# Patient Record
Sex: FEMALE | Race: WHITE | Employment: OTHER | URBAN - METROPOLITAN AREA
[De-identification: names, ages, dates, MRNs, and addresses within clinical notes are randomized per-mention and may not be internally consistent; named-entity substitution may affect disease eponyms.]

---

## 2017-01-24 PROBLEM — Z94.4 LIVER TRANSPLANT STATUS (HCC): Chronic | Status: ACTIVE | Noted: 2017-01-24

## 2017-01-24 PROBLEM — J44.9 COPD (CHRONIC OBSTRUCTIVE PULMONARY DISEASE) (HCC): Chronic | Status: ACTIVE | Noted: 2017-01-24

## 2017-01-24 PROBLEM — I10 HTN (HYPERTENSION), BENIGN: Chronic | Status: ACTIVE | Noted: 2017-01-24

## 2017-01-24 PROBLEM — J18.1 LOBAR PNEUMONIA (HCC): Status: ACTIVE | Noted: 2017-01-24

## 2017-01-30 ENCOUNTER — TELEPHONE (OUTPATIENT)
Dept: PHARMACY | Facility: CLINIC | Age: 57
End: 2017-01-30

## 2017-03-10 PROBLEM — J18.1 LOBAR PNEUMONIA (HCC): Status: RESOLVED | Noted: 2017-01-24 | Resolved: 2017-03-10

## 2018-03-15 RX ORDER — PEN NEEDLE, DIABETIC 32GX 5/32"
NEEDLE, DISPOSABLE MISCELLANEOUS
Qty: 200 EACH | Refills: 1 | Status: SHIPPED | OUTPATIENT
Start: 2018-03-15 | End: 2019-01-14 | Stop reason: SDUPTHER

## 2018-03-27 ENCOUNTER — OFFICE VISIT (OUTPATIENT)
Dept: FAMILY MEDICINE CLINIC | Age: 58
End: 2018-03-27
Payer: MEDICARE

## 2018-03-27 VITALS
BODY MASS INDEX: 36.48 KG/M2 | TEMPERATURE: 98 F | OXYGEN SATURATION: 97 % | WEIGHT: 227 LBS | SYSTOLIC BLOOD PRESSURE: 122 MMHG | HEIGHT: 66 IN | RESPIRATION RATE: 18 BRPM | HEART RATE: 74 BPM | DIASTOLIC BLOOD PRESSURE: 78 MMHG

## 2018-03-27 DIAGNOSIS — G89.29 CHRONIC MIDLINE LOW BACK PAIN WITHOUT SCIATICA: Primary | ICD-10-CM

## 2018-03-27 DIAGNOSIS — M79.10 MYALGIA: ICD-10-CM

## 2018-03-27 DIAGNOSIS — M54.50 CHRONIC MIDLINE LOW BACK PAIN WITHOUT SCIATICA: Primary | ICD-10-CM

## 2018-03-27 PROCEDURE — 3014F SCREEN MAMMO DOC REV: CPT | Performed by: NURSE PRACTITIONER

## 2018-03-27 PROCEDURE — G8427 DOCREV CUR MEDS BY ELIG CLIN: HCPCS | Performed by: NURSE PRACTITIONER

## 2018-03-27 PROCEDURE — G8482 FLU IMMUNIZE ORDER/ADMIN: HCPCS | Performed by: NURSE PRACTITIONER

## 2018-03-27 PROCEDURE — 4004F PT TOBACCO SCREEN RCVD TLK: CPT | Performed by: NURSE PRACTITIONER

## 2018-03-27 PROCEDURE — 3017F COLORECTAL CA SCREEN DOC REV: CPT | Performed by: NURSE PRACTITIONER

## 2018-03-27 PROCEDURE — G8417 CALC BMI ABV UP PARAM F/U: HCPCS | Performed by: NURSE PRACTITIONER

## 2018-03-27 PROCEDURE — 99213 OFFICE O/P EST LOW 20 MIN: CPT | Performed by: NURSE PRACTITIONER

## 2018-03-27 RX ORDER — HYDROCODONE BITARTRATE AND ACETAMINOPHEN 7.5; 325 MG/1; MG/1
1 TABLET ORAL 4 TIMES DAILY PRN
Qty: 120 TABLET | Refills: 0 | Status: SHIPPED | OUTPATIENT
Start: 2018-03-27 | End: 2018-04-25 | Stop reason: SDUPTHER

## 2018-03-27 ASSESSMENT — ENCOUNTER SYMPTOMS
VOMITING: 1
CONSTIPATION: 0
COUGH: 0
BACK PAIN: 1
NAUSEA: 1
SHORTNESS OF BREATH: 0
WHEEZING: 0
DIARRHEA: 0

## 2018-03-27 NOTE — PROGRESS NOTES
well-nourished. No distress. HENT:   Head: Normocephalic and atraumatic. Eyes: EOM are normal. Pupils are equal, round, and reactive to light. Neck: No tracheal deviation present. No thyromegaly present. Cardiovascular: Normal rate, regular rhythm and intact distal pulses. No murmur heard. Pulmonary/Chest: Effort normal and breath sounds normal. She has no wheezes. She has no rales. She exhibits no tenderness. Abdominal: Soft. Bowel sounds are normal. There is no tenderness. Musculoskeletal: She exhibits tenderness. Pain to lumbar spine with left SI tenderness. ROM limited due to pain. Negative SLR on left, positive SLR on right causing contralateral pain. Upper and lower extremities equal and strong. Uses cane for ambulation   Lymphadenopathy:     She has no cervical adenopathy. Neurological: She is alert and oriented to person, place, and time. No cranial nerve deficit. Coordination normal.   Skin: Skin is warm and dry. Psychiatric: She has a normal mood and affect. Her behavior is normal.         Assessment/Plan:  Amaris Pang was seen today for back pain and muscle pain. Diagnoses and all orders for this visit:    Chronic midline low back pain with sciatica  -     HYDROcodone-acetaminophen (NORCO) 7.5-325 MG per tablet; Take 1 tablet by mouth 4 times daily as needed for Pain for up to 30 days. Earliest Fill Date: 3/27/18  -The current medical regimen is effective;  continue present plan and medications. Myalgia    -stop lipitor for one week to see if myalgias resolve  -notify office of results    -Controlled Substances Monitoring: The Prescription Monitoring Report for this patient was reviewed today. Viv Gonzalez CNP)    No signs of potential drug abuse or diversion identified.  Viv Gonzalez CNP)

## 2018-04-09 ENCOUNTER — HOSPITAL ENCOUNTER (OUTPATIENT)
Age: 58
Discharge: HOME | End: 2018-04-09
Payer: MEDICARE

## 2018-04-09 LAB
ALBUMIN SERPL-MCNC: 4.5 G/DL (ref 3.5–5.2)
ALP BLD-CCNC: 160 U/L (ref 35–104)
ALT SERPL-CCNC: 10 U/L (ref 0–32)
ANION GAP SERPL CALCULATED.3IONS-SCNC: 13 MMOL/L (ref 7–16)
AST SERPL-CCNC: 13 U/L (ref 0–31)
BASOPHILS ABSOLUTE: 0.06 E9/L (ref 0–0.2)
BASOPHILS RELATIVE PERCENT: 0.9 % (ref 0–2)
BILIRUB SERPL-MCNC: 0.4 MG/DL (ref 0–1.2)
BUN BLDV-MCNC: 26 MG/DL (ref 6–20)
CALCIUM SERPL-MCNC: 9.1 MG/DL (ref 8.6–10.2)
CHLORIDE BLD-SCNC: 99 MMOL/L (ref 98–107)
CO2: 27 MMOL/L (ref 22–29)
CREAT SERPL-MCNC: 1.5 MG/DL (ref 0.5–1)
EOSINOPHILS ABSOLUTE: 0.17 E9/L (ref 0.05–0.5)
EOSINOPHILS RELATIVE PERCENT: 2.6 % (ref 0–6)
GAMMA GLUTAMYL TRANSFERASE: 14 U/L (ref 6–42)
GFR AFRICAN AMERICAN: 43
GFR NON-AFRICAN AMERICAN: 36 ML/MIN/1.73
GLUCOSE BLD-MCNC: 171 MG/DL (ref 74–109)
HCT VFR BLD CALC: 37.4 % (ref 34–48)
HEMOGLOBIN: 12.4 G/DL (ref 11.5–15.5)
IMMATURE GRANULOCYTES #: 0.02 E9/L
IMMATURE GRANULOCYTES %: 0.3 % (ref 0–5)
LYMPHOCYTES ABSOLUTE: 1.11 E9/L (ref 1.5–4)
LYMPHOCYTES RELATIVE PERCENT: 16.7 % (ref 20–42)
MAGNESIUM: 1.8 MG/DL (ref 1.6–2.6)
MCH RBC QN AUTO: 28.6 PG (ref 26–35)
MCHC RBC AUTO-ENTMCNC: 33.2 % (ref 32–34.5)
MCV RBC AUTO: 86.4 FL (ref 80–99.9)
MONOCYTES ABSOLUTE: 0.66 E9/L (ref 0.1–0.95)
MONOCYTES RELATIVE PERCENT: 9.9 % (ref 2–12)
NEUTROPHILS ABSOLUTE: 4.62 E9/L (ref 1.8–7.3)
NEUTROPHILS RELATIVE PERCENT: 69.6 % (ref 43–80)
PDW BLD-RTO: 13.9 FL (ref 11.5–15)
PHOSPHORUS: 4.6 MG/DL (ref 2.5–4.5)
PLATELET # BLD: 191 E9/L (ref 130–450)
PMV BLD AUTO: 11.1 FL (ref 7–12)
POTASSIUM SERPL-SCNC: 4.6 MMOL/L (ref 3.5–5)
RBC # BLD: 4.33 E12/L (ref 3.5–5.5)
SODIUM BLD-SCNC: 139 MMOL/L (ref 132–146)
TOTAL PROTEIN: 7.1 G/DL (ref 6.4–8.3)
WBC # BLD: 6.6 E9/L (ref 4.5–11.5)

## 2018-04-09 PROCEDURE — 85025 COMPLETE CBC W/AUTO DIFF WBC: CPT

## 2018-04-09 PROCEDURE — 82977 ASSAY OF GGT: CPT

## 2018-04-09 PROCEDURE — 83735 ASSAY OF MAGNESIUM: CPT

## 2018-04-09 PROCEDURE — 36415 COLL VENOUS BLD VENIPUNCTURE: CPT

## 2018-04-09 PROCEDURE — 84100 ASSAY OF PHOSPHORUS: CPT

## 2018-04-09 PROCEDURE — 80053 COMPREHEN METABOLIC PANEL: CPT

## 2018-04-25 ENCOUNTER — OFFICE VISIT (OUTPATIENT)
Dept: FAMILY MEDICINE CLINIC | Age: 58
End: 2018-04-25
Payer: MEDICARE

## 2018-04-25 ENCOUNTER — HOSPITAL ENCOUNTER (OUTPATIENT)
Age: 58
Discharge: HOME | End: 2018-04-27
Payer: MEDICARE

## 2018-04-25 VITALS
HEIGHT: 66 IN | OXYGEN SATURATION: 97 % | DIASTOLIC BLOOD PRESSURE: 78 MMHG | HEART RATE: 98 BPM | TEMPERATURE: 98.2 F | BODY MASS INDEX: 35.52 KG/M2 | WEIGHT: 221 LBS | SYSTOLIC BLOOD PRESSURE: 136 MMHG | RESPIRATION RATE: 18 BRPM

## 2018-04-25 DIAGNOSIS — M54.50 CHRONIC MIDLINE LOW BACK PAIN WITHOUT SCIATICA: Primary | ICD-10-CM

## 2018-04-25 DIAGNOSIS — G89.29 CHRONIC MIDLINE LOW BACK PAIN WITHOUT SCIATICA: Primary | ICD-10-CM

## 2018-04-25 LAB
CREATININE URINE: 46 MG/DL (ref 29–226)
HBA1C MFR BLD: 7.1 %
MICROALBUMIN UR-MCNC: 29 MG/L
MICROALBUMIN/CREAT UR-RTO: 63 (ref 0–30)

## 2018-04-25 PROCEDURE — G8427 DOCREV CUR MEDS BY ELIG CLIN: HCPCS | Performed by: NURSE PRACTITIONER

## 2018-04-25 PROCEDURE — 99213 OFFICE O/P EST LOW 20 MIN: CPT | Performed by: NURSE PRACTITIONER

## 2018-04-25 PROCEDURE — 4004F PT TOBACCO SCREEN RCVD TLK: CPT | Performed by: NURSE PRACTITIONER

## 2018-04-25 PROCEDURE — 3045F PR MOST RECENT HEMOGLOBIN A1C LEVEL 7.0-9.0%: CPT | Performed by: NURSE PRACTITIONER

## 2018-04-25 PROCEDURE — 2022F DILAT RTA XM EVC RTNOPTHY: CPT | Performed by: NURSE PRACTITIONER

## 2018-04-25 PROCEDURE — 83036 HEMOGLOBIN GLYCOSYLATED A1C: CPT | Performed by: NURSE PRACTITIONER

## 2018-04-25 PROCEDURE — 82570 ASSAY OF URINE CREATININE: CPT

## 2018-04-25 PROCEDURE — 3017F COLORECTAL CA SCREEN DOC REV: CPT | Performed by: NURSE PRACTITIONER

## 2018-04-25 PROCEDURE — G8417 CALC BMI ABV UP PARAM F/U: HCPCS | Performed by: NURSE PRACTITIONER

## 2018-04-25 PROCEDURE — 82044 UR ALBUMIN SEMIQUANTITATIVE: CPT

## 2018-04-25 RX ORDER — HYDROCODONE BITARTRATE AND ACETAMINOPHEN 7.5; 325 MG/1; MG/1
1 TABLET ORAL 4 TIMES DAILY PRN
Qty: 120 TABLET | Refills: 0 | Status: SHIPPED | OUTPATIENT
Start: 2018-04-25 | End: 2018-05-22 | Stop reason: SDUPTHER

## 2018-04-25 ASSESSMENT — ENCOUNTER SYMPTOMS
VOMITING: 0
CONSTIPATION: 0
NAUSEA: 0
COUGH: 0
WHEEZING: 0
DIARRHEA: 0
BACK PAIN: 1
SHORTNESS OF BREATH: 1

## 2018-05-01 ENCOUNTER — TELEPHONE (OUTPATIENT)
Dept: FAMILY MEDICINE CLINIC | Age: 58
End: 2018-05-01

## 2018-05-01 RX ORDER — INSULIN GLARGINE 100 [IU]/ML
10 INJECTION, SOLUTION SUBCUTANEOUS NIGHTLY
Qty: 5 PEN | Refills: 3
Start: 2018-05-01 | End: 2018-05-22 | Stop reason: SDUPTHER

## 2018-05-22 ENCOUNTER — OFFICE VISIT (OUTPATIENT)
Dept: FAMILY MEDICINE CLINIC | Age: 58
End: 2018-05-22
Payer: MEDICARE

## 2018-05-22 VITALS
TEMPERATURE: 98.7 F | OXYGEN SATURATION: 98 % | HEART RATE: 70 BPM | HEIGHT: 66 IN | RESPIRATION RATE: 16 BRPM | DIASTOLIC BLOOD PRESSURE: 82 MMHG | BODY MASS INDEX: 34.23 KG/M2 | SYSTOLIC BLOOD PRESSURE: 124 MMHG | WEIGHT: 213 LBS

## 2018-05-22 DIAGNOSIS — Z71.6 TOBACCO ABUSE COUNSELING: ICD-10-CM

## 2018-05-22 DIAGNOSIS — R06.02 SHORTNESS OF BREATH: ICD-10-CM

## 2018-05-22 DIAGNOSIS — Z94.4 STATUS POST LIVER TRANSPLANTATION (HCC): Chronic | ICD-10-CM

## 2018-05-22 DIAGNOSIS — M54.50 CHRONIC MIDLINE LOW BACK PAIN WITHOUT SCIATICA: Primary | ICD-10-CM

## 2018-05-22 DIAGNOSIS — Q21.12 PFO (PATENT FORAMEN OVALE): Chronic | ICD-10-CM

## 2018-05-22 DIAGNOSIS — G89.29 CHRONIC MIDLINE LOW BACK PAIN WITHOUT SCIATICA: Primary | ICD-10-CM

## 2018-05-22 DIAGNOSIS — K21.9 GASTROESOPHAGEAL REFLUX DISEASE, ESOPHAGITIS PRESENCE NOT SPECIFIED: Chronic | ICD-10-CM

## 2018-05-22 PROCEDURE — 99213 OFFICE O/P EST LOW 20 MIN: CPT | Performed by: NURSE PRACTITIONER

## 2018-05-22 PROCEDURE — G8417 CALC BMI ABV UP PARAM F/U: HCPCS | Performed by: NURSE PRACTITIONER

## 2018-05-22 PROCEDURE — 2022F DILAT RTA XM EVC RTNOPTHY: CPT | Performed by: NURSE PRACTITIONER

## 2018-05-22 PROCEDURE — 3045F PR MOST RECENT HEMOGLOBIN A1C LEVEL 7.0-9.0%: CPT | Performed by: NURSE PRACTITIONER

## 2018-05-22 PROCEDURE — G8427 DOCREV CUR MEDS BY ELIG CLIN: HCPCS | Performed by: NURSE PRACTITIONER

## 2018-05-22 PROCEDURE — 4004F PT TOBACCO SCREEN RCVD TLK: CPT | Performed by: NURSE PRACTITIONER

## 2018-05-22 PROCEDURE — 3017F COLORECTAL CA SCREEN DOC REV: CPT | Performed by: NURSE PRACTITIONER

## 2018-05-22 RX ORDER — INSULIN GLARGINE 100 [IU]/ML
10 INJECTION, SOLUTION SUBCUTANEOUS NIGHTLY
Qty: 15 PEN | Refills: 1 | Status: SHIPPED | OUTPATIENT
Start: 2018-05-22 | End: 2018-11-21 | Stop reason: SDUPTHER

## 2018-05-22 RX ORDER — FUROSEMIDE 40 MG/1
TABLET ORAL
Qty: 180 TABLET | Refills: 2 | Status: SHIPPED | OUTPATIENT
Start: 2018-05-22 | End: 2018-10-19 | Stop reason: ALTCHOICE

## 2018-05-22 RX ORDER — ATORVASTATIN CALCIUM 10 MG/1
10 TABLET, FILM COATED ORAL DAILY
Qty: 90 TABLET | Refills: 3 | Status: SHIPPED | OUTPATIENT
Start: 2018-05-22 | End: 2018-11-21 | Stop reason: SDUPTHER

## 2018-05-22 RX ORDER — HYDROCODONE BITARTRATE AND ACETAMINOPHEN 7.5; 325 MG/1; MG/1
1 TABLET ORAL 4 TIMES DAILY PRN
Qty: 120 TABLET | Refills: 0 | Status: SHIPPED | OUTPATIENT
Start: 2018-05-22 | End: 2018-06-20 | Stop reason: SDUPTHER

## 2018-05-22 RX ORDER — BUPROPION HYDROCHLORIDE 150 MG/1
TABLET, EXTENDED RELEASE ORAL
Qty: 180 TABLET | Refills: 1 | Status: SHIPPED | OUTPATIENT
Start: 2018-05-22 | End: 2018-10-12 | Stop reason: SDUPTHER

## 2018-05-22 RX ORDER — ALBUTEROL SULFATE 90 UG/1
AEROSOL, METERED RESPIRATORY (INHALATION)
Qty: 3 INHALER | Refills: 3 | Status: SHIPPED
Start: 2018-05-22 | End: 2020-06-01 | Stop reason: SDUPTHER

## 2018-05-22 RX ORDER — OMEPRAZOLE 40 MG/1
CAPSULE, DELAYED RELEASE ORAL
Qty: 90 CAPSULE | Refills: 2 | Status: SHIPPED | OUTPATIENT
Start: 2018-05-22 | End: 2018-12-27 | Stop reason: SDUPTHER

## 2018-05-22 RX ORDER — SULFAMETHOXAZOLE AND TRIMETHOPRIM 400; 80 MG/1; MG/1
1 TABLET ORAL DAILY
Qty: 90 TABLET | Refills: 3 | Status: SHIPPED | OUTPATIENT
Start: 2018-05-22 | End: 2019-05-29

## 2018-05-22 ASSESSMENT — ENCOUNTER SYMPTOMS
NAUSEA: 0
VOMITING: 0
CONSTIPATION: 0
BACK PAIN: 1
BLURRED VISION: 1
WHEEZING: 0
SHORTNESS OF BREATH: 1
DIARRHEA: 0
COUGH: 0
HEARTBURN: 1

## 2018-06-11 ENCOUNTER — HOSPITAL ENCOUNTER (OUTPATIENT)
Age: 58
Discharge: HOME | End: 2018-06-11
Payer: MEDICARE

## 2018-06-11 LAB
ALBUMIN SERPL-MCNC: 4.3 G/DL (ref 3.5–5.2)
ALP BLD-CCNC: 121 U/L (ref 35–104)
ALT SERPL-CCNC: 12 U/L (ref 0–32)
ANION GAP SERPL CALCULATED.3IONS-SCNC: 15 MMOL/L (ref 7–16)
AST SERPL-CCNC: 14 U/L (ref 0–31)
BASOPHILS ABSOLUTE: 0.04 E9/L (ref 0–0.2)
BASOPHILS RELATIVE PERCENT: 0.7 % (ref 0–2)
BILIRUB SERPL-MCNC: 0.3 MG/DL (ref 0–1.2)
BUN BLDV-MCNC: 25 MG/DL (ref 6–20)
CALCIUM SERPL-MCNC: 8 MG/DL (ref 8.6–10.2)
CHLORIDE BLD-SCNC: 102 MMOL/L (ref 98–107)
CHOLESTEROL, TOTAL: 141 MG/DL (ref 0–199)
CO2: 23 MMOL/L (ref 22–29)
CREAT SERPL-MCNC: 1.9 MG/DL (ref 0.5–1)
EOSINOPHILS ABSOLUTE: 0.22 E9/L (ref 0.05–0.5)
EOSINOPHILS RELATIVE PERCENT: 3.9 % (ref 0–6)
GAMMA GLUTAMYL TRANSFERASE: 13 U/L (ref 6–42)
GFR AFRICAN AMERICAN: 33
GFR NON-AFRICAN AMERICAN: 27 ML/MIN/1.73
GLUCOSE BLD-MCNC: 129 MG/DL (ref 74–109)
HCT VFR BLD CALC: 36 % (ref 34–48)
HDLC SERPL-MCNC: 46 MG/DL
HEMOGLOBIN: 12.1 G/DL (ref 11.5–15.5)
IMMATURE GRANULOCYTES #: 0.02 E9/L
IMMATURE GRANULOCYTES %: 0.4 % (ref 0–5)
LDL CHOLESTEROL CALCULATED: 60 MG/DL (ref 0–99)
LYMPHOCYTES ABSOLUTE: 1.07 E9/L (ref 1.5–4)
LYMPHOCYTES RELATIVE PERCENT: 18.9 % (ref 20–42)
MAGNESIUM: 1.2 MG/DL (ref 1.6–2.6)
MCH RBC QN AUTO: 29.4 PG (ref 26–35)
MCHC RBC AUTO-ENTMCNC: 33.6 % (ref 32–34.5)
MCV RBC AUTO: 87.6 FL (ref 80–99.9)
MONOCYTES ABSOLUTE: 0.47 E9/L (ref 0.1–0.95)
MONOCYTES RELATIVE PERCENT: 8.3 % (ref 2–12)
NEUTROPHILS ABSOLUTE: 3.84 E9/L (ref 1.8–7.3)
NEUTROPHILS RELATIVE PERCENT: 67.8 % (ref 43–80)
PDW BLD-RTO: 14.7 FL (ref 11.5–15)
PHOSPHORUS: 4.3 MG/DL (ref 2.5–4.5)
PLATELET # BLD: 184 E9/L (ref 130–450)
PMV BLD AUTO: 10.2 FL (ref 7–12)
POTASSIUM SERPL-SCNC: 4.2 MMOL/L (ref 3.5–5)
RBC # BLD: 4.11 E12/L (ref 3.5–5.5)
SODIUM BLD-SCNC: 140 MMOL/L (ref 132–146)
TOTAL PROTEIN: 6.7 G/DL (ref 6.4–8.3)
TRIGL SERPL-MCNC: 174 MG/DL (ref 0–149)
VLDLC SERPL CALC-MCNC: 35 MG/DL
WBC # BLD: 5.7 E9/L (ref 4.5–11.5)

## 2018-06-11 PROCEDURE — 87522 HEPATITIS C REVRS TRNSCRPJ: CPT

## 2018-06-11 PROCEDURE — 36415 COLL VENOUS BLD VENIPUNCTURE: CPT

## 2018-06-11 PROCEDURE — 80061 LIPID PANEL: CPT

## 2018-06-11 PROCEDURE — 82977 ASSAY OF GGT: CPT

## 2018-06-11 PROCEDURE — 84100 ASSAY OF PHOSPHORUS: CPT

## 2018-06-11 PROCEDURE — 85025 COMPLETE CBC W/AUTO DIFF WBC: CPT

## 2018-06-11 PROCEDURE — 80053 COMPREHEN METABOLIC PANEL: CPT

## 2018-06-11 PROCEDURE — 83735 ASSAY OF MAGNESIUM: CPT

## 2018-06-14 LAB
EER HCV RNA QNT PCR: NORMAL
HCV RNA QNT REAL-TIME PCR INTERP: NOT DETECTED
HCV RNA, QUANTITATIVE REAL TIME PCR: <1.2 LOG IU
HEPATITIS C RNA PCR QUANT: <15 IU/ML

## 2018-06-20 ENCOUNTER — OFFICE VISIT (OUTPATIENT)
Dept: FAMILY MEDICINE CLINIC | Age: 58
End: 2018-06-20
Payer: MEDICARE

## 2018-06-20 VITALS
SYSTOLIC BLOOD PRESSURE: 124 MMHG | TEMPERATURE: 98.2 F | WEIGHT: 213 LBS | BODY MASS INDEX: 34.23 KG/M2 | HEIGHT: 66 IN | DIASTOLIC BLOOD PRESSURE: 82 MMHG | OXYGEN SATURATION: 98 % | HEART RATE: 76 BPM | RESPIRATION RATE: 16 BRPM

## 2018-06-20 DIAGNOSIS — G89.29 CHRONIC MIDLINE LOW BACK PAIN WITHOUT SCIATICA: Primary | ICD-10-CM

## 2018-06-20 DIAGNOSIS — R42 POSITIONAL LIGHTHEADEDNESS: ICD-10-CM

## 2018-06-20 DIAGNOSIS — M54.50 CHRONIC MIDLINE LOW BACK PAIN WITHOUT SCIATICA: Primary | ICD-10-CM

## 2018-06-20 PROCEDURE — G8427 DOCREV CUR MEDS BY ELIG CLIN: HCPCS | Performed by: NURSE PRACTITIONER

## 2018-06-20 PROCEDURE — 3017F COLORECTAL CA SCREEN DOC REV: CPT | Performed by: NURSE PRACTITIONER

## 2018-06-20 PROCEDURE — 4004F PT TOBACCO SCREEN RCVD TLK: CPT | Performed by: NURSE PRACTITIONER

## 2018-06-20 PROCEDURE — 99213 OFFICE O/P EST LOW 20 MIN: CPT | Performed by: NURSE PRACTITIONER

## 2018-06-20 PROCEDURE — G8417 CALC BMI ABV UP PARAM F/U: HCPCS | Performed by: NURSE PRACTITIONER

## 2018-06-20 RX ORDER — HYDROCODONE BITARTRATE AND ACETAMINOPHEN 7.5; 325 MG/1; MG/1
1 TABLET ORAL 4 TIMES DAILY PRN
Qty: 120 TABLET | Refills: 0 | Status: SHIPPED | OUTPATIENT
Start: 2018-06-20 | End: 2018-07-20 | Stop reason: SDUPTHER

## 2018-06-20 ASSESSMENT — ENCOUNTER SYMPTOMS
BLURRED VISION: 0
COUGH: 0
DOUBLE VISION: 0
DIARRHEA: 0
NAUSEA: 0
WHEEZING: 0
VOMITING: 0
HEARTBURN: 0
SHORTNESS OF BREATH: 0
BACK PAIN: 1
CONSTIPATION: 0

## 2018-06-20 ASSESSMENT — PATIENT HEALTH QUESTIONNAIRE - PHQ9
SUM OF ALL RESPONSES TO PHQ9 QUESTIONS 1 & 2: 0
2. FEELING DOWN, DEPRESSED OR HOPELESS: 0
SUM OF ALL RESPONSES TO PHQ QUESTIONS 1-9: 0
1. LITTLE INTEREST OR PLEASURE IN DOING THINGS: 0

## 2018-06-28 LAB
Lab: NORMAL
REPORT: NORMAL
THIS TEST SENT TO: NORMAL

## 2018-07-06 LAB — DIABETIC RETINOPATHY: NORMAL

## 2018-07-20 ENCOUNTER — OFFICE VISIT (OUTPATIENT)
Dept: FAMILY MEDICINE CLINIC | Age: 58
End: 2018-07-20
Payer: MEDICARE

## 2018-07-20 ENCOUNTER — HOSPITAL ENCOUNTER (OUTPATIENT)
Age: 58
Discharge: HOME | End: 2018-07-22
Payer: MEDICARE

## 2018-07-20 VITALS
HEIGHT: 66 IN | TEMPERATURE: 98 F | SYSTOLIC BLOOD PRESSURE: 170 MMHG | WEIGHT: 210 LBS | DIASTOLIC BLOOD PRESSURE: 102 MMHG | BODY MASS INDEX: 33.75 KG/M2 | RESPIRATION RATE: 16 BRPM | OXYGEN SATURATION: 98 % | HEART RATE: 66 BPM

## 2018-07-20 DIAGNOSIS — I10 HTN (HYPERTENSION), BENIGN: Chronic | ICD-10-CM

## 2018-07-20 DIAGNOSIS — G89.29 CHRONIC MIDLINE LOW BACK PAIN WITHOUT SCIATICA: Primary | ICD-10-CM

## 2018-07-20 DIAGNOSIS — Z51.81 THERAPEUTIC DRUG MONITORING: ICD-10-CM

## 2018-07-20 DIAGNOSIS — R11.2 NAUSEA AND VOMITING, INTRACTABILITY OF VOMITING NOT SPECIFIED, UNSPECIFIED VOMITING TYPE: ICD-10-CM

## 2018-07-20 DIAGNOSIS — M54.50 CHRONIC MIDLINE LOW BACK PAIN WITHOUT SCIATICA: Primary | ICD-10-CM

## 2018-07-20 LAB
AMPHETAMINE SCREEN, URINE: NOT DETECTED
BARBITURATE SCREEN URINE: NOT DETECTED
BENZODIAZEPINE SCREEN, URINE: NOT DETECTED
CANNABINOID SCREEN URINE: POSITIVE
COCAINE METABOLITE SCREEN URINE: NOT DETECTED
HBA1C MFR BLD: 7.9 %
METHADONE SCREEN, URINE: NOT DETECTED
OPIATE SCREEN URINE: NOT DETECTED
PHENCYCLIDINE SCREEN URINE: NOT DETECTED
PROPOXYPHENE SCREEN: NOT DETECTED

## 2018-07-20 PROCEDURE — 80307 DRUG TEST PRSMV CHEM ANLYZR: CPT

## 2018-07-20 PROCEDURE — 4004F PT TOBACCO SCREEN RCVD TLK: CPT | Performed by: NURSE PRACTITIONER

## 2018-07-20 PROCEDURE — 3017F COLORECTAL CA SCREEN DOC REV: CPT | Performed by: NURSE PRACTITIONER

## 2018-07-20 PROCEDURE — G0480 DRUG TEST DEF 1-7 CLASSES: HCPCS

## 2018-07-20 PROCEDURE — 99214 OFFICE O/P EST MOD 30 MIN: CPT | Performed by: NURSE PRACTITIONER

## 2018-07-20 PROCEDURE — G8427 DOCREV CUR MEDS BY ELIG CLIN: HCPCS | Performed by: NURSE PRACTITIONER

## 2018-07-20 PROCEDURE — G8417 CALC BMI ABV UP PARAM F/U: HCPCS | Performed by: NURSE PRACTITIONER

## 2018-07-20 PROCEDURE — 83036 HEMOGLOBIN GLYCOSYLATED A1C: CPT | Performed by: NURSE PRACTITIONER

## 2018-07-20 PROCEDURE — 3045F PR MOST RECENT HEMOGLOBIN A1C LEVEL 7.0-9.0%: CPT | Performed by: NURSE PRACTITIONER

## 2018-07-20 PROCEDURE — 2022F DILAT RTA XM EVC RTNOPTHY: CPT | Performed by: NURSE PRACTITIONER

## 2018-07-20 RX ORDER — LOSARTAN POTASSIUM 50 MG/1
50 TABLET ORAL DAILY
Qty: 90 TABLET | Refills: 3 | Status: SHIPPED | OUTPATIENT
Start: 2018-07-20 | End: 2018-09-21 | Stop reason: SDUPTHER

## 2018-07-20 RX ORDER — ONDANSETRON 4 MG/1
4 TABLET, FILM COATED ORAL EVERY 8 HOURS PRN
Qty: 15 TABLET | Refills: 0 | Status: SHIPPED | OUTPATIENT
Start: 2018-07-20 | End: 2018-07-27 | Stop reason: SDUPTHER

## 2018-07-20 RX ORDER — HYDROCODONE BITARTRATE AND ACETAMINOPHEN 7.5; 325 MG/1; MG/1
1 TABLET ORAL 4 TIMES DAILY PRN
Qty: 120 TABLET | Refills: 0 | Status: SHIPPED | OUTPATIENT
Start: 2018-07-20 | End: 2018-08-21 | Stop reason: SDUPTHER

## 2018-07-20 ASSESSMENT — ENCOUNTER SYMPTOMS
DIARRHEA: 0
COUGH: 0
WHEEZING: 0
BACK PAIN: 1
NAUSEA: 1
SHORTNESS OF BREATH: 1
CONSTIPATION: 0
VOMITING: 1

## 2018-07-20 NOTE — PROGRESS NOTES
HPI:  Patient comes in today for   Chief Complaint   Patient presents with    Back Pain     low back pain radiates down legs. pain is 8/10. pt needs refill of meds    Diabetes     A1C today   . Chronic low back pain that radiates down legs, right worse than left. Aggravated by bending for long periods of time. Uses cane for ambulation. Patient is compliant with medications, diet and exercise. Her exercise ability is limited but she does walk and uses an elliptical. Her FBS has been < 150 mg/dl     Patient was on BP medication for one month but then it was DC'd due to low BP 2 months ago. She was monitoring her BP at home and it was 120s/70s    Complains of nausea and vomiting for past 2 days. States she has had difficulty even keeping liquids down. Prior to Visit Medications    Medication Sig Taking? Authorizing Provider   HYDROcodone-acetaminophen (NORCO) 7.5-325 MG per tablet Take 1 tablet by mouth 4 times daily as needed for Pain for up to 30 days. Margie Harmonthers Date: 7/20/18 Yes Tiffany Briscoe APRN - CNP   losartan (COZAAR) 50 MG tablet Take 1 tablet by mouth daily Yes Tiffany Briscoe APRN - CNP   ondansetron (ZOFRAN) 4 MG tablet Take 1 tablet by mouth every 8 hours as needed for Nausea or Vomiting Yes Tiffany Briscoe APRN - CNP   buPROPion (WELLBUTRIN SR) 150 MG extended release tablet TAKE 1 TABLET BY MOUTH TWICE DAILY Yes MIKEL Regalado - CNP   LANTUS SOLOSTAR 100 UNIT/ML injection pen Inject 10 Units into the skin nightly Yes MIKEL Fontana CNP   omeprazole (PRILOSEC) 40 MG delayed release capsule TAKE 1 CAPSULE BY MOUTH EVERY DAY Yes MIKEL Regalado CNP   Liraglutide (VICTOZA) 18 MG/3ML SOPN SC injection Inject 1.8 mg/day Yes MIKEL Regalado CNP   atorvastatin (LIPITOR) 10 MG tablet Take 1 tablet by mouth daily Yes MIKEL Regalado CNP   furosemide (LASIX) 40 MG tablet TAKE 2 TABLETS BY MOUTH EVERY DAY Yes MIKEL Denson - CNP   albuterol sulfate improving). Negative for cough and wheezing. Cardiovascular: Negative for chest pain and palpitations. Gastrointestinal: Positive for nausea and vomiting. Negative for constipation and diarrhea. Musculoskeletal: Positive for back pain and myalgias. Neurological: Negative for dizziness, tingling, weakness and headaches. Endo/Heme/Allergies: Negative for polydipsia. VS:  BP (!) 170/102   Pulse 66   Temp 98 °F (36.7 °C)   Resp 16   Ht 5' 6\" (1.676 m)   Wt 210 lb (95.3 kg)   SpO2 98%   BMI 33.89 kg/m²     Physical Exam  Physical Exam   Constitutional: She is oriented to person, place, and time. She appears well-developed and well-nourished. No distress. HENT:   Head: Normocephalic and atraumatic. Neck: No tracheal deviation present. No thyromegaly present. Cardiovascular: Normal rate, regular rhythm and intact distal pulses. No murmur heard. Pulmonary/Chest: Effort normal and breath sounds normal. She has no wheezes. She has no rales. She exhibits no tenderness. Abdominal: Soft. Bowel sounds are normal. There is no tenderness. Musculoskeletal: She exhibits edema (trace pitting edema to BLE) and tenderness. Pain to lumbar spine and right SI.  ROM limited due to pain. Negative SLR bilateral. Upper and lower extremities equal and strong. Lymphadenopathy:     She has no cervical adenopathy. Neurological: She is alert and oriented to person, place, and time. Skin: Skin is warm and dry. Psychiatric: She has a normal mood and affect. Her behavior is normal.         Assessment/Plan:  [facility] and diabetes. Diagnoses and all orders for this visit:    Chronic midline low back pain with sciatica  -     HYDROcodone-acetaminophen (NORCO) 7.5-325 MG per tablet; Take 1 tablet by mouth 4 times daily as needed for Pain for up to 30 days. Diogenes Savage Date: 7/20/18  -The current medical regimen is effective;  continue present plan and medications. Uncontrolled type 2 diabetes mellitus without complication, with long-term current use of insulin (HCC)  -     POCT glycosylated hemoglobin (Hb A1C)  The current medical regimen is effective;  continue present plan and medications. Nausea and vomiting, intractability of vomiting not specified, unspecified vomiting type  -     ondansetron (ZOFRAN) 4 MG tablet; Take 1 tablet by mouth every 8 hours as needed for Nausea or Vomiting  -if persists, advised to go to ER    HTN (hypertension), benign  -     losartan (COZAAR) 50 MG tablet; Take 1 tablet by mouth daily  -restart losartan and monitor BP at home with goal of < 140/90    Therapeutic drug monitoring  -     URINE DRUG SCREEN    -  Controlled Substances Monitoring:     RX Monitoring 7/20/2018   Attestation The Prescription Monitoring Report for this patient was reviewed today. Documentation No signs of potential drug abuse or diversion identified.;Obtaining appropriate analgesic effect of treatment.    Medication Contracts -         Greater than 25  Minutes was spent with patient and more than 50% of the time was spent face to face counseling and educating regarding diagnoses

## 2018-07-25 LAB
6AM URINE: <10 NG/ML
CODEINE, URINE: <20 NG/ML
HYDROCODONE, URINE: 188 NG/ML
HYDROMORPHONE, URINE: <20 NG/ML
MORPHINE URINE: <20 NG/ML
NORHYDROCODONE, URINE: 240 NG/ML
NOROXYCODONE, URINE: <20 NG/ML
NOROXYMORPHONE, URINE: <20 NG/ML
OXYCODONE, URINE CONFIRMATION: <20 NG/ML
OXYMORPHONE, URINE: <20 NG/ML

## 2018-07-26 LAB — CANNABINOIDS CONF, URINE: 43 NG/ML

## 2018-07-27 DIAGNOSIS — R11.2 NAUSEA AND VOMITING, INTRACTABILITY OF VOMITING NOT SPECIFIED, UNSPECIFIED VOMITING TYPE: ICD-10-CM

## 2018-07-27 RX ORDER — ONDANSETRON 4 MG/1
4 TABLET, FILM COATED ORAL EVERY 8 HOURS PRN
Qty: 15 TABLET | Refills: 0 | Status: SHIPPED | OUTPATIENT
Start: 2018-07-27 | End: 2018-10-19

## 2018-08-13 ENCOUNTER — HOSPITAL ENCOUNTER (OUTPATIENT)
Age: 58
Discharge: HOME | End: 2018-08-13
Payer: MEDICARE

## 2018-08-13 LAB
ALBUMIN SERPL-MCNC: 4.5 G/DL (ref 3.5–5.2)
ALP BLD-CCNC: 122 U/L (ref 35–104)
ALT SERPL-CCNC: 8 U/L (ref 0–32)
ANION GAP SERPL CALCULATED.3IONS-SCNC: 14 MMOL/L (ref 7–16)
AST SERPL-CCNC: 10 U/L (ref 0–31)
BASOPHILS ABSOLUTE: 0.04 E9/L (ref 0–0.2)
BASOPHILS RELATIVE PERCENT: 0.6 % (ref 0–2)
BILIRUB SERPL-MCNC: 0.5 MG/DL (ref 0–1.2)
BUN BLDV-MCNC: 31 MG/DL (ref 6–20)
CALCIUM SERPL-MCNC: 8.3 MG/DL (ref 8.6–10.2)
CHLORIDE BLD-SCNC: 98 MMOL/L (ref 98–107)
CO2: 27 MMOL/L (ref 22–29)
CREAT SERPL-MCNC: 2.1 MG/DL (ref 0.5–1)
EOSINOPHILS ABSOLUTE: 0.23 E9/L (ref 0.05–0.5)
EOSINOPHILS RELATIVE PERCENT: 3.6 % (ref 0–6)
GAMMA GLUTAMYL TRANSFERASE: 13 U/L (ref 6–42)
GFR AFRICAN AMERICAN: 29
GFR NON-AFRICAN AMERICAN: 24 ML/MIN/1.73
GLUCOSE BLD-MCNC: 189 MG/DL (ref 74–109)
HCT VFR BLD CALC: 38.2 % (ref 34–48)
HEMOGLOBIN: 12.7 G/DL (ref 11.5–15.5)
IMMATURE GRANULOCYTES #: 0.03 E9/L
IMMATURE GRANULOCYTES %: 0.5 % (ref 0–5)
LYMPHOCYTES ABSOLUTE: 1.18 E9/L (ref 1.5–4)
LYMPHOCYTES RELATIVE PERCENT: 18.4 % (ref 20–42)
MAGNESIUM: 1.4 MG/DL (ref 1.6–2.6)
MCH RBC QN AUTO: 29.5 PG (ref 26–35)
MCHC RBC AUTO-ENTMCNC: 33.2 % (ref 32–34.5)
MCV RBC AUTO: 88.8 FL (ref 80–99.9)
MONOCYTES ABSOLUTE: 0.49 E9/L (ref 0.1–0.95)
MONOCYTES RELATIVE PERCENT: 7.6 % (ref 2–12)
NEUTROPHILS ABSOLUTE: 4.44 E9/L (ref 1.8–7.3)
NEUTROPHILS RELATIVE PERCENT: 69.3 % (ref 43–80)
PDW BLD-RTO: 14.2 FL (ref 11.5–15)
PHOSPHORUS: 4.3 MG/DL (ref 2.5–4.5)
PLATELET # BLD: 178 E9/L (ref 130–450)
PMV BLD AUTO: 10.4 FL (ref 7–12)
POTASSIUM SERPL-SCNC: 5.1 MMOL/L (ref 3.5–5)
RBC # BLD: 4.3 E12/L (ref 3.5–5.5)
SODIUM BLD-SCNC: 139 MMOL/L (ref 132–146)
TOTAL PROTEIN: 7 G/DL (ref 6.4–8.3)
WBC # BLD: 6.4 E9/L (ref 4.5–11.5)

## 2018-08-13 PROCEDURE — 80053 COMPREHEN METABOLIC PANEL: CPT

## 2018-08-13 PROCEDURE — 36415 COLL VENOUS BLD VENIPUNCTURE: CPT

## 2018-08-13 PROCEDURE — 84100 ASSAY OF PHOSPHORUS: CPT

## 2018-08-13 PROCEDURE — 83735 ASSAY OF MAGNESIUM: CPT

## 2018-08-13 PROCEDURE — 85025 COMPLETE CBC W/AUTO DIFF WBC: CPT

## 2018-08-13 PROCEDURE — 82977 ASSAY OF GGT: CPT

## 2018-08-14 LAB
Lab: NORMAL
REPORT: NORMAL
THIS TEST SENT TO: NORMAL

## 2018-08-21 ENCOUNTER — OFFICE VISIT (OUTPATIENT)
Dept: FAMILY MEDICINE CLINIC | Age: 58
End: 2018-08-21
Payer: MEDICARE

## 2018-08-21 VITALS
HEIGHT: 66 IN | DIASTOLIC BLOOD PRESSURE: 88 MMHG | WEIGHT: 208 LBS | RESPIRATION RATE: 16 BRPM | TEMPERATURE: 98.2 F | HEART RATE: 75 BPM | SYSTOLIC BLOOD PRESSURE: 138 MMHG | BODY MASS INDEX: 33.43 KG/M2 | OXYGEN SATURATION: 98 %

## 2018-08-21 DIAGNOSIS — G89.29 CHRONIC MIDLINE LOW BACK PAIN WITHOUT SCIATICA: Primary | ICD-10-CM

## 2018-08-21 DIAGNOSIS — I10 HTN (HYPERTENSION), BENIGN: Chronic | ICD-10-CM

## 2018-08-21 DIAGNOSIS — M54.50 CHRONIC MIDLINE LOW BACK PAIN WITHOUT SCIATICA: Primary | ICD-10-CM

## 2018-08-21 PROCEDURE — 4004F PT TOBACCO SCREEN RCVD TLK: CPT | Performed by: NURSE PRACTITIONER

## 2018-08-21 PROCEDURE — 99213 OFFICE O/P EST LOW 20 MIN: CPT | Performed by: NURSE PRACTITIONER

## 2018-08-21 PROCEDURE — G8427 DOCREV CUR MEDS BY ELIG CLIN: HCPCS | Performed by: NURSE PRACTITIONER

## 2018-08-21 PROCEDURE — 3017F COLORECTAL CA SCREEN DOC REV: CPT | Performed by: NURSE PRACTITIONER

## 2018-08-21 PROCEDURE — G8417 CALC BMI ABV UP PARAM F/U: HCPCS | Performed by: NURSE PRACTITIONER

## 2018-08-21 RX ORDER — HYDROCODONE BITARTRATE AND ACETAMINOPHEN 7.5; 325 MG/1; MG/1
1 TABLET ORAL 4 TIMES DAILY PRN
Qty: 120 TABLET | Refills: 0 | Status: SHIPPED | OUTPATIENT
Start: 2018-08-21 | End: 2018-09-21 | Stop reason: SDUPTHER

## 2018-08-21 RX ORDER — GUAIFENESIN 600 MG/1
600 TABLET, EXTENDED RELEASE ORAL DAILY
COMMUNITY
End: 2020-10-30

## 2018-08-21 ASSESSMENT — ENCOUNTER SYMPTOMS
DIARRHEA: 0
WHEEZING: 0
SHORTNESS OF BREATH: 1
COUGH: 0
NAUSEA: 0
VOMITING: 0
CONSTIPATION: 0
BACK PAIN: 1

## 2018-08-21 NOTE — PROGRESS NOTES
HPI:  Patient comes in today for   Chief Complaint   Patient presents with    Back Pain     low back pain. pain is 8/10. pt needs refill of meds.  Hypertension     still running high even with the losartan. .    Chronic low back pain that radiates down legs, right worse than left. Aggravated by bending or staying in any position for long periods of time. Uses cane for ambulation. Patient has been compliant with medication for HTN. States her BP has been 174/70 to 150/70. No side effects noted. Prior to Visit Medications    Medication Sig Taking?  Authorizing Provider   guaiFENesin (MUCINEX) 600 MG extended release tablet Take 1,200 mg by mouth 2 times daily Yes Historical Provider, MD   ondansetron (ZOFRAN) 4 MG tablet Take 1 tablet by mouth every 8 hours as needed for Nausea or Vomiting Yes MIKEL Price CNP   losartan (COZAAR) 50 MG tablet Take 1 tablet by mouth daily Yes MIKEL Price CNP   buPROPion (WELLBUTRIN SR) 150 MG extended release tablet TAKE 1 TABLET BY MOUTH TWICE DAILY Yes MIKEL Regalado CNP   LANTUS SOLOSTAR 100 UNIT/ML injection pen Inject 10 Units into the skin nightly Yes MIKEL Burch CNP   omeprazole (PRILOSEC) 40 MG delayed release capsule TAKE 1 CAPSULE BY MOUTH EVERY DAY Yes MIKEL Regalado CNP   Liraglutide (VICTOZA) 18 MG/3ML SOPN SC injection Inject 1.8 mg/day Yes MIKEL Burch CNP   atorvastatin (LIPITOR) 10 MG tablet Take 1 tablet by mouth daily Yes MIKEL Regalado CNP   furosemide (LASIX) 40 MG tablet TAKE 2 TABLETS BY MOUTH EVERY DAY Yes MIKEL Regalado CNP   albuterol sulfate HFA (PROAIR HFA) 108 (90 Base) MCG/ACT inhaler INHALE 2 PUFFS BY MOUTH EVERY 6 HOURS AS NEEDED FOR WHEEZING Yes MIKEL Regalado CNP   sulfamethoxazole-trimethoprim (BACTRIM;SEPTRA) 400-80 MG per tablet Take 1 tablet by mouth daily Yes MIKEL Regalado CNP   BD PEN NEEDLE YUSUF U/F 32G X 4 MM MISC Use with Insulin dosing schedule Yes Kymberly Ford,    nystatin (MYCOSTATIN) 029754 UNIT/GM powder Apply topically 4 times daily. Yes MIKEL Lane CNP   ibuprofen (ADVIL;MOTRIN) 200 MG tablet Take 400 mg by mouth daily as needed for Pain Yes Historical Provider, MD   cyclobenzaprine (FLEXERIL) 10 MG tablet Take 1 tablet by mouth 2 times daily as needed for Muscle spasms Yes MIKEL Lane CNP   diclofenac sodium (VOLTAREN) 1 % GEL Apply 2 g topically 2 times daily Yes MIKEL Lane CNP   ACCU-CHEK CNOTRERAS PLUS strip 1 each by In Vitro route 4 times daily Yes MIKEL Lane CNP   Garlic 1965 MG CAPS Take 1,000 mg by mouth 2 times daily Yes Historical Provider, MD   tacrolimus (PROGRAF) 1 MG capsule Take 2 mg by mouth 2 times daily  Yes Historical Provider, MD   albuterol (PROVENTIL) (2.5 MG/3ML) 0.083% nebulizer solution Take 2.5 mg by nebulization every 6 hours as needed for Wheezing Yes Historical Provider, MD         Allergies   Allergen Reactions    Chantix [Varenicline] Swelling     Tongue swelling Split in 1/2    Heparin      Platelets drop         Review of Systems  Review of Systems   Constitutional: Positive for weight loss (due to diet). Negative for malaise/fatigue. Respiratory: Positive for shortness of breath (ZURITA). Negative for cough and wheezing. Cardiovascular: Negative for chest pain, palpitations and leg swelling. Gastrointestinal: Negative for constipation, diarrhea, nausea and vomiting. Musculoskeletal: Positive for back pain and myalgias. Neurological: Positive for dizziness (chronic). Negative for tingling, weakness and headaches. VS:  /88   Pulse 75   Temp 98.2 °F (36.8 °C)   Resp 16   Ht 5' 6\" (1.676 m)   Wt 208 lb (94.3 kg)   SpO2 98%   BMI 33.57 kg/m²     Physical Exam  Physical Exam   Constitutional: She is oriented to person, place, and time. She appears well-developed and well-nourished. No distress.    HENT:   Head: Normocephalic

## 2018-09-17 ENCOUNTER — HOSPITAL ENCOUNTER (OUTPATIENT)
Age: 58
Discharge: HOME | End: 2018-09-17
Payer: MEDICARE

## 2018-09-17 LAB
ALBUMIN SERPL-MCNC: 4.2 G/DL (ref 3.5–5.2)
ALP BLD-CCNC: 117 U/L (ref 35–104)
ALT SERPL-CCNC: 7 U/L (ref 0–32)
ANION GAP SERPL CALCULATED.3IONS-SCNC: 15 MMOL/L (ref 7–16)
AST SERPL-CCNC: 12 U/L (ref 0–31)
BASOPHILS ABSOLUTE: 0.06 E9/L (ref 0–0.2)
BASOPHILS RELATIVE PERCENT: 1.1 % (ref 0–2)
BILIRUB SERPL-MCNC: 0.2 MG/DL (ref 0–1.2)
BUN BLDV-MCNC: 42 MG/DL (ref 6–20)
CALCIUM SERPL-MCNC: 9.1 MG/DL (ref 8.6–10.2)
CHLORIDE BLD-SCNC: 93 MMOL/L (ref 98–107)
CHOLESTEROL, TOTAL: 150 MG/DL (ref 0–199)
CO2: 23 MMOL/L (ref 22–29)
CREAT SERPL-MCNC: 3.5 MG/DL (ref 0.5–1)
EOSINOPHILS ABSOLUTE: 0.18 E9/L (ref 0.05–0.5)
EOSINOPHILS RELATIVE PERCENT: 3.2 % (ref 0–6)
GAMMA GLUTAMYL TRANSFERASE: 13 U/L (ref 6–42)
GFR AFRICAN AMERICAN: 16
GFR NON-AFRICAN AMERICAN: 13 ML/MIN/1.73
GLUCOSE BLD-MCNC: 173 MG/DL (ref 74–109)
HCT VFR BLD CALC: 35.7 % (ref 34–48)
HDLC SERPL-MCNC: 42 MG/DL
HEMOGLOBIN: 12.1 G/DL (ref 11.5–15.5)
IMMATURE GRANULOCYTES #: 0.03 E9/L
IMMATURE GRANULOCYTES %: 0.5 % (ref 0–5)
LDL CHOLESTEROL CALCULATED: 77 MG/DL (ref 0–99)
LYMPHOCYTES ABSOLUTE: 1.07 E9/L (ref 1.5–4)
LYMPHOCYTES RELATIVE PERCENT: 18.8 % (ref 20–42)
MAGNESIUM: 1.8 MG/DL (ref 1.6–2.6)
MCH RBC QN AUTO: 30.2 PG (ref 26–35)
MCHC RBC AUTO-ENTMCNC: 33.9 % (ref 32–34.5)
MCV RBC AUTO: 89 FL (ref 80–99.9)
MONOCYTES ABSOLUTE: 0.53 E9/L (ref 0.1–0.95)
MONOCYTES RELATIVE PERCENT: 9.3 % (ref 2–12)
NEUTROPHILS ABSOLUTE: 3.83 E9/L (ref 1.8–7.3)
NEUTROPHILS RELATIVE PERCENT: 67.1 % (ref 43–80)
PDW BLD-RTO: 14.3 FL (ref 11.5–15)
PHOSPHORUS: 4.4 MG/DL (ref 2.5–4.5)
PLATELET # BLD: 190 E9/L (ref 130–450)
PMV BLD AUTO: 9.8 FL (ref 7–12)
POTASSIUM SERPL-SCNC: 4.6 MMOL/L (ref 3.5–5)
RBC # BLD: 4.01 E12/L (ref 3.5–5.5)
SODIUM BLD-SCNC: 131 MMOL/L (ref 132–146)
TOTAL PROTEIN: 6.6 G/DL (ref 6.4–8.3)
TRIGL SERPL-MCNC: 154 MG/DL (ref 0–149)
VLDLC SERPL CALC-MCNC: 31 MG/DL
WBC # BLD: 5.7 E9/L (ref 4.5–11.5)

## 2018-09-17 PROCEDURE — 36415 COLL VENOUS BLD VENIPUNCTURE: CPT

## 2018-09-17 PROCEDURE — 85025 COMPLETE CBC W/AUTO DIFF WBC: CPT

## 2018-09-17 PROCEDURE — 80061 LIPID PANEL: CPT

## 2018-09-17 PROCEDURE — 80053 COMPREHEN METABOLIC PANEL: CPT

## 2018-09-17 PROCEDURE — 84100 ASSAY OF PHOSPHORUS: CPT

## 2018-09-17 PROCEDURE — 83735 ASSAY OF MAGNESIUM: CPT

## 2018-09-17 PROCEDURE — 82977 ASSAY OF GGT: CPT

## 2018-09-18 LAB
Lab: NORMAL
REPORT: NORMAL
THIS TEST SENT TO: NORMAL

## 2018-09-21 ENCOUNTER — OFFICE VISIT (OUTPATIENT)
Dept: FAMILY MEDICINE CLINIC | Age: 58
End: 2018-09-21
Payer: MEDICARE

## 2018-09-21 VITALS
HEART RATE: 66 BPM | DIASTOLIC BLOOD PRESSURE: 88 MMHG | TEMPERATURE: 98.6 F | RESPIRATION RATE: 16 BRPM | SYSTOLIC BLOOD PRESSURE: 148 MMHG | WEIGHT: 203 LBS | BODY MASS INDEX: 32.62 KG/M2 | OXYGEN SATURATION: 98 % | HEIGHT: 66 IN

## 2018-09-21 DIAGNOSIS — G89.29 CHRONIC MIDLINE LOW BACK PAIN WITHOUT SCIATICA: ICD-10-CM

## 2018-09-21 DIAGNOSIS — M54.50 CHRONIC MIDLINE LOW BACK PAIN WITHOUT SCIATICA: ICD-10-CM

## 2018-09-21 DIAGNOSIS — I10 HTN (HYPERTENSION), BENIGN: Chronic | ICD-10-CM

## 2018-09-21 PROCEDURE — 2022F DILAT RTA XM EVC RTNOPTHY: CPT | Performed by: NURSE PRACTITIONER

## 2018-09-21 PROCEDURE — 3017F COLORECTAL CA SCREEN DOC REV: CPT | Performed by: NURSE PRACTITIONER

## 2018-09-21 PROCEDURE — 3045F PR MOST RECENT HEMOGLOBIN A1C LEVEL 7.0-9.0%: CPT | Performed by: NURSE PRACTITIONER

## 2018-09-21 PROCEDURE — 4004F PT TOBACCO SCREEN RCVD TLK: CPT | Performed by: NURSE PRACTITIONER

## 2018-09-21 PROCEDURE — 99213 OFFICE O/P EST LOW 20 MIN: CPT | Performed by: NURSE PRACTITIONER

## 2018-09-21 PROCEDURE — G8417 CALC BMI ABV UP PARAM F/U: HCPCS | Performed by: NURSE PRACTITIONER

## 2018-09-21 PROCEDURE — G8427 DOCREV CUR MEDS BY ELIG CLIN: HCPCS | Performed by: NURSE PRACTITIONER

## 2018-09-21 RX ORDER — LOSARTAN POTASSIUM 50 MG/1
TABLET ORAL
Qty: 108 TABLET | Refills: 1 | Status: SHIPPED | OUTPATIENT
Start: 2018-09-21 | End: 2018-10-19 | Stop reason: CLARIF

## 2018-09-21 RX ORDER — HYDROCODONE BITARTRATE AND ACETAMINOPHEN 7.5; 325 MG/1; MG/1
1 TABLET ORAL 4 TIMES DAILY PRN
Qty: 120 TABLET | Refills: 0 | Status: SHIPPED | OUTPATIENT
Start: 2018-09-21 | End: 2018-10-19 | Stop reason: SDUPTHER

## 2018-09-21 RX ORDER — LOSARTAN POTASSIUM 50 MG/1
50 TABLET ORAL DAILY
Qty: 90 TABLET | Refills: 3 | Status: SHIPPED | OUTPATIENT
Start: 2018-09-21 | End: 2018-09-21 | Stop reason: SDUPTHER

## 2018-09-21 ASSESSMENT — ENCOUNTER SYMPTOMS
NAUSEA: 1
DIARRHEA: 0
CONSTIPATION: 0
BACK PAIN: 1
COUGH: 0
SHORTNESS OF BREATH: 0
WHEEZING: 0
VOMITING: 1

## 2018-09-21 NOTE — PROGRESS NOTES
Yes MIKEL Regalado CNP   sulfamethoxazole-trimethoprim (BACTRIM;SEPTRA) 400-80 MG per tablet Take 1 tablet by mouth daily Yes MIKEL Regalado CNP   BD PEN NEEDLE YUSUF U/F 32G X 4 MM MISC Use with Insulin dosing schedule Yes Kiley Moreno,    nystatin (MYCOSTATIN) [id number] UNIT/GM powder Apply topically 4 times daily. Yes MIKEL Ahuja CNP   ibuprofen (ADVIL;MOTRIN) 200 MG tablet Take 400 mg by mouth daily as needed for Pain Yes Historical Provider, MD   cyclobenzaprine (FLEXERIL) 10 MG tablet Take 1 tablet by mouth 2 times daily as needed for Muscle spasms Yes MIKEL Ahuja CNP   diclofenac sodium (VOLTAREN) 1 % GEL Apply 2 g topically 2 times daily Yes MIKEL Ahuja CNP   ACCU-CHEK CONTRERAS PLUS strip 1 each by In Vitro route 4 times daily Yes MIKEL Ahuja CNP   Garlic 1971 MG CAPS Take 1,000 mg by mouth 2 times daily Yes Historical Provider, MD   tacrolimus (PROGRAF) 1 MG capsule Take 2 mg by mouth 2 times daily  Yes Historical Provider, MD   albuterol (PROVENTIL) (2.5 MG/3ML) 0.083% nebulizer solution Take 2.5 mg by nebulization every 6 hours as needed for Wheezing Yes Historical Provider, MD   losartan (COZAAR) 50 MG tablet TAKE 1 TABLET BY MOUTH EVERY DAY. TAKE ANOTHER TABLET IF BLOOD PRESSURE IS HIGH  MIKEL Ahuja CNP         Allergies   Allergen Reactions    Chantix [Varenicline] Swelling     Tongue swelling Split in 1/2    Heparin      Platelets drop         Review of Systems  Review of Systems   Constitutional: Negative for malaise/fatigue and weight loss. Respiratory: Negative for cough, shortness of breath and wheezing. Cardiovascular: Negative for chest pain and palpitations. Gastrointestinal: Positive for nausea and vomiting. Negative for constipation and diarrhea. Musculoskeletal: Positive for back pain and myalgias. Neurological: Positive for dizziness. Negative for tingling, focal weakness, weakness and headaches. appropriate analgesic effect of treatment.    Medication Contracts -           Greater than 15  Minutes was spent with patient and more than 50% of the time was spent face to face counseling and educating regarding diagnoses

## 2018-10-12 DIAGNOSIS — Z71.6 TOBACCO ABUSE COUNSELING: ICD-10-CM

## 2018-10-12 RX ORDER — BUPROPION HYDROCHLORIDE 150 MG/1
TABLET, EXTENDED RELEASE ORAL
Qty: 180 TABLET | Refills: 1 | Status: SHIPPED | OUTPATIENT
Start: 2018-10-12 | End: 2019-03-11 | Stop reason: SDUPTHER

## 2018-10-19 ENCOUNTER — OFFICE VISIT (OUTPATIENT)
Dept: FAMILY MEDICINE CLINIC | Age: 58
End: 2018-10-19
Payer: MEDICARE

## 2018-10-19 VITALS
HEIGHT: 66 IN | WEIGHT: 199 LBS | HEART RATE: 54 BPM | BODY MASS INDEX: 31.98 KG/M2 | RESPIRATION RATE: 16 BRPM | SYSTOLIC BLOOD PRESSURE: 190 MMHG | OXYGEN SATURATION: 96 % | DIASTOLIC BLOOD PRESSURE: 102 MMHG | TEMPERATURE: 98.3 F

## 2018-10-19 DIAGNOSIS — E11.22 TYPE 2 DIABETES MELLITUS WITH CHRONIC KIDNEY DISEASE, WITH LONG-TERM CURRENT USE OF INSULIN, UNSPECIFIED CKD STAGE (HCC): ICD-10-CM

## 2018-10-19 DIAGNOSIS — I10 HTN (HYPERTENSION), BENIGN: Chronic | ICD-10-CM

## 2018-10-19 DIAGNOSIS — M54.50 CHRONIC MIDLINE LOW BACK PAIN WITHOUT SCIATICA: Primary | ICD-10-CM

## 2018-10-19 DIAGNOSIS — G89.29 CHRONIC MIDLINE LOW BACK PAIN WITHOUT SCIATICA: Primary | ICD-10-CM

## 2018-10-19 DIAGNOSIS — Z79.4 TYPE 2 DIABETES MELLITUS WITH CHRONIC KIDNEY DISEASE, WITH LONG-TERM CURRENT USE OF INSULIN, UNSPECIFIED CKD STAGE (HCC): ICD-10-CM

## 2018-10-19 DIAGNOSIS — Z23 IMMUNIZATION DUE: ICD-10-CM

## 2018-10-19 LAB — HBA1C MFR BLD: 6.7 %

## 2018-10-19 PROCEDURE — 99214 OFFICE O/P EST MOD 30 MIN: CPT | Performed by: NURSE PRACTITIONER

## 2018-10-19 PROCEDURE — 2022F DILAT RTA XM EVC RTNOPTHY: CPT | Performed by: NURSE PRACTITIONER

## 2018-10-19 PROCEDURE — 3044F HG A1C LEVEL LT 7.0%: CPT | Performed by: NURSE PRACTITIONER

## 2018-10-19 PROCEDURE — G8427 DOCREV CUR MEDS BY ELIG CLIN: HCPCS | Performed by: NURSE PRACTITIONER

## 2018-10-19 PROCEDURE — G0008 ADMIN INFLUENZA VIRUS VAC: HCPCS | Performed by: NURSE PRACTITIONER

## 2018-10-19 PROCEDURE — G8417 CALC BMI ABV UP PARAM F/U: HCPCS | Performed by: NURSE PRACTITIONER

## 2018-10-19 PROCEDURE — 3017F COLORECTAL CA SCREEN DOC REV: CPT | Performed by: NURSE PRACTITIONER

## 2018-10-19 PROCEDURE — G8482 FLU IMMUNIZE ORDER/ADMIN: HCPCS | Performed by: NURSE PRACTITIONER

## 2018-10-19 PROCEDURE — 4004F PT TOBACCO SCREEN RCVD TLK: CPT | Performed by: NURSE PRACTITIONER

## 2018-10-19 PROCEDURE — 90686 IIV4 VACC NO PRSV 0.5 ML IM: CPT | Performed by: NURSE PRACTITIONER

## 2018-10-19 PROCEDURE — 83036 HEMOGLOBIN GLYCOSYLATED A1C: CPT | Performed by: NURSE PRACTITIONER

## 2018-10-19 RX ORDER — HYDROCODONE BITARTRATE AND ACETAMINOPHEN 7.5; 325 MG/1; MG/1
1 TABLET ORAL 4 TIMES DAILY PRN
Qty: 120 TABLET | Refills: 0 | Status: SHIPPED | OUTPATIENT
Start: 2018-10-19 | End: 2018-11-18

## 2018-10-19 RX ORDER — TACROLIMUS 1 MG/1
1.5 CAPSULE ORAL 2 TIMES DAILY
COMMUNITY
Start: 2018-10-10

## 2018-10-19 RX ORDER — LOSARTAN POTASSIUM 100 MG/1
100 TABLET ORAL DAILY
COMMUNITY
End: 2018-11-21 | Stop reason: SDUPTHER

## 2018-10-19 ASSESSMENT — ENCOUNTER SYMPTOMS
WHEEZING: 0
SHORTNESS OF BREATH: 1
DIARRHEA: 0
CONSTIPATION: 0
BACK PAIN: 1
COUGH: 0
VOMITING: 0
NAUSEA: 0

## 2018-10-22 ENCOUNTER — HOSPITAL ENCOUNTER (OUTPATIENT)
Age: 58
Discharge: HOME | End: 2018-10-22
Payer: MEDICARE

## 2018-10-22 LAB
ALBUMIN SERPL-MCNC: 4.5 G/DL (ref 3.5–5.2)
ALP BLD-CCNC: 117 U/L (ref 35–104)
ALT SERPL-CCNC: 9 U/L (ref 0–32)
ANION GAP SERPL CALCULATED.3IONS-SCNC: 11 MMOL/L (ref 7–16)
AST SERPL-CCNC: 13 U/L (ref 0–31)
BASOPHILS ABSOLUTE: 0.06 E9/L (ref 0–0.2)
BASOPHILS RELATIVE PERCENT: 1 % (ref 0–2)
BILIRUB SERPL-MCNC: 0.3 MG/DL (ref 0–1.2)
BUN BLDV-MCNC: 20 MG/DL (ref 6–20)
CALCIUM SERPL-MCNC: 9.1 MG/DL (ref 8.6–10.2)
CHLORIDE BLD-SCNC: 102 MMOL/L (ref 98–107)
CO2: 25 MMOL/L (ref 22–29)
CREAT SERPL-MCNC: 1.4 MG/DL (ref 0.5–1)
EOSINOPHILS ABSOLUTE: 0.22 E9/L (ref 0.05–0.5)
EOSINOPHILS RELATIVE PERCENT: 3.7 % (ref 0–6)
GAMMA GLUTAMYL TRANSFERASE: 13 U/L (ref 6–42)
GFR AFRICAN AMERICAN: 47
GFR NON-AFRICAN AMERICAN: 39 ML/MIN/1.73
GLUCOSE BLD-MCNC: 180 MG/DL (ref 74–109)
HCT VFR BLD CALC: 35.8 % (ref 34–48)
HEMOGLOBIN: 11.8 G/DL (ref 11.5–15.5)
IMMATURE GRANULOCYTES #: 0.02 E9/L
IMMATURE GRANULOCYTES %: 0.3 % (ref 0–5)
LYMPHOCYTES ABSOLUTE: 1.1 E9/L (ref 1.5–4)
LYMPHOCYTES RELATIVE PERCENT: 18.7 % (ref 20–42)
MAGNESIUM: 2 MG/DL (ref 1.6–2.6)
MCH RBC QN AUTO: 30.8 PG (ref 26–35)
MCHC RBC AUTO-ENTMCNC: 33 % (ref 32–34.5)
MCV RBC AUTO: 93.5 FL (ref 80–99.9)
MONOCYTES ABSOLUTE: 0.58 E9/L (ref 0.1–0.95)
MONOCYTES RELATIVE PERCENT: 9.9 % (ref 2–12)
NEUTROPHILS ABSOLUTE: 3.9 E9/L (ref 1.8–7.3)
NEUTROPHILS RELATIVE PERCENT: 66.4 % (ref 43–80)
PDW BLD-RTO: 13.4 FL (ref 11.5–15)
PHOSPHORUS: 4 MG/DL (ref 2.5–4.5)
PLATELET # BLD: 169 E9/L (ref 130–450)
PMV BLD AUTO: 10.7 FL (ref 7–12)
POTASSIUM SERPL-SCNC: 5.9 MMOL/L (ref 3.5–5)
RBC # BLD: 3.83 E12/L (ref 3.5–5.5)
SODIUM BLD-SCNC: 138 MMOL/L (ref 132–146)
TOTAL PROTEIN: 6.7 G/DL (ref 6.4–8.3)
WBC # BLD: 5.9 E9/L (ref 4.5–11.5)

## 2018-10-22 PROCEDURE — 83735 ASSAY OF MAGNESIUM: CPT

## 2018-10-22 PROCEDURE — 36415 COLL VENOUS BLD VENIPUNCTURE: CPT

## 2018-10-22 PROCEDURE — 84100 ASSAY OF PHOSPHORUS: CPT

## 2018-10-22 PROCEDURE — 85025 COMPLETE CBC W/AUTO DIFF WBC: CPT

## 2018-10-22 PROCEDURE — 80053 COMPREHEN METABOLIC PANEL: CPT

## 2018-10-22 PROCEDURE — 82977 ASSAY OF GGT: CPT

## 2018-10-29 ENCOUNTER — TELEPHONE (OUTPATIENT)
Dept: FAMILY MEDICINE CLINIC | Age: 58
End: 2018-10-29

## 2018-10-29 DIAGNOSIS — I10 HTN (HYPERTENSION), BENIGN: Primary | Chronic | ICD-10-CM

## 2018-10-29 RX ORDER — AMLODIPINE BESYLATE 2.5 MG/1
2.5 TABLET ORAL DAILY
Qty: 30 TABLET | Refills: 0 | Status: SHIPPED | OUTPATIENT
Start: 2018-10-29 | End: 2018-11-21 | Stop reason: SDUPTHER

## 2018-11-05 ENCOUNTER — HOSPITAL ENCOUNTER (OUTPATIENT)
Age: 58
Discharge: HOME | End: 2018-11-05
Payer: MEDICARE

## 2018-11-05 LAB
ALBUMIN SERPL-MCNC: 4.5 G/DL (ref 3.5–5.2)
ALP BLD-CCNC: 120 U/L (ref 35–104)
ALT SERPL-CCNC: 9 U/L (ref 0–32)
ANION GAP SERPL CALCULATED.3IONS-SCNC: 12 MMOL/L (ref 7–16)
AST SERPL-CCNC: 12 U/L (ref 0–31)
BASOPHILS ABSOLUTE: 0.05 E9/L (ref 0–0.2)
BASOPHILS RELATIVE PERCENT: 0.8 % (ref 0–2)
BILIRUB SERPL-MCNC: 0.2 MG/DL (ref 0–1.2)
BUN BLDV-MCNC: 27 MG/DL (ref 6–20)
CALCIUM SERPL-MCNC: 9.2 MG/DL (ref 8.6–10.2)
CHLORIDE BLD-SCNC: 101 MMOL/L (ref 98–107)
CO2: 24 MMOL/L (ref 22–29)
CREAT SERPL-MCNC: 2.2 MG/DL (ref 0.5–1)
EOSINOPHILS ABSOLUTE: 0.2 E9/L (ref 0.05–0.5)
EOSINOPHILS RELATIVE PERCENT: 3.2 % (ref 0–6)
GAMMA GLUTAMYL TRANSFERASE: 13 U/L (ref 6–42)
GFR AFRICAN AMERICAN: 28
GFR NON-AFRICAN AMERICAN: 23 ML/MIN/1.73
GLUCOSE BLD-MCNC: 182 MG/DL (ref 74–99)
HCT VFR BLD CALC: 36.2 % (ref 34–48)
HEMOGLOBIN: 12 G/DL (ref 11.5–15.5)
IMMATURE GRANULOCYTES #: 0.01 E9/L
IMMATURE GRANULOCYTES %: 0.2 % (ref 0–5)
LYMPHOCYTES ABSOLUTE: 1 E9/L (ref 1.5–4)
LYMPHOCYTES RELATIVE PERCENT: 16.2 % (ref 20–42)
MAGNESIUM: 1.9 MG/DL (ref 1.6–2.6)
MCH RBC QN AUTO: 30.8 PG (ref 26–35)
MCHC RBC AUTO-ENTMCNC: 33.1 % (ref 32–34.5)
MCV RBC AUTO: 93.1 FL (ref 80–99.9)
MONOCYTES ABSOLUTE: 0.56 E9/L (ref 0.1–0.95)
MONOCYTES RELATIVE PERCENT: 9.1 % (ref 2–12)
NEUTROPHILS ABSOLUTE: 4.35 E9/L (ref 1.8–7.3)
NEUTROPHILS RELATIVE PERCENT: 70.5 % (ref 43–80)
PDW BLD-RTO: 13.3 FL (ref 11.5–15)
PHOSPHORUS: 4.3 MG/DL (ref 2.5–4.5)
PLATELET # BLD: 186 E9/L (ref 130–450)
PMV BLD AUTO: 10.4 FL (ref 7–12)
POTASSIUM SERPL-SCNC: 5.9 MMOL/L (ref 3.5–5)
RBC # BLD: 3.89 E12/L (ref 3.5–5.5)
SODIUM BLD-SCNC: 137 MMOL/L (ref 132–146)
TOTAL PROTEIN: 6.8 G/DL (ref 6.4–8.3)
WBC # BLD: 6.2 E9/L (ref 4.5–11.5)

## 2018-11-05 PROCEDURE — 82977 ASSAY OF GGT: CPT

## 2018-11-05 PROCEDURE — 84100 ASSAY OF PHOSPHORUS: CPT

## 2018-11-05 PROCEDURE — 80053 COMPREHEN METABOLIC PANEL: CPT

## 2018-11-05 PROCEDURE — 36415 COLL VENOUS BLD VENIPUNCTURE: CPT

## 2018-11-05 PROCEDURE — 85025 COMPLETE CBC W/AUTO DIFF WBC: CPT

## 2018-11-05 PROCEDURE — 83735 ASSAY OF MAGNESIUM: CPT

## 2018-11-06 LAB
Lab: NORMAL
REPORT: NORMAL
THIS TEST SENT TO: NORMAL

## 2018-11-19 ENCOUNTER — HOSPITAL ENCOUNTER (OUTPATIENT)
Age: 58
Discharge: HOME | End: 2018-11-19
Payer: MEDICARE

## 2018-11-19 LAB
ALBUMIN SERPL-MCNC: 4.6 G/DL (ref 3.5–5.2)
ALP BLD-CCNC: 127 U/L (ref 35–104)
ALT SERPL-CCNC: 11 U/L (ref 0–32)
ANION GAP SERPL CALCULATED.3IONS-SCNC: 14 MMOL/L (ref 7–16)
AST SERPL-CCNC: 13 U/L (ref 0–31)
BASOPHILS ABSOLUTE: 0.08 E9/L (ref 0–0.2)
BASOPHILS RELATIVE PERCENT: 1.1 % (ref 0–2)
BILIRUB SERPL-MCNC: 0.3 MG/DL (ref 0–1.2)
BUN BLDV-MCNC: 35 MG/DL (ref 6–20)
CALCIUM SERPL-MCNC: 9.3 MG/DL (ref 8.6–10.2)
CHLORIDE BLD-SCNC: 101 MMOL/L (ref 98–107)
CHOLESTEROL, TOTAL: 188 MG/DL (ref 0–199)
CO2: 24 MMOL/L (ref 22–29)
CREAT SERPL-MCNC: 2 MG/DL (ref 0.5–1)
EOSINOPHILS ABSOLUTE: 0.28 E9/L (ref 0.05–0.5)
EOSINOPHILS RELATIVE PERCENT: 4 % (ref 0–6)
GAMMA GLUTAMYL TRANSFERASE: 15 U/L (ref 6–42)
GFR AFRICAN AMERICAN: 31
GFR NON-AFRICAN AMERICAN: 26 ML/MIN/1.73
GLUCOSE BLD-MCNC: 175 MG/DL (ref 74–99)
HCT VFR BLD CALC: 38.3 % (ref 34–48)
HDLC SERPL-MCNC: 46 MG/DL
HEMOGLOBIN: 12.7 G/DL (ref 11.5–15.5)
IMMATURE GRANULOCYTES #: 0.03 E9/L
IMMATURE GRANULOCYTES %: 0.4 % (ref 0–5)
LDL CHOLESTEROL CALCULATED: 105 MG/DL (ref 0–99)
LYMPHOCYTES ABSOLUTE: 1.38 E9/L (ref 1.5–4)
LYMPHOCYTES RELATIVE PERCENT: 19.5 % (ref 20–42)
MAGNESIUM: 2 MG/DL (ref 1.6–2.6)
MCH RBC QN AUTO: 30.9 PG (ref 26–35)
MCHC RBC AUTO-ENTMCNC: 33.2 % (ref 32–34.5)
MCV RBC AUTO: 93.2 FL (ref 80–99.9)
MONOCYTES ABSOLUTE: 0.62 E9/L (ref 0.1–0.95)
MONOCYTES RELATIVE PERCENT: 8.8 % (ref 2–12)
NEUTROPHILS ABSOLUTE: 4.69 E9/L (ref 1.8–7.3)
NEUTROPHILS RELATIVE PERCENT: 66.2 % (ref 43–80)
PDW BLD-RTO: 13.1 FL (ref 11.5–15)
PHOSPHORUS: 4.6 MG/DL (ref 2.5–4.5)
PLATELET # BLD: 205 E9/L (ref 130–450)
PMV BLD AUTO: 11 FL (ref 7–12)
POTASSIUM SERPL-SCNC: 5.4 MMOL/L (ref 3.5–5)
RBC # BLD: 4.11 E12/L (ref 3.5–5.5)
SODIUM BLD-SCNC: 139 MMOL/L (ref 132–146)
TOTAL PROTEIN: 7.2 G/DL (ref 6.4–8.3)
TRIGL SERPL-MCNC: 186 MG/DL (ref 0–149)
VLDLC SERPL CALC-MCNC: 37 MG/DL
WBC # BLD: 7.1 E9/L (ref 4.5–11.5)

## 2018-11-19 PROCEDURE — 84100 ASSAY OF PHOSPHORUS: CPT

## 2018-11-19 PROCEDURE — 85025 COMPLETE CBC W/AUTO DIFF WBC: CPT

## 2018-11-19 PROCEDURE — 36415 COLL VENOUS BLD VENIPUNCTURE: CPT

## 2018-11-19 PROCEDURE — 87522 HEPATITIS C REVRS TRNSCRPJ: CPT

## 2018-11-19 PROCEDURE — 80061 LIPID PANEL: CPT

## 2018-11-19 PROCEDURE — 80053 COMPREHEN METABOLIC PANEL: CPT

## 2018-11-19 PROCEDURE — 83735 ASSAY OF MAGNESIUM: CPT

## 2018-11-19 PROCEDURE — 82977 ASSAY OF GGT: CPT

## 2018-11-20 LAB
Lab: NORMAL
REPORT: NORMAL
THIS TEST SENT TO: NORMAL

## 2018-11-21 ENCOUNTER — OFFICE VISIT (OUTPATIENT)
Dept: FAMILY MEDICINE CLINIC | Age: 58
End: 2018-11-21
Payer: MEDICARE

## 2018-11-21 VITALS
TEMPERATURE: 98.2 F | BODY MASS INDEX: 33.11 KG/M2 | HEIGHT: 66 IN | WEIGHT: 206 LBS | OXYGEN SATURATION: 95 % | RESPIRATION RATE: 16 BRPM | HEART RATE: 77 BPM | SYSTOLIC BLOOD PRESSURE: 124 MMHG | DIASTOLIC BLOOD PRESSURE: 70 MMHG

## 2018-11-21 DIAGNOSIS — M54.41 CHRONIC MIDLINE LOW BACK PAIN WITH RIGHT-SIDED SCIATICA: Primary | ICD-10-CM

## 2018-11-21 DIAGNOSIS — G89.29 CHRONIC MIDLINE LOW BACK PAIN WITH RIGHT-SIDED SCIATICA: Primary | ICD-10-CM

## 2018-11-21 DIAGNOSIS — E11.22 TYPE 2 DIABETES MELLITUS WITH CHRONIC KIDNEY DISEASE, WITH LONG-TERM CURRENT USE OF INSULIN, UNSPECIFIED CKD STAGE (HCC): ICD-10-CM

## 2018-11-21 DIAGNOSIS — Z79.4 TYPE 2 DIABETES MELLITUS WITH CHRONIC KIDNEY DISEASE, WITH LONG-TERM CURRENT USE OF INSULIN, UNSPECIFIED CKD STAGE (HCC): ICD-10-CM

## 2018-11-21 DIAGNOSIS — I10 HTN (HYPERTENSION), BENIGN: Chronic | ICD-10-CM

## 2018-11-21 PROCEDURE — G8417 CALC BMI ABV UP PARAM F/U: HCPCS | Performed by: NURSE PRACTITIONER

## 2018-11-21 PROCEDURE — G8482 FLU IMMUNIZE ORDER/ADMIN: HCPCS | Performed by: NURSE PRACTITIONER

## 2018-11-21 PROCEDURE — 3044F HG A1C LEVEL LT 7.0%: CPT | Performed by: NURSE PRACTITIONER

## 2018-11-21 PROCEDURE — 4004F PT TOBACCO SCREEN RCVD TLK: CPT | Performed by: NURSE PRACTITIONER

## 2018-11-21 PROCEDURE — 99213 OFFICE O/P EST LOW 20 MIN: CPT | Performed by: NURSE PRACTITIONER

## 2018-11-21 PROCEDURE — 2022F DILAT RTA XM EVC RTNOPTHY: CPT | Performed by: NURSE PRACTITIONER

## 2018-11-21 PROCEDURE — G8427 DOCREV CUR MEDS BY ELIG CLIN: HCPCS | Performed by: NURSE PRACTITIONER

## 2018-11-21 PROCEDURE — 3017F COLORECTAL CA SCREEN DOC REV: CPT | Performed by: NURSE PRACTITIONER

## 2018-11-21 RX ORDER — AMLODIPINE BESYLATE 2.5 MG/1
2.5 TABLET ORAL DAILY
Qty: 90 TABLET | Refills: 1 | Status: SHIPPED | OUTPATIENT
Start: 2018-11-21 | End: 2019-07-31

## 2018-11-21 RX ORDER — ATORVASTATIN CALCIUM 10 MG/1
10 TABLET, FILM COATED ORAL DAILY
Qty: 90 TABLET | Refills: 3 | Status: ON HOLD | OUTPATIENT
Start: 2018-11-21 | End: 2019-03-15 | Stop reason: HOSPADM

## 2018-11-21 RX ORDER — LOSARTAN POTASSIUM 100 MG/1
100 TABLET ORAL DAILY
Qty: 90 TABLET | Refills: 1 | Status: ON HOLD | OUTPATIENT
Start: 2018-11-21 | End: 2019-03-13 | Stop reason: CLARIF

## 2018-11-21 RX ORDER — INSULIN GLARGINE 100 [IU]/ML
40 INJECTION, SOLUTION SUBCUTANEOUS NIGHTLY
Qty: 15 PEN | Refills: 3 | Status: SHIPPED
Start: 2018-11-21 | End: 2020-02-26 | Stop reason: CLARIF

## 2018-11-21 RX ORDER — HYDROCODONE BITARTRATE AND ACETAMINOPHEN 7.5; 325 MG/1; MG/1
1 TABLET ORAL EVERY 6 HOURS PRN
COMMUNITY
End: 2018-11-21 | Stop reason: SDUPTHER

## 2018-11-21 RX ORDER — HYDROCODONE BITARTRATE AND ACETAMINOPHEN 7.5; 325 MG/1; MG/1
1 TABLET ORAL EVERY 6 HOURS PRN
Qty: 120 TABLET | Refills: 0 | Status: SHIPPED | OUTPATIENT
Start: 2018-11-21 | End: 2018-12-21 | Stop reason: SDUPTHER

## 2018-11-21 ASSESSMENT — ENCOUNTER SYMPTOMS
VOMITING: 0
BACK PAIN: 1
SHORTNESS OF BREATH: 0
CONSTIPATION: 0
NAUSEA: 0
DIARRHEA: 0
COUGH: 0
WHEEZING: 0

## 2018-11-21 NOTE — PROGRESS NOTES
HPI:  Patient comes intoday for   Chief Complaint   Patient presents with    Back Pain     refills rates pain at 8/10    . Chronic low back pain with radiation down right leg. Aggravated by bending. Prior to Visit Medications    Medication Sig Taking? Authorizing Provider   HYDROcodone-acetaminophen (NORCO) 7.5-325 MG per tablet Take 1 tablet by mouth every 6 hours as needed for Pain for up to 30 days. [facility] Date: 11/21/18 Yes MIKEL Dumont CNP   amLODIPine (NORVASC) 2.5 MG tablet Take 1 tablet by mouth daily Yes MIKEL Dumont CNP   atorvastatin (LIPITOR) 10 MG tablet Take 1 tablet by mouth daily Yes MIKEL Dumont CNP   losartan (COZAAR) 100 MG tablet Take 1 tablet by mouth daily Yes MIKEL Dumont CNP   LANTUS SOLOSTAR 100 UNIT/ML injection pen Inject 40 Units into the skin nightly Yes MIKEL Dumont CNP   tacrolimus (PROGRAF) 1 MG capsule Take 1 mg by mouth 2 times daily  Yes Historical Provider, MD   buPROPion (WELLBUTRIN SR) 150 MG extended release tablet TAKE 1 TABLET TWICE DAILY Yes MIKEL Dumont CNP   guaiFENesin (MUCINEX) 600 MG extended release tablet Take 600 mg by mouth daily  Yes Historical Provider, MD   omeprazole (PRILOSEC) 40 MG delayed release capsule TAKE 1 CAPSULE BY MOUTH EVERY DAY Yes MIKEL Regalado CNP   albuterol sulfate HFA (PROAIR HFA) 108 (90 Base) MCG/ACT inhaler INHALE 2 PUFFS BY MOUTH EVERY 6 HOURS AS NEEDED FOR WHEEZING Yes MIKEL Regalado CNP   sulfamethoxazole-trimethoprim (BACTRIM;SEPTRA) 400-80 MG per tablet Take 1 tablet by mouth daily Yes MIKEL Regalado CNP   BD PEN NEEDLE YUSUF U/F 32G X 4 MM MISC Use with Insulin dosing schedule Yes Samara Bui,    nystatin (MYCOSTATIN) [id number] UNIT/GM powder Apply topically 4 times daily.  Yes MIKEL Dumont CNP   ibuprofen (ADVIL;MOTRIN) 200 MG tablet Take 400 mg by mouth daily as needed for Pain Yes Historical Provider, MD cyclobenzaprine (FLEXERIL) 10 MG tablet Take 1 tablet by mouth 2 times daily as needed for Muscle spasms Yes MIKEL Horton CNP   diclofenac sodium (VOLTAREN) 1 % GEL Apply 2 g topically 2 times daily Yes MIKEL Horton CNP   ACCU-CHEK CONTRERAS PLUS strip 1 each by In Vitro route 4 times daily Yes MIKEL Horton CNP   Garlic 2600 MG CAPS Take 1,000 mg by mouth 2 times daily Yes Historical Provider, MD   albuterol (PROVENTIL) (2.5 MG/3ML) 0.083% nebulizer solution Take 2.5 mg by nebulization every 6 hours as needed for Wheezing Yes Historical Provider, MD         Allergies   Allergen Reactions    Chantix [Varenicline] Swelling     Tongue swelling Split in 1/2    Heparin      Platelets drop         Review of Systems  Review of Systems   Constitutional: Negative for activity change, appetite change and unexpected weight change. Respiratory: Negative for cough, shortness of breath and wheezing. Cardiovascular: Negative for chest pain, palpitations and leg swelling. Gastrointestinal: Negative for constipation, diarrhea, nausea and vomiting. Musculoskeletal: Positive for back pain and myalgias. Neurological: Negative for light-headedness and headaches. VS:  /70   Pulse 77   Temp 98.2 °F (36.8 °C) (Oral)   Resp 16   Ht 5' 6\" (1.676 m)   Wt 206 lb (93.4 kg)   SpO2 95%   BMI 33.25 kg/m²     Physical Exam  Physical Exam   Constitutional: She is oriented to person, place, and time. She appears well-developed and well-nourished. No distress. HENT:   Head: Normocephalic and atraumatic. Neck: No tracheal deviation present. No thyromegaly present. Cardiovascular: Normal rate, regular rhythm, normal heart sounds and intact distal pulses. No murmur heard. Pulmonary/Chest: Effort normal and breath sounds normal. No respiratory distress. She has no wheezes. She has no rales. She exhibits no tenderness. Abdominal: Soft. Bowel sounds are normal. There is no tenderness.

## 2018-12-03 LAB
Lab: NORMAL
REPORT: NORMAL
THIS TEST SENT TO: NORMAL

## 2018-12-05 LAB
HCV QNT BY NAAT IU/ML: NOT DETECTED IU/ML
HCV QNT BY NAAT LOG IU/ML: NOT DETECTED LOG IU/ML
INTERPRETATION: NOT DETECTED

## 2018-12-21 ENCOUNTER — OFFICE VISIT (OUTPATIENT)
Dept: FAMILY MEDICINE CLINIC | Age: 58
End: 2018-12-21
Payer: MEDICARE

## 2018-12-21 VITALS
HEART RATE: 70 BPM | HEIGHT: 66 IN | SYSTOLIC BLOOD PRESSURE: 132 MMHG | DIASTOLIC BLOOD PRESSURE: 78 MMHG | WEIGHT: 201 LBS | RESPIRATION RATE: 16 BRPM | BODY MASS INDEX: 32.3 KG/M2 | TEMPERATURE: 97.8 F | OXYGEN SATURATION: 98 %

## 2018-12-21 DIAGNOSIS — B37.2 SKIN YEAST INFECTION: ICD-10-CM

## 2018-12-21 DIAGNOSIS — M54.41 CHRONIC MIDLINE LOW BACK PAIN WITH RIGHT-SIDED SCIATICA: ICD-10-CM

## 2018-12-21 DIAGNOSIS — G89.29 CHRONIC MIDLINE LOW BACK PAIN WITH RIGHT-SIDED SCIATICA: ICD-10-CM

## 2018-12-21 PROCEDURE — 3017F COLORECTAL CA SCREEN DOC REV: CPT | Performed by: NURSE PRACTITIONER

## 2018-12-21 PROCEDURE — 4004F PT TOBACCO SCREEN RCVD TLK: CPT | Performed by: NURSE PRACTITIONER

## 2018-12-21 PROCEDURE — 99213 OFFICE O/P EST LOW 20 MIN: CPT | Performed by: NURSE PRACTITIONER

## 2018-12-21 PROCEDURE — G8427 DOCREV CUR MEDS BY ELIG CLIN: HCPCS | Performed by: NURSE PRACTITIONER

## 2018-12-21 PROCEDURE — G8417 CALC BMI ABV UP PARAM F/U: HCPCS | Performed by: NURSE PRACTITIONER

## 2018-12-21 PROCEDURE — G8482 FLU IMMUNIZE ORDER/ADMIN: HCPCS | Performed by: NURSE PRACTITIONER

## 2018-12-21 RX ORDER — NYSTATIN 100000 [USP'U]/G
POWDER TOPICAL
Qty: 1 BOTTLE | Refills: 1 | Status: SHIPPED | OUTPATIENT
Start: 2018-12-21 | End: 2019-02-18 | Stop reason: SDUPTHER

## 2018-12-21 RX ORDER — CYCLOBENZAPRINE HCL 10 MG
10 TABLET ORAL 2 TIMES DAILY PRN
Qty: 60 TABLET | Refills: 2 | Status: SHIPPED | OUTPATIENT
Start: 2018-12-21 | End: 2019-07-31 | Stop reason: ALTCHOICE

## 2018-12-21 RX ORDER — HYDROCODONE BITARTRATE AND ACETAMINOPHEN 7.5; 325 MG/1; MG/1
1 TABLET ORAL EVERY 6 HOURS PRN
Qty: 120 TABLET | Refills: 0 | Status: SHIPPED | OUTPATIENT
Start: 2018-12-21 | End: 2019-01-14 | Stop reason: SDUPTHER

## 2018-12-21 ASSESSMENT — ENCOUNTER SYMPTOMS
BACK PAIN: 1
NAUSEA: 0
VOMITING: 0
SHORTNESS OF BREATH: 0
CONSTIPATION: 0
COUGH: 0
DIARRHEA: 0
WHEEZING: 0

## 2018-12-21 NOTE — PROGRESS NOTES
Eric Standard, DO   ibuprofen (ADVIL;MOTRIN) 200 MG tablet Take 400 mg by mouth daily as needed for Pain Yes Historical Provider, MD   diclofenac sodium (VOLTAREN) 1 % GEL Apply 2 g topically 2 times daily Yes MIKEL Chicas CNP   ACCU-CHEK CONTRERAS PLUS strip 1 each by In Vitro route 4 times daily Yes MIKEL Chicas CNP   Garlic 9775 MG CAPS Take 1,000 mg by mouth 2 times daily Yes Historical Provider, MD   albuterol (PROVENTIL) (2.5 MG/3ML) 0.083% nebulizer solution Take 2.5 mg by nebulization every 6 hours as needed for Wheezing Yes Historical Provider, MD         Allergies   Allergen Reactions    Chantix [Varenicline] Swelling     Tongue swelling Split in 1/2    Heparin      Platelets drop         Review of Systems  Review of Systems   Constitutional: Negative for activity change, appetite change and unexpected weight change. Respiratory: Negative for cough, shortness of breath and wheezing. Cardiovascular: Negative for chest pain and palpitations. Gastrointestinal: Negative for constipation, diarrhea, nausea and vomiting. Musculoskeletal: Positive for back pain and myalgias. Neurological: Positive for weakness and light-headedness. Negative for headaches. VS:  /78   Pulse 70   Temp 97.8 °F (36.6 °C)   Resp 16   Ht 5' 6\" (1.676 m)   Wt 201 lb (91.2 kg)   SpO2 98%   BMI 32.44 kg/m²     Physical Exam  Physical Exam   Constitutional: She is oriented to person, place, and time. She appears well-developed and well-nourished. No distress. HENT:   Head: Normocephalic and atraumatic. Neck: No tracheal deviation present. No thyromegaly present. Cardiovascular: Normal rate, regular rhythm, normal heart sounds and intact distal pulses. No murmur heard. Pulmonary/Chest: Effort normal and breath sounds normal. No respiratory distress. She has no wheezes. She has no rales. She exhibits no tenderness. Abdominal: Soft.  Bowel sounds are normal. There is no

## 2018-12-24 ENCOUNTER — HOSPITAL ENCOUNTER (OUTPATIENT)
Age: 58
Discharge: HOME | End: 2018-12-24
Payer: MEDICARE

## 2018-12-24 LAB
ALBUMIN SERPL-MCNC: 4.6 G/DL (ref 3.5–5.2)
ALP BLD-CCNC: 121 U/L (ref 35–104)
ALT SERPL-CCNC: 9 U/L (ref 0–32)
ANION GAP SERPL CALCULATED.3IONS-SCNC: 12 MMOL/L (ref 7–16)
AST SERPL-CCNC: 10 U/L (ref 0–31)
BASOPHILS ABSOLUTE: 0.07 E9/L (ref 0–0.2)
BASOPHILS RELATIVE PERCENT: 1.1 % (ref 0–2)
BILIRUB SERPL-MCNC: 0.4 MG/DL (ref 0–1.2)
BUN BLDV-MCNC: 23 MG/DL (ref 6–20)
CALCIUM SERPL-MCNC: 9.7 MG/DL (ref 8.6–10.2)
CHLORIDE BLD-SCNC: 101 MMOL/L (ref 98–107)
CHOLESTEROL, TOTAL: 172 MG/DL (ref 0–199)
CO2: 25 MMOL/L (ref 22–29)
CREAT SERPL-MCNC: 1.6 MG/DL (ref 0.5–1)
EOSINOPHILS ABSOLUTE: 0.26 E9/L (ref 0.05–0.5)
EOSINOPHILS RELATIVE PERCENT: 4 % (ref 0–6)
GAMMA GLUTAMYL TRANSFERASE: 12 U/L (ref 6–42)
GFR AFRICAN AMERICAN: 40
GFR NON-AFRICAN AMERICAN: 33 ML/MIN/1.73
GLUCOSE BLD-MCNC: 257 MG/DL (ref 74–99)
HCT VFR BLD CALC: 38.7 % (ref 34–48)
HDLC SERPL-MCNC: 44 MG/DL
HEMOGLOBIN: 12.9 G/DL (ref 11.5–15.5)
IMMATURE GRANULOCYTES #: 0.02 E9/L
IMMATURE GRANULOCYTES %: 0.3 % (ref 0–5)
LDL CHOLESTEROL CALCULATED: 95 MG/DL (ref 0–99)
LYMPHOCYTES ABSOLUTE: 1.17 E9/L (ref 1.5–4)
LYMPHOCYTES RELATIVE PERCENT: 17.9 % (ref 20–42)
MAGNESIUM: 2 MG/DL (ref 1.6–2.6)
MCH RBC QN AUTO: 30.6 PG (ref 26–35)
MCHC RBC AUTO-ENTMCNC: 33.3 % (ref 32–34.5)
MCV RBC AUTO: 91.7 FL (ref 80–99.9)
MONOCYTES ABSOLUTE: 0.56 E9/L (ref 0.1–0.95)
MONOCYTES RELATIVE PERCENT: 8.5 % (ref 2–12)
NEUTROPHILS ABSOLUTE: 4.47 E9/L (ref 1.8–7.3)
NEUTROPHILS RELATIVE PERCENT: 68.2 % (ref 43–80)
PDW BLD-RTO: 13.1 FL (ref 11.5–15)
PHOSPHORUS: 3.5 MG/DL (ref 2.5–4.5)
PLATELET # BLD: 190 E9/L (ref 130–450)
PMV BLD AUTO: 10.4 FL (ref 7–12)
POTASSIUM SERPL-SCNC: 5.5 MMOL/L (ref 3.5–5)
RBC # BLD: 4.22 E12/L (ref 3.5–5.5)
SODIUM BLD-SCNC: 138 MMOL/L (ref 132–146)
TOTAL PROTEIN: 7.1 G/DL (ref 6.4–8.3)
TRIGL SERPL-MCNC: 166 MG/DL (ref 0–149)
VLDLC SERPL CALC-MCNC: 33 MG/DL
WBC # BLD: 6.6 E9/L (ref 4.5–11.5)

## 2018-12-24 PROCEDURE — 80061 LIPID PANEL: CPT

## 2018-12-24 PROCEDURE — 84100 ASSAY OF PHOSPHORUS: CPT

## 2018-12-24 PROCEDURE — 82977 ASSAY OF GGT: CPT

## 2018-12-24 PROCEDURE — 36415 COLL VENOUS BLD VENIPUNCTURE: CPT

## 2018-12-24 PROCEDURE — 83735 ASSAY OF MAGNESIUM: CPT

## 2018-12-24 PROCEDURE — 85025 COMPLETE CBC W/AUTO DIFF WBC: CPT

## 2018-12-24 PROCEDURE — 80053 COMPREHEN METABOLIC PANEL: CPT

## 2018-12-26 LAB
Lab: NORMAL
REPORT: NORMAL
THIS TEST SENT TO: NORMAL

## 2018-12-27 DIAGNOSIS — K21.9 GASTROESOPHAGEAL REFLUX DISEASE, ESOPHAGITIS PRESENCE NOT SPECIFIED: Chronic | ICD-10-CM

## 2018-12-28 RX ORDER — OMEPRAZOLE 40 MG/1
CAPSULE, DELAYED RELEASE ORAL
Qty: 90 CAPSULE | Refills: 2 | Status: SHIPPED | OUTPATIENT
Start: 2018-12-28 | End: 2020-01-31 | Stop reason: SDUPTHER

## 2019-01-14 ENCOUNTER — OFFICE VISIT (OUTPATIENT)
Dept: FAMILY MEDICINE CLINIC | Age: 59
End: 2019-01-14
Payer: MEDICARE

## 2019-01-14 VITALS
HEART RATE: 74 BPM | HEIGHT: 66 IN | SYSTOLIC BLOOD PRESSURE: 134 MMHG | DIASTOLIC BLOOD PRESSURE: 82 MMHG | BODY MASS INDEX: 32.47 KG/M2 | RESPIRATION RATE: 16 BRPM | TEMPERATURE: 98.5 F | WEIGHT: 202 LBS | OXYGEN SATURATION: 97 %

## 2019-01-14 DIAGNOSIS — J32.9 SINOBRONCHITIS: ICD-10-CM

## 2019-01-14 DIAGNOSIS — G89.29 CHRONIC MIDLINE LOW BACK PAIN WITH RIGHT-SIDED SCIATICA: Primary | ICD-10-CM

## 2019-01-14 DIAGNOSIS — J40 SINOBRONCHITIS: ICD-10-CM

## 2019-01-14 DIAGNOSIS — M54.41 CHRONIC MIDLINE LOW BACK PAIN WITH RIGHT-SIDED SCIATICA: Primary | ICD-10-CM

## 2019-01-14 PROCEDURE — G8417 CALC BMI ABV UP PARAM F/U: HCPCS | Performed by: NURSE PRACTITIONER

## 2019-01-14 PROCEDURE — 3017F COLORECTAL CA SCREEN DOC REV: CPT | Performed by: NURSE PRACTITIONER

## 2019-01-14 PROCEDURE — G8482 FLU IMMUNIZE ORDER/ADMIN: HCPCS | Performed by: NURSE PRACTITIONER

## 2019-01-14 PROCEDURE — G8427 DOCREV CUR MEDS BY ELIG CLIN: HCPCS | Performed by: NURSE PRACTITIONER

## 2019-01-14 PROCEDURE — 99213 OFFICE O/P EST LOW 20 MIN: CPT | Performed by: NURSE PRACTITIONER

## 2019-01-14 PROCEDURE — 4004F PT TOBACCO SCREEN RCVD TLK: CPT | Performed by: NURSE PRACTITIONER

## 2019-01-14 RX ORDER — AZITHROMYCIN 250 MG/1
TABLET, FILM COATED ORAL
Qty: 1 PACKET | Refills: 0 | Status: SHIPPED | OUTPATIENT
Start: 2019-01-14 | End: 2019-02-19

## 2019-01-14 RX ORDER — PEN NEEDLE, DIABETIC 32GX 5/32"
NEEDLE, DISPOSABLE MISCELLANEOUS
Qty: 200 EACH | Refills: 1 | Status: SHIPPED | OUTPATIENT
Start: 2019-01-14 | End: 2019-11-05 | Stop reason: CLARIF

## 2019-01-14 RX ORDER — HYDROCODONE BITARTRATE AND ACETAMINOPHEN 7.5; 325 MG/1; MG/1
1 TABLET ORAL EVERY 6 HOURS PRN
Qty: 120 TABLET | Refills: 0 | Status: SHIPPED | OUTPATIENT
Start: 2019-01-14 | End: 2019-02-19 | Stop reason: SDUPTHER

## 2019-01-14 RX ORDER — PREDNISONE 10 MG/1
10 TABLET ORAL SEE ADMIN INSTRUCTIONS
Qty: 21 TABLET | Refills: 0 | Status: SHIPPED | OUTPATIENT
Start: 2019-01-14 | End: 2019-01-20

## 2019-01-14 ASSESSMENT — ENCOUNTER SYMPTOMS
CONSTIPATION: 0
SORE THROAT: 1
DIARRHEA: 0
COUGH: 1
SINUS PRESSURE: 1
CHEST TIGHTNESS: 1
NAUSEA: 0
WHEEZING: 0
BACK PAIN: 1
SHORTNESS OF BREATH: 0
VOMITING: 0
SINUS PAIN: 1

## 2019-02-18 DIAGNOSIS — B37.2 SKIN YEAST INFECTION: ICD-10-CM

## 2019-02-18 DIAGNOSIS — I10 HTN (HYPERTENSION), BENIGN: Chronic | ICD-10-CM

## 2019-02-18 RX ORDER — AMLODIPINE BESYLATE 2.5 MG/1
2.5 TABLET ORAL DAILY
Qty: 90 TABLET | Refills: 0 | Status: SHIPPED | OUTPATIENT
Start: 2019-02-18 | End: 2019-02-19 | Stop reason: SDUPTHER

## 2019-02-18 RX ORDER — NYSTATIN 100000 [USP'U]/G
POWDER TOPICAL
Qty: 60 G | Refills: 0 | Status: SHIPPED
Start: 2019-02-18 | End: 2020-08-12 | Stop reason: SDUPTHER

## 2019-02-18 RX ORDER — LOSARTAN POTASSIUM 50 MG/1
TABLET ORAL
Qty: 108 TABLET | Refills: 3 | Status: SHIPPED | OUTPATIENT
Start: 2019-02-18 | End: 2019-02-19 | Stop reason: SDUPTHER

## 2019-02-19 ENCOUNTER — OFFICE VISIT (OUTPATIENT)
Dept: FAMILY MEDICINE CLINIC | Age: 59
End: 2019-02-19
Payer: MEDICARE

## 2019-02-19 VITALS
OXYGEN SATURATION: 99 % | TEMPERATURE: 98.9 F | DIASTOLIC BLOOD PRESSURE: 82 MMHG | BODY MASS INDEX: 31.98 KG/M2 | HEART RATE: 61 BPM | WEIGHT: 199 LBS | RESPIRATION RATE: 16 BRPM | SYSTOLIC BLOOD PRESSURE: 130 MMHG | HEIGHT: 66 IN

## 2019-02-19 DIAGNOSIS — G89.29 CHRONIC MIDLINE LOW BACK PAIN WITH RIGHT-SIDED SCIATICA: Primary | ICD-10-CM

## 2019-02-19 DIAGNOSIS — N18.30 TYPE 2 DIABETES MELLITUS WITH STAGE 3 CHRONIC KIDNEY DISEASE, WITH LONG-TERM CURRENT USE OF INSULIN (HCC): ICD-10-CM

## 2019-02-19 DIAGNOSIS — M25.511 ACUTE PAIN OF BOTH SHOULDERS: ICD-10-CM

## 2019-02-19 DIAGNOSIS — Z79.4 TYPE 2 DIABETES MELLITUS WITH STAGE 3 CHRONIC KIDNEY DISEASE, WITH LONG-TERM CURRENT USE OF INSULIN (HCC): ICD-10-CM

## 2019-02-19 DIAGNOSIS — M54.41 CHRONIC MIDLINE LOW BACK PAIN WITH RIGHT-SIDED SCIATICA: Primary | ICD-10-CM

## 2019-02-19 DIAGNOSIS — E11.22 TYPE 2 DIABETES MELLITUS WITH STAGE 3 CHRONIC KIDNEY DISEASE, WITH LONG-TERM CURRENT USE OF INSULIN (HCC): ICD-10-CM

## 2019-02-19 DIAGNOSIS — M25.512 ACUTE PAIN OF BOTH SHOULDERS: ICD-10-CM

## 2019-02-19 LAB — HBA1C MFR BLD: 7.3 %

## 2019-02-19 PROCEDURE — G8417 CALC BMI ABV UP PARAM F/U: HCPCS | Performed by: NURSE PRACTITIONER

## 2019-02-19 PROCEDURE — 2022F DILAT RTA XM EVC RTNOPTHY: CPT | Performed by: NURSE PRACTITIONER

## 2019-02-19 PROCEDURE — G8427 DOCREV CUR MEDS BY ELIG CLIN: HCPCS | Performed by: NURSE PRACTITIONER

## 2019-02-19 PROCEDURE — 4004F PT TOBACCO SCREEN RCVD TLK: CPT | Performed by: NURSE PRACTITIONER

## 2019-02-19 PROCEDURE — 99214 OFFICE O/P EST MOD 30 MIN: CPT | Performed by: NURSE PRACTITIONER

## 2019-02-19 PROCEDURE — 83036 HEMOGLOBIN GLYCOSYLATED A1C: CPT | Performed by: NURSE PRACTITIONER

## 2019-02-19 PROCEDURE — G8482 FLU IMMUNIZE ORDER/ADMIN: HCPCS | Performed by: NURSE PRACTITIONER

## 2019-02-19 PROCEDURE — 3045F PR MOST RECENT HEMOGLOBIN A1C LEVEL 7.0-9.0%: CPT | Performed by: NURSE PRACTITIONER

## 2019-02-19 PROCEDURE — 3017F COLORECTAL CA SCREEN DOC REV: CPT | Performed by: NURSE PRACTITIONER

## 2019-02-19 RX ORDER — HYDROCODONE BITARTRATE AND ACETAMINOPHEN 7.5; 325 MG/1; MG/1
1 TABLET ORAL EVERY 6 HOURS PRN
COMMUNITY
End: 2019-02-19 | Stop reason: SINTOL

## 2019-02-19 RX ORDER — HYDROCODONE BITARTRATE AND ACETAMINOPHEN 7.5; 325 MG/1; MG/1
1 TABLET ORAL EVERY 6 HOURS PRN
Qty: 120 TABLET | Refills: 0 | Status: SHIPPED | OUTPATIENT
Start: 2019-02-19 | End: 2019-03-19 | Stop reason: SDUPTHER

## 2019-02-19 ASSESSMENT — ENCOUNTER SYMPTOMS
WHEEZING: 0
VOMITING: 0
COUGH: 0
BACK PAIN: 1
CONSTIPATION: 0
NAUSEA: 0
SHORTNESS OF BREATH: 0
DIARRHEA: 0

## 2019-02-19 ASSESSMENT — PATIENT HEALTH QUESTIONNAIRE - PHQ9
SUM OF ALL RESPONSES TO PHQ9 QUESTIONS 1 & 2: 0
SUM OF ALL RESPONSES TO PHQ QUESTIONS 1-9: 0
2. FEELING DOWN, DEPRESSED OR HOPELESS: 0
SUM OF ALL RESPONSES TO PHQ QUESTIONS 1-9: 0
1. LITTLE INTEREST OR PLEASURE IN DOING THINGS: 0

## 2019-03-11 DIAGNOSIS — Z71.6 TOBACCO ABUSE COUNSELING: ICD-10-CM

## 2019-03-12 RX ORDER — BUPROPION HYDROCHLORIDE 150 MG/1
TABLET, EXTENDED RELEASE ORAL
Qty: 180 TABLET | Refills: 1 | Status: SHIPPED | OUTPATIENT
Start: 2019-03-12 | End: 2019-12-03

## 2019-03-13 ENCOUNTER — APPOINTMENT (OUTPATIENT)
Dept: CT IMAGING | Age: 59
DRG: 065 | End: 2019-03-13
Payer: MEDICARE

## 2019-03-13 ENCOUNTER — HOSPITAL ENCOUNTER (INPATIENT)
Age: 59
LOS: 2 days | Discharge: HOME | DRG: 065 | End: 2019-03-15
Attending: EMERGENCY MEDICINE | Admitting: INTERNAL MEDICINE
Payer: MEDICARE

## 2019-03-13 DIAGNOSIS — I63.9 CEREBROVASCULAR ACCIDENT (CVA), UNSPECIFIED MECHANISM (HCC): Primary | ICD-10-CM

## 2019-03-13 PROBLEM — R29.90 STROKE-LIKE SYMPTOMS: Status: ACTIVE | Noted: 2019-03-13

## 2019-03-13 LAB
ANION GAP SERPL CALCULATED.3IONS-SCNC: 12 MMOL/L (ref 7–16)
ANION GAP SERPL CALCULATED.3IONS-SCNC: 9 MMOL/L (ref 7–16)
APTT: 30.3 SEC (ref 24.5–35.1)
BASOPHILS ABSOLUTE: 0.05 E9/L (ref 0–0.2)
BASOPHILS RELATIVE PERCENT: 1 % (ref 0–2)
BUN BLDV-MCNC: 17 MG/DL (ref 6–20)
BUN BLDV-MCNC: 19 MG/DL (ref 6–20)
CALCIUM SERPL-MCNC: 9.2 MG/DL (ref 8.6–10.2)
CALCIUM SERPL-MCNC: 9.5 MG/DL (ref 8.6–10.2)
CHLORIDE BLD-SCNC: 104 MMOL/L (ref 98–107)
CHLORIDE BLD-SCNC: 105 MMOL/L (ref 98–107)
CHOLESTEROL, TOTAL: 151 MG/DL (ref 0–199)
CO2: 24 MMOL/L (ref 22–29)
CO2: 26 MMOL/L (ref 22–29)
CREAT SERPL-MCNC: 1.5 MG/DL (ref 0.5–1)
CREAT SERPL-MCNC: 1.7 MG/DL (ref 0.5–1)
EOSINOPHILS ABSOLUTE: 0.1 E9/L (ref 0.05–0.5)
EOSINOPHILS RELATIVE PERCENT: 1.9 % (ref 0–6)
GFR AFRICAN AMERICAN: 37
GFR AFRICAN AMERICAN: 43
GFR NON-AFRICAN AMERICAN: 31 ML/MIN/1.73
GFR NON-AFRICAN AMERICAN: 36 ML/MIN/1.73
GLUCOSE BLD-MCNC: 137 MG/DL (ref 74–99)
GLUCOSE BLD-MCNC: 200 MG/DL (ref 74–99)
HCT VFR BLD CALC: 34.1 % (ref 34–48)
HCT VFR BLD CALC: 36 % (ref 34–48)
HDLC SERPL-MCNC: 45 MG/DL
HEMOGLOBIN: 11.5 G/DL (ref 11.5–15.5)
HEMOGLOBIN: 12.3 G/DL (ref 11.5–15.5)
IMMATURE GRANULOCYTES #: 0.02 E9/L
IMMATURE GRANULOCYTES %: 0.4 % (ref 0–5)
INR BLD: 1.1
LDL CHOLESTEROL CALCULATED: 78 MG/DL (ref 0–99)
LYMPHOCYTES ABSOLUTE: 1.03 E9/L (ref 1.5–4)
LYMPHOCYTES RELATIVE PERCENT: 19.6 % (ref 20–42)
MCH RBC QN AUTO: 30.9 PG (ref 26–35)
MCH RBC QN AUTO: 31 PG (ref 26–35)
MCHC RBC AUTO-ENTMCNC: 33.7 % (ref 32–34.5)
MCHC RBC AUTO-ENTMCNC: 34.2 % (ref 32–34.5)
MCV RBC AUTO: 90.7 FL (ref 80–99.9)
MCV RBC AUTO: 91.7 FL (ref 80–99.9)
METER GLUCOSE: 144 MG/DL (ref 74–99)
MONOCYTES ABSOLUTE: 0.48 E9/L (ref 0.1–0.95)
MONOCYTES RELATIVE PERCENT: 9.1 % (ref 2–12)
NEUTROPHILS ABSOLUTE: 3.57 E9/L (ref 1.8–7.3)
NEUTROPHILS RELATIVE PERCENT: 68 % (ref 43–80)
PDW BLD-RTO: 13.2 FL (ref 11.5–15)
PDW BLD-RTO: 13.2 FL (ref 11.5–15)
PLATELET # BLD: 191 E9/L (ref 130–450)
PLATELET # BLD: 199 E9/L (ref 130–450)
PMV BLD AUTO: 10.6 FL (ref 7–12)
PMV BLD AUTO: 11.1 FL (ref 7–12)
POTASSIUM REFLEX MAGNESIUM: 4.2 MMOL/L (ref 3.5–5)
POTASSIUM SERPL-SCNC: 3.7 MMOL/L (ref 3.5–5)
PROTHROMBIN TIME: 12.3 SEC (ref 9.3–12.4)
RBC # BLD: 3.72 E12/L (ref 3.5–5.5)
RBC # BLD: 3.97 E12/L (ref 3.5–5.5)
SODIUM BLD-SCNC: 140 MMOL/L (ref 132–146)
SODIUM BLD-SCNC: 140 MMOL/L (ref 132–146)
TRIGL SERPL-MCNC: 138 MG/DL (ref 0–149)
VLDLC SERPL CALC-MCNC: 28 MG/DL
WBC # BLD: 5 E9/L (ref 4.5–11.5)
WBC # BLD: 5.3 E9/L (ref 4.5–11.5)

## 2019-03-13 PROCEDURE — 6370000000 HC RX 637 (ALT 250 FOR IP): Performed by: INTERNAL MEDICINE

## 2019-03-13 PROCEDURE — 2580000003 HC RX 258: Performed by: INTERNAL MEDICINE

## 2019-03-13 PROCEDURE — 85025 COMPLETE CBC W/AUTO DIFF WBC: CPT

## 2019-03-13 PROCEDURE — 85610 PROTHROMBIN TIME: CPT

## 2019-03-13 PROCEDURE — 36415 COLL VENOUS BLD VENIPUNCTURE: CPT

## 2019-03-13 PROCEDURE — 85027 COMPLETE CBC AUTOMATED: CPT

## 2019-03-13 PROCEDURE — 99285 EMERGENCY DEPT VISIT HI MDM: CPT

## 2019-03-13 PROCEDURE — 80048 BASIC METABOLIC PNL TOTAL CA: CPT

## 2019-03-13 PROCEDURE — 70450 CT HEAD/BRAIN W/O DYE: CPT

## 2019-03-13 PROCEDURE — 93005 ELECTROCARDIOGRAM TRACING: CPT | Performed by: EMERGENCY MEDICINE

## 2019-03-13 PROCEDURE — 2060000000 HC ICU INTERMEDIATE R&B

## 2019-03-13 PROCEDURE — 82962 GLUCOSE BLOOD TEST: CPT

## 2019-03-13 PROCEDURE — 6360000002 HC RX W HCPCS: Performed by: INTERNAL MEDICINE

## 2019-03-13 PROCEDURE — 85730 THROMBOPLASTIN TIME PARTIAL: CPT

## 2019-03-13 PROCEDURE — 2500000003 HC RX 250 WO HCPCS: Performed by: INTERNAL MEDICINE

## 2019-03-13 PROCEDURE — 80061 LIPID PANEL: CPT

## 2019-03-13 RX ORDER — SULFAMETHOXAZOLE AND TRIMETHOPRIM 400; 80 MG/1; MG/1
1 TABLET ORAL DAILY
Status: DISCONTINUED | OUTPATIENT
Start: 2019-03-13 | End: 2019-03-15 | Stop reason: HOSPADM

## 2019-03-13 RX ORDER — HYDROCODONE BITARTRATE AND ACETAMINOPHEN 7.5; 325 MG/1; MG/1
1 TABLET ORAL EVERY 6 HOURS PRN
Status: DISCONTINUED | OUTPATIENT
Start: 2019-03-13 | End: 2019-03-15 | Stop reason: HOSPADM

## 2019-03-13 RX ORDER — AMLODIPINE BESYLATE 2.5 MG/1
2.5 TABLET ORAL DAILY
Status: DISCONTINUED | OUTPATIENT
Start: 2019-03-13 | End: 2019-03-15 | Stop reason: HOSPADM

## 2019-03-13 RX ORDER — ATORVASTATIN CALCIUM 10 MG/1
10 TABLET, FILM COATED ORAL NIGHTLY
Status: DISCONTINUED | OUTPATIENT
Start: 2019-03-14 | End: 2019-03-15

## 2019-03-13 RX ORDER — ALBUTEROL SULFATE 90 UG/1
2 AEROSOL, METERED RESPIRATORY (INHALATION) EVERY 6 HOURS PRN
Status: DISCONTINUED | OUTPATIENT
Start: 2019-03-13 | End: 2019-03-15 | Stop reason: HOSPADM

## 2019-03-13 RX ORDER — AMLODIPINE BESYLATE 2.5 MG/1
2.5 TABLET ORAL DAILY
Status: DISCONTINUED | OUTPATIENT
Start: 2019-03-14 | End: 2019-03-13

## 2019-03-13 RX ORDER — SODIUM CHLORIDE 0.9 % (FLUSH) 0.9 %
10 SYRINGE (ML) INJECTION PRN
Status: DISCONTINUED | OUTPATIENT
Start: 2019-03-13 | End: 2019-03-15 | Stop reason: HOSPADM

## 2019-03-13 RX ORDER — NICOTINE POLACRILEX 4 MG
15 LOZENGE BUCCAL PRN
Status: DISCONTINUED | OUTPATIENT
Start: 2019-03-13 | End: 2019-03-15 | Stop reason: HOSPADM

## 2019-03-13 RX ORDER — SODIUM CHLORIDE 0.9 % (FLUSH) 0.9 %
10 SYRINGE (ML) INJECTION 2 TIMES DAILY
Status: DISCONTINUED | OUTPATIENT
Start: 2019-03-13 | End: 2019-03-15 | Stop reason: HOSPADM

## 2019-03-13 RX ORDER — CYCLOBENZAPRINE HCL 10 MG
10 TABLET ORAL 2 TIMES DAILY PRN
Status: DISCONTINUED | OUTPATIENT
Start: 2019-03-13 | End: 2019-03-15 | Stop reason: HOSPADM

## 2019-03-13 RX ORDER — INSULIN GLARGINE 100 [IU]/ML
50 INJECTION, SOLUTION SUBCUTANEOUS NIGHTLY
Status: DISCONTINUED | OUTPATIENT
Start: 2019-03-13 | End: 2019-03-15 | Stop reason: HOSPADM

## 2019-03-13 RX ORDER — BUPROPION HYDROCHLORIDE 150 MG/1
150 TABLET, EXTENDED RELEASE ORAL 2 TIMES DAILY
Status: DISCONTINUED | OUTPATIENT
Start: 2019-03-13 | End: 2019-03-15 | Stop reason: HOSPADM

## 2019-03-13 RX ORDER — PANTOPRAZOLE SODIUM 40 MG/1
40 TABLET, DELAYED RELEASE ORAL
Status: DISCONTINUED | OUTPATIENT
Start: 2019-03-14 | End: 2019-03-15 | Stop reason: HOSPADM

## 2019-03-13 RX ORDER — ACETAMINOPHEN 325 MG/1
650 TABLET ORAL EVERY 4 HOURS PRN
Status: DISCONTINUED | OUTPATIENT
Start: 2019-03-13 | End: 2019-03-15 | Stop reason: HOSPADM

## 2019-03-13 RX ORDER — DEXTROSE MONOHYDRATE 50 MG/ML
100 INJECTION, SOLUTION INTRAVENOUS PRN
Status: DISCONTINUED | OUTPATIENT
Start: 2019-03-13 | End: 2019-03-15 | Stop reason: HOSPADM

## 2019-03-13 RX ORDER — GUAIFENESIN 400 MG/1
400 TABLET ORAL 2 TIMES DAILY
Status: DISCONTINUED | OUTPATIENT
Start: 2019-03-13 | End: 2019-03-15 | Stop reason: HOSPADM

## 2019-03-13 RX ORDER — ALBUTEROL SULFATE 2.5 MG/3ML
2.5 SOLUTION RESPIRATORY (INHALATION) EVERY 6 HOURS PRN
Status: DISCONTINUED | OUTPATIENT
Start: 2019-03-13 | End: 2019-03-15 | Stop reason: HOSPADM

## 2019-03-13 RX ORDER — DEXTROSE MONOHYDRATE 25 G/50ML
12.5 INJECTION, SOLUTION INTRAVENOUS PRN
Status: DISCONTINUED | OUTPATIENT
Start: 2019-03-13 | End: 2019-03-15 | Stop reason: HOSPADM

## 2019-03-13 RX ORDER — TACROLIMUS 1 MG/1
1 CAPSULE ORAL 2 TIMES DAILY
Status: DISCONTINUED | OUTPATIENT
Start: 2019-03-13 | End: 2019-03-15 | Stop reason: HOSPADM

## 2019-03-13 RX ADMIN — BUPROPION HYDROCHLORIDE 150 MG: 150 TABLET, EXTENDED RELEASE ORAL at 21:33

## 2019-03-13 RX ADMIN — TACROLIMUS 1 MG: 1 CAPSULE ORAL at 21:34

## 2019-03-13 RX ADMIN — HYDROCODONE BITARTRATE AND ACETAMINOPHEN 1 TABLET: 7.5; 325 TABLET ORAL at 21:33

## 2019-03-13 RX ADMIN — AMLODIPINE BESYLATE 2.5 MG: 2.5 TABLET ORAL at 22:41

## 2019-03-13 RX ADMIN — GUAIFENESIN 400 MG: 400 TABLET ORAL at 21:34

## 2019-03-13 RX ADMIN — Medication 10 ML: at 21:34

## 2019-03-13 RX ADMIN — MICONAZOLE NITRATE: 20.6 POWDER TOPICAL at 21:34

## 2019-03-13 RX ADMIN — INSULIN LISPRO 1 UNITS: 100 INJECTION, SOLUTION INTRAVENOUS; SUBCUTANEOUS at 21:35

## 2019-03-13 ASSESSMENT — ENCOUNTER SYMPTOMS
SHORTNESS OF BREATH: 0
COUGH: 0
EYE PAIN: 0
SINUS PAIN: 0
VISUAL CHANGE: 0
ABDOMINAL PAIN: 0
EYE REDNESS: 0
NAUSEA: 0
CHEST TIGHTNESS: 0
VOICE CHANGE: 0
TROUBLE SWALLOWING: 0
PHOTOPHOBIA: 0
VOMITING: 0
DIARRHEA: 0
BLOOD IN STOOL: 0
COLOR CHANGE: 0
SINUS PRESSURE: 0
CONSTIPATION: 0

## 2019-03-13 ASSESSMENT — PAIN SCALES - GENERAL
PAINLEVEL_OUTOF10: 6
PAINLEVEL_OUTOF10: 8
PAINLEVEL_OUTOF10: 6
PAINLEVEL_OUTOF10: 0
PAINLEVEL_OUTOF10: 0

## 2019-03-13 ASSESSMENT — PAIN DESCRIPTION - LOCATION
LOCATION: BACK
LOCATION: ARM

## 2019-03-13 ASSESSMENT — PAIN DESCRIPTION - DESCRIPTORS: DESCRIPTORS: NUMBNESS

## 2019-03-13 ASSESSMENT — PAIN DESCRIPTION - ORIENTATION
ORIENTATION: MID;LOWER
ORIENTATION: LEFT

## 2019-03-13 ASSESSMENT — PAIN DESCRIPTION - PAIN TYPE
TYPE: ACUTE PAIN
TYPE: ACUTE PAIN

## 2019-03-13 ASSESSMENT — PAIN DESCRIPTION - FREQUENCY: FREQUENCY: CONTINUOUS

## 2019-03-14 ENCOUNTER — APPOINTMENT (OUTPATIENT)
Dept: ULTRASOUND IMAGING | Age: 59
DRG: 065 | End: 2019-03-14
Payer: MEDICARE

## 2019-03-14 ENCOUNTER — APPOINTMENT (OUTPATIENT)
Dept: MRI IMAGING | Age: 59
DRG: 065 | End: 2019-03-14
Payer: MEDICARE

## 2019-03-14 PROBLEM — G45.9 TIA (TRANSIENT ISCHEMIC ATTACK): Status: RESOLVED | Noted: 2019-03-14 | Resolved: 2019-03-14

## 2019-03-14 PROBLEM — N18.30 CKD (CHRONIC KIDNEY DISEASE) STAGE 3, GFR 30-59 ML/MIN (HCC): Chronic | Status: ACTIVE | Noted: 2019-03-14

## 2019-03-14 PROBLEM — I63.9 ACUTE CVA (CEREBROVASCULAR ACCIDENT) (HCC): Status: RESOLVED | Noted: 2019-03-13 | Resolved: 2019-03-14

## 2019-03-14 PROBLEM — I63.9 ACUTE CEREBROVASCULAR ACCIDENT (CVA) (HCC): Status: RESOLVED | Noted: 2019-03-13 | Resolved: 2019-03-14

## 2019-03-14 PROBLEM — I10 ESSENTIAL HYPERTENSION: Chronic | Status: ACTIVE | Noted: 2019-03-14

## 2019-03-14 PROBLEM — R29.90 STROKE-LIKE SYMPTOMS: Status: RESOLVED | Noted: 2019-03-13 | Resolved: 2019-03-14

## 2019-03-14 PROBLEM — G45.9 TIA (TRANSIENT ISCHEMIC ATTACK): Status: ACTIVE | Noted: 2019-03-14

## 2019-03-14 PROBLEM — E66.9 OBESITY (BMI 30-39.9): Chronic | Status: ACTIVE | Noted: 2019-03-14

## 2019-03-14 PROBLEM — I10 HTN (HYPERTENSION), BENIGN: Chronic | Status: RESOLVED | Noted: 2017-01-24 | Resolved: 2019-03-14

## 2019-03-14 PROBLEM — I63.9 ACUTE CVA (CEREBROVASCULAR ACCIDENT) (HCC): Status: ACTIVE | Noted: 2019-03-14

## 2019-03-14 LAB
METER GLUCOSE: 180 MG/DL (ref 74–99)
METER GLUCOSE: 186 MG/DL (ref 74–99)
METER GLUCOSE: 210 MG/DL (ref 74–99)

## 2019-03-14 PROCEDURE — 70544 MR ANGIOGRAPHY HEAD W/O DYE: CPT

## 2019-03-14 PROCEDURE — 99223 1ST HOSP IP/OBS HIGH 75: CPT | Performed by: PSYCHIATRY & NEUROLOGY

## 2019-03-14 PROCEDURE — 6360000002 HC RX W HCPCS: Performed by: INTERNAL MEDICINE

## 2019-03-14 PROCEDURE — 82962 GLUCOSE BLOOD TEST: CPT

## 2019-03-14 PROCEDURE — 70551 MRI BRAIN STEM W/O DYE: CPT

## 2019-03-14 PROCEDURE — 70547 MR ANGIOGRAPHY NECK W/O DYE: CPT

## 2019-03-14 PROCEDURE — 97166 OT EVAL MOD COMPLEX 45 MIN: CPT

## 2019-03-14 PROCEDURE — 2580000003 HC RX 258: Performed by: INTERNAL MEDICINE

## 2019-03-14 PROCEDURE — 2060000000 HC ICU INTERMEDIATE R&B

## 2019-03-14 PROCEDURE — 97162 PT EVAL MOD COMPLEX 30 MIN: CPT

## 2019-03-14 PROCEDURE — 6370000000 HC RX 637 (ALT 250 FOR IP): Performed by: INTERNAL MEDICINE

## 2019-03-14 PROCEDURE — 97530 THERAPEUTIC ACTIVITIES: CPT

## 2019-03-14 PROCEDURE — 93880 EXTRACRANIAL BILAT STUDY: CPT

## 2019-03-14 PROCEDURE — 6370000000 HC RX 637 (ALT 250 FOR IP): Performed by: FAMILY MEDICINE

## 2019-03-14 PROCEDURE — 97535 SELF CARE MNGMENT TRAINING: CPT

## 2019-03-14 RX ORDER — ASPIRIN 81 MG/1
81 TABLET, CHEWABLE ORAL DAILY
Status: DISCONTINUED | OUTPATIENT
Start: 2019-03-14 | End: 2019-03-15 | Stop reason: HOSPADM

## 2019-03-14 RX ORDER — CLOPIDOGREL BISULFATE 75 MG/1
75 TABLET ORAL DAILY
Status: DISCONTINUED | OUTPATIENT
Start: 2019-03-14 | End: 2019-03-15 | Stop reason: HOSPADM

## 2019-03-14 RX ORDER — LORAZEPAM 2 MG/ML
1 INJECTION INTRAMUSCULAR ONCE
Status: COMPLETED | OUTPATIENT
Start: 2019-03-14 | End: 2019-03-14

## 2019-03-14 RX ADMIN — BUPROPION HYDROCHLORIDE 150 MG: 150 TABLET, EXTENDED RELEASE ORAL at 09:26

## 2019-03-14 RX ADMIN — SULFAMETHOXAZOLE AND TRIMETHOPRIM 1 TABLET: 400; 80 TABLET ORAL at 09:26

## 2019-03-14 RX ADMIN — MICONAZOLE NITRATE: 20.6 POWDER TOPICAL at 20:24

## 2019-03-14 RX ADMIN — GUAIFENESIN 400 MG: 400 TABLET ORAL at 09:26

## 2019-03-14 RX ADMIN — INSULIN GLARGINE 50 UNITS: 100 INJECTION, SOLUTION SUBCUTANEOUS at 20:28

## 2019-03-14 RX ADMIN — CLOPIDOGREL 75 MG: 75 TABLET, FILM COATED ORAL at 17:01

## 2019-03-14 RX ADMIN — ASPIRIN 81 MG 81 MG: 81 TABLET ORAL at 17:01

## 2019-03-14 RX ADMIN — DICLOFENAC SODIUM 2 G: 10 GEL TOPICAL at 09:27

## 2019-03-14 RX ADMIN — HYDROCODONE BITARTRATE AND ACETAMINOPHEN 1 TABLET: 7.5; 325 TABLET ORAL at 05:33

## 2019-03-14 RX ADMIN — AMLODIPINE BESYLATE 2.5 MG: 2.5 TABLET ORAL at 09:26

## 2019-03-14 RX ADMIN — PANTOPRAZOLE SODIUM 40 MG: 40 TABLET, DELAYED RELEASE ORAL at 06:21

## 2019-03-14 RX ADMIN — HYDROCODONE BITARTRATE AND ACETAMINOPHEN 1 TABLET: 7.5; 325 TABLET ORAL at 17:48

## 2019-03-14 RX ADMIN — MICONAZOLE NITRATE: 20.6 POWDER TOPICAL at 09:27

## 2019-03-14 RX ADMIN — ATORVASTATIN CALCIUM 10 MG: 10 TABLET, FILM COATED ORAL at 20:24

## 2019-03-14 RX ADMIN — Medication 10 ML: at 20:24

## 2019-03-14 RX ADMIN — GUAIFENESIN 400 MG: 400 TABLET ORAL at 20:24

## 2019-03-14 RX ADMIN — TACROLIMUS 1 MG: 1 CAPSULE ORAL at 20:24

## 2019-03-14 RX ADMIN — DICLOFENAC SODIUM 2 G: 10 GEL TOPICAL at 20:23

## 2019-03-14 RX ADMIN — LORAZEPAM 1 MG: 2 INJECTION INTRAMUSCULAR; INTRAVENOUS at 13:30

## 2019-03-14 RX ADMIN — TACROLIMUS 1 MG: 1 CAPSULE ORAL at 09:26

## 2019-03-14 RX ADMIN — Medication 10 ML: at 09:27

## 2019-03-14 RX ADMIN — BUPROPION HYDROCHLORIDE 150 MG: 150 TABLET, EXTENDED RELEASE ORAL at 20:24

## 2019-03-14 ASSESSMENT — ENCOUNTER SYMPTOMS
FACIAL SWELLING: 0
ABDOMINAL PAIN: 0
PHOTOPHOBIA: 0
SHORTNESS OF BREATH: 0
NAUSEA: 0

## 2019-03-14 ASSESSMENT — PAIN DESCRIPTION - FREQUENCY: FREQUENCY: INTERMITTENT

## 2019-03-14 ASSESSMENT — PAIN DESCRIPTION - LOCATION: LOCATION: BACK

## 2019-03-14 ASSESSMENT — PAIN SCALES - GENERAL
PAINLEVEL_OUTOF10: 0
PAINLEVEL_OUTOF10: 8
PAINLEVEL_OUTOF10: 0
PAINLEVEL_OUTOF10: 6
PAINLEVEL_OUTOF10: 7

## 2019-03-14 ASSESSMENT — PAIN DESCRIPTION - DESCRIPTORS: DESCRIPTORS: ACHING

## 2019-03-14 ASSESSMENT — PAIN DESCRIPTION - PAIN TYPE: TYPE: CHRONIC PAIN

## 2019-03-14 ASSESSMENT — PAIN DESCRIPTION - ORIENTATION: ORIENTATION: LOWER

## 2019-03-15 VITALS
BODY MASS INDEX: 31.66 KG/M2 | SYSTOLIC BLOOD PRESSURE: 172 MMHG | WEIGHT: 197 LBS | HEART RATE: 76 BPM | TEMPERATURE: 98.6 F | DIASTOLIC BLOOD PRESSURE: 68 MMHG | HEIGHT: 66 IN | RESPIRATION RATE: 18 BRPM | OXYGEN SATURATION: 98 %

## 2019-03-15 LAB
EKG ATRIAL RATE: 70 BPM
EKG P AXIS: 41 DEGREES
EKG P-R INTERVAL: 142 MS
EKG Q-T INTERVAL: 394 MS
EKG QRS DURATION: 76 MS
EKG QTC CALCULATION (BAZETT): 425 MS
EKG R AXIS: 18 DEGREES
EKG T AXIS: 49 DEGREES
EKG VENTRICULAR RATE: 70 BPM
LV EF: 65 %
LVEF MODALITY: NORMAL
METER GLUCOSE: 141 MG/DL (ref 74–99)
METER GLUCOSE: 143 MG/DL (ref 74–99)
METER GLUCOSE: 158 MG/DL (ref 74–99)

## 2019-03-15 PROCEDURE — 2580000003 HC RX 258: Performed by: INTERNAL MEDICINE

## 2019-03-15 PROCEDURE — 6370000000 HC RX 637 (ALT 250 FOR IP): Performed by: INTERNAL MEDICINE

## 2019-03-15 PROCEDURE — 6370000000 HC RX 637 (ALT 250 FOR IP): Performed by: FAMILY MEDICINE

## 2019-03-15 PROCEDURE — 93306 TTE W/DOPPLER COMPLETE: CPT

## 2019-03-15 PROCEDURE — 82962 GLUCOSE BLOOD TEST: CPT

## 2019-03-15 PROCEDURE — 99232 SBSQ HOSP IP/OBS MODERATE 35: CPT | Performed by: NURSE PRACTITIONER

## 2019-03-15 PROCEDURE — 6360000002 HC RX W HCPCS: Performed by: INTERNAL MEDICINE

## 2019-03-15 RX ORDER — CLOPIDOGREL BISULFATE 75 MG/1
75 TABLET ORAL DAILY
Qty: 30 TABLET | Refills: 0 | Status: SHIPPED | OUTPATIENT
Start: 2019-03-16 | End: 2019-09-25 | Stop reason: SDUPTHER

## 2019-03-15 RX ORDER — ATORVASTATIN CALCIUM 20 MG/1
20 TABLET, FILM COATED ORAL NIGHTLY
Qty: 30 TABLET | Refills: 0 | Status: SHIPPED | OUTPATIENT
Start: 2019-03-15 | End: 2019-11-05 | Stop reason: SDUPTHER

## 2019-03-15 RX ORDER — ASPIRIN 81 MG/1
81 TABLET, CHEWABLE ORAL DAILY
Qty: 30 TABLET | Refills: 0 | Status: ON HOLD | OUTPATIENT
Start: 2019-03-16 | End: 2021-12-01 | Stop reason: HOSPADM

## 2019-03-15 RX ORDER — ATORVASTATIN CALCIUM 10 MG/1
20 TABLET, FILM COATED ORAL NIGHTLY
Status: DISCONTINUED | OUTPATIENT
Start: 2019-03-15 | End: 2019-03-15 | Stop reason: HOSPADM

## 2019-03-15 RX ADMIN — PANTOPRAZOLE SODIUM 40 MG: 40 TABLET, DELAYED RELEASE ORAL at 05:02

## 2019-03-15 RX ADMIN — HYDROCODONE BITARTRATE AND ACETAMINOPHEN 1 TABLET: 7.5; 325 TABLET ORAL at 08:22

## 2019-03-15 RX ADMIN — TACROLIMUS 1 MG: 1 CAPSULE ORAL at 08:23

## 2019-03-15 RX ADMIN — BUPROPION HYDROCHLORIDE 150 MG: 150 TABLET, EXTENDED RELEASE ORAL at 08:23

## 2019-03-15 RX ADMIN — DICLOFENAC SODIUM 2 G: 10 GEL TOPICAL at 08:24

## 2019-03-15 RX ADMIN — MICONAZOLE NITRATE: 20.6 POWDER TOPICAL at 08:24

## 2019-03-15 RX ADMIN — SULFAMETHOXAZOLE AND TRIMETHOPRIM 1 TABLET: 400; 80 TABLET ORAL at 08:23

## 2019-03-15 RX ADMIN — ASPIRIN 81 MG 81 MG: 81 TABLET ORAL at 08:23

## 2019-03-15 RX ADMIN — Medication 10 ML: at 08:23

## 2019-03-15 RX ADMIN — AMLODIPINE BESYLATE 2.5 MG: 2.5 TABLET ORAL at 08:23

## 2019-03-15 RX ADMIN — GUAIFENESIN 400 MG: 400 TABLET ORAL at 08:23

## 2019-03-15 RX ADMIN — CLOPIDOGREL 75 MG: 75 TABLET, FILM COATED ORAL at 08:23

## 2019-03-15 ASSESSMENT — PAIN DESCRIPTION - LOCATION: LOCATION: BACK

## 2019-03-15 ASSESSMENT — PAIN SCALES - GENERAL
PAINLEVEL_OUTOF10: 5
PAINLEVEL_OUTOF10: 6
PAINLEVEL_OUTOF10: 0
PAINLEVEL_OUTOF10: 7

## 2019-03-15 ASSESSMENT — PAIN DESCRIPTION - PAIN TYPE: TYPE: CHRONIC PAIN

## 2019-03-18 ENCOUNTER — TELEPHONE (OUTPATIENT)
Dept: SURGERY | Age: 59
End: 2019-03-18

## 2019-03-19 ENCOUNTER — OFFICE VISIT (OUTPATIENT)
Dept: FAMILY MEDICINE CLINIC | Age: 59
End: 2019-03-19
Payer: MEDICARE

## 2019-03-19 VITALS
HEART RATE: 73 BPM | HEIGHT: 66 IN | RESPIRATION RATE: 16 BRPM | TEMPERATURE: 98.1 F | SYSTOLIC BLOOD PRESSURE: 162 MMHG | WEIGHT: 194 LBS | BODY MASS INDEX: 31.18 KG/M2 | OXYGEN SATURATION: 98 % | DIASTOLIC BLOOD PRESSURE: 102 MMHG

## 2019-03-19 DIAGNOSIS — G89.29 CHRONIC MIDLINE LOW BACK PAIN WITH RIGHT-SIDED SCIATICA: ICD-10-CM

## 2019-03-19 DIAGNOSIS — I63.231 CEREBROVASCULAR ACCIDENT (CVA) DUE TO OCCLUSION OF RIGHT CAROTID ARTERY (HCC): Primary | ICD-10-CM

## 2019-03-19 DIAGNOSIS — I10 HTN (HYPERTENSION), BENIGN: Chronic | ICD-10-CM

## 2019-03-19 DIAGNOSIS — M54.41 CHRONIC MIDLINE LOW BACK PAIN WITH RIGHT-SIDED SCIATICA: ICD-10-CM

## 2019-03-19 PROCEDURE — G8427 DOCREV CUR MEDS BY ELIG CLIN: HCPCS | Performed by: NURSE PRACTITIONER

## 2019-03-19 PROCEDURE — G8482 FLU IMMUNIZE ORDER/ADMIN: HCPCS | Performed by: NURSE PRACTITIONER

## 2019-03-19 PROCEDURE — 1111F DSCHRG MED/CURRENT MED MERGE: CPT | Performed by: NURSE PRACTITIONER

## 2019-03-19 PROCEDURE — 4004F PT TOBACCO SCREEN RCVD TLK: CPT | Performed by: NURSE PRACTITIONER

## 2019-03-19 PROCEDURE — 99214 OFFICE O/P EST MOD 30 MIN: CPT | Performed by: NURSE PRACTITIONER

## 2019-03-19 PROCEDURE — G8598 ASA/ANTIPLAT THER USED: HCPCS | Performed by: NURSE PRACTITIONER

## 2019-03-19 PROCEDURE — 3017F COLORECTAL CA SCREEN DOC REV: CPT | Performed by: NURSE PRACTITIONER

## 2019-03-19 PROCEDURE — G8417 CALC BMI ABV UP PARAM F/U: HCPCS | Performed by: NURSE PRACTITIONER

## 2019-03-19 RX ORDER — LOSARTAN POTASSIUM 50 MG/1
TABLET ORAL
Qty: 30 TABLET | Refills: 3 | Status: SHIPPED | OUTPATIENT
Start: 2019-03-19 | End: 2020-01-31 | Stop reason: SDUPTHER

## 2019-03-19 RX ORDER — HYDROCODONE BITARTRATE AND ACETAMINOPHEN 7.5; 325 MG/1; MG/1
1 TABLET ORAL EVERY 6 HOURS PRN
Qty: 120 TABLET | Refills: 0 | Status: SHIPPED | OUTPATIENT
Start: 2019-03-19 | End: 2019-05-29 | Stop reason: SDUPTHER

## 2019-03-19 ASSESSMENT — ENCOUNTER SYMPTOMS
WHEEZING: 0
NAUSEA: 1
CONSTIPATION: 0
BACK PAIN: 1
DIARRHEA: 0
COUGH: 0
VOMITING: 0
SHORTNESS OF BREATH: 0

## 2019-03-21 ENCOUNTER — TELEPHONE (OUTPATIENT)
Dept: SURGERY | Age: 59
End: 2019-03-21

## 2019-04-11 ENCOUNTER — APPOINTMENT (OUTPATIENT)
Dept: CT IMAGING | Age: 59
End: 2019-04-11
Payer: MEDICARE

## 2019-04-11 ENCOUNTER — APPOINTMENT (OUTPATIENT)
Dept: GENERAL RADIOLOGY | Age: 59
End: 2019-04-11
Payer: MEDICARE

## 2019-04-11 ENCOUNTER — HOSPITAL ENCOUNTER (EMERGENCY)
Age: 59
Discharge: ANOTHER HOSPITAL | End: 2019-04-11
Attending: EMERGENCY MEDICINE
Payer: MEDICARE

## 2019-04-11 VITALS
DIASTOLIC BLOOD PRESSURE: 88 MMHG | HEART RATE: 76 BPM | WEIGHT: 190 LBS | BODY MASS INDEX: 30.53 KG/M2 | OXYGEN SATURATION: 93 % | HEIGHT: 66 IN | SYSTOLIC BLOOD PRESSURE: 193 MMHG | TEMPERATURE: 98.1 F | RESPIRATION RATE: 14 BRPM

## 2019-04-11 DIAGNOSIS — I63.9 CEREBROVASCULAR ACCIDENT (CVA), UNSPECIFIED MECHANISM (HCC): Primary | ICD-10-CM

## 2019-04-11 LAB
ANION GAP SERPL CALCULATED.3IONS-SCNC: 10 MMOL/L (ref 7–16)
BASOPHILS ABSOLUTE: 0.06 E9/L (ref 0–0.2)
BASOPHILS RELATIVE PERCENT: 0.8 % (ref 0–2)
BUN BLDV-MCNC: 19 MG/DL (ref 6–20)
CALCIUM SERPL-MCNC: 9.5 MG/DL (ref 8.6–10.2)
CHLORIDE BLD-SCNC: 103 MMOL/L (ref 98–107)
CO2: 27 MMOL/L (ref 22–29)
CREAT SERPL-MCNC: 1.3 MG/DL (ref 0.5–1)
EOSINOPHILS ABSOLUTE: 0.22 E9/L (ref 0.05–0.5)
EOSINOPHILS RELATIVE PERCENT: 3.1 % (ref 0–6)
GFR AFRICAN AMERICAN: 51
GFR NON-AFRICAN AMERICAN: 42 ML/MIN/1.73
GLUCOSE BLD-MCNC: 169 MG/DL (ref 74–99)
HCT VFR BLD CALC: 39.1 % (ref 34–48)
HEMOGLOBIN: 12.9 G/DL (ref 11.5–15.5)
IMMATURE GRANULOCYTES #: 0.03 E9/L
IMMATURE GRANULOCYTES %: 0.4 % (ref 0–5)
LYMPHOCYTES ABSOLUTE: 1.06 E9/L (ref 1.5–4)
LYMPHOCYTES RELATIVE PERCENT: 14.8 % (ref 20–42)
MCH RBC QN AUTO: 30.8 PG (ref 26–35)
MCHC RBC AUTO-ENTMCNC: 33 % (ref 32–34.5)
MCV RBC AUTO: 93.3 FL (ref 80–99.9)
METER GLUCOSE: 149 MG/DL (ref 74–99)
METER GLUCOSE: 175 MG/DL (ref 74–99)
MONOCYTES ABSOLUTE: 0.5 E9/L (ref 0.1–0.95)
MONOCYTES RELATIVE PERCENT: 7 % (ref 2–12)
NEUTROPHILS ABSOLUTE: 5.27 E9/L (ref 1.8–7.3)
NEUTROPHILS RELATIVE PERCENT: 73.9 % (ref 43–80)
PDW BLD-RTO: 12.9 FL (ref 11.5–15)
PLATELET # BLD: 196 E9/L (ref 130–450)
PMV BLD AUTO: 10.7 FL (ref 7–12)
POTASSIUM REFLEX MAGNESIUM: 4.5 MMOL/L (ref 3.5–5)
PRO-BNP: 432 PG/ML (ref 0–125)
RBC # BLD: 4.19 E12/L (ref 3.5–5.5)
SODIUM BLD-SCNC: 140 MMOL/L (ref 132–146)
TROPONIN: <0.01 NG/ML (ref 0–0.03)
WBC # BLD: 7.1 E9/L (ref 4.5–11.5)

## 2019-04-11 PROCEDURE — 80048 BASIC METABOLIC PNL TOTAL CA: CPT

## 2019-04-11 PROCEDURE — 83880 ASSAY OF NATRIURETIC PEPTIDE: CPT

## 2019-04-11 PROCEDURE — 70496 CT ANGIOGRAPHY HEAD: CPT

## 2019-04-11 PROCEDURE — 71045 X-RAY EXAM CHEST 1 VIEW: CPT

## 2019-04-11 PROCEDURE — 6360000004 HC RX CONTRAST MEDICATION: Performed by: RADIOLOGY

## 2019-04-11 PROCEDURE — 93005 ELECTROCARDIOGRAM TRACING: CPT

## 2019-04-11 PROCEDURE — 99291 CRITICAL CARE FIRST HOUR: CPT

## 2019-04-11 PROCEDURE — 85025 COMPLETE CBC W/AUTO DIFF WBC: CPT

## 2019-04-11 PROCEDURE — 99292 CRITICAL CARE ADDL 30 MIN: CPT

## 2019-04-11 PROCEDURE — 0042T CT BRAIN PERFUSION: CPT

## 2019-04-11 PROCEDURE — 36415 COLL VENOUS BLD VENIPUNCTURE: CPT

## 2019-04-11 PROCEDURE — 70450 CT HEAD/BRAIN W/O DYE: CPT

## 2019-04-11 PROCEDURE — 82962 GLUCOSE BLOOD TEST: CPT

## 2019-04-11 PROCEDURE — 2580000003 HC RX 258: Performed by: EMERGENCY MEDICINE

## 2019-04-11 PROCEDURE — 84484 ASSAY OF TROPONIN QUANT: CPT

## 2019-04-11 PROCEDURE — 70498 CT ANGIOGRAPHY NECK: CPT

## 2019-04-11 RX ORDER — 0.9 % SODIUM CHLORIDE 0.9 %
1000 INTRAVENOUS SOLUTION INTRAVENOUS ONCE
Status: COMPLETED | OUTPATIENT
Start: 2019-04-11 | End: 2019-04-11

## 2019-04-11 RX ADMIN — SODIUM CHLORIDE 1000 ML: 9 INJECTION, SOLUTION INTRAVENOUS at 10:31

## 2019-04-11 RX ADMIN — IOPAMIDOL 100 ML: 755 INJECTION, SOLUTION INTRAVENOUS at 09:54

## 2019-04-11 NOTE — CONSULTS
 COPD exacerbation ([facility] 2015    pt denies having    Diabetes mellitus ([facility]     Essential hypertension 2/15/2016    Gall stones     did have stent placed    GERD (gastroesophageal reflux disease)     Hep C w/o coma, chronic ([facility]     Sucessfully treated with Harvoni-no longer has viral load    History of blood transfusion     Neuropathy     PFO (patent foramen ovale)     Pneumonia     Type 2 diabetes mellitus with stage 3 chronic kidney disease, with long-term current use of insulin (HCC)     Unspecified cerebral artery occlusion with cerebral infarction     Varices      Past Surgical History:   Procedure Laterality Date    CARDIAC CATHETERIZATION      cerebral angiogrem to check cavernous malformation in head - negative    CARDIAC SURGERY  2014    heart cath     SECTION      x 2     SECTION   and     COLONOSCOPY      ECHO COMPL W DOP COLOR FLOW  2012         ENDOSCOPY, COLON, DIAGNOSTIC      ESOPHAGEAL VARICE LIGATION      banding    LIVER TRANSPLANT      OTHER SURGICAL HISTORY Right 16    Right Knee Arthroscopy with Debridement partial lateral menisectomy, chondroplasty, synovectomy     Social History     Socioeconomic History    Marital status:       Spouse name: Not on file    Number of children: Not on file    Years of education: Not on file    Highest education level: Not on file   Occupational History    Not on file   Social Needs    Financial resource strain: Not on file    Food insecurity:     Worry: Not on file     Inability: Not on file    Transportation needs:     Medical: Not on file     Non-medical: Not on file   Tobacco Use    Smoking status: Current Some Day Smoker     Packs/day: 0.25     Years: 6.00     Pack years: 1.50     Types: Cigarettes    Smokeless tobacco: Never Used    Tobacco comment: occasionally   Substance and Sexual Activity    Alcohol use: No     Alcohol/week: 0.0 oz     Comment: has not drank for > 5yrs    Drug use: No    Sexual activity: Not on file   Lifestyle    Physical activity:     Days per week: Not on file     Minutes per session: Not on file    Stress: Not on file   Relationships    Social connections:     Talks on phone: Not on file     Gets together: Not on file     Attends Congregation service: Not on file     Active member of club or organization: Not on file     Attends meetings of clubs or organizations: Not on file     Relationship status: Not on file    Intimate partner violence:     Fear of current or ex partner: Not on file     Emotionally abused: Not on file     Physically abused: Not on file     Forced sexual activity: Not on file   Other Topics Concern    Not on file   Social History Narrative    States that she is retired, had previously worked for [employer]. Her daughter lives nearby. OBJECTIVE  Vitals:    04/11/19 0920 04/11/19 0924 04/11/19 0949 04/11/19 0958   BP:  (!) 188/91 (!) 181/81 (!) 163/72   Pulse:  71 80 75   Resp:  16  14   Temp:  97.9 °F (36.6 °C)     TempSrc:  Oral     SpO2:  97% 98% 98%   Weight: 190 lb (86.2 kg)      Height:  5' 6\" (1.676 m)       NIH Stroke Scale at time of initial evaluation:   NIH/MNHISS Stroke Scale  Interval: Baseline  Level of Consciousness (1a. ): Alert  LOC Questions (1b. ): Answers both correctly  LOC Commands (1c. ): Obeys both correctly  Best Gaze (2. ): Normal  Visual (3. ): No visual loss  Facial Palsy (4. ): (!) Partial  Motor Arm, Left (5a. ): Drift, but does not hit bed  Motor Arm, Right (5b. ): No drift  Motor Leg, Left (6a. ): No drift  Motor Leg, Right (6b. ): No drift  Limb Ataxia (7. ): (!) Present in one limb  Sensory (8. ): (!) Partial loss  Best Language (9. ): No aphasia  Dysarthria (10. ): Mild to moderate dysarthria  Extinction and Inattention (11): No neglect  Total: 6    Imaging:  CT of head without contrast displays no intracranial hemorrhage.   Hypodensities in RMCA territory- some extension from prior MRI,  and caudate- new from prior MRI. CTA- confirms ROCK occlusion in neck and intracranially, cross filling to smaller RMCA from LICA. CTP imaging: reduction in CBF R anterior MCA territory with extensive prolonged time to peak in [facility] territory. Labs Reviewed   CBC WITH AUTO DIFFERENTIAL - Abnormal; Notable for the following components:       Result Value    Lymphocytes % 14.8 (*)     Lymphocytes # 1.06 (*)     All other components within normal limits   BASIC METABOLIC PANEL W/ REFLEX TO MG FOR LOW K - Abnormal; Notable for the following components:    Glucose 169 (*)     CREATININE 1.3 (*)     All other components within normal limits   BRAIN NATRIURETIC PEPTIDE - Abnormal; Notable for the following components:    Pro- (*)     All other components within normal limits   POCT GLUCOSE - Abnormal; Notable for the following components:    Meter Glucose 175 (*)     All other components within normal limits   TROPONIN   URINALYSIS WITH MICROSCOPIC       IV tPA INCLUSION criteria met Yes:  Diagnosis of ischemic stroke causing a measurable neurologic deficit. Time from last known well(or stroke onset) is:  *Less than 3 hours or  *Between 3-4.5 hours and the patient is > or = [de-identified]years old, on no anticoagulants       regardless of INR, NIHSS equal or < 25 and has no history of prior stroke with diabetes. *Adult aged 25 years or older. The following EXCLUSION criteria/contraindications were noted: prior ICH, recent ischemic stroke. Assessment:  Working Diagnosis: Ischemic stroke, stroke progression with recent ROCK occlusion, either artery-to-artery embolism or collateral failure. Recommendations:  Complex, options limited- poor candidate for IV-tPA due to ineligibility criteria increasing risk of hemorrhage, not a candidate for intervention due to chronic ICA occlusion and RM1 patent.   Discussed plan to optimize cerebral blood flow with permissive hypertension SBP < 200 and

## 2019-04-11 NOTE — PROGRESS NOTES
[id number] [address] Girard OVERHEAD. PER DR JENSEN PAGED STROKE ALERT @ NEUROLOGY @0406  TELESCOPE  CALLED AND SPOKE WITH DR JENSEN @114.

## 2019-04-11 NOTE — ED PROVIDER NOTES
complaints. Code Status on file: Prior. NIH Stroke Scale at time of initial evaluation:   NIH/MNHISS Stroke Scale  Interval: Baseline  Level of Consciousness (1a. ): Alert  LOC Questions (1b. ): Answers both correctly  LOC Commands (1c. ): Obeys both correctly  Best Gaze (2. ): Normal  Visual (3. ): No visual loss  Facial Palsy (4. ): (!) Partial  Motor Arm, Left (5a. ): Drift, but does not hit bed  Motor Arm, Right (5b. ): No drift  Motor Leg, Left (6a. ): No drift  Motor Leg, Right (6b. ): No drift  Limb Ataxia (7. ): (!) Present in one limb  Sensory (8. ): (!) Partial loss  Best Language (9. ): No aphasia  Dysarthria (10. ): Mild to moderate dysarthria  Extinction and Inattention (11): No neglect  Total: 6      Past Medical History:  has a past medical history of Arthritis, Blood circulation, collateral, Chronic fatigue, Cirrhosis of liver ([facility] [address] COPD exacerbation ([facility] [address] Diabetes mellitus ([address] Essential hypertension, Gall stones, GERD (gastroesophageal reflux disease), Hep C w/o coma, chronic ([address] History of blood transfusion, Neuropathy, PFO (patent foramen ovale), Pneumonia, Type 2 diabetes mellitus with stage 3 chronic kidney disease, with long-term current use of insulin ([facility] [address] Unspecified cerebral artery occlusion with cerebral infarction, and Varices. Past Surgical History:  has a past surgical history that includes  section; Esophageal varice ligation ();  section ( and ); ECHO Compl W Dop Color Flow (2012); Colonoscopy; Endoscopy, colon, diagnostic; Cardiac surgery (); Cardiac catheterization; other surgical history (Right, 17); and Liver transplant. Social History:  reports that she has been smoking cigarettes. She has a 1.50 pack-year smoking history. She has never used smokeless tobacco. She reports that she does not drink alcohol or use drugs. Family History: family history is not on file. She was adopted.      The patients home medications have been reviewed. Prior to Admission medications    Medication Sig Start Date End Date Taking? Authorizing Provider   HYDROcodone-acetaminophen (NORCO) 7.5-325 MG per tablet Take 1 tablet by mouth every 6 hours as needed for Pain for up to 30 days.  3/19/19 4/18/19 Yes MIKEL Warner Chi, CNP   aspirin 81 MG chewable tablet Take 1 tablet by mouth daily 3/16/19  Yes Shira Powell MD   clopidogrel (PLAVIX) 75 MG tablet Take 1 tablet by mouth daily 3/16/19  Yes Shira Powell MD   tacrolimus (PROGRAF) 1 MG capsule Take 1 mg by mouth 2 times daily  10/10/18  Yes Historical Provider, MD   sulfamethoxazole-trimethoprim (BACTRIM;SEPTRA) 400-80 MG per tablet Take 1 tablet by mouth daily  Patient taking differently: Take 1 tablet by mouth daily For transplant 5/22/18  Yes MIKEL Garcia CNP   losartan (COZAAR) 50 MG tablet Take one tablet daily, call with BP > 160/100 3/19/19   MIKEL Warner Chi, CNP   atorvastatin (LIPITOR) 20 MG tablet Take 1 tablet by mouth nightly 3/15/19   Shira Powell MD   buPROPion [facility] SR) 150 MG extended release tablet TAKE 1 TABLET TWICE DAILY 3/12/19   MIKEL Warner Chi, CNP   NYSTATIN 239482 UNIT/GM powder APPLY FOUR TIMES DAILY 2/18/19   Kymberly Villanueva DO   BD PEN NEEDLE YUSUF U/F 32G X 4 MM MISC Use with Insulin dosing schedule 1/14/19   MIKEL Warner Chi, CNP   omeprazole (PRILOSEC) 40 MG delayed release capsule TAKE 1 CAPSULE EVERY DAY 12/28/18   MIKEL Warner Chi, CNP   cyclobenzaprine (FLEXERIL) 10 MG tablet Take 1 tablet by mouth 2 times daily as needed for Muscle spasms 12/21/18   MIKEL Warner Chi, CNP   amLODIPine (NORVASC) 2.5 MG tablet Take 1 tablet by mouth daily 11/21/18   MIKEL Warner Chi, CNP   LANTUS SOLOSTAR 100 UNIT/ML injection pen Inject 40 Units into the skin nightly 11/21/18   MIKEL Warner Chi, CNP   guaiFENesin (MUCINEX) 600 MG extended release tablet Take 600 mg by mouth daily     Historical Provider, MD   albuterol sulfate HFA (PROAIR HFA) 108 (90 Base) MCG/ACT inhaler INHALE 2 PUFFS BY MOUTH EVERY 6 HOURS AS NEEDED FOR WHEEZING 5/22/18   MIKEL Stein CNP   diclofenac sodium (VOLTAREN) 1 % GEL Apply 2 g topically 2 times daily 8/2/17   Nusrat Nabil, APRN - CNP   ACCU-CHEK CONTRERAS PLUS strip 1 each by In Vitro route 4 times daily 7/5/17   Delpha KennettMIKEL CNP   Garlic 8739 MG CAPS Take 1,000 mg by mouth 2 times daily    Historical Provider, MD   albuterol (PROVENTIL) (2.5 MG/3ML) 0.083% nebulizer solution Take 2.5 mg by nebulization every 6 hours as needed for Wheezing    Historical Provider, MD       Allergies: Chantix [varenicline] and Heparin       Review of Systems:   Pertinent positives and negatives are stated within HPI, all other systems reviewed and are negative.    ---------------------------------------------------PHYSICAL EXAM--------------------------------------    Constitutional/General: Alert and oriented x3, non toxic in NAD  Head: Normocephalic and atraumatic  Eyes: PERRL, EOMI. Partial visual field deficit to right upper. Mouth: Oropharynx clear, handling secretions. Left facial droop. Neck: Supple, full ROM, non tender to palpation in the midline, no stridor, no crepitus, no meningeal signs  Pulmonary: Lungs clear to auscultation bilaterally, no wheezes, rales, or rhonchi. Not in respiratory distress  Cardiovascular:  Regular rate. Regular rhythm. No murmurs, gallops, or rubs. 2+ distal pulses  Chest: no chest wall tenderness  Abdomen: Soft. Non tender. Non distended. +BS. No rebound, guarding, or rigidity. No pulsatile masses appreciated. Musculoskeletal: Moves all extremities x 4. Warm and well perfused, no clubbing, cyanosis, or edema. Capillary refill <3 seconds  Skin: warm and dry. No rashes. Neurologic: GCS 15, left-sided facial droop with involvement of upper face, diminished sensations to left cheek, left arm, and left leg.  Decreased  strength on right, Ataxia to LUE, mild weakness to left leg, mild dysarthria. Please also see NIH stroke scale below. Psych: Normal Affect    NIH Stroke Scale at time of initial evaluation: 9:20 AM  1A: Level of Consciousness 0 - alert; keenly responsive   1B: Ask Month and Age 0 - answers both questions correctly   1C:  Tell Patient To Open and Close Eyes, then Hand  Squeeze 0 - performs both tasks correctly   2: Test Horizontal Extraocular Movements 0 - normal   3: Test Visual Fields 1 - partial hemianopia   4: Test Facial Palsy 2 - partial paralysis (total or near total paralysis of the lower face)   5A: Test Left Arm Motor Drift 1 - drift, limb holds 90 (or 45) degrees but drifts down before full 10 seconds: does not hit bed   5B: Test Right Arm Motor Drift 0 - no drift, limb holds 90 (or 45) degrees for full 10 seconds   6A: Test Left Leg Motor Drift 0 - no drift; leg holds 30 degree position for full 5 seconds   6B: Test Right Leg Motor Drift 0 - no drift; leg holds 30 degree position for full 5 seconds   7: Test Limb Ataxia   (FNF/Heel-Shin) 1 - present in one limb   8: Test Sensation 1 - mild to moderate sensory loss; patient feels pinprick is less sharp or is dull on the affected side; there is a loss of superficial pain with pinprick but patient is aware of being touched    9: Test Language/Aphasia 0 - no aphasia, normal   10: Test Dysarthria 1 - mild to moderate, patient slurs at least some words and at worst, can be understood with some difficulty   11: Test Extinction/Inattention 0 - no abnormality   Total NIH Stroke Score: 7         -------------------------------------------------- RESULTS -------------------------------------------------  All laboratory and imaging studies have been reviewed by myself    LABS:  Results for orders placed or performed during the hospital encounter of 04/11/19   CBC Auto Differential   Result Value Ref Range    WBC 7.1 4.5 - 11.5 E9/L    RBC 4.19 3.50 - 5.50 E12/L    Hemoglobin 12.9 11.5 - 15.5 g/dL    Hematocrit 39.1 34.0 - 48.0 %    MCV 93.3 80.0 - 99.9 fL    MCH 30.8 26.0 - 35.0 pg    MCHC 33.0 32.0 - 34.5 %    RDW 12.9 11.5 - 15.0 fL    Platelets 919 524 - 181 E9/L    MPV 10.7 7.0 - 12.0 fL    Neutrophils % 73.9 43.0 - 80.0 %    Immature Granulocytes % 0.4 0.0 - 5.0 %    Lymphocytes % 14.8 (L) 20.0 - 42.0 %    Monocytes % 7.0 2.0 - 12.0 %    Eosinophils % 3.1 0.0 - 6.0 %    Basophils % 0.8 0.0 - 2.0 %    Neutrophils # 5.27 1.80 - 7.30 E9/L    Immature Granulocytes # 0.03 E9/L    Lymphocytes # 1.06 (L) 1.50 - 4.00 E9/L    Monocytes # 0.50 0.10 - 0.95 E9/L    Eosinophils # 0.22 0.05 - 0.50 E9/L    Basophils # 0.06 0.00 - 0.20 A5/M   Basic Metabolic Panel w/ Reflex to MG   Result Value Ref Range    Sodium 140 132 - 146 mmol/L    Potassium reflex Magnesium 4.5 3.5 - 5.0 mmol/L    Chloride 103 98 - 107 mmol/L    CO2 27 22 - 29 mmol/L    Anion Gap 10 7 - 16 mmol/L    Glucose 169 (H) 74 - 99 mg/dL    BUN 19 6 - 20 mg/dL    CREATININE 1.3 (H) 0.5 - 1.0 mg/dL    GFR Non-African American 42 >=60 mL/min/1.73    GFR African American 51     Calcium 9.5 8.6 - 10.2 mg/dL   Troponin   Result Value Ref Range    Troponin <0.01 0.00 - 0.03 ng/mL   Brain Natriuretic Peptide   Result Value Ref Range    Pro- (H) 0 - 125 pg/mL   POCT Glucose   Result Value Ref Range    Meter Glucose 175 (H) 74 - 99 mg/dL       RADIOLOGY:  Interpreted by Radiologist.  CT Head WO Contrast   Final Result      No acute intracranial hemorrhage or mass effect. Hypodensity in the anterior right frontal cortex along the superior   frontal sulcus, which may represent new infarct or extension of prior   infarcts in the right frontal lobe. Additional hypodense foci in the   right frontal and parietal lobes more closely correlate with the   infarcted areas identified on the recent prior studies from March 2019. MRI would be more sensitive for detecting acute infarct.  If   there is consideration of acute large vessel occlusion, CTA of the   head and neck and CT perfusion of the brain are recommended. Age-indeterminate lacunar infarct in the right caudate head, new since   the prior study. Mild cerebral atrophy. CTA NECK W CONTRAST   Final Result      Occlusion of the right internal carotid artery from its origin, which   may be chronic based on comparison with the prior MRI, on which the   right ICA appeared either severely stenotic or occluded, and on the   degree of collateralization in the right cerebral hemisphere. However,   taken at face value, there is some degree of diminished flow in the   right ICA territory with preserved cerebral blood volume, compatible   with a large ischemic penumbra. The interpretation is confounded by   submission of two sets of CT perfusion images processed with different   software and providing conflicting results in the degree of   diminishment of cerebral blood flow. Small infarct in the anterior right frontal lobe. Moderate stenosis at the origin of the left ICA (50-69% by NASCET   criteria). Severe stenosis at the origin of the left vertebral artery. Moderate stenosis at the origin of the right vertebral artery. XR CHEST PORTABLE   Final Result   Findings compatible with atherosclerotic disease    No acute infiltrate                     CTA HEAD W CONTRAST    (Results Pending)   CT BRAIN PERFUSION    (Results Pending)       EKG: This EKG is signed and interpreted by the EP. Time: 9:19  Rate: 75  Rhythm: Sinus  Interpretation: Nonspecific T wave flattening  Comparison: None    ------------------------- NURSING NOTES AND VITALS REVIEWED ---------------------------   The nursing notes within the ED encounter and vital signs as below have been reviewed.    BP (!) 193/88   Pulse 76   Temp 98.1 °F (36.7 °C)   Resp 14   Ht 5' 6\" (1.676 m)   Wt 190 lb (86.2 kg)   SpO2 93%   BMI 30.67 kg/m²   Oxygen Saturation Interpretation: Normal    The patients available past medical records and past encounters were reviewed. ------------------------------ ED COURSE/MEDICAL DECISION MAKING----------------------  Medications   0.9 % sodium chloride bolus (1,000 mLs Intravenous New Bag 4/11/19 1031)   iopamidol (ISOVUE-370) 76 % injection 100 mL (100 mLs Intravenous Given 4/11/19 0954)       Based upon patient's stroke like symptoms a stroke neurology consult is indicated. Consult to Neurology completed. [facility] Neurology by telephone      Acute CVA Core Measures:   Last Known to be Well (Stroke Diagnosis)  Date Last Known Well: 04/11/19  Time Last Known Well: 0800  NIH Stroke Scale Total: Total: 6  t-PA Eligibility: was not recommeded by [employer] and under the order of the ED physician  IV t-PA was considered and not given due to violations in inclusion criteria including patient's history      ED Course as of Apr 11 1816   Thu Apr 11, 2019   0922 Stroke team called at 9:23 am    [JA]   2193 Spoke with Dr. Fer Hinson. Discussed case. Will review images. [JA]   [id number] I did speak with patient regarding risks of giving IV contrast with known prior decreased kidney function. I discussed the risks of kidney damage or failure versus benefits of diagnosing emergent diagnoses at this time. Believe that benefits outweigh the risks at this time. Patient is agreeable and demonstrates understanding. [JA]   1006 Patient's BP improving spontaneously. No need for IV BP meds. Will continue to monitor. [JA]   56 Spoke with Dr. Fer Hinson once again. Patient has a history of liver transplant and esophageal banding due to esophageal varices. Patient denies any recent GI bleeding. Dr. Fer Hinson reviewed images and the patient's chart. She states that due to the patient's history, she is not a candidate for TPA. Did recommend speaking with her neuro interventional doctor here to discuss possible intervention.  She recommends keeping the patient laying flat and only treating blood pressure if it greater than 982 systolic. [JA]   56 Spoke with Dr. Liliane Balbuena with neurointervention. Reviewed images. States patient is not a candidate for intervention. [JA]   1030 Patient requesting transfer to [facility] due to established patient there with neurology. Dr. Italo Hahn did recommend ICU admission. [JA]   1038 I reevaluated the patient. Neuro status is unchanged. Continues to have an NIH of 7. Discussed results as well as my discussions with neurology and IR. Patient demonstrates understanding. She would like us to initiate transfer to the [facility]. [JA]   80 Spoke with [facility] stroke neurologist, Dr. Martina Freeman. Accepted patient for admission to critical care. Will send transport. [RK]   4407 TIFIA with NSICU doctor at [facility] to discuss case. [JA]   26 Spoke with Dr. Rand Rivas, ED physician at [facility]. Patient will be transferred via critical care from ED to ED. [JA]      ED Course User Index  [JA] Robert Haynes MD       Medical Decision Making:    Pt presents for sudden onset of left-facial droop, left-sided weakness and decreased sensation. Last known well was approximately 8:00 AM. NIH Stroke Score of 7 upon examination. Stroke team called shortly after arrival. POC glucose 175. [facility] neurology consulted. Discussed case extensively. Patient is not a candidate for tpa due to history of esophageal varices and recent stroke. Patient is at high risk for hemorrhage if thrombolytics given. Also spoke with Dr. Liliane Balbuena with IR. He states patient is a poor candidate for neuro intervention, and he did not recommend intervention at this time. No bleeding on head CT. CTA head and neck showed severe right carotid stenosis, appears chronic. Patient requesting to be transferred to [facility] because that is where she has received her prior care. Patient remained HDS throughout ED course. She did not require any IV medications for BP control. She was given 1 L of IVF for volume expansion.  Laid flat per recs from Dr. Rosa Isela Shell. Maintained her mental status and airway throughout ED course. Re-Evaluations:             10:00 AM.  Re-evaluation. Patients symptoms show no change. This patient's ED course included: a personal history and physicial examination, re-evaluation prior to disposition, cardiac monitoring and continuous pulse oximetry    This patient has been closely monitored during their ED course. Consultations:    9:32 AM  The case has been discussed with [facility] Neurology, Dr. Rosa Isela Shell. Will call back after reviewing images. 10:15 AM.  Spoke again with [employer]. States pt not a tPA candidate. Would like neuro interventionalist consulted. 10:27 AM.  Spoke with Dr. Jeanette Richardson (Neurointervention). Discussed case. He reviewed the imaging and states pt is not a candidate for intervention. 10:53 AM.  Spoke with Dr. Anthony Martinez at [facility]. They will accept pt for transfer. Critical Care:   Please note that the withdrawal or failure to initiate urgent interventions for this patient would likely result in a life threatening deterioration or permanent disability. Accordingly this patient received 75 minutes of critical care time, excluding separately billable procedures. Counseling: The emergency provider has spoken with the patient and family and discussed todays presentation, condition, results, treatment options and risk/benefits of thrombolytic therapy, in addition to providing specific details for the plan of care and counseling regarding the diagnosis and prognosis. Questions are answered at this time and they are agreeable with the plan.       --------------------------------- IMPRESSION AND DISPOSITION ---------------------------------    IMPRESSION  1.  Cerebrovascular accident (CVA), unspecified mechanism ([facility] [address]        DISPOSITION  Disposition: Transfer to [facility]  Patient condition is critical      4/11/19, 9:23 AM.    This note is prepared by Sumit Silva acting as Scribe for Randall Hamilton MD.    Randall Hamilton MD:  The scribe's documentation has been prepared under my direction and personally reviewed by me in its entirety. I confirm that the note above accurately reflects all work, treatment, procedures, and medical decision making performed by me.          Randall Hamilton MD  04/11/19 1387

## 2019-04-11 NOTE — PROGRESS NOTES
@10:31 calling the [facility] to start transfer for the patient, to [address] [place] [address] ICU.  @10:37 placed a call to [address] [place] [address] transfer center   (due too extended hold for [facility] called cc directly.)

## 2019-04-14 ENCOUNTER — APPOINTMENT (OUTPATIENT)
Dept: CT IMAGING | Age: 59
DRG: 065 | End: 2019-04-14
Payer: MEDICARE

## 2019-04-14 ENCOUNTER — HOSPITAL ENCOUNTER (INPATIENT)
Age: 59
LOS: 1 days | Discharge: ANOTHER HOSPITAL | DRG: 065 | End: 2019-04-15
Attending: EMERGENCY MEDICINE | Admitting: INTERNAL MEDICINE
Payer: MEDICARE

## 2019-04-14 DIAGNOSIS — R29.90 STROKE-LIKE SYMPTOMS: Primary | ICD-10-CM

## 2019-04-14 DIAGNOSIS — R53.1 GENERAL WEAKNESS: ICD-10-CM

## 2019-04-14 LAB
ALBUMIN SERPL-MCNC: 3.9 G/DL (ref 3.5–5.2)
ALP BLD-CCNC: 123 U/L (ref 35–104)
ALT SERPL-CCNC: 8 U/L (ref 0–32)
ANION GAP SERPL CALCULATED.3IONS-SCNC: 9 MMOL/L (ref 7–16)
AST SERPL-CCNC: 16 U/L (ref 0–31)
BILIRUB SERPL-MCNC: 0.5 MG/DL (ref 0–1.2)
BUN BLDV-MCNC: 21 MG/DL (ref 6–20)
CALCIUM SERPL-MCNC: 9.3 MG/DL (ref 8.6–10.2)
CHLORIDE BLD-SCNC: 104 MMOL/L (ref 98–107)
CO2: 23 MMOL/L (ref 22–29)
CREAT SERPL-MCNC: 1.7 MG/DL (ref 0.5–1)
GFR AFRICAN AMERICAN: 37
GFR NON-AFRICAN AMERICAN: 31 ML/MIN/1.73
GLUCOSE BLD-MCNC: 165 MG/DL (ref 74–99)
HCT VFR BLD CALC: 35.8 % (ref 34–48)
HEMOGLOBIN: 12 G/DL (ref 11.5–15.5)
INR BLD: 1.2
MCH RBC QN AUTO: 30.9 PG (ref 26–35)
MCHC RBC AUTO-ENTMCNC: 33.5 % (ref 32–34.5)
MCV RBC AUTO: 92.3 FL (ref 80–99.9)
METER GLUCOSE: 176 MG/DL (ref 74–99)
PDW BLD-RTO: 13.1 FL (ref 11.5–15)
PLATELET # BLD: 159 E9/L (ref 130–450)
PMV BLD AUTO: 10.9 FL (ref 7–12)
POTASSIUM SERPL-SCNC: 4.7 MMOL/L (ref 3.5–5)
PROTHROMBIN TIME: 13.8 SEC (ref 9.3–12.4)
RBC # BLD: 3.88 E12/L (ref 3.5–5.5)
SODIUM BLD-SCNC: 136 MMOL/L (ref 132–146)
TOTAL PROTEIN: 7.1 G/DL (ref 6.4–8.3)
TROPONIN: <0.01 NG/ML (ref 0–0.03)
WBC # BLD: 7.9 E9/L (ref 4.5–11.5)

## 2019-04-14 PROCEDURE — 85027 COMPLETE CBC AUTOMATED: CPT

## 2019-04-14 PROCEDURE — 85610 PROTHROMBIN TIME: CPT

## 2019-04-14 PROCEDURE — 36415 COLL VENOUS BLD VENIPUNCTURE: CPT

## 2019-04-14 PROCEDURE — 93005 ELECTROCARDIOGRAM TRACING: CPT

## 2019-04-14 PROCEDURE — 80053 COMPREHEN METABOLIC PANEL: CPT

## 2019-04-14 PROCEDURE — 70450 CT HEAD/BRAIN W/O DYE: CPT

## 2019-04-14 PROCEDURE — 84484 ASSAY OF TROPONIN QUANT: CPT

## 2019-04-14 PROCEDURE — 99285 EMERGENCY DEPT VISIT HI MDM: CPT

## 2019-04-14 PROCEDURE — 82962 GLUCOSE BLOOD TEST: CPT

## 2019-04-14 ASSESSMENT — PAIN DESCRIPTION - DESCRIPTORS: DESCRIPTORS: ACHING

## 2019-04-14 ASSESSMENT — PAIN SCALES - GENERAL: PAINLEVEL_OUTOF10: 7

## 2019-04-14 ASSESSMENT — PAIN DESCRIPTION - LOCATION: LOCATION: HEAD

## 2019-04-15 VITALS
WEIGHT: 190 LBS | RESPIRATION RATE: 18 BRPM | SYSTOLIC BLOOD PRESSURE: 123 MMHG | OXYGEN SATURATION: 95 % | DIASTOLIC BLOOD PRESSURE: 60 MMHG | TEMPERATURE: 99.2 F | HEIGHT: 66 IN | BODY MASS INDEX: 30.53 KG/M2 | HEART RATE: 80 BPM

## 2019-04-15 PROBLEM — I63.9 ACUTE CVA (CEREBROVASCULAR ACCIDENT) (HCC): Status: ACTIVE | Noted: 2019-04-15

## 2019-04-15 PROBLEM — I63.9 CEREBROVASCULAR ACCIDENT (CVA) (HCC): Status: RESOLVED | Noted: 2019-03-14 | Resolved: 2019-04-15

## 2019-04-15 LAB
HBA1C MFR BLD: 6.9 % (ref 4–5.6)
METER GLUCOSE: 177 MG/DL (ref 74–99)

## 2019-04-15 PROCEDURE — 2060000000 HC ICU INTERMEDIATE R&B

## 2019-04-15 PROCEDURE — 6360000002 HC RX W HCPCS: Performed by: EMERGENCY MEDICINE

## 2019-04-15 PROCEDURE — 36415 COLL VENOUS BLD VENIPUNCTURE: CPT

## 2019-04-15 PROCEDURE — 97530 THERAPEUTIC ACTIVITIES: CPT

## 2019-04-15 PROCEDURE — 6360000002 HC RX W HCPCS: Performed by: INTERNAL MEDICINE

## 2019-04-15 PROCEDURE — 97162 PT EVAL MOD COMPLEX 30 MIN: CPT

## 2019-04-15 PROCEDURE — 2580000003 HC RX 258: Performed by: INTERNAL MEDICINE

## 2019-04-15 PROCEDURE — 82962 GLUCOSE BLOOD TEST: CPT

## 2019-04-15 PROCEDURE — 6370000000 HC RX 637 (ALT 250 FOR IP): Performed by: INTERNAL MEDICINE

## 2019-04-15 PROCEDURE — 83036 HEMOGLOBIN GLYCOSYLATED A1C: CPT

## 2019-04-15 RX ORDER — LOSARTAN POTASSIUM 50 MG/1
50 TABLET ORAL DAILY
Status: DISCONTINUED | OUTPATIENT
Start: 2019-04-15 | End: 2019-04-15

## 2019-04-15 RX ORDER — ATORVASTATIN CALCIUM 10 MG/1
20 TABLET, FILM COATED ORAL NIGHTLY
Status: DISCONTINUED | OUTPATIENT
Start: 2019-04-15 | End: 2019-04-15 | Stop reason: HOSPADM

## 2019-04-15 RX ORDER — GUAIFENESIN 400 MG/1
400 TABLET ORAL 3 TIMES DAILY
Status: DISCONTINUED | OUTPATIENT
Start: 2019-04-15 | End: 2019-04-15 | Stop reason: HOSPADM

## 2019-04-15 RX ORDER — BUPROPION HYDROCHLORIDE 150 MG/1
150 TABLET, EXTENDED RELEASE ORAL 2 TIMES DAILY
Status: DISCONTINUED | OUTPATIENT
Start: 2019-04-15 | End: 2019-04-15 | Stop reason: HOSPADM

## 2019-04-15 RX ORDER — DEXTROSE MONOHYDRATE 25 G/50ML
12.5 INJECTION, SOLUTION INTRAVENOUS PRN
Status: DISCONTINUED | OUTPATIENT
Start: 2019-04-15 | End: 2019-04-15 | Stop reason: HOSPADM

## 2019-04-15 RX ORDER — AMLODIPINE BESYLATE 2.5 MG/1
2.5 TABLET ORAL DAILY
Status: DISCONTINUED | OUTPATIENT
Start: 2019-04-15 | End: 2019-04-15 | Stop reason: HOSPADM

## 2019-04-15 RX ORDER — SODIUM CHLORIDE 0.9 % (FLUSH) 0.9 %
10 SYRINGE (ML) INJECTION PRN
Status: DISCONTINUED | OUTPATIENT
Start: 2019-04-15 | End: 2019-04-15 | Stop reason: HOSPADM

## 2019-04-15 RX ORDER — CYCLOBENZAPRINE HCL 10 MG
10 TABLET ORAL 2 TIMES DAILY PRN
Status: DISCONTINUED | OUTPATIENT
Start: 2019-04-15 | End: 2019-04-15 | Stop reason: HOSPADM

## 2019-04-15 RX ORDER — HYDROCODONE BITARTRATE AND ACETAMINOPHEN 7.5; 325 MG/1; MG/1
1 TABLET ORAL EVERY 6 HOURS PRN
Status: DISCONTINUED | OUTPATIENT
Start: 2019-04-15 | End: 2019-04-15 | Stop reason: HOSPADM

## 2019-04-15 RX ORDER — SULFAMETHOXAZOLE AND TRIMETHOPRIM 400; 80 MG/1; MG/1
1 TABLET ORAL DAILY
Status: DISCONTINUED | OUTPATIENT
Start: 2019-04-15 | End: 2019-04-15 | Stop reason: HOSPADM

## 2019-04-15 RX ORDER — TACROLIMUS 1 MG/1
1 CAPSULE ORAL 2 TIMES DAILY
Status: DISCONTINUED | OUTPATIENT
Start: 2019-04-15 | End: 2019-04-15 | Stop reason: HOSPADM

## 2019-04-15 RX ORDER — NICOTINE POLACRILEX 4 MG
15 LOZENGE BUCCAL PRN
Status: DISCONTINUED | OUTPATIENT
Start: 2019-04-15 | End: 2019-04-15 | Stop reason: HOSPADM

## 2019-04-15 RX ORDER — ASPIRIN 81 MG/1
81 TABLET ORAL DAILY
Status: DISCONTINUED | OUTPATIENT
Start: 2019-04-15 | End: 2019-04-15 | Stop reason: HOSPADM

## 2019-04-15 RX ORDER — INSULIN GLARGINE 100 [IU]/ML
40 INJECTION, SOLUTION SUBCUTANEOUS NIGHTLY
Status: DISCONTINUED | OUTPATIENT
Start: 2019-04-15 | End: 2019-04-15 | Stop reason: HOSPADM

## 2019-04-15 RX ORDER — CLOPIDOGREL BISULFATE 75 MG/1
75 TABLET ORAL DAILY
Status: DISCONTINUED | OUTPATIENT
Start: 2019-04-15 | End: 2019-04-15 | Stop reason: HOSPADM

## 2019-04-15 RX ORDER — ONDANSETRON 2 MG/ML
4 INJECTION INTRAMUSCULAR; INTRAVENOUS EVERY 6 HOURS PRN
Status: DISCONTINUED | OUTPATIENT
Start: 2019-04-15 | End: 2019-04-15 | Stop reason: HOSPADM

## 2019-04-15 RX ORDER — ALBUTEROL SULFATE 2.5 MG/3ML
2.5 SOLUTION RESPIRATORY (INHALATION) EVERY 6 HOURS PRN
Status: DISCONTINUED | OUTPATIENT
Start: 2019-04-15 | End: 2019-04-15 | Stop reason: HOSPADM

## 2019-04-15 RX ORDER — ONDANSETRON 2 MG/ML
4 INJECTION INTRAMUSCULAR; INTRAVENOUS EVERY 6 HOURS PRN
Status: DISCONTINUED | OUTPATIENT
Start: 2019-04-15 | End: 2019-04-15 | Stop reason: SDUPTHER

## 2019-04-15 RX ORDER — SODIUM CHLORIDE 0.9 % (FLUSH) 0.9 %
10 SYRINGE (ML) INJECTION EVERY 12 HOURS SCHEDULED
Status: DISCONTINUED | OUTPATIENT
Start: 2019-04-15 | End: 2019-04-15 | Stop reason: HOSPADM

## 2019-04-15 RX ORDER — DEXTROSE MONOHYDRATE 50 MG/ML
100 INJECTION, SOLUTION INTRAVENOUS PRN
Status: DISCONTINUED | OUTPATIENT
Start: 2019-04-15 | End: 2019-04-15 | Stop reason: HOSPADM

## 2019-04-15 RX ORDER — PANTOPRAZOLE SODIUM 40 MG/1
40 TABLET, DELAYED RELEASE ORAL
Status: DISCONTINUED | OUTPATIENT
Start: 2019-04-15 | End: 2019-04-15 | Stop reason: HOSPADM

## 2019-04-15 RX ADMIN — LOSARTAN POTASSIUM 50 MG: 50 TABLET, FILM COATED ORAL at 08:30

## 2019-04-15 RX ADMIN — Medication 10 ML: at 08:31

## 2019-04-15 RX ADMIN — SULFAMETHOXAZOLE AND TRIMETHOPRIM 1 TABLET: 400; 80 TABLET ORAL at 08:30

## 2019-04-15 RX ADMIN — TACROLIMUS 1 MG: 1 CAPSULE ORAL at 08:30

## 2019-04-15 RX ADMIN — ONDANSETRON 4 MG: 2 INJECTION INTRAMUSCULAR; INTRAVENOUS at 00:57

## 2019-04-15 RX ADMIN — ASPIRIN 81 MG: 81 TABLET ORAL at 08:30

## 2019-04-15 RX ADMIN — GUAIFENESIN 400 MG: 400 TABLET ORAL at 08:30

## 2019-04-15 RX ADMIN — AMLODIPINE BESYLATE 2.5 MG: 2.5 TABLET ORAL at 08:30

## 2019-04-15 RX ADMIN — BUPROPION HYDROCHLORIDE 150 MG: 150 TABLET, EXTENDED RELEASE ORAL at 08:30

## 2019-04-15 RX ADMIN — PANTOPRAZOLE SODIUM 40 MG: 40 TABLET, DELAYED RELEASE ORAL at 06:15

## 2019-04-15 RX ADMIN — CLOPIDOGREL 75 MG: 75 TABLET, FILM COATED ORAL at 08:30

## 2019-04-15 ASSESSMENT — PAIN DESCRIPTION - PAIN TYPE: TYPE: ACUTE PAIN

## 2019-04-15 ASSESSMENT — PAIN SCALES - GENERAL: PAINLEVEL_OUTOF10: 7

## 2019-04-15 ASSESSMENT — PAIN DESCRIPTION - DESCRIPTORS: DESCRIPTORS: ACHING

## 2019-04-15 ASSESSMENT — PAIN DESCRIPTION - LOCATION: LOCATION: HEAD

## 2019-04-15 NOTE — PROGRESS NOTES
[facility] and ETA for transportation of 12pm to [facility].

## 2019-04-15 NOTE — PROGRESS NOTES
Consult received at this time 0933. Notified that patient is going to [facility] and has bed assignment there. Will not complete screening process at this time.

## 2019-04-15 NOTE — ED PROVIDER NOTES
Department of Emergency Medicine   ED  Provider Note  Admit Date/RoomTime: 2019 10:04 PM  ED Room: 17A/17A-17      History of Present Illness:  19, Time: 10:02 PM         Júnior Jean is a 63 y.o. female presenting to the ED for increased CVA symptoms, beginning last night. The complaint has been persistent, mild in severity, and worsened by nothing. Pt with history of CVA presents with increased weakness on her left side and a facial droop. Pt presented to [facility] ED for same sx and was transferred to [facility]. She was discharged from the Clinic Friday. Her symptoms have been getting worse since then. Patient is getting progressively weaker having difficulty ambulating. Pt denies CP, SOB, fever, HA, nausea, vomiting, diarrhea, lightheadedness, abdominal pain, or hematochezia. Review of Systems:   Pertinent positives and negatives are stated within HPI, all other systems reviewed and are negative.    --------------------------------------------- PAST HISTORY ---------------------------------------------  Past Medical History:  has a past medical history of Arthritis, Blood circulation, collateral, Chronic fatigue, Cirrhosis of liver ([address] COPD exacerbation ([facility] [address] Diabetes mellitus ([address] Essential hypertension, Gall stones, GERD (gastroesophageal reflux disease), Hep C w/o coma, chronic ([address] History of blood transfusion, Neuropathy, PFO (patent foramen ovale), Pneumonia, Type 2 diabetes mellitus with stage 3 chronic kidney disease, with long-term current use of insulin ([address] Unspecified cerebral artery occlusion with cerebral infarction, and Varices. Past Surgical History:  has a past surgical history that includes  section; Esophageal varice ligation ();  section ( and ); ECHO Compl W Dop Color Flow (2012); Colonoscopy; Endoscopy, colon, diagnostic; Cardiac surgery ();  Cardiac catheterization; other surgical history (Right, 16); and Liver transplant. Social History:  reports that she has been smoking cigarettes. She has a 1.50 pack-year smoking history. She has never used smokeless tobacco. She reports that she does not drink alcohol or use drugs. Family History: family history is not on file. She was adopted. The patients home medications have been reviewed. Allergies: Chantix [varenicline] and Heparin    ---------------------------------------------------PHYSICAL EXAM--------------------------------------    Constitutional/General: Alert and oriented x3, well appearing, non toxic in NAD  Head: Normocephalic and atraumatic  Eyes: PERRL, EOMI, conjunctiva normal, sclera non icteric  Mouth: Oropharynx clear  Neck: Supple  Respiratory: Lungs clear to auscultation bilaterally, no wheezes, rales, or rhonchi. Not in respiratory distress  Cardiovascular:  Regular rate. Regular rhythm. No murmurs, gallops, or rubs. 2+ distal pulses  Chest: No chest wall tenderness  GI:  Abdomen Soft, Non tender, Non distended. +BS. No rebound, guarding, or rigidity. No pulsatile masses. Musculoskeletal: Moves all extremities x 4. Warm and well perfused, no clubbing, cyanosis, or edema. Integument: Skin warm and dry. No rashes. Neurologic: No slurred speech. Left sided facial droop. Moves all extremities with weakness left side GCS 15, no focal deficits,   Psychiatric: Normal Affect    -------------------------------------------------- RESULTS -------------------------------------------------  I have personally reviewed all laboratory and imaging results for this patient. Results are listed below.      LABS:  Results for orders placed or performed during the hospital encounter of 04/14/19   CBC   Result Value Ref Range    WBC 7.9 4.5 - 11.5 E9/L    RBC 3.88 3.50 - 5.50 E12/L    Hemoglobin 12.0 11.5 - 15.5 g/dL    Hematocrit 35.8 34.0 - 48.0 %    MCV 92.3 80.0 - 99.9 fL    MCH 30.9 26.0 - 35.0 pg    MCHC 33.5 32.0 - 34.5 %    RDW 13.1 11.5 - 15.0 fL Platelets 807 073 - 244 E9/L    MPV 10.9 7.0 - 12.0 fL   Comprehensive Metabolic Panel   Result Value Ref Range    Sodium 136 132 - 146 mmol/L    Potassium 4.7 3.5 - 5.0 mmol/L    Chloride 104 98 - 107 mmol/L    CO2 23 22 - 29 mmol/L    Anion Gap 9 7 - 16 mmol/L    Glucose 165 (H) 74 - 99 mg/dL    BUN 21 (H) 6 - 20 mg/dL    CREATININE 1.7 (H) 0.5 - 1.0 mg/dL    GFR Non-African American 31 >=60 mL/min/1.73    GFR African American 37     Calcium 9.3 8.6 - 10.2 mg/dL    Total Protein 7.1 6.4 - 8.3 g/dL    Alb 3.9 3.5 - 5.2 g/dL    Total Bilirubin 0.5 0.0 - 1.2 mg/dL    Alkaline Phosphatase 123 (H) 35 - 104 U/L    ALT 8 0 - 32 U/L    AST 16 0 - 31 U/L   Troponin   Result Value Ref Range    Troponin <0.01 0.00 - 0.03 ng/mL   Protime-INR   Result Value Ref Range    Protime 13.8 (H) 9.3 - 12.4 sec    INR 1.2    POCT Glucose   Result Value Ref Range    Meter Glucose 176 (H) 74 - 99 mg/dL       RADIOLOGY:  Interpreted by Radiologist.  CT Head WO Contrast   Final Result   1. Probable subacute right frontal lobe infarct. 2. Subacute and/or chronic right parietal lobe infarct as above. 3. Correlation with diffusion weighted MR would be useful for further    evaluation of as indicated clinically. 4. Remainder of findings as above. This report has been electronically signed by Majo Castellon MD.          EKG: This EKG is signed and interpreted by me. Rate: 83  Rhythm: Sinus  Interpretation: no acute changes  Comparison: no previous EKG available      ------------------------- NURSING NOTES AND VITALS REVIEWED ---------------------------   The nursing notes within the ED encounter and vital signs as below have been reviewed by myself. BP (!) 141/76   Pulse 88   Temp 98.5 °F (36.9 °C) (Temporal)   Resp 16   Ht 5' 6\" (1.676 m)   Wt 190 lb (86.2 kg)   SpO2 95%   BMI 30.67 kg/m²   Oxygen Saturation Interpretation: Normal    The patients available past medical records and past encounters were reviewed. ------------------------------ ED COURSE/MEDICAL DECISION MAKING----------------------  Medications   ondansetron (ZOFRAN) injection 4 mg (4 mg Intravenous Given 4/15/19 0057)       Medical Decision Making: This patient's ED course included: a personal history and physicial examination, re-evaluation prior to disposition and multiple bedside re-evaluations    This patient has remained hemodynamically stable and been closely monitored during their ED course. Re-Evaluations:           Re-evaluation. Patients symptoms show no change    Consultations:             IM    Counseling: The emergency provider has spoken with the patient and discussed todays results, in addition to providing specific details for the plan of care and counseling regarding the diagnosis and prognosis. Questions are answered at this time and they are agreeable with the plan.     --------------------------------- IMPRESSION AND DISPOSITION ---------------------------------    IMPRESSION  1. Stroke-like symptoms    2. General weakness        DISPOSITION  Disposition: admit  Patient condition is stable    4/14/19, 10:02 PM.    This note is prepared by Lang Ray, acting as Scribe for Marcelo Cristobal MD.    Marcelo Cristobal MD:  The scribe's documentation has been prepared under my direction and personally reviewed by me in its entirety. I confirm that the note above accurately reflects all work, treatment, procedures, and medical decision making performed by me.         Marcelo Cristobal MD  04/14/19 3044       Marcelo Cristobal MD  04/15/19 7739

## 2019-04-15 NOTE — H&P
History and Physical      CHIEF COMPLAINT:  CVA      HISTORY OF PRESENT ILLNESS:      The patient is a 63 y.o. female patient of Dr Ayanna Mahan who presents with left sided weakness and recurrent stroke. She was originally admitted on 3/13 with an acute right frontal stroke secondary to carotid occlusion. She was seen by neurology and placed on aspirin and Plavix. She has a history of a PFO and previous cerebellar pontine hemorrhage in 2013. She also has a prior history of liver transplant. She was found to have a right carotid occlusion. She was discharged on 3/15 with plans to follow-up in [facility] with neurology as outpatient. She was seen in follow-up in 4/9 in  [facility] at which time carotid ultrasound confirmed complete occlusion. On 4/11 she presented at [place] [address] emergency room with acute onset of left-sided weakness and was referred to the emergency department. She was felt to be a poor candidate for TPA due to prior intracerebral hemorrhage. Interventional radiology was also consulted and the patient was felt to be a poor candidate for intervention. She was subsequently transferred to [facility] clinic. In [facility] she was told that she possibly had a new ischemic stroke. In speaking with her daughter surgery was recommended but her mother wish to speak with her prior to making a decision. She was subsequently discharged home with plans to discuss further with her family. Over the weekend her mother developed increasing left-sided weakness and visual difficulty. Daughter called the [facility] and apparently spoke with Dr. Cheko Suresh who recommended she go to the emergency room and be transferred back to [facility]. According to the patient she was seen in the emergency room and was told that transfer was not necessary. She was admitted here for further evaluation. Daughter and patient are now requesting transfer back to [facility] for further evaluation.     On her prior admission in [facility] she was told to discontinue losartan as the stroke service was suspicious of hypoperfusion. Patient's blood pressure on admission was 141/76. Past Medical History:    Past Medical History:   Diagnosis Date    Arthritis     right knee    Blood circulation, collateral     Chronic fatigue 2016    Cirrhosis of liver (HCC)     end stage    COPD exacerbation ([facility] [address] 2015    pt denies having    Diabetes mellitus ([facility] [address]     Essential hypertension 2/15/2016    Gall stones     did have stent placed    GERD (gastroesophageal reflux disease)     Hep C w/o coma, chronic ([facility] [address]     Sucessfully treated with Harvoni-no longer has viral load    History of blood transfusion     Neuropathy     PFO (patent foramen ovale)     Pneumonia     Type 2 diabetes mellitus with stage 3 chronic kidney disease, with long-term current use of insulin (HCC)     Unspecified cerebral artery occlusion with cerebral infarction     Varices        Past Surgical History:    Past Surgical History:   Procedure Laterality Date    CARDIAC CATHETERIZATION      cerebral angiogrem to check cavernous malformation in head - negative    CARDIAC SURGERY      heart cath     SECTION      x 2     SECTION   and     COLONOSCOPY      ECHO COMPL W DOP COLOR FLOW  2012         ENDOSCOPY, COLON, DIAGNOSTIC      ESOPHAGEAL VARICE LIGATION      banding    LIVER TRANSPLANT      OTHER SURGICAL HISTORY Right 16    Right Knee Arthroscopy with Debridement partial lateral menisectomy, chondroplasty, synovectomy       Medications Prior to Admission:    Medications Prior to Admission: HYDROcodone-acetaminophen (NORCO) 7.5-325 MG per tablet, Take 1 tablet by mouth every 6 hours as needed for Pain for up to 30 days.   losartan (COZAAR) 50 MG tablet, Take one tablet daily, call with BP > 160/100  aspirin 81 MG chewable tablet, Take 1 tablet by mouth daily  atorvastatin (LIPITOR) 20 MG tablet, Take 1 tablet by mouth nightly  clopidogrel (PLAVIX) 75 MG tablet, Take 1 tablet by mouth daily  buPROPion (WELLBUTRIN SR) 150 MG extended release tablet, TAKE 1 TABLET TWICE DAILY  NYSTATIN 368586 UNIT/GM powder, APPLY FOUR TIMES DAILY  BD PEN NEEDLE YUSUF U/F 32G X 4 MM MISC, Use with Insulin dosing schedule  omeprazole (PRILOSEC) 40 MG delayed release capsule, TAKE 1 CAPSULE EVERY DAY  cyclobenzaprine (FLEXERIL) 10 MG tablet, Take 1 tablet by mouth 2 times daily as needed for Muscle spasms  amLODIPine (NORVASC) 2.5 MG tablet, Take 1 tablet by mouth daily  LANTUS SOLOSTAR 100 UNIT/ML injection pen, Inject 40 Units into the skin nightly  tacrolimus (PROGRAF) 1 MG capsule, Take 1 mg by mouth 2 times daily   guaiFENesin (MUCINEX) 600 MG extended release tablet, Take 600 mg by mouth daily   albuterol sulfate HFA (PROAIR HFA) 108 (90 Base) MCG/ACT inhaler, INHALE 2 PUFFS BY MOUTH EVERY 6 HOURS AS NEEDED FOR WHEEZING  diclofenac sodium (VOLTAREN) 1 % GEL, Apply 2 g topically 2 times daily  ACCU-CHEK CONTRERAS PLUS strip, 1 each by In Vitro route 4 times daily  albuterol (PROVENTIL) (2.5 MG/3ML) 0.083% nebulizer solution, Take 2.5 mg by nebulization every 6 hours as needed for Wheezing  sulfamethoxazole-trimethoprim (BACTRIM;SEPTRA) 400-80 MG per tablet, Take 1 tablet by mouth daily (Patient taking differently: Take 1 tablet by mouth daily For transplant)  [address]24 MG CAPS, Take 1,000 mg by mouth 2 times daily    Allergies:    Chantix [varenicline] and Heparin    Social History:    reports that she has been smoking cigarettes. She has a 1.50 pack-year smoking history. She has never used smokeless tobacco. She reports that she does not drink alcohol or use drugs. Family History:   family history is not on file. She was adopted.     REVIEW OF SYSTEMS    Constitutional: negative for chills and fevers  Eyes: negative for icterus and redness  Ears, nose, mouth, throat, and face: negative for epistaxis, hearing loss, nasal congestion, sore mouth, sore throat and tinnitus  Respiratory: negative for cough and hemoptysis  Cardiovascular: negative for chest pain and palpitations  Gastrointestinal: negative for abdominal pain and vomiting  Genitourinary:negative for dysuria and frequency  Integument/breast: negative for pruritus and rash  Hematologic/lymphatic: negative for bleeding and easy bruising  Musculoskeletal:negative for arthralgias and back pain  Neurological: Positive for left-sided weakness and visual difficulty  Behavioral/Psych: negative for anxiety and depression  Endocrine: negative for temperature intolerance  Allergic/Immunologic: negative for anaphylaxis and angioedema    PHYSICAL EXAM:    Vitals:  /77   Pulse 90   Temp 98.8 °F (37.1 °C)   Resp 18   Ht 5' 6\" (1.676 m)   Wt 190 lb (86.2 kg)   SpO2 95%   BMI 30.67 kg/m²     General appearance: alert, appears stated age and cooperative  Head: Normocephalic, without obvious abnormality, atraumatic  Eyes: conjunctivae/corneas clear. PERRL, EOM's intact. Fundi benign. Ears: normal TM's and external ear canals both ears  Nose: Nares normal. Septum midline. Mucosa normal. No drainage or sinus tenderness.   Throat: lips, mucosa, and tongue normal; teeth and gums normal  Neck: no adenopathy, no carotid bruit, no JVD, supple, symmetrical, trachea midline and thyroid not enlarged, symmetric, no tenderness/mass/nodules  Lungs: clear to auscultation bilaterally  Heart: regular rate and rhythm, S1, S2 normal, no murmur, click, rub or gallop  Abdomen: soft, non-tender; bowel sounds normal; no masses,  no organomegaly  Extremities: extremities normal, atraumatic, no cyanosis or edema  Pulses: 2+ and symmetric  Skin: Skin color, texture, turgor normal. No rashes or lesions  Neurologic: 3/5 muscle strength left upper and lower extremity with left pronator drift; right gaze preference; 5/5 muscle strength right upper and lower extremity, speech fluent and normal in content    Results      Component Value Units   Hemoglobin A1c [[id number]] (Abnormal) Collected: 04/15/19 0630   Updated: 04/15/19 0903     Hemoglobin A1C 6.9High  %   EKG 12 Lead [[id number]] Collected: 04/14/19 2214   Updated: 04/15/19 0748     Ventricular Rate 83 BPM    Atrial Rate 83 BPM    P-R Interval 132 ms    QRS Duration 78 ms    Q-T Interval 352 ms    QTc Calculation (Bazett) 413 ms    P Axis 60 degrees    R Axis 22 degrees    T Axis 62 degrees   Narrative:     Normal sinus rhythm  Normal ECG  When compared with ECG of 13-MAR-2019 13:12,  No significant change was found   POCT Glucose [[id number]] (Abnormal) Collected: 04/15/19 0523   Updated: 04/15/19 0527     Meter Glucose 177High  mg/dL   CT Head WO Contrast [[id number]] Collected: 04/14/19 2215   Updated: 04/15/19 0032    Narrative:     EXAM:    CT Head Without Contrast     EXAM DATE/TIME:    4/14/2019 10:15 PM     CLINICAL HISTORY:    60 years old, female; Signs and symptoms; Weakness, extremity and weakness,   facial; Left; Additional info: Sstroke symptoms     TECHNIQUE:    Imaging protocol: Axial computed tomography images of the head/brain without   contrast.     Coronal and sagittal reformatted images were created and reviewed.    Radiation optimization: All CT scans at this facility use at least one of   these dose optimization techniques: automated exposure control; mA and/or kV   adjustment per patient size (includes targeted exams where dose is matched to   clinical indication); or iterative reconstruction. COMPARISON:    CT BRAIN PERFUSION 4/11/2019 9:36 AM.No prior report provided by referring   facility radiologic technologist for correlation with prior images. FINDINGS:    Brain:  Ill-defined persistent right lateral periventricular white matter   hypodensities extending somewhat to right parietal lobe cortex which may be   subacute and/or chronic. Generalized parenchymal atrophy present which is   advanced for patient age.  Chronic ischemic gliotic white matter pending transfer    Quinton Rahman D.O., [facility]  8:54 AM  4/15/2019

## 2019-04-15 NOTE — PROGRESS NOTES
Patient's daughter called and stated that she was unsure why her mother was admitted to our unit because she was supposed to go to [facility]. Told daughter I would call her back when I found out more information because I was unaware of this. Attempted to call daughter back x3 tries and unable to reach her with updated information.

## 2019-04-15 NOTE — PLAN OF CARE
Problem: Falls - Risk of:  Goal: Will remain free from falls  Description  Will remain free from falls  Outcome: Met This Shift  Goal: Absence of physical injury  Description  Absence of physical injury  Outcome: Met This Shift     Problem: Pain:  Goal: Pain level will decrease  Description  Pain level will decrease  Outcome: Met This Shift  Goal: Control of acute pain  Description  Control of acute pain  Outcome: Met This Shift  Goal: Control of chronic pain  Description  Control of chronic pain  Outcome: Met This Shift     Problem: Risk for Impaired Skin Integrity  Goal: Tissue integrity - skin and mucous membranes  Description  Structural intactness and normal physiological function of skin and  mucous membranes.   Outcome: Met This Shift

## 2019-04-15 NOTE — PROGRESS NOTES
Physical Therapy    Facility/Department: [facility]    NAME: Valdemar Lafleur  : 1960  MRN: [id number]    Date of Service: 4/15/2019  Notified [facility] Becerril that pt may benefit from acute rehab at discharge and is in need of a PM&R consult.     Mynor Mcmahon, PT, DPT  [id number]

## 2019-04-15 NOTE — ED NOTES
Pt resting quietly in bed, vss denies pain. Pt updated on plan of care. Cardiac monitor in place. No distress noted, continue to monitor.      Augustus Mederos RN  04/15/19 [id number]

## 2019-04-15 NOTE — CARE COORDINATION
Spoke briefly with the patient at the bedside. Patient plans to transition to [facility] for higher level of care and to be seen by her Neurologist.  Patient accepted and bed assignment received, H60 Bed 21. Transportation arranged for transfer at [facility] to [facility].      Clementina Arguelles.

## 2019-04-15 NOTE — PROGRESS NOTES
Physical Therapy    Facility/Department: [facility]  Initial Assessment    NAME: Parvez Charles  : 1960  MRN: [id number]    Date of Service: 4/15/2019  Evaluating Therapist: Marcelo Calhoun PT, DPT     Room #: 3221/9547-L  DIAGNOSIS: Acute CVA  PRECAUTIONS: Falls, L hemiparesis (UE>LE), L inattention, Ataxia, Mild slurred speech  PMHx: CVA, HLD, HTN  PROCEDURES: NA     Social:  Pt lives with daughter, JOYCE, and 3 grandchildren in a 2 floor plan, 1st floor setup with 4 step(s) and 2 rail(s) to enter. Prior to admission pt walked with no device for short distances and SPC for longer distances. Pt reported being independent.        Initial Evaluation  Date: 4/15/19 Treatment  Date:     Short Term/ Long Term   Goals   AM-PAC 6 Clicks 34/79       Does pt have pain? 8/10 headache       Bed Mobility  Rolling: NT  Supine to sit: Bear  Sit to supine: NT  Scooting: SBA   Supervision   Transfers Sit to stand: Bear  Stand to sit: Bear  Stand pivot: ModA with no device or with SPC   SBA with AAD   Ambulation   25 feet with ModA with SPC   >75 feet with SBA with AAD   Stair negotiation: ascended and descended NT   >4 steps with 1 rail with SBA   BLE ROM WNL       BLE strength RLE Grossly 4+/5  LLE Grossly 4 to 4+/5   Increase by 1/3 MMT grade   Balance Sitting: Bear dynamic  Standing: ModA with no device or SPC   Sitting: SBA  Standing: SBA with AAD      Pt is alert and oriented x 4  Sensation: WNL to LLE  Edema: WNL     ASSESSMENT    Pt displays functional ability as noted in the objective portion of this evaluation.       Comments/Treatment:  Pt supine in bed upon arrival, agreeable to initial evaluation. Pt presented with LUE weakness, distal>proximal and required VCs to attend to L side and UE. Pt required minimal trunk assistance to reach a seated position. Upon sitting, pt reported mild dizziness and was instructed on PLB. Symptoms improved within minutes.   Pt exhibited minimal LLE weakness compared to RLE.  Attempted ambulation without device initially. Pt was unsteady and fearful, reaching for environment. Pt completed shuffled steps to bedside chair. Initiated ambulation with SPC. Pt was still unsteady and required VCs for proper use of cane and sequencing. Pt unable to maintain cane within safe proximity. Pt had difficulty advancing LLE due to decreased clearance during swing phase. Pt also ambulated on lateral aspect of L foot. Pt returned to sitting in chair with all needs met and call light in reach. Explained discharge destinations at this time. Discussed assessment with [facility] Bennett. Pt would benefit from an intensive rehabilitation program at discharge.       Patient education  Pt educated on safety     Patient response to education:   Pt verbalized understanding Pt demonstrated skill Pt requires further education in this area   x partial x           Pts/ family goals     1. Return home     Patient and or family understand(s) diagnosis, prognosis, and plan of care.     PLAN    PT care will be provided in accordance with the objectives noted above. Whenever appropriate, clear delegation orders will be provided for nursing staff. Exercises and functional mobility practice will be used as well as appropriate assistive devices or modalities to obtain goals.  Patient and family education will also be administered as needed.     Frequency of treatments will be 2-5x/week as able.     Time in: 0740  Time out: Marichuy Wilson 13     Annalee Isaacs PT, DPT  [id number]

## 2019-04-16 ENCOUNTER — TELEPHONE (OUTPATIENT)
Dept: ADMINISTRATIVE | Age: 59
End: 2019-04-16

## 2019-04-16 NOTE — DISCHARGE SUMMARY
Physician Discharge Summary     Patient ID:  Yvette Ellison  [id number]  63 y.o.  1960    Admit date: 4/14/2019    Discharge date and time: 4/15/2019 12:14 PM     Admission Diagnoses: Acute CVA (cerebrovascular accident) [facility]) [I63.9]  Acute CVA (cerebrovascular accident) [facility]) [I63.9]    Discharge Diagnoses:     1. Acute, recurrent right hemispheric stroke     2. Right internal carotid artery occlusion     3. History of PFO     4. History of liver transplant     5. Essential hypertension      6. COPD     7. History of esophageal varices     8. Type II diabetes mellitus with renal complication     9. Chronic kidney disease stage III     10. Dyslipidemia         Consults: none    Procedures: NONE    Laboratory:   Last 3 BMP  Recent Labs     04/14/19  2230      K 4.7      CO2 23   BUN 21*   CREATININE 1.7*   GLUCOSE 165*   CALCIUM 9.3       Last 3 CBC:  Recent Labs     04/14/19  2230   WBC 7.9   RBC 3.88   HGB 12.0   HCT 35.8   MCV 92.3   MCH 30.9   MCHC 33.5   RDW 13.1      MPV 10.9           Hospital Course: The patient is a 63 y.o. female patient of Dr Lili Carvalho who presents with left sided weakness and recurrent stroke. She was originally admitted on 3/13 with an acute right frontal stroke secondary to carotid occlusion. She was seen by neurology and placed on aspirin and Plavix. She has a history of a PFO and previous cerebellar pontine hemorrhage in 2013. She also has a prior history of liver transplant. She was found to have a right carotid occlusion. She was discharged on 3/15 with plans to follow-up in [facility] with neurology as outpatient.     She was seen in follow-up in 4/9 in  [facility] at which time carotid ultrasound confirmed complete occlusion. On 4/11 she presented at [facility] emergency room with acute onset of left-sided weakness and was referred to the emergency department. She was felt to be a poor candidate for TPA due to prior intracerebral hemorrhage. Interventional radiology was also consulted and the patient was felt to be a poor candidate for intervention. She was subsequently transferred to [facility] clinic. In [facility] she was told that she possibly had a new ischemic stroke. In speaking with her daughter surgery was recommended but her mother wish to speak with her prior to making a decision. She was subsequently discharged home with plans to discuss further with her family.     Over the weekend she developed increasing left-sided weakness and visual difficulty. Daughter called the [facility] and apparently spoke with Dr. Tori Shipley who recommended she go to the emergency room and be transferred back to [facility]. According to the patient she was seen in the emergency room and was told that transfer was not necessary. She was admitted here for further evaluation. Daughter and patient are now requesting transfer back to [facility] for further evaluation.     On her prior admission in [facility] she was told to discontinue losartan as the stroke service was suspicious of hypoperfusion. Patient's blood pressure on admission was 141/76. [facility] was notified of the admission and patient request and accept this patient in transfer. Patient was transferred to [facility] in stable but guarded condition. Discharge Exam:  See progress note from today    Disposition: TO [facility]    Patient Instructions:   Discharge Medication List as of 4/15/2019 12:14 PM      CONTINUE these medications which have NOT CHANGED    Details   HYDROcodone-acetaminophen (NORCO) 7.5-325 MG per tablet Take 1 tablet by mouth every 6 hours as needed for Pain for up to 30 days. , Disp-120 tablet, R-0Normal      losartan (COZAAR) 50 MG tablet Take one tablet daily, call with BP > 160/100, Disp-30 tablet, R-3**Patient requests 90 days supply**Normal      aspirin 81 MG chewable tablet Take 1 tablet by mouth daily, Disp-30 tablet, R-0Normal      atorvastatin (LIPITOR) 20 MG tablet Take 1 tablet by mouth nightly, Disp-30 tablet, R-0Normal      clopidogrel (PLAVIX) 75 MG tablet Take 1 tablet by mouth daily, Disp-30 tablet, R-0Normal      buPROPion (WELLBUTRIN SR) 150 MG extended release tablet TAKE 1 TABLET TWICE DAILY, Disp-180 tablet, R-1Normal      NYSTATIN 199931 UNIT/GM powder APPLY FOUR TIMES DAILY, Disp-60 g, R-0Normal      BD PEN NEEDLE YUSUF U/F 32G X 4 MM MISC Disp-200 each, R-1, PEDRITO, NormalUse with Insulin dosing schedule      omeprazole (PRILOSEC) 40 MG delayed release capsule TAKE 1 CAPSULE EVERY DAY, Disp-90 capsule, R-2Normal      cyclobenzaprine (FLEXERIL) 10 MG tablet Take 1 tablet by mouth 2 times daily as needed for Muscle spasms, Disp-60 tablet, R-2Normal      amLODIPine (NORVASC) 2.5 MG tablet Take 1 tablet by mouth daily, Disp-90 tablet, R-1Normal      LANTUS SOLOSTAR 100 UNIT/ML injection pen Inject 40 Units into the skin nightly, Disp-15 pen, R-3, DAWNormal      tacrolimus (PROGRAF) 1 MG capsule Take 1 mg by mouth 2 times daily Historical Med      guaiFENesin (MUCINEX) 600 MG extended release tablet Take 600 mg by mouth daily Historical Med      albuterol sulfate HFA (PROAIR HFA) 108 (90 Base) MCG/ACT inhaler INHALE 2 PUFFS BY MOUTH EVERY 6 HOURS AS NEEDED FOR WHEEZING, Disp-3 Inhaler, R-3Normal      diclofenac sodium (VOLTAREN) 1 % GEL Apply 2 g topically 2 times daily, Topical, 2 TIMES DAILY Starting 8/2/2017, Until Discontinued, Disp-1 Tube, R-3, Normal      ACCU-CHEK CONTRERAS PLUS strip 1 each by In Vitro route 4 times daily, Disp-100 each, R-5, DAWNormal      albuterol (PROVENTIL) (2.5 MG/3ML) 0.083% nebulizer solution Take 2.5 mg by nebulization every 6 hours as needed for Wheezing      sulfamethoxazole-trimethoprim (BACTRIM;SEPTRA) 400-80 MG per tablet Take 1 tablet by mouth daily, Disp-90 tablet, [id number]      Garlic 2217 MG CAPS Take 1,000 mg by mouth 2 times daily           Activity: activity as tolerated  Diet: regular diet    Follow-up with dR Burrows in 1 month.     Note that over 30 minutes was spent in preparing discharge papers, discussing discharge with patient, medication review, etc.    Signed:  FLORIN Balderas  4/16/2019  8:44 AM

## 2019-04-16 NOTE — TELEPHONE ENCOUNTER
from [address] [place] [address] Yojana Curtis) called to let office know she is faxing transition of care paperwork. As of now, pt is still an inpt at [facility], not sure when she will be released. Pt does have a hosp f/u appt scheduled for 4/17. Her contact number is [phone].

## 2019-04-17 LAB
EKG ATRIAL RATE: 75 BPM
EKG P AXIS: 61 DEGREES
EKG P-R INTERVAL: 152 MS
EKG Q-T INTERVAL: 384 MS
EKG QRS DURATION: 68 MS
EKG QTC CALCULATION (BAZETT): 428 MS
EKG R AXIS: 12 DEGREES
EKG T AXIS: 46 DEGREES
EKG VENTRICULAR RATE: 75 BPM

## 2019-04-19 LAB
EKG ATRIAL RATE: 83 BPM
EKG P AXIS: 60 DEGREES
EKG P-R INTERVAL: 132 MS
EKG Q-T INTERVAL: 352 MS
EKG QRS DURATION: 78 MS
EKG QTC CALCULATION (BAZETT): 413 MS
EKG R AXIS: 22 DEGREES
EKG T AXIS: 62 DEGREES
EKG VENTRICULAR RATE: 83 BPM

## 2019-05-29 ENCOUNTER — OFFICE VISIT (OUTPATIENT)
Dept: FAMILY MEDICINE CLINIC | Age: 59
End: 2019-05-29
Payer: MEDICARE

## 2019-05-29 VITALS
TEMPERATURE: 98.2 F | BODY MASS INDEX: 30.67 KG/M2 | OXYGEN SATURATION: 96 % | DIASTOLIC BLOOD PRESSURE: 96 MMHG | SYSTOLIC BLOOD PRESSURE: 164 MMHG | RESPIRATION RATE: 16 BRPM | HEART RATE: 82 BPM | HEIGHT: 66 IN

## 2019-05-29 DIAGNOSIS — Z86.73 HISTORY OF CVA (CEREBROVASCULAR ACCIDENT): Primary | Chronic | ICD-10-CM

## 2019-05-29 DIAGNOSIS — G89.29 CHRONIC MIDLINE LOW BACK PAIN WITH RIGHT-SIDED SCIATICA: ICD-10-CM

## 2019-05-29 DIAGNOSIS — M54.41 CHRONIC MIDLINE LOW BACK PAIN WITH RIGHT-SIDED SCIATICA: ICD-10-CM

## 2019-05-29 DIAGNOSIS — I10 ESSENTIAL HYPERTENSION: Chronic | ICD-10-CM

## 2019-05-29 PROCEDURE — G8598 ASA/ANTIPLAT THER USED: HCPCS | Performed by: NURSE PRACTITIONER

## 2019-05-29 PROCEDURE — 3017F COLORECTAL CA SCREEN DOC REV: CPT | Performed by: NURSE PRACTITIONER

## 2019-05-29 PROCEDURE — 99214 OFFICE O/P EST MOD 30 MIN: CPT | Performed by: NURSE PRACTITIONER

## 2019-05-29 PROCEDURE — G8417 CALC BMI ABV UP PARAM F/U: HCPCS | Performed by: NURSE PRACTITIONER

## 2019-05-29 PROCEDURE — 4004F PT TOBACCO SCREEN RCVD TLK: CPT | Performed by: NURSE PRACTITIONER

## 2019-05-29 PROCEDURE — G8427 DOCREV CUR MEDS BY ELIG CLIN: HCPCS | Performed by: NURSE PRACTITIONER

## 2019-05-29 RX ORDER — SULFAMETHOXAZOLE AND TRIMETHOPRIM 400; 80 MG/1; MG/1
1 TABLET ORAL DAILY
COMMUNITY
End: 2019-11-01 | Stop reason: SDUPTHER

## 2019-05-29 RX ORDER — POLYETHYLENE GLYCOL 3350 17 G/17G
17 POWDER, FOR SOLUTION ORAL DAILY
Status: ON HOLD | COMMUNITY
End: 2019-07-06

## 2019-05-29 RX ORDER — ACETAMINOPHEN 325 MG/1
650 TABLET ORAL
Status: ON HOLD | COMMUNITY
End: 2019-07-06

## 2019-05-29 RX ORDER — LANOLIN ALCOHOL/MO/W.PET/CERES
3 CREAM (GRAM) TOPICAL DAILY
Status: ON HOLD | COMMUNITY
End: 2019-07-06

## 2019-05-29 RX ORDER — HYDROCODONE BITARTRATE AND ACETAMINOPHEN 7.5; 325 MG/1; MG/1
1 TABLET ORAL EVERY 6 HOURS PRN
Qty: 120 TABLET | Refills: 0 | Status: SHIPPED | OUTPATIENT
Start: 2019-05-29 | End: 2019-06-28 | Stop reason: SDUPTHER

## 2019-05-29 ASSESSMENT — ENCOUNTER SYMPTOMS
NAUSEA: 0
WHEEZING: 0
VOMITING: 0
CONSTIPATION: 0
COUGH: 0
DIARRHEA: 0
SHORTNESS OF BREATH: 0
BACK PAIN: 1

## 2019-05-29 NOTE — PROGRESS NOTES
HPI:  Patient comes intoday for   Chief Complaint   Patient presents with    Cerebrovascular Accident     Pt reports she had 3 strokes within 1 month. Then had a brain operation, external to internal cranial bypass. Pt states she is feeling very fatigued and shakes a lot. Doesn't have motivation to do any activities.  Hypertension     Pt was taken off Bp meds while in Rehabilitation. Pt just started back Bp meds 2 days ago. Did not take any meds today   . Patient had surgery at [facility] (right STA/MCA)  Patient was discharged from rehab [facility] rehab in [facility]) on 5/22. She has not started PT, OT and ST due to difficulty finding facility that offers all and is covered. She does have an appointment with [facility] on 6/3 and 6/5. Patient was taken off losartan and norvasc was increased to 10 mg. She has checked her BP at home and it was 160s/88    Complains of chronic low back pain with radiation down right leg at times. Pain is chronic. Prior to Visit Medications    Medication Sig Taking? Authorizing Provider   acetaminophen (TYLENOL) 325 MG tablet 650 mg Yes Historical Provider, MD   melatonin 3 MG TABS tablet Take 3 mg by mouth daily Yes Historical Provider, MD   polyethylene glycol (GLYCOLAX) powder Take 17 g by mouth daily Yes Historical Provider, MD   HYDROcodone-acetaminophen (NORCO) 7.5-325 MG per tablet Take 1 tablet by mouth every 6 hours as needed for Pain for up to 30 days.  Yes MIKEL Royal - CNP   sulfamethoxazole-trimethoprim (BACTRIM;SEPTRA) 400-80 MG per tablet Take 1 tablet by mouth daily Yes Historical Provider, MD   aspirin 81 MG chewable tablet Take 1 tablet by mouth daily Yes Ephraim Thorpe MD   atorvastatin (LIPITOR) 20 MG tablet Take 1 tablet by mouth nightly  Patient taking differently: Take 40 mg by mouth nightly  Yes Ephraim Thorpe MD   clopidogrel (PLAVIX) 75 MG tablet Take 1 tablet by mouth daily Yes Ephraim Thorpe MD   buPROPion (WELLBUTRIN SR) 150 MG extended release tablet TAKE 1 TABLET TWICE DAILY Yes MIKEL Mckeon CNP   NYSTATIN 524047 UNIT/GM powder APPLY FOUR TIMES DAILY Yes Melissa Snider,    BD PEN NEEDLE YUSUF U/F 32G X 4 MM MISC Use with Insulin dosing schedule Yes MIKEL Mckeon CNP   omeprazole (PRILOSEC) 40 MG delayed release capsule TAKE 1 CAPSULE EVERY DAY Yes MIKEL Mckeon CNP   cyclobenzaprine (FLEXERIL) 10 MG tablet Take 1 tablet by mouth 2 times daily as needed for Muscle spasms Yes MIKEL Mckeon CNP   amLODIPine (NORVASC) 2.5 MG tablet Take 1 tablet by mouth daily  Patient taking differently: Take 10 mg by mouth daily  Yes MIKEL Mckeon CNP   LANTUS SOLOSTAR 100 UNIT/ML injection pen Inject 40 Units into the skin nightly Yes MIKEL Mckeon CNP   tacrolimus (PROGRAF) 1 MG capsule Take 1 mg by mouth 2 times daily  Yes Historical Provider, MD   albuterol sulfate HFA (PROAIR HFA) 108 (90 Base) MCG/ACT inhaler INHALE 2 PUFFS BY MOUTH EVERY 6 HOURS AS NEEDED FOR WHEEZING Yes MIKEL Regalado CNP   diclofenac sodium (VOLTAREN) 1 % GEL Apply 2 g topically 2 times daily Yes MIKEL Mckeon CNP   ACCU-CHEK CONTRERAS PLUS strip 1 each by In Vitro route 4 times daily Yes MIKEL Mckeon CNP   albuterol (PROVENTIL) (2.5 MG/3ML) 0.083% nebulizer solution Take 2.5 mg by nebulization every 6 hours as needed for Wheezing Yes Historical Provider, MD   losartan (COZAAR) 50 MG tablet Take one tablet daily, call with BP > 160/100  MIKEL Mckeon CNP   guaiFENesin (MUCINEX) 600 MG extended release tablet Take 600 mg by mouth daily   Historical Provider, MD         Allergies   Allergen Reactions    Chantix [Varenicline] Swelling     Tongue swelling Split in 1/2    Heparin      Platelets drop         Review of Systems  Review of Systems   Respiratory: Negative for cough, shortness of breath and wheezing.     Cardiovascular: Negative for chest pain and palpitations. Gastrointestinal: Negative for constipation, diarrhea, nausea and vomiting. Musculoskeletal: Positive for back pain and myalgias. Neurological: Negative for dizziness, weakness and headaches. VS:  BP (!) 164/96   Pulse 82   Temp 98.2 °F (36.8 °C) (Oral)   Resp 16   Ht 5' 6\" (1.676 m)   LMP  (LMP Unknown)   SpO2 96%   Breastfeeding? No   BMI 30.67 kg/m²     Physical Exam  Physical Exam   Constitutional: She is oriented to person, place, and time. She appears well-developed and well-nourished. No distress. HENT:   Head: Normocephalic and atraumatic. Neck: No tracheal deviation present. No thyromegaly present. Cardiovascular: Normal rate, regular rhythm and intact distal pulses. No murmur heard. Pulmonary/Chest: Effort normal and breath sounds normal. She has no wheezes. She has no rales. She exhibits no tenderness. Abdominal: Soft. Bowel sounds are normal. There is no tenderness. Musculoskeletal: She exhibits tenderness. Tenderness to lumbar spine with bilateral paraspinal tenderness, right worse than left. Lymphadenopathy:     She has no cervical adenopathy. Neurological: She is alert and oriented to person, place, and time. Left facial droop, left  weak   Skin: Skin is warm and dry. Right scalp incision healing well, no erythema   Psychiatric:   Tearful at times         Assessment/Plan:  Jame Eden was seen today for cerebrovascular accident and hypertension. Diagnoses and all orders for this visit:    History of CVA (cerebrovascular accident)  -living with daughter  -will start PT, OT, ST as ordered  -records from [facility] reviewed    Chronic midline low back pain with right-sided sciatica  -     HYDROcodone-acetaminophen ([address]) 7.5-325 MG per tablet; Take 1 tablet by mouth every 6 hours as needed for Pain for up to 30 days.  -The current medical regimen is effective;  continue present plan and medications.     Essential hypertension  -monitor BP at home with goal of < 140/90, call with BP > 160/100  -take losartan along with amlodipine    -  Controlled Substances Monitoring:     RX Monitoring 5/29/2019   Attestation The Prescription Monitoring Report for this patient was reviewed today.    Chronic Pain Routine Monitoring No signs of potential drug abuse or diversion identified: otherwise, see note documentation       Greater than 25  Minutes was spent with patient and more than 50% of the time was spent face to facecounseling and educating regarding diagnoses

## 2019-06-03 ENCOUNTER — EVALUATION (OUTPATIENT)
Dept: OCCUPATIONAL THERAPY | Age: 59
End: 2019-06-03
Payer: MEDICARE

## 2019-06-03 ENCOUNTER — EVALUATION (OUTPATIENT)
Dept: PHYSICAL THERAPY | Age: 59
End: 2019-06-03
Payer: MEDICARE

## 2019-06-03 DIAGNOSIS — G81.94 HEMIPLEGIA AFFECTING LEFT NONDOMINANT SIDE, UNSPECIFIED ETIOLOGY, UNSPECIFIED HEMIPLEGIA TYPE (HCC): Primary | ICD-10-CM

## 2019-06-03 DIAGNOSIS — I63.9 ACUTE CVA (CEREBROVASCULAR ACCIDENT) (HCC): Primary | ICD-10-CM

## 2019-06-03 PROCEDURE — 97166 OT EVAL MOD COMPLEX 45 MIN: CPT | Performed by: OCCUPATIONAL THERAPIST

## 2019-06-03 PROCEDURE — 97163 PT EVAL HIGH COMPLEX 45 MIN: CPT | Performed by: PHYSICAL THERAPIST

## 2019-06-03 NOTE — PROGRESS NOTES
[address] THERAPY INITIAL EVALUATION     Phone: [phone]  Fax: [phone]     Date:  6/3/2019  Initial Evaluation Date: 6-3-19    Patient Name:  Steffanie Mccord    :  1960    Restrictions/Precautions:   Activity as tolerated, Moderate fall risk  Diagnosis:  CVA       Insurance/Certification information:  Medicare  Referring Physician:  Dr Travis Huerta, [address]  Date of Surgery/Injury: CVAs March- 2019  Plan of care signed (Y/N):  N  Visit# / total visits: - visits    Past Medical History:   Past Medical History:   Diagnosis Date    Arthritis     right knee    Blood circulation, collateral     Chronic fatigue 2016    Cirrhosis of liver ([address]     end stage    COPD exacerbation ([address] 2015    pt denies having    Diabetes mellitus ([address]     Essential hypertension 2/15/2016    Gall stones     did have stent placed    GERD (gastroesophageal reflux disease)     Hep C w/o coma, chronic ([address]     Sucessfully treated with Harvoni-no longer has viral load    History of blood transfusion     Neuropathy     PFO (patent foramen ovale)     Pneumonia     Type 2 diabetes mellitus with stage 3 chronic kidney disease, with long-term current use of insulin ([address]     Unspecified cerebral artery occlusion with cerebral infarction     Varices      Past Surgical History:   Past Surgical History:   Procedure Laterality Date    CARDIAC CATHETERIZATION      cerebral angiogrem to check cavernous malformation in head - negative    CARDIAC SURGERY      heart cath     SECTION      x 2     SECTION   and     COLONOSCOPY      ECHO COMPL W DOP COLOR FLOW  2012         ENDOSCOPY, COLON, DIAGNOSTIC      ESOPHAGEAL VARICE LIGATION      banding    LIVER TRANSPLANT      OTHER SURGICAL HISTORY Right 16    Right Knee Arthroscopy with Debridement partial lateral menisectomy, chondroplasty, synovectomy       Reason for Referral: Patient presents with a history of multiple CVAs  Starting on 3-13-19 thru 4-11-19. The strokes affected the frontal lobe, and cerebellar/ pontine. The cause of the the strokes was attributed to full occlusion of the carotid artery. On 4-23-19, she under went a external to internal cranial bypass of the occlusion, followed by inpatient acute rehabilitation. She has now been home since 5-22-19 and is referred to outpatient for continued therapy. Her main concerns includes left arm weakness, decreased use of the left arm, decreased ease with self care and decreased ability to participate in activities about her home. She does have a resting splint she uses at home. Home Living: lives with her daughter and family, 2 floor home. She stays on the main floor. Her daughter Janeen Bill" is present during testing. Prior Level of Function: Independent  IADL History  Homemaking Responsibility: secondary  Shopping Responsibility: secondary  Mode of Transportation: car/ not driving at this point  Leisure & Hobbies: TV, time with family/ dogs  Work: NA    Pain Level: Pt states the only pain she has is from headaches from surgery. Cognition:   Alert/Oriented x3 , able to follow 2-3 step commands, good insight into her limitations    ADL  Feeding:  I with assist to cut food only  Grooming: Mod I  Bathing:  Supervision to Min assist  UE Dressing: Mod I  LE Dressing: Mod I  Toileting: Mod I  Transfer:  Min assist to CGA  Comments: Pt states she is not able to assist with cooking or cleaning, or folding laundry like prior to the cva. In addition, pt states she tries very easily.      UE Assessment: RHD    L UE AROM: Exceptions to WNL  Supination 0-60  Finger flexion 3/4  Finger ext: slight movement    L UE strength: Exceptions to WNL  Shld 3+/5  Elbow 4-/5  Wrist 3/5  Forearm 3-/5 supination and 4/5 pronation  Finger flexion 3-/5  Finger extension 2-/5    Sensation: No acute deficits    Tone: Mild spasticity present in the left hand    Vision / Perception:   Moderate left neglect, visual fields are intact    Dynamometer (setting 2):     Left: not able          Pinch Meter:   Lateral: Left= Not able     Left UE coordination: Use of the left arm is limited to a fair gross stabilizer only. Otherwise, pt uses her right arm for most daily tasks. Assessment of current deficits   Functional mobility []  ADLs [x] Strength [x]  Cognition []  Functional transfers  [x] IADLs [x] Safety Awareness []  Endurance [x]  Fine Motor Coordination [] Balance [] Vision/perception [x] Sensation []   Gross Motor Coordination [x] ROM [x]  Work []  Leisure[]     Eval Complexity: Moderate  Profile and History- interview, ED notes, MD notes, test results  Assessment of Occupational Performance and Identification of Deficits- 8 performance dficits  Clinical Decision Making- modifications needed in testing/ co-morbidity prior liver transplant in 2016/ multi area CVA    Rehab Potential:   [x] Good  [] Fair  [] Poor   Suggested Professional Referral: [x] No  [] Yes:  Barriers to Goal Achievement[de-identified]   [x] No  [] Yes:  Domestic Concerns:     [x] No  [] Yes:    Goal Formulation: Patient \" get my hand better so I can use my arm\"    Time In: 0900  Time Out: 1000  Timed Code Treatment Minutes: 60 min      PLAN     Plan   Plan: Plan of care initiated. Frequency Pt will be seen 2-3x a week for 8 weeks or up to 24 sessions as needed. Treatment to include:   [x] Instruction in HEP   Modalities:  [x] Therapeutic Exercise [] Ultrasound   [] Electrical Stimulation/Attended  [x] PROM/Stretching             [] Fluidotherapy          []  Paraffin                   [x]AAROM  [x] AROM             [] Iontophoresis: 4 mg/mL;  Dexamethasone Sodium           [] Desensitization                           Phosphate 40-80 mAmin     [x] Neuromuscular Re-education    [] Splinting    [x] Therapeutic Activity            [] Pain Management with/without modalities PRN        [] Manual Therapy/Fascial release   [x] ADL/IADL re-training        [] Tendon Glides                   []Joint Protection/Training  []Ergonomics       [] Joint Mobilization  []Adaptive Equipment Assessment/Training          [] Manual Edema Mobilization    [] Energy Conservation/Work Simplification  [x] GM/FM Coordination  [x]  Safety retraining/education per  individual diagnosis/goals      GOALS (Long term same as Short term):  1) Patient will demonstrate good understanding of home program(exercises/activities/diagnosis/prognosis/goals) with good accuracy. 2) Patient will demonstrate increased active/passive range of motion of their left UE to [facility] with strength of 4/5 throughout to help ADL/IADL completion. 3) Patient will demonstrate /pinch strength of at least 15# / 5# lateral  pinch pounds of their left hand. 4) Increase in fine motor function as evidenced by ability to complete 9-hole peg test and use an a functional assist during daily tasks. 5) Patient will report ADL and light homemaking to Mod I with improved functional use of the left arm and good attention to the left side. 6)Patient will demonstrate improved functional activity tolerance from fair to good for ADL/IADL completion. 7)Patient/caregiver to demonstrate proper follow through of home modification/adaptive recommendations to increase safe functional ADLs. OT G-codes: Priscilla Setting  Adm: 51% impairment     Patient. Education:  [x] Plans/Goals, Risks/Benefits discussed  [] Home exercise program  Method of Education: [x] Verbal  [] Demo  [] Written  Comprehension of Education:  [x] Verbalizes understanding. [] Demonstrates understanding. [] Needs Review. [] Demonstrates/verbalizes understanding of HEP/Ed previously given.       Patient understands diagnosis/prognosis and consents to treatment, plan and goals: [x] Yes    [] No         Electronically signed by: Pablo Elkins OT/L [id number]      [id number] Certification / Comments     Frequency/Duration 2-3x / week for 8 visits. Certification period From: 6-3-19  To: 8-4-19    I have reviewed the Plan of Care established for skilled therapy services and certify that the services are required and that they will be provided while the patient is under my care. Physician's Comments/Revisions:         Physician's Printed Name:   Dr Bunny Chau                                        Physician's Signature:                                                               Date:       Please review Patient's OT evaluation and if you agree sign/date and fax back to us at our [facility] fax # [phone].

## 2019-06-03 NOTE — PROGRESS NOTES
[facility] OUTPATIENT REHABILITATION  PHYSICAL THERAPY INITIAL EVALUATION         Date:  6/3/2019   Patient: Archie Mccoy  : 1960  MRN: [id number]  Referring Provider: No referring provider defined for this encounter. Medical Diagnosis:   I63.9 (ICD-10-CM) - Acute CVA (cerebrovascular accident) ([facility] [address]     SUBJECTIVE:     History: Patient reports decreased functional mobility over the past 2  month(s) due to 3 CVAs. Readmitted to [facility] 4/15-      Previous PT: yes - helped [facility] for 3 weeks, was unable to ambulate or use L UE at all on admission. States she ambulates w/ [facility]    Related impairments: mobility    Chief complaint: can't function like she used to    Behavior: condition is getting better    Pain: constant  Current: 4-5/10         Symptom Type/Quality: pressure  Location[de-identified] eyes, temples, and around collar of shirt    Imaging results: No results found.     Past Medical History:  Past Medical History:   Diagnosis Date    Arthritis     right knee    Blood circulation, collateral     Chronic fatigue 2016    Cirrhosis of liver (HCC)     end stage    COPD exacerbation ([address] 2015    pt denies having    Diabetes mellitus ([address]     Essential hypertension 2/15/2016    Gall stones     did have stent placed    GERD (gastroesophageal reflux disease)     Hep C w/o coma, chronic ([address]     Sucessfully treated with Harvoni-no longer has viral load    History of blood transfusion     Neuropathy     PFO (patent foramen ovale)     Pneumonia     Type 2 diabetes mellitus with stage 3 chronic kidney disease, with long-term current use of insulin (HCC)     Unspecified cerebral artery occlusion with cerebral infarction     Varices      Past Surgical History:   Procedure Laterality Date    CARDIAC CATHETERIZATION      cerebral angiogrem to check cavernous malformation in head - negative    CARDIAC SURGERY      heart cath   [facility]  SECTION x 2     SECTION   and     COLONOSCOPY      ECHO COMPL W DOP COLOR FLOW  2012         ENDOSCOPY, COLON, DIAGNOSTIC      ESOPHAGEAL VARICE LIGATION      banding    LIVER TRANSPLANT      OTHER SURGICAL HISTORY Right 16    Right Knee Arthroscopy with Debridement partial lateral menisectomy, chondroplasty, synovectomy       Medications:   Current Outpatient Medications   Medication Sig Dispense Refill    acetaminophen (TYLENOL) 325 MG tablet 650 mg      melatonin 3 MG TABS tablet Take 3 mg by mouth daily      polyethylene glycol (GLYCOLAX) powder Take 17 g by mouth daily      HYDROcodone-acetaminophen (NORCO) 7.5-325 MG per tablet Take 1 tablet by mouth every 6 hours as needed for Pain for up to 30 days.  120 tablet 0    sulfamethoxazole-trimethoprim (BACTRIM;SEPTRA) 400-80 MG per tablet Take 1 tablet by mouth daily      losartan (COZAAR) 50 MG tablet Take one tablet daily, call with BP > 160/100 30 tablet 3    aspirin 81 MG chewable tablet Take 1 tablet by mouth daily 30 tablet 0    atorvastatin (LIPITOR) 20 MG tablet Take 1 tablet by mouth nightly (Patient taking differently: Take 40 mg by mouth nightly ) 30 tablet 0    clopidogrel (PLAVIX) 75 MG tablet Take 1 tablet by mouth daily 30 tablet 0    buPROPion (WELLBUTRIN SR) 150 MG extended release tablet TAKE 1 TABLET TWICE DAILY 180 tablet 1    NYSTATIN 777643 UNIT/GM powder APPLY FOUR TIMES DAILY 60 g 0    BD PEN NEEDLE YUSUF U/F 32G X 4 MM MISC Use with Insulin dosing schedule 200 each 1    omeprazole (PRILOSEC) 40 MG delayed release capsule TAKE 1 CAPSULE EVERY DAY 90 capsule 2    cyclobenzaprine (FLEXERIL) 10 MG tablet Take 1 tablet by mouth 2 times daily as needed for Muscle spasms 60 tablet 2    amLODIPine (NORVASC) 2.5 MG tablet Take 1 tablet by mouth daily (Patient taking differently: Take 10 mg by mouth daily ) 90 tablet 1    LANTUS SOLOSTAR 100 UNIT/ML injection pen Inject 40 Units into the skin nightly 15 LE: 4/5    Functional Mobility/Tests:  Test    Basic Transfer Sit to stand   Sit/Stand Requires R UE        Double stance, feet together, eyes open F-   Double stance, feet together, eyes closed P+                       ASSESSMENT     Outcome Measure:   [facility] outpatient mobility 38    Problems:    Strength decreased   Balance limitations in standing and walking   Decreased functional ability with walking and standing    [x] There are no barriers affecting plan of care or recovery    [] Barriers to this patient's plan of care or recovery include. Domestic Concerns:  [x] No  [] Yes:    Short Term goals (4 weeks)   Increase Strength to 4+/5    Demonstrate improved balance in standing and walking   [facility] 48   Able to perform/complete the following functions/tasks: walk 10 minutes SBA    Long Term goals (8 weeks)   Increase Strength to 5-/5    Demonstrate improved balance in standing and walking   Able to perform/complete the following functions/tasks: walk w/ straight cane   [facility] 62   Independent with Home Exercise Programs     Rehab Potential: [x] Good  [] Fair  [] Poor    PLAN       Treatment Plan:   [x] Therapeutic Exercise  [x] Therapeutic Activity  [x] Neuromuscular Re-education   [x] Gait Training  [x] Balance Training  [] Aerobic conditioning  [] Manual Therapy  [] Massage/Fascial release   [x] Work/Sport specific activities    [] Pain Neuroscience [] Cold/hotpack  [] Vasocompression  [] Electrical Stimulation  [] Lumbar/Cervical Traction  [] Ultrasound   [] Iontophoresis: 4 mg/mL Dexamethasone Sodium Phosphate 40-80 mAmin        [x] Instruction in HEP      []  Medication allergies reviewed for use of Dexamethasone Sodium Phosphate 4mg/ml  with iontophoresis treatments. Patient is not allergic.       The following CPT codes are likely to be used in the care of this patient: 94682 PT Evaluation: High Complexity , 00384 Therapeutic Exercise , [address] Neuromuscular Re-Education , 67743 Therapeutic Activities       Suggested Professional Referral: [x] No  [] Yes:     Patient Education:  [x] Plans/Goals, Risks/Benefits discussed  [x] Home exercise program  Method of Education: [x] Verbal  [x] Demo  [x] Written  Comprehension of Education:  [x] Verbalizes understanding. [x] Demonstrates understanding. [] Needs Review. [] Demonstrates/verbalizes understanding of HEP/Ed previously given. Frequency:  1-2 days per week for 8 weeks    Patient understands diagnosis/prognosis and consents to treatment, plan and goals: [x] Yes    [] No     Thank you for the opportunity to work with your patient. If you have questions or comments, please contact me at [phone]; fax: [phone]. Electronically signed by: Abi Preciado PT         Please sign Physician's Certification and return to: [place] [address]  Dept: [phone]  Dept Fax: [phone] Certification / Comments     Frequency/Duration 1-2 days per week for 8 weeks. Certification period from 6/3/2019  to 8-31-19. I have reviewed the Plan of Care established for skilled therapy services and certify that the services are required and that they will be provided while the patient is under my care.     Physician's Comments/Revisions:               Physician's Printed Name:                                           [de-identified] Signature:                                                               Date:

## 2019-06-05 ENCOUNTER — EVALUATION (OUTPATIENT)
Dept: SPEECH THERAPY | Age: 59
End: 2019-06-05
Payer: MEDICARE

## 2019-06-05 DIAGNOSIS — R41.841 COGNITIVE COMMUNICATION DEFICIT: Primary | ICD-10-CM

## 2019-06-05 PROBLEM — G81.94 HEMIPLEGIA AFFECTING LEFT NONDOMINANT SIDE (HCC): Status: ACTIVE | Noted: 2019-06-05

## 2019-06-05 PROCEDURE — 96125 COGNITIVE TEST BY HC PRO: CPT | Performed by: SPEECH-LANGUAGE PATHOLOGIST

## 2019-06-10 NOTE — PROGRESS NOTES
SPEECH/LANGUAGE PATHOLOGY   COGNITIVE EVALUATION       Name:  Jemal Barr  YOB: 1960 63years old  Date of Evaluation:  06/05/2019  Referred by:  Dr. Nino Spearing  Reason for Referral:  PAOLA Smith was accompanied to this evaluation by her daughter Ray Greer. They report that Sridhar Smith suffered a CVA in March followed by 2 other CVA's in April. Sridhar Smith states that she had a prior CVA in 2013 with only residual balance issues. Sridhar Smith also reports that she had a liver transplant in 2016. Sridhar Smith and Ray Greer report that Sridhar Smith received speech therapy while in [facility] after this most recent stroke. She states that she has difficulty with remembering things and mild word finding problems. Ray Greer states that prior to this CVA, her mother was independent with all ADL's. Stimulus questions were obtained from the RIPA-2. Severity ratings for each subtest were determined as follows:     RAW SCORE  SEVERITY    28-30  Within functional limits (WFL)   25-27  Mild deficit    22-24  Moderate deficit    19-21  Marked deficit    16-18  Severe deficit        Subtest  Raw Score    Severity    Immediate Memory  26/30  MILD   Recent Memory  30/30  WFL   Temporal Orientation   (Recent Memory)  30/30  WFL   Temporal Orientation   (Remote Memory)  30/30  WFL   Spatial Orientation  30/30  WFL   Orientation to Environment  30/30  WFL   Recall of General Information  30/30  WFL   Problem Solving & Abstract Reasoning  29/30  WFL   Organization  30/30  WFL   Auditory Processing   & Retention  30/30  WFL       VARIANT OBSERVATIONS:   [x] Error  [] Perseveration  [] Repeat instructions/Stimulus  [] Denial/Refusal  [] Delayed response  [] Confabulation  [x] Partially correct  [] Irrelevant  [] Tangential  [x] Self-corrected    THERAPY RECOMMENDATIONS:   [] Cognitive therapy is not warranted at this time.    [x] Cognitive therapy is recommended 1-2X/week for 3-6 weeks with emphasis on the following:    [x] To improve immediate memory    [] To improve recent memory    [] To improve temporal orientation (recent memory)    [] To improve temporal orientation (remote memory)    [] To improve spatial orientation    [] To improve orientation to environment    [] To improve recall of general information    [] To improve reasoning    [] To improve problem solving    [] To improve organization    [] To improve auditory processing and retention     Patient stated goals: to remember things better  Treatment goals discussed with [x] patient [x] family. [x] Patient [x] family understands the diagnosis, prognosis and plan of care. This plan will be re-evaluated and revised in if warranted. Prognosis for improvements in the above area(s) is [x] good [] fair [] guarded [] unknown at this time. EDUCATION:     Speech language pathologist (SLP) completed education with the patient regarding identified cognitive deficits and subsequent need for speech pathology intervention. Discussed deficit areas to be targeted by formal intervention and established short/long term goals. Reviewed compensatory strategies to improve functional outcome (as appropriate). Encouraged patient to engage SLP in structured Q&A session relative to identified deficit areas. Patient indicated understanding of all information provided via satisfactory verbal response. [x]Fall risk assessment completed  [x] The admitting diagnosis and active problem list as listed below have been reviewed prior to the initiation of this evaluation. No admission diagnoses are documented for this encounter. Patient Active Problem List   Diagnosis    PFO (patent foramen ovale)    History of esophageal varices    History of CVA (cerebrovascular accident)    GERD (gastroesophageal reflux disease)    Status post liver transplantation ([facility] [address]    COPD (chronic obstructive pulmonary disease) (HCC)    Obesity (BMI 30-39. 9)    Diabetes mellitus type 2, uncontrolled ([facility] [address]    CKD (chronic kidney disease) stage 3, GFR 30-59 ml/min (HCC)    Essential hypertension    Acute CVA (cerebrovascular accident) ([address]    Hemiplegia affecting left nondominant side ([address]       Abhi Duke. Jack Roy, MA/CCC-SLP  [id number]        I CERTIFY THAT THIS EVALUATION AND PLAN OF CARE FOR SPEECH THERAPY SERVICES ARE APPROPRIATE AND MEDICALLY NECESSARY.     DURATION:  FROM:________06/05/2019_______________THRU_________09/04/2019_______________              ________________________________________________________________________  PHYSICIAN'S SIGNATURE                                                                         DATE

## 2019-06-12 ENCOUNTER — EVALUATION (OUTPATIENT)
Dept: SPEECH THERAPY | Age: 59
End: 2019-06-12
Payer: MEDICARE

## 2019-06-12 ENCOUNTER — TREATMENT (OUTPATIENT)
Dept: OCCUPATIONAL THERAPY | Age: 59
End: 2019-06-12
Payer: MEDICARE

## 2019-06-12 DIAGNOSIS — R41.841 COGNITIVE COMMUNICATION DEFICIT: Primary | ICD-10-CM

## 2019-06-12 DIAGNOSIS — G81.94 HEMIPLEGIA AFFECTING LEFT NONDOMINANT SIDE, UNSPECIFIED ETIOLOGY, UNSPECIFIED HEMIPLEGIA TYPE (HCC): Primary | ICD-10-CM

## 2019-06-12 PROCEDURE — 97032 APPL MODALITY 1+ESTIM EA 15: CPT | Performed by: OCCUPATIONAL THERAPIST

## 2019-06-12 PROCEDURE — 97112 NEUROMUSCULAR REEDUCATION: CPT | Performed by: OCCUPATIONAL THERAPIST

## 2019-06-12 PROCEDURE — 97535 SELF CARE MNGMENT TRAINING: CPT | Performed by: OCCUPATIONAL THERAPIST

## 2019-06-12 PROCEDURE — G0515 COGNITIVE SKILLS DEVELOPMENT: HCPCS | Performed by: SPEECH-LANGUAGE PATHOLOGIST

## 2019-06-12 PROCEDURE — 97110 THERAPEUTIC EXERCISES: CPT | Performed by: OCCUPATIONAL THERAPIST

## 2019-06-12 NOTE — PROGRESS NOTES
Patient seen for 30 minute session with daughter present. They deny any events occurring that were caused by problems with her recall skills. We worked on recall of 5 related words to 60-80% acc with min cues; Recall of 5 unrelated words to 80% acc. She was instructed on strategies to use to help improve recall. She was also instructed on how to find 'brain games' to work on for homework. Will continue. Bee Lopez.  Afia Ruiz MA/[facility]-SLP  [id number]

## 2019-06-12 NOTE — PROGRESS NOTES
OCCUPATIONAL THERAPY PROGRESS NOTE    Date:  2019  Initial Evaluation Date: 6-3-19    Patient Name:  Jacky Amor    :  1960  Restrictions/Precautions: Activity as tolerated, Moderate fall risk  Diagnosis:  CVA                                                          Insurance/Certification information:  Medicare  Referring Physician:  Julio César Lala [place], [id number]  Date of Surgery/Injury: CVAs March- 2019  Plan of care signed (Y/N):  Y  Visit# / total visits: - visits    Pain Level: moderate back pain  Subjective: Pt presents with no new comments. Objective:  Updated POC to be completed by 7-3-19. INTERVENTION: COMPLETED: SPECIFICS/COMMENTS:   Modality:     FES- wrist and digit ext PPR2 -8 Tolerated well- good result/ completed with a without AROM        AROM/ AAROM     L UE AROM x shld flexion and ABD, elbow flexion/ ext, supination/ pronation, wrist ext/ flexion   LUE AROM with manual assist x Completed in d1 and D2 patterns- requires cues to attend to the left arm and to extinguish abnormal patterns             PROM/Stretching:     L hand with attn to thumb/ digital extension x         Neuro- re-education:     Left andrea-attention  x Fair/ needs verbal cues and tactile cues   Facilitation/ digital ext an flexion x Stroking and tapping   L UE AROM x Completed with light stroking and deep pressure  To increase propriopceptive awareness   Strengthening:               Other:     Hand helper x Pt would benefit from trial of hand helper due to active fisting but lack of digital extension/ will order   ADL- functional mobility x Discussion of strategies to maximize independent functional mobility about the home- pt prefers from w/c level and family from walking level/ compromise encouraged due to her back pain when walking too much     Assessment/Comments: Pt is making Good progress toward stated plan of care.    -Rehab Potential: Good  -Requires OT Follow Up: Yes  Time In:1330            Time Out: 1535             Visit #: 2    Treatment Charges: Mins Units   Modalities: Estim 15 1   Ther Exercise 15 1   Manual Therapy     Thera Activities     ADL/Home Mgt  15 1   Neuro Re-education 30 2   Gait Training     Group Therapy     Non-Billable Service Time     Other     Total Time/Units 75 5       -Response to Treatment: Tx completed with a focus on left andrea attention, ROM, strengthenng and facilitation of movement. Pt is motivated for recovery. Frequent encouragement needed. Goals: Goals for pt can be seen on initial eval occurring on 6-3-19    Plan:   [x]  Continue Plan of care: Treatment covered based on POC and graduated to patient's progress. Pt education continues at each visit to obtain maximum benefits from skilled OT intervention.   []  [facility]:   []  Discharge:      Bunny Ureña OT/L  [id number]

## 2019-06-14 ENCOUNTER — TREATMENT (OUTPATIENT)
Dept: PHYSICAL THERAPY | Age: 59
End: 2019-06-14
Payer: MEDICARE

## 2019-06-14 ENCOUNTER — TREATMENT (OUTPATIENT)
Dept: OCCUPATIONAL THERAPY | Age: 59
End: 2019-06-14
Payer: MEDICARE

## 2019-06-14 DIAGNOSIS — Z86.73 HISTORY OF CVA (CEREBROVASCULAR ACCIDENT): Chronic | ICD-10-CM

## 2019-06-14 DIAGNOSIS — G81.94 HEMIPLEGIA AFFECTING LEFT NONDOMINANT SIDE, UNSPECIFIED ETIOLOGY, UNSPECIFIED HEMIPLEGIA TYPE (HCC): Primary | ICD-10-CM

## 2019-06-14 PROCEDURE — 97110 THERAPEUTIC EXERCISES: CPT | Performed by: PHYSICAL THERAPIST

## 2019-06-14 PROCEDURE — 97110 THERAPEUTIC EXERCISES: CPT | Performed by: OCCUPATIONAL THERAPIST

## 2019-06-14 PROCEDURE — 97112 NEUROMUSCULAR REEDUCATION: CPT | Performed by: OCCUPATIONAL THERAPIST

## 2019-06-14 PROCEDURE — 97032 APPL MODALITY 1+ESTIM EA 15: CPT | Performed by: OCCUPATIONAL THERAPIST

## 2019-06-14 PROCEDURE — 97530 THERAPEUTIC ACTIVITIES: CPT | Performed by: PHYSICAL THERAPIST

## 2019-06-14 NOTE — PROGRESS NOTES
OCCUPATIONAL THERAPY PROGRESS NOTE    Date:  2019  Initial Evaluation Date: 6-3-19    Patient Name:  Sumeet eMyers    :  1960  Restrictions/Precautions: Activity as tolerated, Moderate fall risk  Diagnosis:  CVA                                                          Insurance/Certification information:  Medicare  Referring Physician:  Julio César [place], [id number]  Date of Surgery/Injury: CVAs March- 2019  Plan of care signed (Y/N):  Y  Visit# / total visits: 3 / 16- visits    Pain Level: Moderate back pain  Subjective: Pt presents with no new comments. Objective:  Updated POC to be completed by 7-3-19. INTERVENTION: COMPLETED: SPECIFICS/COMMENTS:   Modality:     FES- wrist and digit ext PPR2 -8 Tolerated well- good result/ completed with a without AROM        AROM/ AAROM     L UE AROM x shld flexion and ABD, elbow flexion/ ext, supination/ pronation, wrist ext/ flexion   LUE AROM with manual assist x Completed in d1 and D2 patterns- requires cues to attend to the left arm and to extinguish abnormal patterns             PROM/Stretching:     L hand with attn to thumb/ digital extension x         Neuro- re-education:     Left andrea-attention  x Fair/ needs verbal cues and tactile cues   Facilitation/ digital ext an flexion x Stroking and tapping   L UE AROM x Completed with light stroking and deep pressure  To increase propriopceptive awareness   Strengthening:               Other:     Hand helper x Pt would benefit from trial of hand helper due to active fisting but lack of digital extension/ will order   ADL- functional mobility  Discussion of strategies to maximize independent functional mobility about the home- pt prefers from w/c level and family from walking level/ compromise encouraged due to her back pain when walking too much     Assessment/Comments: Pt is making Good progress toward stated plan of care.    -Rehab Potential: Good  -Requires OT Follow Up: Yes  Time In:1305           Time Out:  1400          Visit #: 3    Treatment Charges: Mins Units   Modalities: Estim 15 1   Ther Exercise 10 1   Manual Therapy     Thera Activities     ADL/Home Mgt      Neuro Re-education 30 2   Gait Training     Group Therapy     Non-Billable Service Time     Other     Total Time/Units 55 5       -Response to Treatment: Tx completed with a focus on left andrea attention, ROM, strengthenng and facilitation of movement. Pt is motivated for recovery. Slight improvement in active digit extension noted today. Goals: Goals for pt can be seen on initial eval occurring on 6-3-19    Plan:   [x]  Continue Plan of care: Treatment covered based on POC and graduated to patient's progress. Pt education continues at each visit to obtain maximum benefits from skilled OT intervention.   []  [facility] [place] of care:   []  Discharge:      Hansa Rosales OT/L  [id number]

## 2019-06-17 ENCOUNTER — TREATMENT (OUTPATIENT)
Dept: OCCUPATIONAL THERAPY | Age: 59
End: 2019-06-17
Payer: MEDICARE

## 2019-06-17 ENCOUNTER — TREATMENT (OUTPATIENT)
Dept: PHYSICAL THERAPY | Age: 59
End: 2019-06-17
Payer: MEDICARE

## 2019-06-17 ENCOUNTER — EVALUATION (OUTPATIENT)
Dept: SPEECH THERAPY | Age: 59
End: 2019-06-17
Payer: MEDICARE

## 2019-06-17 DIAGNOSIS — G81.94 HEMIPLEGIA AFFECTING LEFT NONDOMINANT SIDE, UNSPECIFIED ETIOLOGY, UNSPECIFIED HEMIPLEGIA TYPE (HCC): Primary | ICD-10-CM

## 2019-06-17 DIAGNOSIS — G81.94 LEFT HEMIPLEGIA (HCC): Primary | ICD-10-CM

## 2019-06-17 DIAGNOSIS — R41.841 COGNITIVE COMMUNICATION DEFICIT: Primary | ICD-10-CM

## 2019-06-17 PROCEDURE — 97112 NEUROMUSCULAR REEDUCATION: CPT | Performed by: OCCUPATIONAL THERAPIST

## 2019-06-17 PROCEDURE — 97032 APPL MODALITY 1+ESTIM EA 15: CPT | Performed by: OCCUPATIONAL THERAPIST

## 2019-06-17 PROCEDURE — 97110 THERAPEUTIC EXERCISES: CPT | Performed by: OCCUPATIONAL THERAPIST

## 2019-06-17 PROCEDURE — G0515 COGNITIVE SKILLS DEVELOPMENT: HCPCS | Performed by: SPEECH-LANGUAGE PATHOLOGIST

## 2019-06-17 PROCEDURE — 97110 THERAPEUTIC EXERCISES: CPT | Performed by: PHYSICAL THERAPIST

## 2019-06-17 PROCEDURE — 97530 THERAPEUTIC ACTIVITIES: CPT | Performed by: PHYSICAL THERAPIST

## 2019-06-17 NOTE — PROGRESS NOTES
Physical Therapy Daily Treatment Note    Date: 2019  Patient Name: Steffanie Mccord  : 1960   MRN: [id number]  Date of Onset: 3/2019  Referring Provider:  Travis Huerta MD   Medical Diagnosis:G81.94 (ICD-10-CM) - Hemiplegia, unspecified affecting left nondominant side     S: reports she went to her grandsons graduation party and did well  O:  Time      Visit 3 Repeat outcome measure at mid point and end. Pain 0/10     ROM      Modalities            Exercise      Nustep   #5  10 min  te         Squats      Calf Raises 2 x 15  te          Marching 2 x 15  ta   Alt. Sidekicks 2 x 15  ta         Sit/Stands 3 x 5  ta         Gait training       NMR Balance activities to aid in safe community and home ambulation    Marching Gait      Sidestepping      Retrowalk      Heel to toe      March on BOSU                  A:  Tolerated well. Above added to written HEP.   P: Continue with rehab plan  Nic Gil, PT    Treatment Charges: Mins Units   Initial Evaluation     Re-Evaluation     Ther Exercise         TE 15 1   Manual Therapy     MT     Ther Activities        TA 30 2   Gait Training          GT     Neuro Re-education NR     Modalities     Non-Billable Service Time     Other     Total Time/Units 45 3

## 2019-06-17 NOTE — PROGRESS NOTES
Patient seen for 30 minute session this date. Daughter joined session for last few minutes. Today, we worked on facial memory tasks to 75% acc; Word memory tasks to 86% acc; and pattern recall tasks to 65% acc. Patient reports that she has been working on brain exercises at home daily. Encouraged to continue to do these for homework. Will continue. Yan Andre.  Alexa Hunt MA/[facility]-SLP  [id number]

## 2019-06-18 NOTE — PROGRESS NOTES
prefers from w/c level and family from walking level/ compromise encouraged due to her back pain when walking too much     Assessment/Comments: Pt is making Good progress toward stated plan of care. Exercises continued wit the addition of grasp/ release patterning. Steady progress continues. -Rehab Potential: Good  -Requires OT Follow Up: Yes  Time In:1300           Time Out:  1400          Visit #: 3    Treatment Charges: Mins Units   Modalities: Estim 10 1   Ther Exercise 20 1   Manual Therapy     Thera Activities     ADL/Home Mgt      Neuro Re-education 30 2   Gait Training     Group Therapy     Non-Billable Service Time     Other     Total Time/Units 60 4       -Response to Treatment: Tx completed with a focus on left andrea attention, ROM, strengthening, grasp release gross motor ex and facilitation of movement. Pt is motivated for recovery. Goals: Goals for pt can be seen on initial eval occurring on 6-3-19    Plan:   [x]  Continue Plan of care: Treatment covered based on POC and graduated to patient's progress. Pt education continues at each visit to obtain maximum benefits from skilled OT intervention.   []  [facility]   []  Discharge:      Soumya Arechiga OT/L  [id number]

## 2019-06-19 ENCOUNTER — TREATMENT (OUTPATIENT)
Dept: OCCUPATIONAL THERAPY | Age: 59
End: 2019-06-19
Payer: MEDICARE

## 2019-06-19 ENCOUNTER — EVALUATION (OUTPATIENT)
Dept: SPEECH THERAPY | Age: 59
End: 2019-06-19
Payer: MEDICARE

## 2019-06-19 ENCOUNTER — TREATMENT (OUTPATIENT)
Dept: PHYSICAL THERAPY | Age: 59
End: 2019-06-19
Payer: MEDICARE

## 2019-06-19 DIAGNOSIS — G81.94 HEMIPLEGIA AFFECTING LEFT NONDOMINANT SIDE, UNSPECIFIED ETIOLOGY, UNSPECIFIED HEMIPLEGIA TYPE (HCC): Primary | ICD-10-CM

## 2019-06-19 DIAGNOSIS — Z86.73 HISTORY OF CVA (CEREBROVASCULAR ACCIDENT): ICD-10-CM

## 2019-06-19 DIAGNOSIS — G81.94 LEFT HEMIPLEGIA (HCC): Primary | ICD-10-CM

## 2019-06-19 DIAGNOSIS — R41.841 COGNITIVE COMMUNICATION DEFICIT: Primary | ICD-10-CM

## 2019-06-19 PROCEDURE — G0515 COGNITIVE SKILLS DEVELOPMENT: HCPCS | Performed by: SPEECH-LANGUAGE PATHOLOGIST

## 2019-06-19 PROCEDURE — 97530 THERAPEUTIC ACTIVITIES: CPT | Performed by: PHYSICAL THERAPIST

## 2019-06-19 PROCEDURE — 97110 THERAPEUTIC EXERCISES: CPT | Performed by: PHYSICAL THERAPIST

## 2019-06-19 PROCEDURE — 97032 APPL MODALITY 1+ESTIM EA 15: CPT | Performed by: OCCUPATIONAL THERAPIST

## 2019-06-19 PROCEDURE — 97110 THERAPEUTIC EXERCISES: CPT | Performed by: OCCUPATIONAL THERAPIST

## 2019-06-19 PROCEDURE — 97116 GAIT TRAINING THERAPY: CPT | Performed by: PHYSICAL THERAPIST

## 2019-06-19 PROCEDURE — 97112 NEUROMUSCULAR REEDUCATION: CPT | Performed by: OCCUPATIONAL THERAPIST

## 2019-06-19 NOTE — PROGRESS NOTES
Physical Therapy Daily Treatment Note    Date: 2019  Patient Name: Michelle Nguyen  : 1960   MRN: [id number]  Date of Onset: 3/2019  Referring Provider:  Rodrick Mcgee MD   Medical Diagnosis:G81.94 (ICD-10-CM) - Hemiplegia, unspecified affecting left nondominant side     S: no new complaints  O:  Time 4170-6782     Visit 2 Repeat outcome measure at mid point and end. Pain 0/10     ROM      Modalities            Exercise      Nustep   #5  10 min  te         Squats      Calf Raises 2 x 15  te          Marching 2 x 15  ta   Alt. Sidekicks 2 x 15  ta         Sit/Stands 3 x 5  ta         Gait training       NMR Balance activities to aid in safe community and home ambulation    Marching Gait      Sidestepping      Retrowalk      Heel to toe      March on BOSU                  A:  Tolerated well. Above added to written HEP.   P: Continue with rehab plan  Vicky Mcdaniel PT    Treatment Charges: Mins Units   Initial Evaluation     Re-Evaluation     Ther Exercise         TE 15 1   Manual Therapy     MT     Ther Activities        TA 30 2   Gait Training          GT     Neuro Re-education NR     Modalities     Non-Billable Service Time     Other     Total Time/Units 45 3

## 2019-06-19 NOTE — PROGRESS NOTES
Patient seen for 30 minute session this date with daughter present. Patient's daughter states that she feels she is functioning like she did prior to her CVA. We reviewed numerous scenarios where patients memory may cause problems and both report no issues noted. We re-tested cognitive skills today and the subtest for Immediate Memory where patient scored 26/30 placing her in the [facility] Deficit\" category at time of initial evaluation, was improved to a score of 28/30 today placing her in the \"within functional limits\" category. We agreed that she is appropriate for discharge at this time. We reviewed all of the brain exercises that she should continue to work on daily to help maintain current level of function. They were also instructed to contact this therapist if there are any questions, concerns or changes in condition. They both agreed to all instructions. Patient is discharged after her session this date due to reaching goals. Valdez Dobson.  Mohit Deras MA/[facility]-SLP  [id number]

## 2019-06-19 NOTE — PROGRESS NOTES
OCCUPATIONAL THERAPY PROGRESS NOTE    Date:  2019  Initial Evaluation Date: 6-3-19    Patient Name:  Fredis Chapa    :  1960  Restrictions/Precautions: Activity as tolerated, Moderate fall risk  Diagnosis:  CVA                                                          Insurance/Certification information:  Medicare  Referring Physician:  Julio César Garcia [place], [address]  Date of Surgery/Injury: CVAs March- 2019  Plan of care signed (Y/N):  Y  Visit# / total visits: - visits    Pain Level: No pain reports today. Subjective: Pt presents with no new complaints. Objective:  Updated POC to be completed by 7-3-19. INTERVENTION: COMPLETED: SPECIFICS/COMMENTS:   Modality:     FES- wrist and digit ext PPR2 -8 Tolerated well- good result/ completed with a without AROM- better isolation today        AROM/ AAROM     L UE AROM x shld flexion and ABD, elbow flexion/ ext, supination/ pronation, wrist ext/ flexion   LUE AROM with manual assist  Completed in d1 and D2 patterns- requires cues to attend to the left arm and to extinguish abnormal patterns   Wand ex w/ ace to left hand  Chest press/ challenging- shld press/ easy   Cone stacking x With intermittent assist to extend fingers and cues for positioning- tolerated better with longer time before fatigue             PROM/Stretching:     L hand with attn to thumb/ digital extension x         Neuro- re-education:     Left andrea-attention  x Good-/ needs less verbal cues and tactile cues   Facilitation/ digital ext an flexion x Stroking and tapping   Inhibition techniques x Spasticity in the hand and elbow are increased today.  Intermittent WB needed/ spasticity decreased as sessio progressed   L UE AROM x Completed with light stroking and deep pressure to increase propriopceptive awareness to the left arm and minimize substitutions   Strengthening:               Other:     Hand helper  Pt would benefit from trial of hand

## 2019-06-24 ENCOUNTER — TREATMENT (OUTPATIENT)
Dept: PHYSICAL THERAPY | Age: 59
End: 2019-06-24
Payer: MEDICARE

## 2019-06-24 ENCOUNTER — TREATMENT (OUTPATIENT)
Dept: OCCUPATIONAL THERAPY | Age: 59
End: 2019-06-24
Payer: MEDICARE

## 2019-06-24 DIAGNOSIS — G81.94 LEFT HEMIPLEGIA (HCC): Primary | ICD-10-CM

## 2019-06-24 DIAGNOSIS — Z86.73 HISTORY OF CVA (CEREBROVASCULAR ACCIDENT): ICD-10-CM

## 2019-06-24 DIAGNOSIS — G81.94 HEMIPLEGIA AFFECTING LEFT NONDOMINANT SIDE, UNSPECIFIED ETIOLOGY, UNSPECIFIED HEMIPLEGIA TYPE (HCC): Primary | ICD-10-CM

## 2019-06-24 PROCEDURE — 97110 THERAPEUTIC EXERCISES: CPT | Performed by: PHYSICAL THERAPIST

## 2019-06-24 PROCEDURE — 97112 NEUROMUSCULAR REEDUCATION: CPT | Performed by: OCCUPATIONAL THERAPIST

## 2019-06-24 PROCEDURE — 97530 THERAPEUTIC ACTIVITIES: CPT | Performed by: PHYSICAL THERAPIST

## 2019-06-24 PROCEDURE — 97110 THERAPEUTIC EXERCISES: CPT | Performed by: OCCUPATIONAL THERAPIST

## 2019-06-24 PROCEDURE — 97032 APPL MODALITY 1+ESTIM EA 15: CPT | Performed by: OCCUPATIONAL THERAPIST

## 2019-06-24 PROCEDURE — 97116 GAIT TRAINING THERAPY: CPT | Performed by: PHYSICAL THERAPIST

## 2019-06-24 NOTE — PROGRESS NOTES
Physical Therapy Daily Treatment Note    Date: 2019  Patient Name: Matt Weston  : 1960   MRN: [id number]  Date of Onset: 3/2019  Referring Provider:  Jay Mcardle, MD   Medical Diagnosis:G81.94 (ICD-10-CM) - Hemiplegia, unspecified affecting left nondominant side     S: no new complaints, soreness in LEs  O:  Time 7379-6853     Visit 3 Repeat outcome measure at mid point and end. Pain 0/10     ROM      Modalities            Exercise      Nustep   #5  10 min 5 min w/ L hand ace wrapped to hadle te         Squats      Calf Raises 2 x 20  te          Marching 2 x 20 Markedly more difficult adding 5 reps ta   Alt. Sidekicks 2 x 20  ta         Sit/Stands 2 x 8, 1 x 6  ta         Gait training 1 x 150ft, 1 x 20ftw/ SBQC,   gt    NMR Balance activities to aid in safe community and home ambulation    Marching Gait HHA +1 mod 2 x 45ft  gt   Sidestepping      STAIRS      Heel to toe      March on BOSU                  A:  Tolerated well. Above added to written HEP.   P: Continue with rehab plan  Shirlene Byers PT    Treatment Charges: Mins Units   Initial Evaluation     Re-Evaluation     Ther Exercise         TE 20 1   Manual Therapy     MT     Ther Activities        TA 30 2   Gait Training          GT 10 1   Neuro Re-education NR     Modalities     Non-Billable Service Time     Other     Total Time/Units 60 4

## 2019-06-24 NOTE — PROGRESS NOTES
OCCUPATIONAL THERAPY PROGRESS NOTE    Date:  2019  Initial Evaluation Date: 6-3-19    Patient Name:  Steffanie Mccord    :  1960  Restrictions/Precautions: Activity as tolerated, Moderate fall risk  Diagnosis:  CVA                                                          Insurance/Certification information:  Medicare  Referring Physician:  Julio César Levy [place], [address]  Date of Surgery/Injury: CVAs March- 2019  Plan of care signed (Y/N):  Y  Visit# / total visits: - visits    Pain Level: No pain reports today. Subjective: Pt presents with no new complaints. Objective:  Updated POC to be completed by 7-3-19.     INTERVENTION: COMPLETED: SPECIFICS/COMMENTS:   Modality:     FES- wrist and digit ext PPR2 -8 Tolerated well- good result/ completed with a without AROM- better isolation continues        AROM/ AAROM     L UE AROM x shld flexion and ABD, elbow flexion/ ext, supination/ pronation, wrist ext/ flexion   LUE AROM with manual assist  Completed in d1 and D2 patterns- requires cues to attend to the left arm and to extinguish abnormal patterns   Wand ex w/ ace to left hand  Chest press/ challenging- shld press/ easy   Cone stacking  With intermittent assist to extend fingers and cues for positioning- tolerated better with longer time before fatigue   Bean bag grasp/release x Very challenging/ min assist for hand positioning and cues for patterning        PROM/Stretching:     L hand with attn to thumb/ digital extension x         Neuro- re-education:     Left andrea-attention  x Good-/ needs less verbal cues and tactile cues   Facilitation/ digital ext and flexion x Stroking and tapping   Inhibition techniques x Needed less today due to better spasticity   L UE AROM x Completed with intermittent light stroking and deep pressure to increase propriopceptive awareness to the left arm and minimize substitutions   Strengthening:     L UE PREs 1# 1-10 shld  2# curls

## 2019-06-26 ENCOUNTER — TREATMENT (OUTPATIENT)
Dept: PHYSICAL THERAPY | Age: 59
End: 2019-06-26
Payer: MEDICARE

## 2019-06-26 ENCOUNTER — TREATMENT (OUTPATIENT)
Dept: OCCUPATIONAL THERAPY | Age: 59
End: 2019-06-26
Payer: MEDICARE

## 2019-06-26 DIAGNOSIS — Z86.73 HISTORY OF CVA (CEREBROVASCULAR ACCIDENT): ICD-10-CM

## 2019-06-26 DIAGNOSIS — G81.94 HEMIPLEGIA AFFECTING LEFT NONDOMINANT SIDE, UNSPECIFIED ETIOLOGY, UNSPECIFIED HEMIPLEGIA TYPE (HCC): Primary | ICD-10-CM

## 2019-06-26 DIAGNOSIS — G81.94 LEFT HEMIPLEGIA (HCC): Primary | ICD-10-CM

## 2019-06-26 PROCEDURE — 97112 NEUROMUSCULAR REEDUCATION: CPT | Performed by: OCCUPATIONAL THERAPIST

## 2019-06-26 PROCEDURE — 97032 APPL MODALITY 1+ESTIM EA 15: CPT | Performed by: OCCUPATIONAL THERAPIST

## 2019-06-26 PROCEDURE — 97110 THERAPEUTIC EXERCISES: CPT | Performed by: OCCUPATIONAL THERAPIST

## 2019-06-26 PROCEDURE — 97530 THERAPEUTIC ACTIVITIES: CPT | Performed by: PHYSICAL THERAPIST

## 2019-06-26 PROCEDURE — 97116 GAIT TRAINING THERAPY: CPT | Performed by: PHYSICAL THERAPIST

## 2019-06-26 PROCEDURE — 97110 THERAPEUTIC EXERCISES: CPT | Performed by: PHYSICAL THERAPIST

## 2019-06-26 NOTE — PROGRESS NOTES
Physical Therapy Daily Treatment Note    Date: 2019  Patient Name: Governor Jason  : 1960   MRN: [id number]  Date of Onset: 3/2019  Referring Provider:  Ned Nelson MD   Medical Diagnosis:G81.94 (ICD-10-CM) - Hemiplegia, unspecified affecting left nondominant side     S: no new complaints  O:  Time 2256-0505     Visit 6 Repeat outcome measure at mid point and end. Pain 0/10     ROM      Modalities            Exercise      Nustep   #5  10 min 5 min w/ L hand ace wrapped to hadle te         Squats      Calf Raises 2 x 20  te          Marching 2 x 20 Markedly more difficult adding 5 reps ta   Alt. Sidekicks 2 x 20  ta         Sit/Stands 2 x 8, 1 x 6  ta         Gait training 1 x 120ft, 1 x 90ftw/ SBQC,   gt    NMR Balance activities to aid in safe community and home ambulation    Marching Gait HHA +1 mod 2 x 45ft  gt   Sidestepping      STAIRS      Heel to toe      March on BOSU                  A:  Tolerated well. Above added to written HEP.   P: Continue with rehab plan  Herman Doyle PT    Treatment Charges: Mins Units   Initial Evaluation     Re-Evaluation     Ther Exercise         TE 25 1   Manual Therapy     MT     Ther Activities        TA 30 2   Gait Training          GT 10 1   Neuro Re-education NR     Modalities     Non-Billable Service Time     Other     Total Time/Units 55 4

## 2019-06-26 NOTE — PROGRESS NOTES
Strengthening:     L UE PREs 1# 1-10 shld  2# curls         Other:     Hand helper  Pt would benefit from trial of hand helper due to active fisting but lack of digital extension/ will order   ADL- functional mobility  Discussion of strategies to maximize independent functional mobility about the home- pt prefers from w/c level and family from walking level/ compromise encouraged due to her back pain when walking too much     Assessment/Comments: Pt is making Good progress toward stated plan of care. -Rehab Potential: Good  -Requires OT Follow Up: Yes  Time In:1300           Time Out:  1400          Visit #: 7    Treatment Charges: Mins Units   Modalities: Estim 10 1   Ther Exercise 20 1   Manual Therapy     Thera Activities     ADL/Home Mgt      Neuro Re-education 30 2   Gait Training     Group Therapy     Non-Billable Service Time     Other     Total Time/Units 60 4       -Response to Treatment: Good improvements continue. Goals: Goals for pt can be seen on initial eval occurring on 6-3-19    Plan:   [x]  Continue Plan of care: Treatment covered based on POC and graduated to patient's progress. Pt education continues at each visit to obtain maximum benefits from skilled OT intervention.   []  [address] of care:   []  Discharge:      Antionette Osorio OT/L  [id number]

## 2019-06-27 ENCOUNTER — TREATMENT (OUTPATIENT)
Dept: OCCUPATIONAL THERAPY | Age: 59
End: 2019-06-27
Payer: MEDICARE

## 2019-06-27 DIAGNOSIS — G81.94 HEMIPLEGIA AFFECTING LEFT NONDOMINANT SIDE, UNSPECIFIED ETIOLOGY, UNSPECIFIED HEMIPLEGIA TYPE (HCC): Primary | ICD-10-CM

## 2019-06-27 PROCEDURE — 97032 APPL MODALITY 1+ESTIM EA 15: CPT | Performed by: OCCUPATIONAL THERAPIST

## 2019-06-27 PROCEDURE — 97110 THERAPEUTIC EXERCISES: CPT | Performed by: OCCUPATIONAL THERAPIST

## 2019-06-27 PROCEDURE — 97112 NEUROMUSCULAR REEDUCATION: CPT | Performed by: OCCUPATIONAL THERAPIST

## 2019-06-27 NOTE — PROGRESS NOTES
OCCUPATIONAL THERAPY PROGRESS NOTE    Date:  2019  Initial Evaluation Date: 6-3-19    Patient Name:  Jacky Amor    :  1960  Restrictions/Precautions: Activity as tolerated, Moderate fall risk  Diagnosis:  CVA                                                          Insurance/Certification information:  Medicare  Referring Physician:  Julio César Lala [place], [address]  Date of Surgery/Injury: CVAs March- 2019  Plan of care signed (Y/N):  Y  Visit# / total visits: - visits    Pain Level: Moderate pain reported in her hips/ low back  Subjective: \" I am tired but I made it\". Objective:  Updated POC to be completed by 7-3-19.     INTERVENTION: COMPLETED: SPECIFICS/COMMENTS:   Modality:     FES- wrist and digit ext PPR2 -8 Tolerated well- good result/ completed with a without AROM- better isolation continues        AROM/ AAROM     L UE AROM x shld flexion and ABD, elbow flexion/ ext, supination/ pronation, wrist ext/ flexion   LUE AROM with manual assist  Completed in d1 and D2 patterns- requires cues to attend to the left arm and to extinguish abnormal patterns   Wand ex w/ ace to left hand  Chest press/ challenging- shld press/ easy   Cone stacking x With intermittent assist to extend fingers and cues for positioning- tolerated better with longer time before fatigue   Bean bag grasp/release  Very challenging/ min assist for hand positioning and cues for patterning        PROM/Stretching:     L hand with attn to thumb/ digital extension x         Neuro- re-education:     Left andrea-attention  x Good-/ needs less verbal cues and tactile cues   Facilitation/ digital ext and flexion x Stroking and tapping   Inhibition techniques x Needed less today due to better spasticity   L UE AROM x Completed with intermittent light stroking and deep pressure to increase propriopceptive awareness to the left arm and minimize substitutions   Strengthening:     L UE PREs 1# 1-10

## 2019-06-28 ENCOUNTER — OFFICE VISIT (OUTPATIENT)
Dept: FAMILY MEDICINE CLINIC | Age: 59
End: 2019-06-28
Payer: MEDICARE

## 2019-06-28 VITALS
HEART RATE: 67 BPM | TEMPERATURE: 97.9 F | OXYGEN SATURATION: 98 % | DIASTOLIC BLOOD PRESSURE: 86 MMHG | BODY MASS INDEX: 30.67 KG/M2 | SYSTOLIC BLOOD PRESSURE: 138 MMHG | HEIGHT: 66 IN | RESPIRATION RATE: 16 BRPM

## 2019-06-28 DIAGNOSIS — N18.30 TYPE 2 DIABETES MELLITUS WITH STAGE 3 CHRONIC KIDNEY DISEASE, WITH LONG-TERM CURRENT USE OF INSULIN (HCC): ICD-10-CM

## 2019-06-28 DIAGNOSIS — G81.94 HEMIPLEGIA AFFECTING LEFT NONDOMINANT SIDE, UNSPECIFIED ETIOLOGY, UNSPECIFIED HEMIPLEGIA TYPE (HCC): ICD-10-CM

## 2019-06-28 DIAGNOSIS — E11.22 TYPE 2 DIABETES MELLITUS WITH STAGE 3 CHRONIC KIDNEY DISEASE, WITH LONG-TERM CURRENT USE OF INSULIN (HCC): ICD-10-CM

## 2019-06-28 DIAGNOSIS — M54.41 CHRONIC MIDLINE LOW BACK PAIN WITH RIGHT-SIDED SCIATICA: Primary | ICD-10-CM

## 2019-06-28 DIAGNOSIS — Z79.4 TYPE 2 DIABETES MELLITUS WITH STAGE 3 CHRONIC KIDNEY DISEASE, WITH LONG-TERM CURRENT USE OF INSULIN (HCC): ICD-10-CM

## 2019-06-28 DIAGNOSIS — Z91.14 NONCOMPLIANCE WITH MEDICATIONS: ICD-10-CM

## 2019-06-28 DIAGNOSIS — G89.29 CHRONIC MIDLINE LOW BACK PAIN WITH RIGHT-SIDED SCIATICA: Primary | ICD-10-CM

## 2019-06-28 DIAGNOSIS — Z94.4 STATUS POST LIVER TRANSPLANTATION (HCC): ICD-10-CM

## 2019-06-28 DIAGNOSIS — L72.0 EIC (EPIDERMAL INCLUSION CYST): ICD-10-CM

## 2019-06-28 PROCEDURE — G8598 ASA/ANTIPLAT THER USED: HCPCS | Performed by: NURSE PRACTITIONER

## 2019-06-28 PROCEDURE — 4004F PT TOBACCO SCREEN RCVD TLK: CPT | Performed by: NURSE PRACTITIONER

## 2019-06-28 PROCEDURE — 2022F DILAT RTA XM EVC RTNOPTHY: CPT | Performed by: NURSE PRACTITIONER

## 2019-06-28 PROCEDURE — 99214 OFFICE O/P EST MOD 30 MIN: CPT | Performed by: NURSE PRACTITIONER

## 2019-06-28 PROCEDURE — G8427 DOCREV CUR MEDS BY ELIG CLIN: HCPCS | Performed by: NURSE PRACTITIONER

## 2019-06-28 PROCEDURE — G8417 CALC BMI ABV UP PARAM F/U: HCPCS | Performed by: NURSE PRACTITIONER

## 2019-06-28 PROCEDURE — 3017F COLORECTAL CA SCREEN DOC REV: CPT | Performed by: NURSE PRACTITIONER

## 2019-06-28 PROCEDURE — 3044F HG A1C LEVEL LT 7.0%: CPT | Performed by: NURSE PRACTITIONER

## 2019-06-28 RX ORDER — HYDROCODONE BITARTRATE AND ACETAMINOPHEN 7.5; 325 MG/1; MG/1
1 TABLET ORAL EVERY 6 HOURS PRN
Qty: 120 TABLET | Refills: 0 | Status: SHIPPED | OUTPATIENT
Start: 2019-06-28 | End: 2019-07-31 | Stop reason: SDUPTHER

## 2019-06-28 ASSESSMENT — ENCOUNTER SYMPTOMS
COUGH: 0
SHORTNESS OF BREATH: 0
BACK PAIN: 1
WHEEZING: 0
VOMITING: 0
NAUSEA: 0
DIARRHEA: 1
CONSTIPATION: 0

## 2019-06-28 NOTE — PROGRESS NOTES
HPI:  Patient comes intoday for   Chief Complaint   Patient presents with    Back Pain     low back pain, hips are starting to hurt.  pain is 6/10. pt needs refill of meds    Cyst     cyst on the back of neck has been there a couple years. .    Complains of chronic pain to low back with radiation down legs. Pain is worse since going to PT post CVA. Complains of cyst to back of neck. States it has been there a few years and is now itchy and the tag on her shirts are rubbing it. Patient admits to not taking any medications other than her Norco for the past month. She states she was staying between her 2 daughters houses and forgot her medications so she just didn't restart them. She states she was monitoring her BS and it has been 70-80 fasting and her BP has been < 140/90. Her daughter is present and states she was not aware that Riya Hyman was not taking her medications as ordered. Prior to Visit Medications    Medication Sig Taking? Authorizing Provider   HYDROcodone-acetaminophen (NORCO) 7.5-325 MG per tablet Take 1 tablet by mouth every 6 hours as needed for Pain for up to 30 days.  Yes MIKEL Warner Chi, CNP   acetaminophen (TYLENOL) 325 MG tablet 650 mg Yes Historical Provider, MD   melatonin 3 MG TABS tablet Take 3 mg by mouth daily Yes Historical Provider, MD   polyethylene glycol (GLYCOLAX) powder Take 17 g by mouth daily Yes Historical Provider, MD   sulfamethoxazole-trimethoprim (BACTRIM;SEPTRA) 400-80 MG per tablet Take 1 tablet by mouth daily Yes Historical Provider, MD   losartan (COZAAR) 50 MG tablet Take one tablet daily, call with BP > 160/100 Yes MIKEL Warner Chi, CNP   aspirin 81 MG chewable tablet Take 1 tablet by mouth daily Yes Shira Powell MD   atorvastatin (LIPITOR) 20 MG tablet Take 1 tablet by mouth nightly  Patient taking differently: Take 40 mg by mouth nightly  Yes Shira Powell MD   clopidogrel (PLAVIX) 75 MG tablet Take 1 tablet by mouth daily Yes Briseida Curiel and palpitations. Gastrointestinal: Positive for diarrhea. Negative for constipation, nausea and vomiting. Musculoskeletal: Positive for back pain, gait problem and myalgias. Neurological: Positive for dizziness. Negative for weakness and headaches. Poor balance         VS:  /86   Pulse 67   Temp 97.9 °F (36.6 °C) (Oral)   Resp 16   Ht 5' 6\" (1.676 m)   LMP  (LMP Unknown)   SpO2 98%   BMI 30.67 kg/m²     Physical Exam  Physical Exam   Constitutional: She is oriented to person, place, and time. She appears well-developed and well-nourished. No distress. HENT:   Head: Normocephalic and atraumatic. Neck: No tracheal deviation present. No thyromegaly present. Cardiovascular: Normal rate, regular rhythm, normal heart sounds and intact distal pulses. No murmur heard. Pulmonary/Chest: Effort normal. She has no wheezes. She has rales. She exhibits no tenderness. Abdominal: Soft. Bowel sounds are normal. She exhibits no distension. There is no tenderness. Musculoskeletal: She exhibits tenderness. Pain to lumbar spine with bilateral paraspinal tenderness. ROM limited due to pain   Lymphadenopathy:     She has no cervical adenopathy. Neurological: She is alert and oriented to person, place, and time. Left sided weakness, slight left facial droop   Skin: Skin is warm and dry. Cyst to back of neck, not inflammed   Psychiatric: She has a normal mood and affect. Her behavior is normal.         Assessment/Plan:  James Daugherty was seen today for back pain and cyst.    Diagnoses and all orders for this visit:    Chronic midline low back pain with right-sided sciatica  -     HYDROcodone-acetaminophen (NORCO) 7.5-325 MG per tablet; Take 1 tablet by mouth every 6 hours as needed for Pain for up to 30 days.  -The current medical regimen is effective;  continue present plan and medications.     EIC (epidermal inclusion cyst)  -not inflammed, no treatment at present  -if worsens will contact office for referral for removal    Hemiplegia affecting left nondominant side, unspecified etiology, unspecified hemiplegia type ([facility] [address]  -stable  -continue PT    Type 2 diabetes mellitus with stage 4 chronic kidney disease, with long-term current use of insulin (HCC)  -random  mg/dl today, not fasting  -discussed at length need for compliance with medications and risks of uncontrolled glucose    Status post liver transplantation ([facility] [address]  -discussed need for compliance with anti rejection medications and risk of rejection     Noncompliance with medications  -patent and daughter voice understanding of risks associated with non compliance including but not limited to CVA, kidney failure, transplant rejection, death  -patient and daughter are agreeable with resuming anti rejection medications. -will monitor BS and call with fastings above 120 and PP greater than 180. BP to be monitored with goal of < 140/90    Controlled Substance Monitoring:    Acute and Chronic Pain Monitoring:   RX Monitoring 6/28/2019   Attestation -   Periodic Controlled Substance Monitoring No signs of potential drug abuse or diversion identified.            Greater than 25  Minutes was spent with patient and more than 50% of the time was spent face to facecounseling and educating regarding diagnoses

## 2019-07-01 ENCOUNTER — TREATMENT (OUTPATIENT)
Dept: OCCUPATIONAL THERAPY | Age: 59
End: 2019-07-01
Payer: MEDICARE

## 2019-07-01 ENCOUNTER — TREATMENT (OUTPATIENT)
Dept: PHYSICAL THERAPY | Age: 59
End: 2019-07-01
Payer: MEDICARE

## 2019-07-01 DIAGNOSIS — G81.94 LEFT HEMIPLEGIA (HCC): Primary | ICD-10-CM

## 2019-07-01 DIAGNOSIS — G81.94 HEMIPLEGIA AFFECTING LEFT NONDOMINANT SIDE, UNSPECIFIED ETIOLOGY, UNSPECIFIED HEMIPLEGIA TYPE (HCC): Primary | ICD-10-CM

## 2019-07-01 PROCEDURE — 97530 THERAPEUTIC ACTIVITIES: CPT

## 2019-07-01 PROCEDURE — 97116 GAIT TRAINING THERAPY: CPT

## 2019-07-01 PROCEDURE — 97110 THERAPEUTIC EXERCISES: CPT | Performed by: OCCUPATIONAL THERAPIST

## 2019-07-01 PROCEDURE — 97530 THERAPEUTIC ACTIVITIES: CPT | Performed by: OCCUPATIONAL THERAPIST

## 2019-07-01 PROCEDURE — 97110 THERAPEUTIC EXERCISES: CPT

## 2019-07-01 PROCEDURE — 97112 NEUROMUSCULAR REEDUCATION: CPT | Performed by: OCCUPATIONAL THERAPIST

## 2019-07-01 NOTE — PROGRESS NOTES
OCCUPATIONAL THERAPY PROGRESS NOTE    Date:  2019  Initial Evaluation Date: 6-3-19    Patient Name:  Tim Padron    :  1960  Restrictions/Precautions: Activity as tolerated, Moderate fall risk  Diagnosis:  CVA                                                          Insurance/Certification information:  Medicare  Referring Physician:  Julio César Soto [place], [address]  Date of Surgery/Injury: CVAs March- 2019  Plan of care signed (Y/N):  Y  Visit# / total visits: - visits    Pain Level: Moderate pain reported in her hips/ low back/ right hsoulder  Subjective: \" My arm is moving better. It seems like to fingers are more relaxed. \"  Objective:  Updated POC to be completed by 7-3-19.     INTERVENTION: COMPLETED: SPECIFICS/COMMENTS:   Modality:     FES- wrist and digit ext PPR2 -8  Hold today Tolerated well- good result/ completed with a without AROM- better isolation continues        AROM/ AAROM     L UE AROM x shld flexion and ABD, elbow flexion/ ext, supination/ pronation, wrist ext/ flexion   LUE AROM with manual assist  Completed in d1 and D2 patterns- requires cues to attend to the left arm and to extinguish abnormal patterns   Wand ex w/ ace to left hand  Chest press/ challenging- shld press/ easy   Cone stacking x With intermittent assist to extend fingers and cues for positioning- tolerated better with longer time before fatigue   Bean bag grasp/release  Very challenging/ min assist for hand positioning and cues for patterning   Clothespin grasp and release x Yellow and red- intermittent assist to extend fingers for grasp   PROM/Stretching:     L hand with attn to thumb/ digital extension x         Neuro- re-education:     Left andrea-attention  x Getting better needs less verbal cues and tactile cues   Facilitation/ digital ext and flexion x Stroking and tapping   Inhibition techniques x Needed less today due to better spasticity   L UE AROM x Completed with

## 2019-07-01 NOTE — PROGRESS NOTES
Physical Therapy Daily Treatment Note    Date: 2019  Patient Name: Louis Mueller  : 1960   MRN: [id number]  Date of Onset: 3/2019  Referring Provider:  Jeancarlos Billings MD   Medical Diagnosis:G81.94 (ICD-10-CM) - Hemiplegia, unspecified affecting left nondominant side     S: no new complaints  O:  Time 2265-3187     Visit 7 Repeat outcome measure at mid point and end. Pain 0/10     ROM      Modalities            Exercise      Nustep   #5  10 min 5 min w/ L hand ace wrapped to hadle te         Squats      Calf Raises 2 x 20  te          Marching 2 x 20 Markedly more difficult adding 5 reps ta   Alt. Sidekicks 2 x 20  ta         Sit/Stands 5x, 13x  ta         Gait training 2 x 135ftw/ SBQC,  SBA/CGA gt    NMR Balance activities to aid in safe community and home ambulation    Marching Gait HHA +1 mod 2 x 45ft  gt   Sidestepping      STAIRS      Heel to toe      March on BOSU                  A:  Tolerated well. Above added to written HEP.   P: Continue with rehab plan  Ramos Norton PTA    Treatment Charges: Mins Units   Initial Evaluation     Re-Evaluation     Ther Exercise         TE 25 1   Manual Therapy     MT     Ther Activities        TA 30 2   Gait Training          GT 15 1   Neuro Re-education NR     Modalities     Non-Billable Service Time     Other     Total Time/Units 60 4

## 2019-07-05 ENCOUNTER — TREATMENT (OUTPATIENT)
Dept: OCCUPATIONAL THERAPY | Age: 59
End: 2019-07-05
Payer: MEDICARE

## 2019-07-05 DIAGNOSIS — G81.94 HEMIPLEGIA AFFECTING LEFT NONDOMINANT SIDE, UNSPECIFIED ETIOLOGY, UNSPECIFIED HEMIPLEGIA TYPE (HCC): Primary | ICD-10-CM

## 2019-07-05 PROCEDURE — 97110 THERAPEUTIC EXERCISES: CPT | Performed by: OCCUPATIONAL THERAPIST

## 2019-07-05 PROCEDURE — 97530 THERAPEUTIC ACTIVITIES: CPT | Performed by: OCCUPATIONAL THERAPIST

## 2019-07-05 PROCEDURE — 97032 APPL MODALITY 1+ESTIM EA 15: CPT | Performed by: OCCUPATIONAL THERAPIST

## 2019-07-05 PROCEDURE — 97112 NEUROMUSCULAR REEDUCATION: CPT | Performed by: OCCUPATIONAL THERAPIST

## 2019-07-05 NOTE — PROGRESS NOTES
OCCUPATIONAL THERAPY   PROGRESS NOTE/ RE-EVALUATION    Date:  2019  Initial Evaluation Date: 6-3-19    Patient Name:  Chadd Duarte    :  1960  Restrictions/Precautions: Activity as tolerated, Moderate fall risk  Diagnosis:  CVA                                                          Insurance/Certification information:  Medicare  Referring Physician:  Julio César Dean [place], [address]  Date of Surgery/Injury: CVAs March- 2019  Plan of care signed (Y/N):  Y  Visit# / total visits: 10 / 16- visits    Pain Level: Moderate pain reported in her hips/ low back/ right hsoulder  Subjective: \" My arm is moving better. It seems like to fingers are more relaxed. \"  Objective:  Updated POC to be completed by 7-3-19.     INTERVENTION: COMPLETED: SPECIFICS/COMMENTS:   Modality:     FES- wrist and digit ext X  10 MIN Tolerated well- good result/ completed with a without AROM- better isolation continues        AROM/ AAROM     L UE AROM x shld flexion and ABD, elbow flexion/ ext, supination/ pronation, wrist ext/ flexion   LUE AROM with manual assist  Completed in d1 and D2 patterns- requires cues to attend to the left arm and to extinguish abnormal patterns   Wand ex w/ ace to left hand  Chest press/ challenging- shld press/ easy   Cone stacking x With intermittent assist to extend fingers and cues for positioning- tolerated better with longer time before fatigue   Bean bag grasp/release  Very challenging/ min assist for hand positioning and cues for patterning   Clothespin grasp and release x Yellow and red- intermittent assist to extend fingers for grasp   PROM/Stretching:     L hand with attn to thumb/ digital extension x         Neuro- re-education:     Left andrea-attention  x Getting better needs less verbal cues and tactile cues   Facilitation/ digital ext and flexion x Stroking and tapping   Inhibition techniques x Needed less today due to better spasticity   L UE AROM x

## 2019-07-06 ENCOUNTER — HOSPITAL ENCOUNTER (OUTPATIENT)
Age: 59
Discharge: HOME | End: 2019-07-06
Payer: MEDICARE

## 2019-07-06 ENCOUNTER — HOSPITAL ENCOUNTER (OUTPATIENT)
Age: 59
Setting detail: OBSERVATION
Discharge: HOME | End: 2019-07-07
Attending: EMERGENCY MEDICINE | Admitting: INTERNAL MEDICINE
Payer: MEDICARE

## 2019-07-06 ENCOUNTER — APPOINTMENT (OUTPATIENT)
Dept: GENERAL RADIOLOGY | Age: 59
End: 2019-07-06
Payer: MEDICARE

## 2019-07-06 DIAGNOSIS — R06.09 DYSPNEA ON EXERTION: ICD-10-CM

## 2019-07-06 DIAGNOSIS — R07.9 CHEST PAIN, UNSPECIFIED TYPE: Primary | ICD-10-CM

## 2019-07-06 LAB
ANION GAP SERPL CALCULATED.3IONS-SCNC: 13 MMOL/L (ref 7–16)
BASOPHILS ABSOLUTE: 0.07 E9/L (ref 0–0.2)
BASOPHILS RELATIVE PERCENT: 1.4 % (ref 0–2)
BUN BLDV-MCNC: 23 MG/DL (ref 6–20)
CALCIUM SERPL-MCNC: 8.7 MG/DL (ref 8.6–10.2)
CHLORIDE BLD-SCNC: 106 MMOL/L (ref 98–107)
CO2: 23 MMOL/L (ref 22–29)
CREAT SERPL-MCNC: 1.3 MG/DL (ref 0.5–1)
EOSINOPHILS ABSOLUTE: 0.38 E9/L (ref 0.05–0.5)
EOSINOPHILS RELATIVE PERCENT: 7.7 % (ref 0–6)
GFR AFRICAN AMERICAN: 51
GFR NON-AFRICAN AMERICAN: 42 ML/MIN/1.73
GLUCOSE BLD-MCNC: 133 MG/DL (ref 74–99)
HCT VFR BLD CALC: 36.1 % (ref 34–48)
HEMOGLOBIN: 11.8 G/DL (ref 11.5–15.5)
IMMATURE GRANULOCYTES #: 0.01 E9/L
IMMATURE GRANULOCYTES %: 0.2 % (ref 0–5)
LACTIC ACID: 1.3 MMOL/L (ref 0.5–2.2)
LYMPHOCYTES ABSOLUTE: 1.05 E9/L (ref 1.5–4)
LYMPHOCYTES RELATIVE PERCENT: 21.4 % (ref 20–42)
MAGNESIUM: 2 MG/DL (ref 1.6–2.6)
MCH RBC QN AUTO: 28 PG (ref 26–35)
MCHC RBC AUTO-ENTMCNC: 32.7 % (ref 32–34.5)
MCV RBC AUTO: 85.5 FL (ref 80–99.9)
METER GLUCOSE: 110 MG/DL (ref 74–99)
MONOCYTES ABSOLUTE: 0.44 E9/L (ref 0.1–0.95)
MONOCYTES RELATIVE PERCENT: 9 % (ref 2–12)
NEUTROPHILS ABSOLUTE: 2.96 E9/L (ref 1.8–7.3)
NEUTROPHILS RELATIVE PERCENT: 60.3 % (ref 43–80)
PDW BLD-RTO: 13.2 FL (ref 11.5–15)
PLATELET # BLD: 174 E9/L (ref 130–450)
PMV BLD AUTO: 11 FL (ref 7–12)
POTASSIUM SERPL-SCNC: 3.9 MMOL/L (ref 3.5–5)
PRO-BNP: 260 PG/ML (ref 0–125)
RBC # BLD: 4.22 E12/L (ref 3.5–5.5)
SODIUM BLD-SCNC: 142 MMOL/L (ref 132–146)
TROPONIN: <0.01 NG/ML (ref 0–0.03)
WBC # BLD: 4.9 E9/L (ref 4.5–11.5)

## 2019-07-06 PROCEDURE — 84484 ASSAY OF TROPONIN QUANT: CPT

## 2019-07-06 PROCEDURE — 71045 X-RAY EXAM CHEST 1 VIEW: CPT

## 2019-07-06 PROCEDURE — 87040 BLOOD CULTURE FOR BACTERIA: CPT

## 2019-07-06 PROCEDURE — 83605 ASSAY OF LACTIC ACID: CPT

## 2019-07-06 PROCEDURE — A0425 GROUND MILEAGE: HCPCS

## 2019-07-06 PROCEDURE — 6370000000 HC RX 637 (ALT 250 FOR IP): Performed by: INTERNAL MEDICINE

## 2019-07-06 PROCEDURE — 83735 ASSAY OF MAGNESIUM: CPT

## 2019-07-06 PROCEDURE — 6360000002 HC RX W HCPCS: Performed by: INTERNAL MEDICINE

## 2019-07-06 PROCEDURE — 93005 ELECTROCARDIOGRAM TRACING: CPT | Performed by: EMERGENCY MEDICINE

## 2019-07-06 PROCEDURE — 36415 COLL VENOUS BLD VENIPUNCTURE: CPT

## 2019-07-06 PROCEDURE — G0378 HOSPITAL OBSERVATION PER HR: HCPCS

## 2019-07-06 PROCEDURE — 83880 ASSAY OF NATRIURETIC PEPTIDE: CPT

## 2019-07-06 PROCEDURE — 99285 EMERGENCY DEPT VISIT HI MDM: CPT

## 2019-07-06 PROCEDURE — 6370000000 HC RX 637 (ALT 250 FOR IP): Performed by: EMERGENCY MEDICINE

## 2019-07-06 PROCEDURE — A0426 ALS 1: HCPCS

## 2019-07-06 PROCEDURE — 80048 BASIC METABOLIC PNL TOTAL CA: CPT

## 2019-07-06 PROCEDURE — 94760 N-INVAS EAR/PLS OXIMETRY 1: CPT

## 2019-07-06 PROCEDURE — 85025 COMPLETE CBC W/AUTO DIFF WBC: CPT

## 2019-07-06 PROCEDURE — 82962 GLUCOSE BLOOD TEST: CPT

## 2019-07-06 RX ORDER — CLOPIDOGREL BISULFATE 75 MG/1
75 TABLET ORAL DAILY
Status: DISCONTINUED | OUTPATIENT
Start: 2019-07-07 | End: 2019-07-07 | Stop reason: HOSPADM

## 2019-07-06 RX ORDER — ALBUTEROL SULFATE 2.5 MG/3ML
2.5 SOLUTION RESPIRATORY (INHALATION) EVERY 6 HOURS PRN
Status: DISCONTINUED | OUTPATIENT
Start: 2019-07-06 | End: 2019-07-07 | Stop reason: HOSPADM

## 2019-07-06 RX ORDER — DEXTROSE MONOHYDRATE 25 G/50ML
12.5 INJECTION, SOLUTION INTRAVENOUS PRN
Status: DISCONTINUED | OUTPATIENT
Start: 2019-07-06 | End: 2019-07-07 | Stop reason: HOSPADM

## 2019-07-06 RX ORDER — AMLODIPINE BESYLATE 2.5 MG/1
2.5 TABLET ORAL DAILY
Status: DISCONTINUED | OUTPATIENT
Start: 2019-07-07 | End: 2019-07-07 | Stop reason: HOSPADM

## 2019-07-06 RX ORDER — ATORVASTATIN CALCIUM 10 MG/1
20 TABLET, FILM COATED ORAL NIGHTLY
Status: DISCONTINUED | OUTPATIENT
Start: 2019-07-06 | End: 2019-07-07 | Stop reason: HOSPADM

## 2019-07-06 RX ORDER — CYCLOBENZAPRINE HCL 10 MG
10 TABLET ORAL 2 TIMES DAILY PRN
Status: DISCONTINUED | OUTPATIENT
Start: 2019-07-06 | End: 2019-07-07 | Stop reason: HOSPADM

## 2019-07-06 RX ORDER — DEXTROSE MONOHYDRATE 50 MG/ML
100 INJECTION, SOLUTION INTRAVENOUS PRN
Status: DISCONTINUED | OUTPATIENT
Start: 2019-07-06 | End: 2019-07-07 | Stop reason: HOSPADM

## 2019-07-06 RX ORDER — HYDROCODONE BITARTRATE AND ACETAMINOPHEN 7.5; 325 MG/1; MG/1
1 TABLET ORAL EVERY 6 HOURS PRN
Status: DISCONTINUED | OUTPATIENT
Start: 2019-07-06 | End: 2019-07-07 | Stop reason: HOSPADM

## 2019-07-06 RX ORDER — SODIUM CHLORIDE 0.9 % (FLUSH) 0.9 %
10 SYRINGE (ML) INJECTION PRN
Status: DISCONTINUED | OUTPATIENT
Start: 2019-07-06 | End: 2019-07-07 | Stop reason: SDUPTHER

## 2019-07-06 RX ORDER — POLYETHYLENE GLYCOL 3350 17 G/17G
17 POWDER, FOR SOLUTION ORAL DAILY
Status: DISCONTINUED | OUTPATIENT
Start: 2019-07-07 | End: 2019-07-07 | Stop reason: HOSPADM

## 2019-07-06 RX ORDER — NICOTINE POLACRILEX 4 MG
15 LOZENGE BUCCAL PRN
Status: DISCONTINUED | OUTPATIENT
Start: 2019-07-06 | End: 2019-07-07 | Stop reason: HOSPADM

## 2019-07-06 RX ORDER — INSULIN GLARGINE 100 [IU]/ML
40 INJECTION, SOLUTION SUBCUTANEOUS NIGHTLY
Status: DISCONTINUED | OUTPATIENT
Start: 2019-07-06 | End: 2019-07-07 | Stop reason: HOSPADM

## 2019-07-06 RX ORDER — FUROSEMIDE 40 MG/1
40 TABLET ORAL DAILY
Status: DISCONTINUED | OUTPATIENT
Start: 2019-07-07 | End: 2019-07-07 | Stop reason: HOSPADM

## 2019-07-06 RX ORDER — ASPIRIN 81 MG/1
81 TABLET, CHEWABLE ORAL DAILY
Status: DISCONTINUED | OUTPATIENT
Start: 2019-07-07 | End: 2019-07-07 | Stop reason: HOSPADM

## 2019-07-06 RX ORDER — ACETAMINOPHEN 325 MG/1
650 TABLET ORAL EVERY 4 HOURS PRN
Status: DISCONTINUED | OUTPATIENT
Start: 2019-07-06 | End: 2019-07-07 | Stop reason: HOSPADM

## 2019-07-06 RX ORDER — PANTOPRAZOLE SODIUM 40 MG/1
40 TABLET, DELAYED RELEASE ORAL
Status: DISCONTINUED | OUTPATIENT
Start: 2019-07-07 | End: 2019-07-07 | Stop reason: HOSPADM

## 2019-07-06 RX ORDER — FUROSEMIDE 40 MG/1
40 TABLET ORAL DAILY
COMMUNITY
End: 2020-01-31 | Stop reason: SDUPTHER

## 2019-07-06 RX ORDER — TACROLIMUS 1 MG/1
1 CAPSULE ORAL 2 TIMES DAILY
Status: DISCONTINUED | OUTPATIENT
Start: 2019-07-06 | End: 2019-07-07 | Stop reason: HOSPADM

## 2019-07-06 RX ORDER — SULFAMETHOXAZOLE AND TRIMETHOPRIM 400; 80 MG/1; MG/1
1 TABLET ORAL DAILY
Status: DISCONTINUED | OUTPATIENT
Start: 2019-07-07 | End: 2019-07-07 | Stop reason: HOSPADM

## 2019-07-06 RX ORDER — ASPIRIN 81 MG/1
243 TABLET, CHEWABLE ORAL ONCE
Status: COMPLETED | OUTPATIENT
Start: 2019-07-06 | End: 2019-07-06

## 2019-07-06 RX ORDER — LOSARTAN POTASSIUM 50 MG/1
50 TABLET ORAL DAILY
Status: DISCONTINUED | OUTPATIENT
Start: 2019-07-07 | End: 2019-07-07 | Stop reason: HOSPADM

## 2019-07-06 RX ORDER — BUPROPION HYDROCHLORIDE 150 MG/1
150 TABLET, EXTENDED RELEASE ORAL DAILY
Status: DISCONTINUED | OUTPATIENT
Start: 2019-07-07 | End: 2019-07-07 | Stop reason: HOSPADM

## 2019-07-06 RX ORDER — LANOLIN ALCOHOL/MO/W.PET/CERES
3 CREAM (GRAM) TOPICAL DAILY
Status: DISCONTINUED | OUTPATIENT
Start: 2019-07-06 | End: 2019-07-07 | Stop reason: HOSPADM

## 2019-07-06 RX ADMIN — TACROLIMUS 1 MG: 1 CAPSULE ORAL at 23:28

## 2019-07-06 RX ADMIN — ASPIRIN 81 MG 243 MG: 81 TABLET ORAL at 18:27

## 2019-07-06 RX ADMIN — ATORVASTATIN CALCIUM 20 MG: 10 TABLET, FILM COATED ORAL at 23:28

## 2019-07-06 ASSESSMENT — PAIN DESCRIPTION - DESCRIPTORS: DESCRIPTORS: PRESSURE

## 2019-07-06 ASSESSMENT — PAIN SCALES - GENERAL
PAINLEVEL_OUTOF10: 0
PAINLEVEL_OUTOF10: 4

## 2019-07-06 ASSESSMENT — PAIN DESCRIPTION - ORIENTATION: ORIENTATION: ANTERIOR;UPPER

## 2019-07-06 ASSESSMENT — PAIN DESCRIPTION - LOCATION: LOCATION: CHEST

## 2019-07-06 ASSESSMENT — PAIN DESCRIPTION - PAIN TYPE: TYPE: ACUTE PAIN

## 2019-07-06 NOTE — ED PROVIDER NOTES
64-year-old female with history of multiple strokes, PFO, diabetes, tobacco abuse, high blood pressure, high cholesterol presents to the ED with chief complaint of chest pressure and shortness of breath especially with exertion. Patient usually walks with a cane. She was just sitting watching TV when all of a sudden she started to feel this way. She did not have any pressure radiating to her back or jaw or her arms but she did feel like someone was sitting on her chest.  She takes baby aspirin and Plavix as well as a low-dose statin for strokes. Patient had a PE and a DVT in the past after a liver transplant for end-stage liver disease from hepatitis C. Patient has no leg swelling. She has no sharp pain associated with breathing. Chest Pain   Pain location:  Substernal area  Pain quality: pressure    Pain radiates to:  Does not radiate  Pain severity:  Moderate  Onset quality:  Sudden  Timing:  Constant  Progression:  Improving  Chronicity:  New  Context: at rest    Relieved by:  Nothing  Worsened by:  Nothing  Ineffective treatments:  None tried  Associated symptoms: shortness of breath    Associated symptoms: no abdominal pain, no back pain, no cough, no fever, no headache, no nausea, no vomiting and no weakness    Risk factors: diabetes mellitus, high cholesterol, hypertension and smoking        Review of Systems   Constitutional: Negative for chills and fever. Respiratory: Positive for shortness of breath. Negative for cough. Cardiovascular: Positive for chest pain. Gastrointestinal: Negative for abdominal pain, blood in stool, nausea and vomiting. Genitourinary: Negative for dysuria and frequency. Musculoskeletal: Negative for back pain. Skin: Negative for rash. Neurological: Negative for weakness and headaches. All other systems reviewed and are negative. Physical Exam   Constitutional: She is oriented to person, place, and time. She appears well-developed and well-nourished.

## 2019-07-07 ENCOUNTER — APPOINTMENT (OUTPATIENT)
Dept: NUCLEAR MEDICINE | Age: 59
End: 2019-07-07
Payer: MEDICARE

## 2019-07-07 VITALS
WEIGHT: 177 LBS | SYSTOLIC BLOOD PRESSURE: 168 MMHG | RESPIRATION RATE: 16 BRPM | OXYGEN SATURATION: 96 % | HEIGHT: 66 IN | TEMPERATURE: 98 F | HEART RATE: 70 BPM | DIASTOLIC BLOOD PRESSURE: 79 MMHG | BODY MASS INDEX: 28.45 KG/M2

## 2019-07-07 LAB
EKG ATRIAL RATE: 72 BPM
EKG P AXIS: 57 DEGREES
EKG P-R INTERVAL: 150 MS
EKG Q-T INTERVAL: 406 MS
EKG QRS DURATION: 82 MS
EKG QTC CALCULATION (BAZETT): 444 MS
EKG R AXIS: -3 DEGREES
EKG T AXIS: 42 DEGREES
EKG VENTRICULAR RATE: 72 BPM
LV EF: 74 %
LVEF MODALITY: NORMAL
METER GLUCOSE: 127 MG/DL (ref 74–99)
METER GLUCOSE: 191 MG/DL (ref 74–99)
TROPONIN: <0.01 NG/ML (ref 0–0.03)
TROPONIN: <0.01 NG/ML (ref 0–0.03)

## 2019-07-07 PROCEDURE — 3430000000 HC RX DIAGNOSTIC RADIOPHARMACEUTICAL: Performed by: RADIOLOGY

## 2019-07-07 PROCEDURE — A9500 TC99M SESTAMIBI: HCPCS | Performed by: RADIOLOGY

## 2019-07-07 PROCEDURE — 99204 OFFICE O/P NEW MOD 45 MIN: CPT | Performed by: INTERNAL MEDICINE

## 2019-07-07 PROCEDURE — 93018 CV STRESS TEST I&R ONLY: CPT | Performed by: INTERNAL MEDICINE

## 2019-07-07 PROCEDURE — 2580000003 HC RX 258: Performed by: INTERNAL MEDICINE

## 2019-07-07 PROCEDURE — 82962 GLUCOSE BLOOD TEST: CPT

## 2019-07-07 PROCEDURE — 93017 CV STRESS TEST TRACING ONLY: CPT

## 2019-07-07 PROCEDURE — 6360000002 HC RX W HCPCS: Performed by: INTERNAL MEDICINE

## 2019-07-07 PROCEDURE — 78452 HT MUSCLE IMAGE SPECT MULT: CPT

## 2019-07-07 PROCEDURE — 93010 ELECTROCARDIOGRAM REPORT: CPT | Performed by: INTERNAL MEDICINE

## 2019-07-07 PROCEDURE — 6370000000 HC RX 637 (ALT 250 FOR IP): Performed by: INTERNAL MEDICINE

## 2019-07-07 PROCEDURE — 36415 COLL VENOUS BLD VENIPUNCTURE: CPT

## 2019-07-07 PROCEDURE — G0378 HOSPITAL OBSERVATION PER HR: HCPCS

## 2019-07-07 PROCEDURE — 93016 CV STRESS TEST SUPVJ ONLY: CPT | Performed by: INTERNAL MEDICINE

## 2019-07-07 PROCEDURE — 84484 ASSAY OF TROPONIN QUANT: CPT

## 2019-07-07 RX ORDER — SODIUM CHLORIDE 0.9 % (FLUSH) 0.9 %
10 SYRINGE (ML) INJECTION PRN
Status: DISCONTINUED | OUTPATIENT
Start: 2019-07-07 | End: 2019-07-07 | Stop reason: HOSPADM

## 2019-07-07 RX ORDER — SODIUM CHLORIDE 0.9 % (FLUSH) 0.9 %
10 SYRINGE (ML) INJECTION EVERY 12 HOURS SCHEDULED
Status: DISCONTINUED | OUTPATIENT
Start: 2019-07-07 | End: 2019-07-07 | Stop reason: HOSPADM

## 2019-07-07 RX ADMIN — SULFAMETHOXAZOLE AND TRIMETHOPRIM 1 TABLET: 400; 80 TABLET ORAL at 13:57

## 2019-07-07 RX ADMIN — REGADENOSON 0.4 MG: 0.08 INJECTION, SOLUTION INTRAVENOUS at 10:01

## 2019-07-07 RX ADMIN — Medication 30 MILLICURIE: at 10:10

## 2019-07-07 RX ADMIN — CLOPIDOGREL 75 MG: 75 TABLET, FILM COATED ORAL at 13:56

## 2019-07-07 RX ADMIN — BUPROPION HYDROCHLORIDE 150 MG: 150 TABLET, EXTENDED RELEASE ORAL at 13:56

## 2019-07-07 RX ADMIN — TACROLIMUS 1 MG: 1 CAPSULE ORAL at 13:57

## 2019-07-07 RX ADMIN — Medication 10 MILLICURIE: at 09:00

## 2019-07-07 RX ADMIN — INSULIN LISPRO 2 UNITS: 100 INJECTION, SOLUTION INTRAVENOUS; SUBCUTANEOUS at 14:00

## 2019-07-07 RX ADMIN — ASPIRIN 81 MG 81 MG: 81 TABLET ORAL at 13:56

## 2019-07-07 RX ADMIN — Medication 10 ML: at 13:56

## 2019-07-07 RX ADMIN — AMLODIPINE BESYLATE 2.5 MG: 2.5 TABLET ORAL at 13:56

## 2019-07-07 RX ADMIN — FUROSEMIDE 40 MG: 40 TABLET ORAL at 13:56

## 2019-07-07 RX ADMIN — LOSARTAN POTASSIUM 50 MG: 50 TABLET, FILM COATED ORAL at 13:56

## 2019-07-07 ASSESSMENT — PAIN SCALES - GENERAL: PAINLEVEL_OUTOF10: 0

## 2019-07-07 NOTE — CONSULTS
long-term current use of insulin ([facility] [address]     Unspecified cerebral artery occlusion with cerebral infarction     Varices        Past Surgical History:  Past Surgical History:   Procedure Laterality Date    BRAIN SURGERY      CARDIAC CATHETERIZATION      cerebral angiogrem to check cavernous malformation in head - negative    CARDIAC SURGERY  2014    heart cath     SECTION      x 2     SECTION   and     COLONOSCOPY      ECHO COMPL W DOP COLOR FLOW  2012         ENDOSCOPY, COLON, DIAGNOSTIC      ESOPHAGEAL VARICE LIGATION      banding    LIVER TRANSPLANT      OTHER SURGICAL HISTORY Right 16    Right Knee Arthroscopy with Debridement partial lateral menisectomy, chondroplasty, synovectomy       Family History:  Family History   Adopted: Yes       Social History:  Social History     Socioeconomic History    Marital status:       Spouse name: Not on file    Number of children: Not on file    Years of education: Not on file    Highest education level: Not on file   Occupational History    Not on file   Social Needs    Financial resource strain: Not on file    Food insecurity:     Worry: Not on file     Inability: Not on file    Transportation needs:     Medical: Not on file     Non-medical: Not on file   Tobacco Use    Smoking status: Former Smoker     Packs/day: 0.50     Years: 30.00     Pack years: 15.00     Types: Cigarettes     Last attempt to quit: 2019     Years since quittin.0    Smokeless tobacco: Never Used    Tobacco comment: using Juul-nicotine free   Substance and Sexual Activity    Alcohol use: No     Alcohol/week: 0.0 oz     Comment: has not drank for > 5yrs    Drug use: No    Sexual activity: Not on file   Lifestyle    Physical activity:     Days per week: Not on file     Minutes per session: Not on file    Stress: Not on file   Relationships    Social connections:     Talks on phone: Not on file     Gets together: Not on file Attends Jainism service: Not on file     Active member of club or organization: Not on file     Attends meetings of clubs or organizations: Not on file     Relationship status: Not on file    Intimate partner violence:     Fear of current or ex partner: Not on file     Emotionally abused: Not on file     Physically abused: Not on file     Forced sexual activity: Not on file   Other Topics Concern    Not on file   Social History Narrative    States that she is retired, had previously worked for [employer]. Her daughter lives nearby. Allergies: Allergies   Allergen Reactions    Chantix [Varenicline] Swelling     Tongue swelling Split in 1/2    Heparin      Platelets drop       Home Medications:  Prior to Admission medications    Medication Sig Start Date End Date Taking? Authorizing Provider   furosemide (LASIX) 40 MG tablet Take 40 mg by mouth daily   Yes Historical Provider, MD   HYDROcodone-acetaminophen (NORCO) 7.5-325 MG per tablet Take 1 tablet by mouth every 6 hours as needed for Pain for up to 30 days.  6/28/19 7/28/19 Yes MIKEL Crowe - CNP   sulfamethoxazole-trimethoprim (BACTRIM;SEPTRA) 400-80 MG per tablet Take 1 tablet by mouth daily   Yes Historical Provider, MD   losartan (COZAAR) 50 MG tablet Take one tablet daily, call with BP > 160/100 3/19/19  Yes MIKEL Crowe CNP   aspirin 81 MG chewable tablet Take 1 tablet by mouth daily 3/16/19  Yes Daren Giles MD   atorvastatin (LIPITOR) 20 MG tablet Take 1 tablet by mouth nightly  Patient taking differently: Take 40 mg by mouth nightly  3/15/19  Yes Daren Giles MD   clopidogrel (PLAVIX) 75 MG tablet Take 1 tablet by mouth daily 3/16/19  Yes Daren Giles MD   buPROPion [facility] SR) 150 MG extended release tablet TAKE 1 TABLET TWICE DAILY 3/12/19  Yes MIKEL Crowe CNP   NYSTATIN 667769 UNIT/GM powder APPLY FOUR TIMES DAILY 2/18/19  Yes Anil Yang, DO   omeprazole (PRILOSEC) 40 MG delayed release capsule TAKE 1 CAPSULE EVERY DAY 12/28/18  Yes MIKEL Martinez CNP   cyclobenzaprine (FLEXERIL) 10 MG tablet Take 1 tablet by mouth 2 times daily as needed for Muscle spasms 12/21/18  Yes MIKEL Martinez CNP   amLODIPine (NORVASC) 2.5 MG tablet Take 1 tablet by mouth daily  Patient taking differently: Take 10 mg by mouth daily  11/21/18  Yes MIKEL Martinez CNP   LANTUS SOLOSTAR 100 UNIT/ML injection pen Inject 40 Units into the skin nightly 11/21/18  Yes MIKEL Martinez CNP   tacrolimus (PROGRAF) 1 MG capsule Take 1 mg by mouth 2 times daily  10/10/18  Yes Historical Provider, MD johnsonFENesin ([address]) 600 MG extended release tablet Take 600 mg by mouth daily    Yes Historical Provider, MD   albuterol sulfate HFA (PROAIR HFA) 108 (90 Base) MCG/ACT inhaler INHALE 2 PUFFS BY MOUTH EVERY 6 HOURS AS NEEDED FOR WHEEZING 5/22/18  Yes MIKEL Regalado CNP   diclofenac sodium (VOLTAREN) 1 % GEL Apply 2 g topically 2 times daily 8/2/17  Yes MIKEL Martinez CNP   albuterol (PROVENTIL) (2.5 MG/3ML) 0.083% nebulizer solution Take 2.5 mg by nebulization every 6 hours as needed for Wheezing   Yes Historical Provider, MD   BD PEN NEEDLE YUSUF U/F 32G X 4 MM MISC Use with Insulin dosing schedule 1/14/19   MIKEL Martinez CNP   ACCU-CHEK CONTRERAS PLUS strip 1 each by In Vitro route 4 times daily 7/5/17   MIKEL Martinez CNP       Current Medications:  Current Facility-Administered Medications   Medication Dose Route Frequency Provider Last Rate Last Dose    sodium chloride flush 0.9 % injection 10 mL  10 mL Intravenous PRN Hakeem Choi MD        albuterol (PROVENTIL) nebulizer solution 2.5 mg  2.5 mg Nebulization Q6H PRN Demetrio Coreas MD        tacrolimus (PROGRAF) capsule 1 mg  1 mg Oral BID Demetrio Coreas MD   1 mg at 07/06/19 2264    amLODIPine (NORVASC) tablet 2.5 mg  2.5 mg Oral Daily MD Carrillo Thornton Physical Exam:  /73   Pulse 61   Temp 98.5 °F (36.9 °C) (Temporal)   Resp 16   Ht 5' 6\" (1.676 m)   Wt 177 lb (80.3 kg)   LMP  (LMP Unknown)   SpO2 98%   BMI 28.57 kg/m²   Wt Readings from Last 3 Encounters:   07/07/19 177 lb (80.3 kg)   04/14/19 190 lb (86.2 kg)   04/11/19 190 lb (86.2 kg)     Appearance: Awake, no acute respiratory distress  Skin: Intact, no rash  Head: Normocephalic, atraumatic  Eyes: EOMI, no conjunctival erythema  ENMT: No pharyngeal erythema, MMM, no rhinorrhea  Neck: Supple, no carotid bruits  Lungs: Clear to auscultation bilaterally. No wheezes, rales, or rhonchi. Cardiac: Regular rate and rhythm, +S1S2, no murmurs apparent  Abdomen: Soft, nontender, +bowel sounds  Extremities: +LUE weakness, no lower extremity edema  Neurologic: Normal mood and affect    Intake/Output:    Intake/Output Summary (Last 24 hours) at 7/7/2019 1127  Last data filed at 7/6/2019 2300  Gross per 24 hour   Intake 200 ml   Output --   Net 200 ml     No intake/output data recorded.     Laboratory Tests:  Recent Labs     07/06/19  1819      K 3.9      CO2 23   BUN 23*   CREATININE 1.3*   GLUCOSE 133*   CALCIUM 8.7     Lab Results   Component Value Date    MG 2.0 07/06/2019     Recent Labs     07/06/19  1819   WBC 4.9   RBC 4.22   HGB 11.8   HCT 36.1   MCV 85.5   MCH 28.0   MCHC 32.7   RDW 13.2      MPV 11.0     Lab Results   Component Value Date    CKTOTAL 103 02/13/2014    CKMB 4.1 (H) 02/13/2014    TROPONINI <0.01 07/07/2019    TROPONINI <0.01 07/06/2019    TROPONINI <0.01 07/06/2019     Lab Results   Component Value Date    INR 1.2 04/14/2019    INR 1.1 03/13/2019    INR 1.3 06/26/2017    PROTIME 13.8 (H) 04/14/2019    PROTIME 12.3 03/13/2019    PROTIME 14.7 (H) 06/26/2017     Lab Results   Component Value Date    TSH 2.570 04/19/2016     Lab Results   Component Value Date    LABA1C 6.9 (H) 04/15/2019     No results found for: EAG  Lab Results   Component Value Date    CHOL 151 patient's care. Please feel free to contact me if you have any questions or concerns.     Alena White MD  [facility]) Cardiology

## 2019-07-07 NOTE — PLAN OF CARE
Problem: Falls - Risk of:  Goal: Will remain free from falls  Description  Will remain free from falls  7/7/2019 1513 by Rafael Ge RN  Outcome: Met This Shift     Problem: Falls - Risk of:  Goal: Absence of physical injury  Description  Absence of physical injury  Outcome: Met This Shift     Problem: Cardiac Output - Decreased:  Goal: Hemodynamic stability will improve  Description  Hemodynamic stability will improve  7/7/2019 1513 by Rafael Ge RN  Outcome: Met This Shift     Problem: Tissue Perfusion - Cardiopulmonary, Altered:  Goal: Absence of angina  Description  Absence of angina  7/7/2019 1513 by Rafael Ge RN  Outcome: Met This Shift     Problem: Serum Glucose Level - Abnormal:  Goal: Ability to maintain appropriate glucose levels will improve  Description  Ability to maintain appropriate glucose levels will improve  7/7/2019 1513 by Rafael Ge RN  Outcome: Ongoing

## 2019-07-07 NOTE — PROCEDURES
[facility] Nuclear Stress Test:    Cardiologist: Dr. Murvin Boxer Scrocco    Date: 7/7/2019    Indications for study: Chest pain    1. No chest pain  2. No new arrhythmias  3. No EKG changes suggestive of stress induced ischemia  4.  Nuclear images pending    Rodrigue Aceves MD  [facility]) Cardiology

## 2019-07-07 NOTE — H&P
 OTHER SURGICAL HISTORY Right 04/21/16    Right Knee Arthroscopy with Debridement partial lateral menisectomy, chondroplasty, synovectomy         Medications Prior to Admission:    Medications Prior to Admission: furosemide (LASIX) 40 MG tablet, Take 40 mg by mouth daily  HYDROcodone-acetaminophen (NORCO) 7.5-325 MG per tablet, Take 1 tablet by mouth every 6 hours as needed for Pain for up to 30 days.   sulfamethoxazole-trimethoprim (BACTRIM;SEPTRA) 400-80 MG per tablet, Take 1 tablet by mouth daily  losartan (COZAAR) 50 MG tablet, Take one tablet daily, call with BP > 160/100  aspirin 81 MG chewable tablet, Take 1 tablet by mouth daily  atorvastatin (LIPITOR) 20 MG tablet, Take 1 tablet by mouth nightly (Patient taking differently: Take 40 mg by mouth nightly )  clopidogrel (PLAVIX) 75 MG tablet, Take 1 tablet by mouth daily  buPROPion (WELLBUTRIN SR) 150 MG extended release tablet, TAKE 1 TABLET TWICE DAILY  NYSTATIN 512279 UNIT/GM powder, APPLY FOUR TIMES DAILY  omeprazole (PRILOSEC) 40 MG delayed release capsule, TAKE 1 CAPSULE EVERY DAY  cyclobenzaprine (FLEXERIL) 10 MG tablet, Take 1 tablet by mouth 2 times daily as needed for Muscle spasms  amLODIPine (NORVASC) 2.5 MG tablet, Take 1 tablet by mouth daily (Patient taking differently: Take 10 mg by mouth daily )  LANTUS SOLOSTAR 100 UNIT/ML injection pen, Inject 40 Units into the skin nightly  tacrolimus (PROGRAF) 1 MG capsule, Take 1 mg by mouth 2 times daily   guaiFENesin (MUCINEX) 600 MG extended release tablet, Take 600 mg by mouth daily   albuterol sulfate HFA (PROAIR HFA) 108 (90 Base) MCG/ACT inhaler, INHALE 2 PUFFS BY MOUTH EVERY 6 HOURS AS NEEDED FOR WHEEZING  diclofenac sodium (VOLTAREN) 1 % GEL, Apply 2 g topically 2 times daily  albuterol (PROVENTIL) (2.5 MG/3ML) 0.083% nebulizer solution, Take 2.5 mg by nebulization every 6 hours as needed for Wheezing  [DISCONTINUED] acetaminophen (TYLENOL) 325 MG tablet, 650 mg  [DISCONTINUED] melatonin 3 MG

## 2019-07-08 ENCOUNTER — TELEPHONE (OUTPATIENT)
Dept: CARDIOLOGY CLINIC | Age: 59
End: 2019-07-08

## 2019-07-08 ENCOUNTER — TREATMENT (OUTPATIENT)
Dept: OCCUPATIONAL THERAPY | Age: 59
End: 2019-07-08
Payer: MEDICARE

## 2019-07-08 DIAGNOSIS — G81.94 HEMIPLEGIA AFFECTING LEFT NONDOMINANT SIDE, UNSPECIFIED ETIOLOGY, UNSPECIFIED HEMIPLEGIA TYPE (HCC): Primary | ICD-10-CM

## 2019-07-08 PROCEDURE — 97112 NEUROMUSCULAR REEDUCATION: CPT | Performed by: OCCUPATIONAL THERAPIST

## 2019-07-08 PROCEDURE — 97032 APPL MODALITY 1+ESTIM EA 15: CPT | Performed by: OCCUPATIONAL THERAPIST

## 2019-07-08 PROCEDURE — 97530 THERAPEUTIC ACTIVITIES: CPT | Performed by: OCCUPATIONAL THERAPIST

## 2019-07-08 PROCEDURE — 97110 THERAPEUTIC EXERCISES: CPT | Performed by: OCCUPATIONAL THERAPIST

## 2019-07-08 NOTE — PROGRESS NOTES
less today due to better spasticity   L UE AROM x Completed with intermittent light stroking and deep pressure to increase propriopceptive awareness to the left arm and minimize substitutions   Strengthening:     L UE PREs 1# 1-10 shld  2# curls    Hand gripper- from store Lowest setting < 20#  is starting to get stronger- hand gripper provided for home use- pt reminded not to over use at home   Other:     Hand helper  Pt would benefit from trial of hand helper due to active fisting but lack of digital extension/ will order   ADL- functional mobility  Discussion of strategies to maximize independent functional mobility about the home- pt prefers from w/c level and family from walking level/ compromise encouraged due to her back pain when walking too much     Assessment/Comments: Pt is making Good progress toward stated plan of care. L UE AROM and strength continues to improve.    -Rehab Potential: Good  -Requires OT Follow Up: Yes  Time In: 1305           Time Out: 1400          Visit #: 11    Treatment Charges: Mins Units   Modalities: Estim 10 1   Ther Exercise 10 1   Manual Therapy     Thera Activities  20 1   ADL/Home Mgt      Neuro Re-education 15 1   Gait Training     Group Therapy     Non-Billable Service Time     Other     Total Time/Units 55 4       -Response to Treatment: Good improvements continue. Will continue with OT plan of care. Potential for further progress is very good. Goals: Goals for pt can be seen on initial eval occurring on 6-3-19    Plan:   [x]  Continue Plan of care: Treatment covered based on POC and graduated to patient's progress. Pt education continues at each visit to obtain maximum benefits from skilled OT intervention.   []  [address] [facility] of care:   []  Discharge:      Gerry Sanchez OT/L  [id number]

## 2019-07-10 ASSESSMENT — ENCOUNTER SYMPTOMS
BACK PAIN: 0
BLOOD IN STOOL: 0
VOMITING: 0
SHORTNESS OF BREATH: 1
NAUSEA: 0
ABDOMINAL PAIN: 0
COUGH: 0

## 2019-07-12 LAB — BLOOD CULTURE, ROUTINE: NORMAL

## 2019-07-15 ENCOUNTER — TREATMENT (OUTPATIENT)
Dept: PHYSICAL THERAPY | Age: 59
End: 2019-07-15
Payer: MEDICARE

## 2019-07-15 ENCOUNTER — TREATMENT (OUTPATIENT)
Dept: OCCUPATIONAL THERAPY | Age: 59
End: 2019-07-15
Payer: MEDICARE

## 2019-07-15 DIAGNOSIS — G81.94 LEFT HEMIPLEGIA (HCC): Primary | ICD-10-CM

## 2019-07-15 DIAGNOSIS — G81.94 HEMIPLEGIA AFFECTING LEFT NONDOMINANT SIDE, UNSPECIFIED ETIOLOGY, UNSPECIFIED HEMIPLEGIA TYPE (HCC): Primary | ICD-10-CM

## 2019-07-15 PROCEDURE — 97116 GAIT TRAINING THERAPY: CPT | Performed by: PHYSICAL THERAPIST

## 2019-07-15 PROCEDURE — 97110 THERAPEUTIC EXERCISES: CPT | Performed by: OCCUPATIONAL THERAPIST

## 2019-07-15 PROCEDURE — 97032 APPL MODALITY 1+ESTIM EA 15: CPT | Performed by: OCCUPATIONAL THERAPIST

## 2019-07-15 PROCEDURE — 97530 THERAPEUTIC ACTIVITIES: CPT | Performed by: PHYSICAL THERAPIST

## 2019-07-15 PROCEDURE — 97112 NEUROMUSCULAR REEDUCATION: CPT | Performed by: OCCUPATIONAL THERAPIST

## 2019-07-15 PROCEDURE — 97110 THERAPEUTIC EXERCISES: CPT | Performed by: PHYSICAL THERAPIST

## 2019-07-15 PROCEDURE — 97530 THERAPEUTIC ACTIVITIES: CPT | Performed by: OCCUPATIONAL THERAPIST

## 2019-07-15 NOTE — PROGRESS NOTES
Physical Therapy Daily Treatment Note    Date: 7/15/2019  Patient Name: Suzan Nieves  : 1960   MRN: [id number]  Date of Onset: 3/2019  Referring Provider:  Efrain Gregg MD   Medical Diagnosis:G81.94 (ICD-10-CM) - Hemiplegia, unspecified affecting left nondominant side     S: no new complaints  O:  Time 4131-7073     Visit 8 Repeat outcome measure at mid point and end. Pain 0/10     ROM      Modalities            Exercise      Nustep   #5  10 min 5 min w/ L hand ace wrapped to hadle te         Squats      Calf Raises 2 x 20  te          Marching 2 x 20  ta   Alt. Sidekicks 2 x 20  ta         Sit/Stands 8x  ta         Gait training 2 x 135ftw/   SBA/CGA gt    NMR Balance activities to aid in safe community and home ambulation    Marching Gait HHA +1 mod 2 x 45ft  gt   Sidestepping      STAIRS      Heel to toe      March on BOSU                  A:  Tolerated well. Slight LOB with ass't needed to recover when amb without AD. inst pt to use AD when amb.     P: Continue with rehab plan  José Miguel Singh PTA    Treatment Charges: Mins Units   Initial Evaluation     Re-Evaluation     Ther Exercise         TE 25 1   Manual Therapy     MT     Ther Activities        TA 30 2   Gait Training          GT 15 1   Neuro Re-education NR     Modalities     Non-Billable Service Time     Other     Total Time/Units 60 4

## 2019-07-17 ENCOUNTER — TREATMENT (OUTPATIENT)
Dept: PHYSICAL THERAPY | Age: 59
End: 2019-07-17
Payer: MEDICARE

## 2019-07-17 ENCOUNTER — TREATMENT (OUTPATIENT)
Dept: OCCUPATIONAL THERAPY | Age: 59
End: 2019-07-17
Payer: MEDICARE

## 2019-07-17 DIAGNOSIS — G81.94 HEMIPLEGIA AFFECTING LEFT NONDOMINANT SIDE, UNSPECIFIED ETIOLOGY, UNSPECIFIED HEMIPLEGIA TYPE (HCC): Primary | ICD-10-CM

## 2019-07-17 DIAGNOSIS — G81.94 LEFT HEMIPLEGIA (HCC): Primary | ICD-10-CM

## 2019-07-17 DIAGNOSIS — Z86.73 HISTORY OF CVA (CEREBROVASCULAR ACCIDENT): ICD-10-CM

## 2019-07-17 PROCEDURE — 97116 GAIT TRAINING THERAPY: CPT | Performed by: PHYSICAL THERAPIST

## 2019-07-17 PROCEDURE — 97530 THERAPEUTIC ACTIVITIES: CPT | Performed by: OCCUPATIONAL THERAPIST

## 2019-07-17 PROCEDURE — 97110 THERAPEUTIC EXERCISES: CPT | Performed by: PHYSICAL THERAPIST

## 2019-07-17 PROCEDURE — 97110 THERAPEUTIC EXERCISES: CPT | Performed by: OCCUPATIONAL THERAPIST

## 2019-07-17 PROCEDURE — 97535 SELF CARE MNGMENT TRAINING: CPT | Performed by: OCCUPATIONAL THERAPIST

## 2019-07-17 PROCEDURE — 97112 NEUROMUSCULAR REEDUCATION: CPT | Performed by: PHYSICAL THERAPIST

## 2019-07-17 PROCEDURE — 97112 NEUROMUSCULAR REEDUCATION: CPT | Performed by: OCCUPATIONAL THERAPIST

## 2019-07-17 NOTE — PROGRESS NOTES
Physical Therapy Daily Treatment Note    Date: 2019  Patient Name: Samira Johansen  : 1960   MRN: [id number]  Date of Onset: 3/2019  Referring Provider:  Sheryl Hernández MD   Medical Diagnosis:G81.94 (ICD-10-CM) - Hemiplegia, unspecified affecting left nondominant side     S: no new complaints  O:  Time 4798-8315     Visit 9 Repeat outcome measure at mid point and end. Pain 0/10     ROM      Modalities            Exercise      Nustep   #5  8 min  te         Squats      Calf Raises 2 x 20  te          Marching 2 x 20  ta   Alt. Sidekicks 2 x 20  ta         Sit/Stands 8x  ta         Gait training 400 ft,  150 ft w/   SBA/CGA gt    NMR Balance activities to aid in safe community and home ambulation    Marching Gait HHA +1 mod 2 x 45ft  gt   Sidestepping      STAIRS      Heel to toe      March on BOSU                  A:  Tolerated well. Slight LOB with ass't needed to recover when amb without AD. inst pt to use AD when amb. Worked with pt amb with AD throughout clinic SBA/CGA around objects in the gym to work on balance and gait training.   Pt unsteady at times, VC's for safety,   P: Continue with rehab plan  Lenka Patience, PTA    Treatment Charges: Mins Units   Initial Evaluation     Re-Evaluation     Ther Exercise         TE 15 1   Manual Therapy     MT     Ther Activities        TA 30 2   Gait Training          GT 15 1   Neuro Re-education NR     Modalities     Non-Billable Service Time     Other     Total Time/Units 60 4

## 2019-07-17 NOTE — PROGRESS NOTES
better spasticity   L UE AROM x Completed with intermittent light stroking and deep pressure to increase propriopceptive awareness to the left arm and minimize substitutions   Strengthening:     L UE PREs 1# 1-10 shld/ PNF  2# curls  no ace needed to help sustain    Hand gripper- from store Lowest setting < 20#  is starting to get stronger- hand gripper provided for home use- pt reminded not to over use at home   Other:     Hand helper  Pt would benefit from trial of hand helper due to active fisting but lack of digital extension/ will order   ADL/ functional mobility x Further discussion of issues at home relating to mobility about the kitchen and functioning with the left as assist vs dependency. Strategies for opening containers, carrying items from one room to another discussed. Pt states she has been doing some laundry and other light homemaking as tolerated. Assessment/Comments: Pt is making Good progress toward stated plan of care. -Rehab Potential: Good  -Requires OT Follow Up: Yes  Time In: 1300           Time Out: 1400          Visit #: 13    Treatment Charges: Mins Units   Modalities: Estim 5    Ther Exercise 10 1   Manual Therapy     Thera Activities  20 1   ADL/Home Mgt  15 1   Neuro Re-education 10 1   Gait Training     Group Therapy     Non-Billable Service Time     Other     Total Time/Units 60 4       -Response to Treatment: Good improvements continue. Will continue with OT plan of care. Potential for further progress is very good. Goals: Goals for pt can be seen on initial eval occurring on 6-3-19    Plan:   [x]  Continue Plan of care: Treatment covered based on POC and graduated to patient's progress. Pt education continues at each visit to obtain maximum benefits from skilled OT intervention.   []  [facility] of care:   []  Discharge:      Sara Meneses OT/L  [id number]

## 2019-07-19 ENCOUNTER — TREATMENT (OUTPATIENT)
Dept: OCCUPATIONAL THERAPY | Age: 59
End: 2019-07-19
Payer: MEDICARE

## 2019-07-19 DIAGNOSIS — G81.94 HEMIPLEGIA AFFECTING LEFT NONDOMINANT SIDE, UNSPECIFIED ETIOLOGY, UNSPECIFIED HEMIPLEGIA TYPE (HCC): Primary | ICD-10-CM

## 2019-07-19 PROCEDURE — 97112 NEUROMUSCULAR REEDUCATION: CPT | Performed by: OCCUPATIONAL THERAPIST

## 2019-07-19 PROCEDURE — 97110 THERAPEUTIC EXERCISES: CPT | Performed by: OCCUPATIONAL THERAPIST

## 2019-07-19 PROCEDURE — 97032 APPL MODALITY 1+ESTIM EA 15: CPT | Performed by: OCCUPATIONAL THERAPIST

## 2019-07-19 PROCEDURE — 97530 THERAPEUTIC ACTIVITIES: CPT | Performed by: OCCUPATIONAL THERAPIST

## 2019-07-24 ENCOUNTER — TREATMENT (OUTPATIENT)
Dept: OCCUPATIONAL THERAPY | Age: 59
End: 2019-07-24
Payer: MEDICARE

## 2019-07-24 ENCOUNTER — TREATMENT (OUTPATIENT)
Dept: PHYSICAL THERAPY | Age: 59
End: 2019-07-24
Payer: MEDICARE

## 2019-07-24 DIAGNOSIS — G81.94 LEFT HEMIPLEGIA (HCC): Primary | ICD-10-CM

## 2019-07-24 DIAGNOSIS — G81.94 HEMIPLEGIA AFFECTING LEFT NONDOMINANT SIDE, UNSPECIFIED ETIOLOGY, UNSPECIFIED HEMIPLEGIA TYPE (HCC): Primary | ICD-10-CM

## 2019-07-24 PROCEDURE — 97116 GAIT TRAINING THERAPY: CPT

## 2019-07-24 PROCEDURE — 97112 NEUROMUSCULAR REEDUCATION: CPT | Performed by: OCCUPATIONAL THERAPIST

## 2019-07-24 PROCEDURE — 97032 APPL MODALITY 1+ESTIM EA 15: CPT | Performed by: OCCUPATIONAL THERAPIST

## 2019-07-24 PROCEDURE — 97530 THERAPEUTIC ACTIVITIES: CPT

## 2019-07-24 PROCEDURE — 97110 THERAPEUTIC EXERCISES: CPT | Performed by: OCCUPATIONAL THERAPIST

## 2019-07-24 PROCEDURE — 97530 THERAPEUTIC ACTIVITIES: CPT | Performed by: OCCUPATIONAL THERAPIST

## 2019-07-24 NOTE — PROGRESS NOTES
OCCUPATIONAL THERAPY   PROGRESS NOTE    Date:  2019  Initial Evaluation Date: 6-3-19    Patient Name:  Cyn Hooper    :  1960  Restrictions/Precautions: Activity as tolerated, Moderate fall risk  Diagnosis:  CVA                                                          Insurance/Certification information:  Medicare  Referring Physician:  Dr Gregg Araujo, Jude Angela [id number]  Date of Surgery/Injury: CVAs March- 2019  Plan of care signed (Y/N):  Y  Visit# / total visits: - visits    Pain Level: Moderate pain reported in her hips/ low back/ right shoulder continues  Subjective: \" I am really tried today but I am here\". Objective:  Updated POC to be completed by 7-3-19.     INTERVENTION: COMPLETED: SPECIFICS/COMMENTS:   Modality:     FES- wrist and digit ext X  10 MIN  today Tolerated well- good result/ completed with a without AROM- better isolation continues        AROM/ AAROM     L UE AROM x shld flexion and ABD, elbow flexion/ ext, supination/ pronation, wrist ext/ flexion   LUE AROM with manual assist  Completed in d1 and D2 patterns- requires cues to attend to the left arm and to extinguish abnormal patterns   Wand ex w/o ace to left hand x Chest press/ shd press, row and reverse row   Cone stacking x With intermittent assist to extend fingers and cues for positioning- tolerated better with soft thumb ABD splint to assist with opening of the thumb   Bean bag grasp/release  Very challenging/ min assist for hand positioning and cues for patterning   Clothespin grasp and release  Yellow and red- intermittent assist to extend fingers for grasp   PROM/Stretching:     L hand with attn to thumb/ digital extension x         Neuro- re-education:     Left andrea-attention  x Getting better needs less verbal cues and tactile cues   Facilitation/ digital ext and flexion x Stroking and tapping   Inhibition techniques x Needed less today due to better spasticity   L UE AROM

## 2019-07-26 ENCOUNTER — TREATMENT (OUTPATIENT)
Dept: OCCUPATIONAL THERAPY | Age: 59
End: 2019-07-26
Payer: MEDICARE

## 2019-07-26 DIAGNOSIS — G81.94 HEMIPLEGIA AFFECTING LEFT NONDOMINANT SIDE, UNSPECIFIED ETIOLOGY, UNSPECIFIED HEMIPLEGIA TYPE (HCC): Primary | ICD-10-CM

## 2019-07-26 PROCEDURE — 97530 THERAPEUTIC ACTIVITIES: CPT | Performed by: OCCUPATIONAL THERAPIST

## 2019-07-26 PROCEDURE — 97110 THERAPEUTIC EXERCISES: CPT | Performed by: OCCUPATIONAL THERAPIST

## 2019-07-29 ENCOUNTER — TREATMENT (OUTPATIENT)
Dept: PHYSICAL THERAPY | Age: 59
End: 2019-07-29
Payer: MEDICARE

## 2019-07-29 ENCOUNTER — TREATMENT (OUTPATIENT)
Dept: OCCUPATIONAL THERAPY | Age: 59
End: 2019-07-29
Payer: MEDICARE

## 2019-07-29 DIAGNOSIS — G81.94 HEMIPLEGIA AFFECTING LEFT NONDOMINANT SIDE, UNSPECIFIED ETIOLOGY, UNSPECIFIED HEMIPLEGIA TYPE (HCC): Primary | ICD-10-CM

## 2019-07-29 DIAGNOSIS — G81.94 LEFT HEMIPLEGIA (HCC): Primary | ICD-10-CM

## 2019-07-29 PROCEDURE — 97116 GAIT TRAINING THERAPY: CPT

## 2019-07-29 PROCEDURE — 97032 APPL MODALITY 1+ESTIM EA 15: CPT | Performed by: OCCUPATIONAL THERAPIST

## 2019-07-29 PROCEDURE — 97110 THERAPEUTIC EXERCISES: CPT | Performed by: OCCUPATIONAL THERAPIST

## 2019-07-29 PROCEDURE — 97530 THERAPEUTIC ACTIVITIES: CPT | Performed by: OCCUPATIONAL THERAPIST

## 2019-07-29 PROCEDURE — 97530 THERAPEUTIC ACTIVITIES: CPT

## 2019-07-29 NOTE — PROGRESS NOTES
OCCUPATIONAL THERAPY   PROGRESS NOTE    Date:  2019  Initial Evaluation Date: 6-3-19    Patient Name:  Meghan Leong    :  1960  Restrictions/Precautions: Activity as tolerated, Moderate fall risk  Diagnosis:  CVA                                                          Insurance/Certification information:  Medicare  Referring Physician:  Julio César Irizarry [place], [id number]  Date of Surgery/Injury: CVAs March- 2019  Plan of care signed (Y/N):  Y  Visit# / total visits: - visits    Pain Level: Moderate pain reported in her hips/ low back/ right shoulder continues  Subjective: \" I feel better today. \"   Objective:  Updated POC to be completed by 8-3-19.     INTERVENTION: COMPLETED: SPECIFICS/COMMENTS:   Modality:     FES- wrist and digit ext x Tolerated well- good result/ completed with a without AROM- better isolation continues        AROM/ AAROM     L UE AROM x shld flexion and ABD, elbow flexion/ ext, supination/ pronation, wrist ext/ flexion   Wand ex w/o ace to left hand x Chest press/ shd press, row and reverse row   Cone stacking x With intermittent assist to extend fingers and cues for positioning- tolerated better with soft thumb ABD splint to assist with opening of the thumb   Bean bag grasp/release  Very challenging/ min assist for hand positioning and cues for patterning   Clothespin grasp and release x  red- intermittent assist to extend fingers for grasp   PROM/Stretching:     L hand with attn to thumb/ digital extension x         Neuro- re-education:     Left andrea-attention  x Getting better needs less verbal cues and tactile cues   Facilitation/ digital ext and flexion x Stroking and tapping   Inhibition techniques x Needed less today due to better spasticity   L UE AROM x Completed with intermittent light stroking and deep pressure to increase propriopceptive awareness to the left arm and minimize substitutions   Strengthening:     Wand ex- 2# x Chest press/ shoulder press   L UE PREs   2# 1-10 shld/ PNF/  2# curls  no ace needed to help sustain    Hand gripper- from store Lowest setting < 20#  is starting to get stronger- hand gripper provided for home use- pt reminded not to over use at home   Other:     Hand helper  Pt would benefit from trial of hand helper due to active fisting but lack of digital extension/ will order   ADL/ functional mobility x Further discussion of issues at home relating to mobility about the kitchen and functioning with the left as assist vs dependency. Strategies for opening containers, carrying items from one room to another discussed. Pt states she has been doing some laundry and other light homemaking as tolerated. Assessment/Comments: Pt is making Good progress toward stated plan of care. The main deficit hindering overall progress is extension of the digits/ thumb. Pt shows some extension especially in the index finger and across the MCPs. However PIP/DIP extension remains limited. Tone is the hand is mildly increased. -Rehab Potential: Good  -Requires OT Follow Up: Yes  Time In: 1400           Time Out: 1500          Visit #: 16    Treatment Charges: Mins Units   Modalities: Estim 15 1   Ther Exercise 30 2   Manual Therapy     Thera Activities  15 1   ADL/Home Mgt      Neuro Re-education     Gait Training     Group Therapy     Non-Billable Service Time     Other     Total Time/Units 60 4       -Response to Treatment: Good improvements continue. Pt states she is trying to use her hand for tasks like tying, carrying things and donning her bra. Pt is concerned about the long recovery. Support provided. Will continue with OT plan of care. Potential for further progress is very good. Goals: Goals for pt can be seen on initial eval occurring on 6-3-19    Plan:   [x]  Continue Plan of care: Treatment covered based on POC and graduated to patient's progress.  Pt education continues at each visit to obtain maximum benefits from skilled OT intervention.   []  [facility] of care:   []  Discharge:       Blind OT/L  [id number]

## 2019-07-31 ENCOUNTER — OFFICE VISIT (OUTPATIENT)
Dept: FAMILY MEDICINE CLINIC | Age: 59
End: 2019-07-31
Payer: MEDICARE

## 2019-07-31 ENCOUNTER — TREATMENT (OUTPATIENT)
Dept: PHYSICAL THERAPY | Age: 59
End: 2019-07-31
Payer: MEDICARE

## 2019-07-31 ENCOUNTER — TREATMENT (OUTPATIENT)
Dept: OCCUPATIONAL THERAPY | Age: 59
End: 2019-07-31
Payer: MEDICARE

## 2019-07-31 ENCOUNTER — HOSPITAL ENCOUNTER (OUTPATIENT)
Age: 59
Discharge: HOME | End: 2019-08-02
Payer: MEDICARE

## 2019-07-31 VITALS
BODY MASS INDEX: 28.45 KG/M2 | HEIGHT: 66 IN | SYSTOLIC BLOOD PRESSURE: 122 MMHG | OXYGEN SATURATION: 98 % | HEART RATE: 69 BPM | TEMPERATURE: 97.9 F | DIASTOLIC BLOOD PRESSURE: 78 MMHG | RESPIRATION RATE: 16 BRPM | WEIGHT: 177 LBS

## 2019-07-31 DIAGNOSIS — G81.94 LEFT HEMIPLEGIA (HCC): Primary | ICD-10-CM

## 2019-07-31 DIAGNOSIS — G81.94 HEMIPLEGIA AFFECTING LEFT NONDOMINANT SIDE, UNSPECIFIED ETIOLOGY, UNSPECIFIED HEMIPLEGIA TYPE (HCC): Primary | ICD-10-CM

## 2019-07-31 DIAGNOSIS — G89.29 CHRONIC MIDLINE LOW BACK PAIN WITH RIGHT-SIDED SCIATICA: Primary | ICD-10-CM

## 2019-07-31 DIAGNOSIS — M54.41 CHRONIC MIDLINE LOW BACK PAIN WITH RIGHT-SIDED SCIATICA: Primary | ICD-10-CM

## 2019-07-31 DIAGNOSIS — E11.649 UNCONTROLLED TYPE 2 DIABETES MELLITUS WITH HYPOGLYCEMIA WITHOUT COMA (HCC): Chronic | ICD-10-CM

## 2019-07-31 LAB — MICROALBUMIN UR-MCNC: 929 MG/L

## 2019-07-31 PROCEDURE — 97116 GAIT TRAINING THERAPY: CPT

## 2019-07-31 PROCEDURE — 1036F TOBACCO NON-USER: CPT | Performed by: NURSE PRACTITIONER

## 2019-07-31 PROCEDURE — 3044F HG A1C LEVEL LT 7.0%: CPT | Performed by: NURSE PRACTITIONER

## 2019-07-31 PROCEDURE — 97032 APPL MODALITY 1+ESTIM EA 15: CPT | Performed by: OCCUPATIONAL THERAPIST

## 2019-07-31 PROCEDURE — 2022F DILAT RTA XM EVC RTNOPTHY: CPT | Performed by: NURSE PRACTITIONER

## 2019-07-31 PROCEDURE — G8427 DOCREV CUR MEDS BY ELIG CLIN: HCPCS | Performed by: NURSE PRACTITIONER

## 2019-07-31 PROCEDURE — G8417 CALC BMI ABV UP PARAM F/U: HCPCS | Performed by: NURSE PRACTITIONER

## 2019-07-31 PROCEDURE — G8598 ASA/ANTIPLAT THER USED: HCPCS | Performed by: NURSE PRACTITIONER

## 2019-07-31 PROCEDURE — 99213 OFFICE O/P EST LOW 20 MIN: CPT | Performed by: NURSE PRACTITIONER

## 2019-07-31 PROCEDURE — 97530 THERAPEUTIC ACTIVITIES: CPT | Performed by: OCCUPATIONAL THERAPIST

## 2019-07-31 PROCEDURE — 82044 UR ALBUMIN SEMIQUANTITATIVE: CPT

## 2019-07-31 PROCEDURE — 97530 THERAPEUTIC ACTIVITIES: CPT

## 2019-07-31 PROCEDURE — 97110 THERAPEUTIC EXERCISES: CPT | Performed by: OCCUPATIONAL THERAPIST

## 2019-07-31 PROCEDURE — 3017F COLORECTAL CA SCREEN DOC REV: CPT | Performed by: NURSE PRACTITIONER

## 2019-07-31 RX ORDER — HYDROCODONE BITARTRATE AND ACETAMINOPHEN 7.5; 325 MG/1; MG/1
1 TABLET ORAL EVERY 6 HOURS PRN
Qty: 120 TABLET | Refills: 0 | Status: SHIPPED | OUTPATIENT
Start: 2019-07-31 | End: 2019-08-28 | Stop reason: SDUPTHER

## 2019-07-31 RX ORDER — AMLODIPINE BESYLATE 10 MG/1
10 TABLET ORAL DAILY
COMMUNITY
End: 2019-11-01

## 2019-07-31 ASSESSMENT — ENCOUNTER SYMPTOMS
VOMITING: 0
SHORTNESS OF BREATH: 0
BACK PAIN: 1
WHEEZING: 0
CONSTIPATION: 1
NAUSEA: 0
COUGH: 0
DIARRHEA: 0

## 2019-07-31 NOTE — PROGRESS NOTES
HPI:  Patient comes intoday for   Chief Complaint   Patient presents with    Back Pain     low back pain. pain is 7/10. pt needs refill of meds   . Complains of pain to low back with radiation to hips and down right leg. She continues to go to PT post CVA for left sided weakness. Prior to Visit Medications    Medication Sig Taking? Authorizing Provider   amLODIPine (NORVASC) 10 MG tablet Take 10 mg by mouth daily Yes Historical Provider, MD   HYDROcodone-acetaminophen (NORCO) 7.5-325 MG per tablet Take 1 tablet by mouth every 6 hours as needed for Pain for up to 30 days.  Yes MKIEL Araujo CNP   furosemide (LASIX) 40 MG tablet Take 40 mg by mouth daily Yes Historical Provider, MD   sulfamethoxazole-trimethoprim (BACTRIM;SEPTRA) 400-80 MG per tablet Take 1 tablet by mouth daily Yes Historical Provider, MD   losartan (COZAAR) 50 MG tablet Take one tablet daily, call with BP > 160/100 Yes MIKEL Araujo CNP   aspirin 81 MG chewable tablet Take 1 tablet by mouth daily Yes Carmine Ruffin MD   atorvastatin (LIPITOR) 20 MG tablet Take 1 tablet by mouth nightly  Patient taking differently: Take 40 mg by mouth nightly  Yes Carmine Ruffin MD   clopidogrel (PLAVIX) 75 MG tablet Take 1 tablet by mouth daily Yes Carmine Ruffin MD   buPROPion [facility] SR) 150 MG extended release tablet TAKE 1 TABLET TWICE DAILY Yes MIKEL Araujo CNP   NYSTATIN 180636 UNIT/GM powder APPLY FOUR TIMES DAILY Yes Sekou Last, DO   BD PEN NEEDLE YUSUF U/F 32G X 4 MM MISC Use with Insulin dosing schedule Yes MIKEL Araujo CNP   omeprazole (PRILOSEC) 40 MG delayed release capsule TAKE 1 CAPSULE EVERY DAY Yes MIKEL Araujo CNP   LANTUS SOLOSTAR 100 UNIT/ML injection pen Inject 40 Units into the skin nightly  Patient taking differently: Inject 20 Units into the skin nightly  Yes MIKEL Araujo CNP   tacrolimus (PROGRAF) 1 MG capsule Take 1 mg by mouth 2 times daily  Yes Historical

## 2019-07-31 NOTE — PROGRESS NOTES
Physical Therapy Daily Treatment Note    Date: 2019  Patient Name: Meghan Leong  : 1960   MRN: [id number]  Date of Onset: 3/2019  Referring Provider:  Lisbet Paul MD   Medical Diagnosis:G81.94 (ICD-10-CM) - Hemiplegia, unspecified affecting left nondominant side     S: Pt c/o pain in back and hips but not as bad d/t recently had a norco at doctor's office today. O:  Time 13:00-14:00     Visit 11 Repeat outcome measure at mid point and end. Pain 0/10     ROM      Modalities            Exercise      Nustep     te         Squats      Calf Raises  te         Marching  ta   Alt. Sidekicks  ta         Sit/Stands 9x/30 sec Unsteady, chair against wall ta         Gait training 3 x 150 ft w/   SBA/CGA gt    50 ft carrying cup in left hand SBA/CGA    Marching Gait HHA +1 mod 2 x 45ft Over 8 hurdles  gt   Sidestepping      STAIRS 6\" 20x CGA    Heel to toe      ABCD toe taps on floor 30 sec Left/right CGA    Leg press 40#  2 x 20 Support left leg    Leg press single leg 5#  Left side  20x      \"    A:  Tolerated well. Pt demo'd 3 LOB during marching with hurdles but only demo'd 1 LOB with second rep. Worked with patient ambulating without AD throughout clinic SBA/CGA around objects in the gym to work on balance and gait training. Pt unsteady at times with min assist needed to prevent falling, VC's needed for safety.   P: Continue with rehab plan  [facility], [facility]    Treatment Charges: Mins Units   Initial Evaluation     Re-Evaluation     Ther Exercise         TE     Manual Therapy     MT     Ther Activities        TA 45 3   Gait Training          GT 15 1   Neuro Re-education NR     Modalities     Non-Billable Service Time     Other     Total Time/Units 60 4

## 2019-08-05 ENCOUNTER — TREATMENT (OUTPATIENT)
Dept: OCCUPATIONAL THERAPY | Age: 59
End: 2019-08-05
Payer: MEDICARE

## 2019-08-05 ENCOUNTER — TREATMENT (OUTPATIENT)
Dept: PHYSICAL THERAPY | Age: 59
End: 2019-08-05
Payer: MEDICARE

## 2019-08-05 DIAGNOSIS — G81.94 HEMIPLEGIA AFFECTING LEFT NONDOMINANT SIDE, UNSPECIFIED ETIOLOGY, UNSPECIFIED HEMIPLEGIA TYPE (HCC): Primary | ICD-10-CM

## 2019-08-05 DIAGNOSIS — G81.94 LEFT HEMIPLEGIA (HCC): Primary | ICD-10-CM

## 2019-08-05 PROCEDURE — 97032 APPL MODALITY 1+ESTIM EA 15: CPT | Performed by: OCCUPATIONAL THERAPIST

## 2019-08-05 PROCEDURE — 97110 THERAPEUTIC EXERCISES: CPT | Performed by: OCCUPATIONAL THERAPIST

## 2019-08-05 PROCEDURE — 97530 THERAPEUTIC ACTIVITIES: CPT | Performed by: PHYSICAL THERAPIST

## 2019-08-05 PROCEDURE — 97530 THERAPEUTIC ACTIVITIES: CPT | Performed by: OCCUPATIONAL THERAPIST

## 2019-08-05 PROCEDURE — 97116 GAIT TRAINING THERAPY: CPT | Performed by: PHYSICAL THERAPIST

## 2019-08-05 NOTE — PROGRESS NOTES
OCCUPATIONAL THERAPY   PROGRESS NOTE    Date:  2019  Initial Evaluation Date: 6-3-19    Patient Name:  Louis Mueller    :  1960  Restrictions/Precautions: Activity as tolerated, Moderate fall risk  Diagnosis:  CVA                                                          Insurance/Certification information:  Medicare  Referring Physician:  Dr Jeancarlos Billings, Jude Angela [address]  Date of Surgery/Injury: CVAs March- 2019  Plan of care signed (Y/N):  Y  Visit# / total visits: - visits    Pain Level: Moderate pain reported in her hips/ low back/ right shoulder continues  Subjective: Pt presents with no new complaints. Objective:  Updated POC to be completed by 8-3-19.     INTERVENTION: COMPLETED: SPECIFICS/COMMENTS:   Modality:     FES- wrist and digit ext x Tolerated well- good result but more fatigued today x 15 min        AROM/ AAROM     L UE AROM x shld flexion and ABD, elbow flexion/ ext, supination/ pronation, wrist ext/ flexion   Wand ex w/o ace to left hand  Chest press/ shd press, row and reverse row   Cone stacking  With intermittent assist to extend fingers and cues for positioning- tolerated better with soft thumb ABD splint to assist with opening of the thumb   Bean bag grasp/release  Very challenging/ min assist for hand positioning and cues for patterning   Clothespin grasp and release   red- intermittent assist to extend fingers for grasp   PROM/Stretching:     L hand with attn to thumb/ digital extension x         Neuro- re-education:     Left andrea-attention  x better   Facilitation/ digital ext and flexion x Stroking , physical prompts and tapping   Strengthening:     Wand ex- 2#  Chest press/ shoulder press   L UE PREs   1# 1-10 shld/ PNF/   2# curls    Hand gripper- from store Lowest setting < 20#  is starting to get stronger- hand gripper provided for home use- pt reminded not to over use at home   Other:     Hand helper  Pt would benefit from

## 2019-08-07 ENCOUNTER — TREATMENT (OUTPATIENT)
Dept: OCCUPATIONAL THERAPY | Age: 59
End: 2019-08-07
Payer: MEDICARE

## 2019-08-07 ENCOUNTER — TREATMENT (OUTPATIENT)
Dept: PHYSICAL THERAPY | Age: 59
End: 2019-08-07
Payer: MEDICARE

## 2019-08-07 DIAGNOSIS — Z86.73 HISTORY OF CVA (CEREBROVASCULAR ACCIDENT): ICD-10-CM

## 2019-08-07 DIAGNOSIS — G81.94 LEFT HEMIPLEGIA (HCC): Primary | ICD-10-CM

## 2019-08-07 DIAGNOSIS — G81.94 HEMIPLEGIA AFFECTING LEFT NONDOMINANT SIDE, UNSPECIFIED ETIOLOGY, UNSPECIFIED HEMIPLEGIA TYPE (HCC): Primary | ICD-10-CM

## 2019-08-07 PROCEDURE — 97110 THERAPEUTIC EXERCISES: CPT | Performed by: OCCUPATIONAL THERAPIST

## 2019-08-07 PROCEDURE — 97530 THERAPEUTIC ACTIVITIES: CPT

## 2019-08-07 PROCEDURE — 97032 APPL MODALITY 1+ESTIM EA 15: CPT | Performed by: OCCUPATIONAL THERAPIST

## 2019-08-07 PROCEDURE — 97116 GAIT TRAINING THERAPY: CPT

## 2019-08-07 PROCEDURE — 97530 THERAPEUTIC ACTIVITIES: CPT | Performed by: OCCUPATIONAL THERAPIST

## 2019-08-12 ENCOUNTER — TELEPHONE (OUTPATIENT)
Dept: PHYSICAL THERAPY | Age: 59
End: 2019-08-12

## 2019-08-14 ENCOUNTER — TREATMENT (OUTPATIENT)
Dept: OCCUPATIONAL THERAPY | Age: 59
End: 2019-08-14
Payer: MEDICARE

## 2019-08-14 ENCOUNTER — TREATMENT (OUTPATIENT)
Dept: PHYSICAL THERAPY | Age: 59
End: 2019-08-14
Payer: MEDICARE

## 2019-08-14 DIAGNOSIS — G81.94 HEMIPLEGIA AFFECTING LEFT NONDOMINANT SIDE, UNSPECIFIED ETIOLOGY, UNSPECIFIED HEMIPLEGIA TYPE (HCC): Primary | ICD-10-CM

## 2019-08-14 DIAGNOSIS — G81.94 LEFT HEMIPLEGIA (HCC): Primary | ICD-10-CM

## 2019-08-14 PROCEDURE — 97116 GAIT TRAINING THERAPY: CPT

## 2019-08-14 PROCEDURE — 97110 THERAPEUTIC EXERCISES: CPT | Performed by: OCCUPATIONAL THERAPIST

## 2019-08-14 PROCEDURE — 97110 THERAPEUTIC EXERCISES: CPT

## 2019-08-14 PROCEDURE — 97032 APPL MODALITY 1+ESTIM EA 15: CPT | Performed by: OCCUPATIONAL THERAPIST

## 2019-08-14 PROCEDURE — 97530 THERAPEUTIC ACTIVITIES: CPT | Performed by: OCCUPATIONAL THERAPIST

## 2019-08-19 ENCOUNTER — TREATMENT (OUTPATIENT)
Dept: PHYSICAL THERAPY | Age: 59
End: 2019-08-19
Payer: MEDICARE

## 2019-08-19 ENCOUNTER — TREATMENT (OUTPATIENT)
Dept: OCCUPATIONAL THERAPY | Age: 59
End: 2019-08-19
Payer: MEDICARE

## 2019-08-19 DIAGNOSIS — G81.94 LEFT HEMIPLEGIA (HCC): Primary | ICD-10-CM

## 2019-08-19 DIAGNOSIS — Z86.73 HISTORY OF CVA (CEREBROVASCULAR ACCIDENT): ICD-10-CM

## 2019-08-19 DIAGNOSIS — G81.94 HEMIPLEGIA AFFECTING LEFT NONDOMINANT SIDE, UNSPECIFIED ETIOLOGY, UNSPECIFIED HEMIPLEGIA TYPE (HCC): Primary | ICD-10-CM

## 2019-08-19 PROCEDURE — 97530 THERAPEUTIC ACTIVITIES: CPT | Performed by: OCCUPATIONAL THERAPIST

## 2019-08-19 PROCEDURE — 97530 THERAPEUTIC ACTIVITIES: CPT

## 2019-08-19 PROCEDURE — 97112 NEUROMUSCULAR REEDUCATION: CPT | Performed by: OCCUPATIONAL THERAPIST

## 2019-08-19 PROCEDURE — 97110 THERAPEUTIC EXERCISES: CPT

## 2019-08-19 PROCEDURE — 97110 THERAPEUTIC EXERCISES: CPT | Performed by: OCCUPATIONAL THERAPIST

## 2019-08-19 NOTE — PROGRESS NOTES
OCCUPATIONAL THERAPY   PROGRESS NOTE    Date:  2019  Initial Evaluation Date: 6-3-19    Patient Name:  Elin Chou    :  1960  Restrictions/Precautions: Activity as tolerated, Moderate fall risk  Diagnosis:  CVA                                                          Insurance/Certification information:  Medicare  Referring Physician:  Julio César Zavala [place], [address]  Date of Surgery/Injury: CVAs March- 2019  Plan of care signed (Y/N):  Y  Visit# / total visits: - visits    Pain Level: Moderate pain reported in her hips/ low back/ right shoulder continues  Subjective: Pt presents with no new complaints. Objective:  Updated POC to be completed by 8-3-19.     INTERVENTION: COMPLETED: SPECIFICS/COMMENTS:   Modality:     FES- wrist and digit ext  Tolerated well- good result but more fatigued today x 15 min        AROM/ AAROM     L UE AROM x shld flexion and ABD, elbow flexion/ ext, supination/ pronation, wrist ext/ flexion   Wand ex w/o ace to left hand  Chest press/ shd press, row and reverse row   Cone stacking  With intermittent assist to extend fingers and cues for positioning- tolerated better with soft thumb ABD splint    Bean bag grasp/release  Very challenging/ min assist for hand positioning and cues for patterning   Clothespin grasp and release   red- intermittent assist to extend fingers for grasp   PROM/Stretching:     L hand with attn to thumb/ digital extension x         Neuro- re-education:     Left andrea-attention  x better   Facilitation/ digital ext and flexion x [place] at times with PP to sustain    Strengthening:     Wand ex- 5# 1-10 each Chest press/ shoulder press   L UE PREs x   shld press 2# 2-5  Curls 3# 2-10  Wrist 2# 1-10   Hand gripper- from store   is starting to get stronger- hand gripper provided for home use- pt reminded not to over use at home   Other:     Hand helper  Pt would benefit from trial of hand helper due to

## 2019-08-21 ENCOUNTER — TREATMENT (OUTPATIENT)
Dept: OCCUPATIONAL THERAPY | Age: 59
End: 2019-08-21
Payer: MEDICARE

## 2019-08-21 ENCOUNTER — TREATMENT (OUTPATIENT)
Dept: PHYSICAL THERAPY | Age: 59
End: 2019-08-21
Payer: MEDICARE

## 2019-08-21 DIAGNOSIS — I63.9 ACUTE CVA (CEREBROVASCULAR ACCIDENT) (HCC): ICD-10-CM

## 2019-08-21 DIAGNOSIS — G81.94 LEFT HEMIPLEGIA (HCC): Primary | ICD-10-CM

## 2019-08-21 DIAGNOSIS — Z86.73 HISTORY OF CVA (CEREBROVASCULAR ACCIDENT): ICD-10-CM

## 2019-08-21 DIAGNOSIS — G81.94 HEMIPLEGIA AFFECTING LEFT NONDOMINANT SIDE, UNSPECIFIED ETIOLOGY, UNSPECIFIED HEMIPLEGIA TYPE (HCC): Primary | ICD-10-CM

## 2019-08-21 PROCEDURE — 97032 APPL MODALITY 1+ESTIM EA 15: CPT | Performed by: OCCUPATIONAL THERAPIST

## 2019-08-21 PROCEDURE — 97112 NEUROMUSCULAR REEDUCATION: CPT | Performed by: OCCUPATIONAL THERAPIST

## 2019-08-21 PROCEDURE — 97530 THERAPEUTIC ACTIVITIES: CPT | Performed by: OCCUPATIONAL THERAPIST

## 2019-08-21 PROCEDURE — 97112 NEUROMUSCULAR REEDUCATION: CPT

## 2019-08-21 PROCEDURE — 97110 THERAPEUTIC EXERCISES: CPT | Performed by: OCCUPATIONAL THERAPIST

## 2019-08-21 PROCEDURE — 97530 THERAPEUTIC ACTIVITIES: CPT

## 2019-08-21 NOTE — PROGRESS NOTES
OCCUPATIONAL THERAPY   PROGRESS NOTE    Date:  2019  Initial Evaluation Date: 6-3-19    Patient Name:  Aubrey Peña    :  1960  Restrictions/Precautions: Activity as tolerated, Moderate fall risk  Diagnosis:  CVA                                                          Insurance/Certification information:  Medicare  Referring Physician:  Julio César Keen [place], [address]  Date of Surgery/Injury: CVAs March- 2019  Plan of care signed (Y/N):  Y  Visit# / total visits:  visits    Pain Level: Moderate pain reported in her hips/ low back/ right shoulder continues  Subjective: Pt presents with no new complaints. Objective:  Updated POC to be completed by 8-3-19.     INTERVENTION: COMPLETED: SPECIFICS/COMMENTS:   Modality:     FES- wrist and digit ext x PPR 2/ Tolerated well- good result but more fatigued today x 15 min        AROM/ AAROM     L UE AROM x shld flexion and ABD, elbow flexion/ ext, supination/ pronation, wrist ext/ flexion   Wand ex w/o ace to left hand  Chest press/ shd press, row and reverse row   Cone stacking  With intermittent assist to extend fingers and cues for positioning- tolerated better with soft thumb ABD splint    Over shd ring activity x    Bean bag grasp/release  Very challenging/ min assist for hand positioning and cues for patterning   Clothespin grasp and release   red- intermittent assist to extend fingers for grasp   PROM/Stretching:     L hand with attn to thumb/ digital extension x         Neuro- re-education:     Left andrea-attention  x better   Facilitation/ digital ext and flexion x [place] at times with PP to sustain    Strengthening:     Wand ex- 5# 1-10 each Chest press/ shoulder press   L UE PREs x   shld press 2# 2-5  Curls 3# 2-10  Wrist 2# 1-10   Hand gripper- from store   is starting to get stronger- hand gripper provided for home use- pt reminded not to over use at home   Other:     Hand helper  Pt would benefit

## 2019-08-28 ENCOUNTER — OFFICE VISIT (OUTPATIENT)
Dept: FAMILY MEDICINE CLINIC | Age: 59
End: 2019-08-28
Payer: MEDICARE

## 2019-08-28 VITALS
HEART RATE: 65 BPM | WEIGHT: 172 LBS | OXYGEN SATURATION: 98 % | RESPIRATION RATE: 16 BRPM | HEIGHT: 66 IN | SYSTOLIC BLOOD PRESSURE: 138 MMHG | TEMPERATURE: 97.9 F | DIASTOLIC BLOOD PRESSURE: 88 MMHG | BODY MASS INDEX: 27.64 KG/M2

## 2019-08-28 DIAGNOSIS — E11.649 UNCONTROLLED TYPE 2 DIABETES MELLITUS WITH HYPOGLYCEMIA WITHOUT COMA (HCC): Primary | Chronic | ICD-10-CM

## 2019-08-28 DIAGNOSIS — M54.41 CHRONIC MIDLINE LOW BACK PAIN WITH RIGHT-SIDED SCIATICA: ICD-10-CM

## 2019-08-28 DIAGNOSIS — J06.9 VIRAL URI: ICD-10-CM

## 2019-08-28 DIAGNOSIS — G89.29 CHRONIC MIDLINE LOW BACK PAIN WITH RIGHT-SIDED SCIATICA: ICD-10-CM

## 2019-08-28 LAB — HBA1C MFR BLD: 6.1 %

## 2019-08-28 PROCEDURE — 99214 OFFICE O/P EST MOD 30 MIN: CPT | Performed by: NURSE PRACTITIONER

## 2019-08-28 PROCEDURE — 83036 HEMOGLOBIN GLYCOSYLATED A1C: CPT | Performed by: NURSE PRACTITIONER

## 2019-08-28 PROCEDURE — 3017F COLORECTAL CA SCREEN DOC REV: CPT | Performed by: NURSE PRACTITIONER

## 2019-08-28 PROCEDURE — G8417 CALC BMI ABV UP PARAM F/U: HCPCS | Performed by: NURSE PRACTITIONER

## 2019-08-28 PROCEDURE — 2022F DILAT RTA XM EVC RTNOPTHY: CPT | Performed by: NURSE PRACTITIONER

## 2019-08-28 PROCEDURE — G8427 DOCREV CUR MEDS BY ELIG CLIN: HCPCS | Performed by: NURSE PRACTITIONER

## 2019-08-28 PROCEDURE — 1036F TOBACCO NON-USER: CPT | Performed by: NURSE PRACTITIONER

## 2019-08-28 PROCEDURE — G8598 ASA/ANTIPLAT THER USED: HCPCS | Performed by: NURSE PRACTITIONER

## 2019-08-28 PROCEDURE — 3044F HG A1C LEVEL LT 7.0%: CPT | Performed by: NURSE PRACTITIONER

## 2019-08-28 RX ORDER — HYDROCODONE BITARTRATE AND ACETAMINOPHEN 7.5; 325 MG/1; MG/1
1 TABLET ORAL EVERY 6 HOURS PRN
Qty: 120 TABLET | Refills: 0 | Status: SHIPPED | OUTPATIENT
Start: 2019-08-28 | End: 2019-09-25 | Stop reason: SDUPTHER

## 2019-08-28 RX ORDER — FLASH GLUCOSE SCANNING READER
1 EACH MISCELLANEOUS ONCE
Qty: 1 DEVICE | Refills: 0 | Status: SHIPPED | OUTPATIENT
Start: 2019-08-28 | End: 2019-09-25

## 2019-08-28 RX ORDER — BENZONATATE 100 MG/1
100 CAPSULE ORAL 3 TIMES DAILY PRN
Qty: 21 CAPSULE | Refills: 0 | Status: SHIPPED | OUTPATIENT
Start: 2019-08-28 | End: 2019-09-04

## 2019-08-28 RX ORDER — FLASH GLUCOSE SENSOR
1 KIT MISCELLANEOUS 3 TIMES DAILY
Qty: 2 EACH | Refills: 3 | Status: SHIPPED | OUTPATIENT
Start: 2019-08-28 | End: 2019-11-05 | Stop reason: CLARIF

## 2019-08-28 ASSESSMENT — ENCOUNTER SYMPTOMS
SHORTNESS OF BREATH: 0
CONSTIPATION: 0
VOMITING: 0
NAUSEA: 0
WHEEZING: 0
DIARRHEA: 0
COUGH: 1
BACK PAIN: 1

## 2019-09-04 ENCOUNTER — TREATMENT (OUTPATIENT)
Dept: OCCUPATIONAL THERAPY | Age: 59
End: 2019-09-04
Payer: MEDICARE

## 2019-09-04 ENCOUNTER — TREATMENT (OUTPATIENT)
Dept: PHYSICAL THERAPY | Age: 59
End: 2019-09-04
Payer: MEDICARE

## 2019-09-04 DIAGNOSIS — G81.94 HEMIPLEGIA AFFECTING LEFT NONDOMINANT SIDE, UNSPECIFIED ETIOLOGY, UNSPECIFIED HEMIPLEGIA TYPE (HCC): Primary | ICD-10-CM

## 2019-09-04 DIAGNOSIS — G81.94 LEFT HEMIPLEGIA (HCC): Primary | ICD-10-CM

## 2019-09-04 PROCEDURE — 97530 THERAPEUTIC ACTIVITIES: CPT | Performed by: OCCUPATIONAL THERAPIST

## 2019-09-04 PROCEDURE — 97032 APPL MODALITY 1+ESTIM EA 15: CPT | Performed by: OCCUPATIONAL THERAPIST

## 2019-09-04 PROCEDURE — 97110 THERAPEUTIC EXERCISES: CPT | Performed by: OCCUPATIONAL THERAPIST

## 2019-09-04 PROCEDURE — 97112 NEUROMUSCULAR REEDUCATION: CPT | Performed by: OCCUPATIONAL THERAPIST

## 2019-09-04 PROCEDURE — 97112 NEUROMUSCULAR REEDUCATION: CPT

## 2019-09-04 PROCEDURE — 97530 THERAPEUTIC ACTIVITIES: CPT

## 2019-09-09 ENCOUNTER — TREATMENT (OUTPATIENT)
Dept: PHYSICAL THERAPY | Age: 59
End: 2019-09-09
Payer: MEDICARE

## 2019-09-09 ENCOUNTER — TREATMENT (OUTPATIENT)
Dept: OCCUPATIONAL THERAPY | Age: 59
End: 2019-09-09
Payer: MEDICARE

## 2019-09-09 DIAGNOSIS — G81.94 HEMIPLEGIA AFFECTING LEFT NONDOMINANT SIDE, UNSPECIFIED ETIOLOGY, UNSPECIFIED HEMIPLEGIA TYPE (HCC): Primary | ICD-10-CM

## 2019-09-09 DIAGNOSIS — I63.9 ACUTE CVA (CEREBROVASCULAR ACCIDENT) (HCC): ICD-10-CM

## 2019-09-09 DIAGNOSIS — Z86.73 HISTORY OF CVA (CEREBROVASCULAR ACCIDENT): ICD-10-CM

## 2019-09-09 DIAGNOSIS — G81.94 LEFT HEMIPLEGIA (HCC): Primary | ICD-10-CM

## 2019-09-09 PROCEDURE — 97112 NEUROMUSCULAR REEDUCATION: CPT

## 2019-09-09 PROCEDURE — 97112 NEUROMUSCULAR REEDUCATION: CPT | Performed by: OCCUPATIONAL THERAPIST

## 2019-09-09 PROCEDURE — 97530 THERAPEUTIC ACTIVITIES: CPT

## 2019-09-09 PROCEDURE — 97110 THERAPEUTIC EXERCISES: CPT | Performed by: OCCUPATIONAL THERAPIST

## 2019-09-09 PROCEDURE — 97530 THERAPEUTIC ACTIVITIES: CPT | Performed by: OCCUPATIONAL THERAPIST

## 2019-09-09 PROCEDURE — 97032 APPL MODALITY 1+ESTIM EA 15: CPT | Performed by: OCCUPATIONAL THERAPIST

## 2019-09-11 ENCOUNTER — TREATMENT (OUTPATIENT)
Dept: OCCUPATIONAL THERAPY | Age: 59
End: 2019-09-11
Payer: MEDICARE

## 2019-09-11 ENCOUNTER — TELEPHONE (OUTPATIENT)
Dept: FAMILY MEDICINE CLINIC | Age: 59
End: 2019-09-11

## 2019-09-11 ENCOUNTER — TREATMENT (OUTPATIENT)
Dept: PHYSICAL THERAPY | Age: 59
End: 2019-09-11
Payer: MEDICARE

## 2019-09-11 DIAGNOSIS — G81.94 LEFT HEMIPLEGIA (HCC): Primary | ICD-10-CM

## 2019-09-11 DIAGNOSIS — G81.94 HEMIPLEGIA AFFECTING LEFT NONDOMINANT SIDE, UNSPECIFIED ETIOLOGY, UNSPECIFIED HEMIPLEGIA TYPE (HCC): Primary | ICD-10-CM

## 2019-09-11 PROCEDURE — 97110 THERAPEUTIC EXERCISES: CPT | Performed by: OCCUPATIONAL THERAPIST

## 2019-09-11 PROCEDURE — 97112 NEUROMUSCULAR REEDUCATION: CPT

## 2019-09-11 PROCEDURE — 97112 NEUROMUSCULAR REEDUCATION: CPT | Performed by: OCCUPATIONAL THERAPIST

## 2019-09-11 PROCEDURE — 97032 APPL MODALITY 1+ESTIM EA 15: CPT | Performed by: OCCUPATIONAL THERAPIST

## 2019-09-11 PROCEDURE — 97530 THERAPEUTIC ACTIVITIES: CPT

## 2019-09-11 PROCEDURE — 97530 THERAPEUTIC ACTIVITIES: CPT | Performed by: OCCUPATIONAL THERAPIST

## 2019-09-11 NOTE — TELEPHONE ENCOUNTER
Spoke to [employer] for prior auth for [employer]. All information requested faxed to [phone].

## 2019-09-12 NOTE — PROGRESS NOTES
advanced homemaking. 6)Patient will demonstrate improved functional activity tolerance from fair to good for ADL/IADL completion. (progress noted)  Functional mobility is fair+. 7)Patient/caregiver to demonstrate proper follow through of home modification/adaptive recommendations to increase safe functional ADLs. ( ongoing)    -Rehab Potential: Good  -Requires OT Follow Up: Yes  Time In: 1005         Time Out: 1100          Visit #: 26    Treatment Charges: Mins Units   Modalities: Estim 15 1   Ther Exercise 20 1   Manual Therapy     Thera Activities  10 1   ADL/Home Mgt      Neuro Re-education 10 1   Gait Training     Group Therapy     Non-Billable Service Time     Other     Total Time/Units 55 4       -Response to Treatment: Patient continues to show good progress. Her main limitation at this point is lack of digital/ thumb extension with hand weakness. Continued OT is recommended at 1-2x/ week x 4 more weeks. Potential for further progress is good. Will consider Saebo glove to help with progress. Goals: Goals for pt can be seen on initial eval occurring on 6-3-19 with re-evaluation 67-5-19 and 8-7-19. Plan:   [x]  Continue Plan of care: Treatment covered based on POC and graduated to patient's progress. Pt education continues at each visit to obtain maximum benefits from skilled OT intervention. []  [address] of care:   []  Discharge:      Nicolasa Gil OT/L  [id number]         I certify that the above evaluation and plan of care for Occupational Therapy services are appropriate and medically necessary.     Duration: From:  9-11-19   thru   11-11-19                                                                              _______________________________________                                                                         Physician Signature

## 2019-09-16 ENCOUNTER — TREATMENT (OUTPATIENT)
Dept: PHYSICAL THERAPY | Age: 59
End: 2019-09-16
Payer: MEDICARE

## 2019-09-16 ENCOUNTER — TREATMENT (OUTPATIENT)
Dept: OCCUPATIONAL THERAPY | Age: 59
End: 2019-09-16
Payer: MEDICARE

## 2019-09-16 DIAGNOSIS — G81.94 HEMIPLEGIA AFFECTING LEFT NONDOMINANT SIDE, UNSPECIFIED ETIOLOGY, UNSPECIFIED HEMIPLEGIA TYPE (HCC): Primary | ICD-10-CM

## 2019-09-16 DIAGNOSIS — G81.94 LEFT HEMIPLEGIA (HCC): Primary | ICD-10-CM

## 2019-09-16 PROCEDURE — 97032 APPL MODALITY 1+ESTIM EA 15: CPT | Performed by: OCCUPATIONAL THERAPIST

## 2019-09-16 PROCEDURE — 97530 THERAPEUTIC ACTIVITIES: CPT | Performed by: OCCUPATIONAL THERAPIST

## 2019-09-16 PROCEDURE — 97112 NEUROMUSCULAR REEDUCATION: CPT | Performed by: OCCUPATIONAL THERAPIST

## 2019-09-16 PROCEDURE — 97530 THERAPEUTIC ACTIVITIES: CPT | Performed by: PHYSICAL THERAPIST

## 2019-09-16 PROCEDURE — 97110 THERAPEUTIC EXERCISES: CPT | Performed by: OCCUPATIONAL THERAPIST

## 2019-09-16 PROCEDURE — 97110 THERAPEUTIC EXERCISES: CPT | Performed by: PHYSICAL THERAPIST

## 2019-09-16 NOTE — PROGRESS NOTES
OCCUPATIONAL THERAPY   PROGRESS NOTE    Date: 2019  Initial Evaluation Date: 6-3-19    Patient Name:  Gregg Toro    :  1960  Restrictions/Precautions: Activity as tolerated, Moderate fall risk  Diagnosis:  CVA                                                          Insurance/Certification information:  Medicare  Referring Physician:  Julio César Alfaro [place], [address]  Date of Surgery/Injury: CVAs March- 2019  Plan of care signed (Y/N):  Y  Visit# / total visits: 27/ up to 34 visits    Pain Level: 7/10 reported in her hips/ low back/ both shoulders   Subjective: \" I hurt all over today. \"  Objective:  Updated POC to be completed by 8-3-19.     INTERVENTION: COMPLETED: SPECIFICS/COMMENTS:   Modality:     FES- wrist and digit ext x PPR 2/ Tolerated well- weak and  more fatigued today x 15 min        AROM/ AAROM     L UE AROM x shld flexion and ABD, elbow flexion/ ext, supination/ pronation, wrist ext/ flexion   Wand ex w/o ace to left hand  Chest press/ shd press, row and reverse row   Cone stacking x With intermittent assist to extend fingers and cues for positioning- tolerated fair with soft thumb ABD splint   wristciser     Over shd ring activity     Block / jumbo pegs grasp/ release  Completed with verbal cues and intermittent physical assist for hand placement        PROM/Stretching:     L hand with attn to thumb/ digital extension x         Neuro- re-education:     Left andrea-attention  x resolved   Tactile and verbal cues to minimize substitution pattern x Still needed   Strengthening:     Wand ex- 5#  Chest press/ shoulder press   L UE PREs    shld press 2# 2-5  Curls 3# 2-10  Wrist 2# 1-10   Hand gripper- from store   is starting to get stronger- hand gripper provided for home use- pt reminded not to over use at home   Other:     Hand helper  Pt would benefit from trial of hand helper due to active fisting but lack of digital extension/ will order   ADL/ functional mobility x Further discussion of issues at home relating to mobility about the kitchen and functioning with the left as assist vs dependency. Strategies for opening containers, carrying items from one room to another discussed. Pt states she has been doing some laundry and other light homemaking as tolerated. Assessment/Comments: Pt is making Good progress toward stated plan of care. Patient is very fatigued today. She states she is hurting more today and hasn't been sleeping. Frequent short rests needed. -Rehab Potential: Good  -Requires OT Follow Up: Yes  Time In: 1005         Time Out: 1100          Visit #: 27    Treatment Charges: Mins Units   Modalities: Estim 15 1   Ther Exercise 20 1   Manual Therapy     Thera Activities  10 1   ADL/Home Mgt      Neuro Re-education 10 1   Gait Training     Group Therapy     Non-Billable Service Time     Other     Total Time/Units 55 4       -Response to Treatment: Continue with OT POC. Goals: Goals for pt can be seen on initial eval occurring on 6-3-19 with re-evaluation 67-5-19 and 8-7-19. Plan:   [x]  Continue Plan of care: Treatment covered based on POC and graduated to patient's progress. Pt education continues at each visit to obtain maximum benefits from skilled OT intervention.   []  [facility] of care:   []  Discharge:      Bernadine Sánchez OT/L  [id number]

## 2019-09-18 ENCOUNTER — TREATMENT (OUTPATIENT)
Dept: PHYSICAL THERAPY | Age: 59
End: 2019-09-18
Payer: MEDICARE

## 2019-09-18 ENCOUNTER — TREATMENT (OUTPATIENT)
Dept: OCCUPATIONAL THERAPY | Age: 59
End: 2019-09-18
Payer: MEDICARE

## 2019-09-18 DIAGNOSIS — G81.94 HEMIPLEGIA AFFECTING LEFT NONDOMINANT SIDE, UNSPECIFIED ETIOLOGY, UNSPECIFIED HEMIPLEGIA TYPE (HCC): Primary | ICD-10-CM

## 2019-09-18 DIAGNOSIS — G81.94 LEFT HEMIPLEGIA (HCC): Primary | ICD-10-CM

## 2019-09-18 PROCEDURE — 97032 APPL MODALITY 1+ESTIM EA 15: CPT | Performed by: OCCUPATIONAL THERAPIST

## 2019-09-18 PROCEDURE — 97530 THERAPEUTIC ACTIVITIES: CPT | Performed by: OCCUPATIONAL THERAPIST

## 2019-09-18 PROCEDURE — 97112 NEUROMUSCULAR REEDUCATION: CPT | Performed by: PHYSICAL THERAPIST

## 2019-09-18 PROCEDURE — 97110 THERAPEUTIC EXERCISES: CPT | Performed by: OCCUPATIONAL THERAPIST

## 2019-09-18 PROCEDURE — 97530 THERAPEUTIC ACTIVITIES: CPT | Performed by: PHYSICAL THERAPIST

## 2019-09-23 ENCOUNTER — TREATMENT (OUTPATIENT)
Dept: PHYSICAL THERAPY | Age: 59
End: 2019-09-23
Payer: MEDICARE

## 2019-09-23 ENCOUNTER — TREATMENT (OUTPATIENT)
Dept: OCCUPATIONAL THERAPY | Age: 59
End: 2019-09-23
Payer: MEDICARE

## 2019-09-23 DIAGNOSIS — G81.94 HEMIPLEGIA AFFECTING LEFT NONDOMINANT SIDE, UNSPECIFIED ETIOLOGY, UNSPECIFIED HEMIPLEGIA TYPE (HCC): Primary | ICD-10-CM

## 2019-09-23 DIAGNOSIS — G81.94 LEFT HEMIPLEGIA (HCC): Primary | ICD-10-CM

## 2019-09-23 PROCEDURE — 97110 THERAPEUTIC EXERCISES: CPT | Performed by: OCCUPATIONAL THERAPIST

## 2019-09-23 PROCEDURE — 97530 THERAPEUTIC ACTIVITIES: CPT | Performed by: OCCUPATIONAL THERAPIST

## 2019-09-23 PROCEDURE — 97110 THERAPEUTIC EXERCISES: CPT | Performed by: PHYSICAL THERAPIST

## 2019-09-23 PROCEDURE — 97530 THERAPEUTIC ACTIVITIES: CPT | Performed by: PHYSICAL THERAPIST

## 2019-09-23 PROCEDURE — 97032 APPL MODALITY 1+ESTIM EA 15: CPT | Performed by: OCCUPATIONAL THERAPIST

## 2019-09-25 ENCOUNTER — OFFICE VISIT (OUTPATIENT)
Dept: FAMILY MEDICINE CLINIC | Age: 59
End: 2019-09-25
Payer: MEDICARE

## 2019-09-25 ENCOUNTER — TELEPHONE (OUTPATIENT)
Dept: PHYSICAL THERAPY | Age: 59
End: 2019-09-25

## 2019-09-25 VITALS
WEIGHT: 168 LBS | TEMPERATURE: 97.8 F | BODY MASS INDEX: 27 KG/M2 | DIASTOLIC BLOOD PRESSURE: 94 MMHG | SYSTOLIC BLOOD PRESSURE: 156 MMHG | RESPIRATION RATE: 16 BRPM | OXYGEN SATURATION: 98 % | HEART RATE: 65 BPM | HEIGHT: 66 IN

## 2019-09-25 DIAGNOSIS — G89.29 CHRONIC MIDLINE LOW BACK PAIN WITH RIGHT-SIDED SCIATICA: ICD-10-CM

## 2019-09-25 DIAGNOSIS — M54.41 CHRONIC MIDLINE LOW BACK PAIN WITH RIGHT-SIDED SCIATICA: ICD-10-CM

## 2019-09-25 PROCEDURE — 3017F COLORECTAL CA SCREEN DOC REV: CPT | Performed by: NURSE PRACTITIONER

## 2019-09-25 PROCEDURE — G8427 DOCREV CUR MEDS BY ELIG CLIN: HCPCS | Performed by: NURSE PRACTITIONER

## 2019-09-25 PROCEDURE — 99213 OFFICE O/P EST LOW 20 MIN: CPT | Performed by: NURSE PRACTITIONER

## 2019-09-25 PROCEDURE — G8417 CALC BMI ABV UP PARAM F/U: HCPCS | Performed by: NURSE PRACTITIONER

## 2019-09-25 PROCEDURE — 1036F TOBACCO NON-USER: CPT | Performed by: NURSE PRACTITIONER

## 2019-09-25 PROCEDURE — G8598 ASA/ANTIPLAT THER USED: HCPCS | Performed by: NURSE PRACTITIONER

## 2019-09-25 RX ORDER — HYDROCODONE BITARTRATE AND ACETAMINOPHEN 7.5; 325 MG/1; MG/1
1 TABLET ORAL EVERY 6 HOURS PRN
Qty: 120 TABLET | Refills: 0 | Status: SHIPPED | OUTPATIENT
Start: 2019-09-25 | End: 2019-11-05 | Stop reason: SDUPTHER

## 2019-09-25 RX ORDER — CLOPIDOGREL BISULFATE 75 MG/1
75 TABLET ORAL DAILY
Qty: 90 TABLET | Refills: 0 | Status: SHIPPED | OUTPATIENT
Start: 2019-09-25 | End: 2019-11-05 | Stop reason: ALTCHOICE

## 2019-09-25 RX ORDER — CLOPIDOGREL BISULFATE 75 MG/1
75 TABLET ORAL DAILY
Qty: 30 TABLET | Refills: 0 | Status: SHIPPED | OUTPATIENT
Start: 2019-09-25 | End: 2019-09-25 | Stop reason: SDUPTHER

## 2019-09-25 ASSESSMENT — ENCOUNTER SYMPTOMS
DIARRHEA: 0
NAUSEA: 0
CONSTIPATION: 0
BACK PAIN: 1
VOMITING: 0
COUGH: 0
WHEEZING: 0
SHORTNESS OF BREATH: 0

## 2019-09-25 NOTE — PROGRESS NOTES
27.12 kg/m²     Physical Exam  Physical Exam   Constitutional: She is oriented to person, place, and time. She appears well-developed and well-nourished. No distress. Wheelchair bound   HENT:   Head: Normocephalic and atraumatic. Neck: No tracheal deviation present. No thyromegaly present. Cardiovascular: Normal rate, regular rhythm, normal heart sounds and intact distal pulses. No murmur heard. Pulmonary/Chest: Effort normal and breath sounds normal. No respiratory distress. She has no wheezes. She has no rales. She exhibits no tenderness. Abdominal: Soft. Bowel sounds are normal. There is no tenderness. Musculoskeletal: She exhibits tenderness. She exhibits no edema. Pain to lumbar spine with bilateral paraspinal tenderness. Left upper and lower extremities 3/5, right 5/5   Lymphadenopathy:     She has no cervical adenopathy. Neurological: She is alert and oriented to person, place, and time. Skin: Skin is warm and dry. Psychiatric: She has a normal mood and affect. Her behavior is normal.         Assessment/Plan:    Phyllis Cortes was seen today for back pain and hypertension. Diagnoses and all orders for this visit:    Chronic midline low back pain with right-sided sciatica  -     HYDROcodone-acetaminophen (NORCO) 7.5-325 MG per tablet; Take 1 tablet by mouth every 6 hours as needed for Pain for up to 30 days.  -The current medical regimen is effective;  continue present plan and medications. Other orders  -     Discontinue: clopidogrel (PLAVIX) 75 MG tablet; Take 1 tablet by mouth daily          Controlled Substance Monitoring:    Acute and Chronic Pain Monitoring:   RX Monitoring 9/25/2019   Attestation -   Periodic Controlled Substance Monitoring No signs of potential drug abuse or diversion identified.            Greater than 15  Minutes was spent with patient and more than 50% of the time was spent face to facecounseling and educating regarding diagnoses

## 2019-09-30 ENCOUNTER — TREATMENT (OUTPATIENT)
Dept: PHYSICAL THERAPY | Age: 59
End: 2019-09-30
Payer: MEDICARE

## 2019-09-30 ENCOUNTER — TREATMENT (OUTPATIENT)
Dept: OCCUPATIONAL THERAPY | Age: 59
End: 2019-09-30
Payer: MEDICARE

## 2019-09-30 DIAGNOSIS — G81.94 LEFT HEMIPLEGIA (HCC): Primary | ICD-10-CM

## 2019-09-30 DIAGNOSIS — G81.94 HEMIPLEGIA AFFECTING LEFT NONDOMINANT SIDE, UNSPECIFIED ETIOLOGY, UNSPECIFIED HEMIPLEGIA TYPE (HCC): Primary | ICD-10-CM

## 2019-09-30 PROCEDURE — 97112 NEUROMUSCULAR REEDUCATION: CPT | Performed by: OCCUPATIONAL THERAPIST

## 2019-09-30 PROCEDURE — 97110 THERAPEUTIC EXERCISES: CPT | Performed by: OCCUPATIONAL THERAPIST

## 2019-09-30 PROCEDURE — 97530 THERAPEUTIC ACTIVITIES: CPT | Performed by: OCCUPATIONAL THERAPIST

## 2019-09-30 PROCEDURE — 97032 APPL MODALITY 1+ESTIM EA 15: CPT | Performed by: OCCUPATIONAL THERAPIST

## 2019-09-30 PROCEDURE — 97116 GAIT TRAINING THERAPY: CPT | Performed by: PHYSICAL THERAPIST

## 2019-09-30 NOTE — PROGRESS NOTES
OCCUPATIONAL THERAPY   PROGRESS NOTE    Date: 2019  Initial Evaluation Date: 6-3-19    Patient Name:  Dai Ghotra    :  1960  Restrictions/Precautions: Activity as tolerated, Moderate fall risk  Diagnosis:  CVA                                                          Insurance/Certification information:  Medicare  Referring Physician:  Dr Colleen Gannon, Jude Angela [address]  Date of Surgery/Injury: CVAs March- 2019  Plan of care signed (Y/N):  Y  Visit# / total visits: 30 / up to 34 visits    Pain Level: Sore in the wrist with exercise  Subjective: \" I think I hurt my wrist when I tried to put weight on it\".    Objective:  Updated POC to be completed by visit 34    INTERVENTION: COMPLETED: SPECIFICS/COMMENTS:   Modality:     FES- wrist and digit ext x PPR 2/ Tolerated well- x 15 min        AROM/ AAROM     L UE AROM x shld flexion and ABD, elbow flexion/ ext, supination/ pronation, wrist ext/ flexion   Wand ex w/o ace to left hand  Chest press/ shd press, row and reverse row   Cone stacking x With intermittent assist to extend fingers and cues for positioning- tolerated fair- today due to higher flexor tone   wristciser x    Over shd ring activity     Block / jumbo pegs grasp/ release  Completed with verbal cues and intermittent physical assist for hand placement        PROM/Stretching:     L hand with attn to thumb/ digital extension x         Neuro- re-education:     Left andrea-attention  x resolved   Tactile and verbal cues to minimize substitution pattern x Still needed   Strengthening:     wristciser with 1# x fatiguing   Wand ex- 5#  Chest press/ shoulder press   Weighted dowel 1# as tolerated supin / pronation x 10, deviations with min assist due to compensation   L UE PREs    shld flexion 2# 1-10  Curls 3# 2-10  Wrist 2# 1-10   Hand helper x 2 yellow bands- provided for home use/ will advance as toelrated   Other:     Brace options to assist extension X  Discussed

## 2019-10-14 ENCOUNTER — TREATMENT (OUTPATIENT)
Dept: OCCUPATIONAL THERAPY | Age: 59
End: 2019-10-14
Payer: MEDICARE

## 2019-10-14 ENCOUNTER — TREATMENT (OUTPATIENT)
Dept: PHYSICAL THERAPY | Age: 59
End: 2019-10-14
Payer: MEDICARE

## 2019-10-14 DIAGNOSIS — Z86.73 HISTORY OF CVA (CEREBROVASCULAR ACCIDENT): Primary | ICD-10-CM

## 2019-10-14 DIAGNOSIS — G81.94 HEMIPLEGIA AFFECTING LEFT NONDOMINANT SIDE, UNSPECIFIED ETIOLOGY, UNSPECIFIED HEMIPLEGIA TYPE (HCC): Primary | ICD-10-CM

## 2019-10-14 PROCEDURE — 97112 NEUROMUSCULAR REEDUCATION: CPT | Performed by: OCCUPATIONAL THERAPIST

## 2019-10-14 PROCEDURE — 97110 THERAPEUTIC EXERCISES: CPT | Performed by: OCCUPATIONAL THERAPIST

## 2019-10-14 PROCEDURE — 97032 APPL MODALITY 1+ESTIM EA 15: CPT | Performed by: OCCUPATIONAL THERAPIST

## 2019-10-14 PROCEDURE — 97116 GAIT TRAINING THERAPY: CPT | Performed by: PHYSICAL THERAPIST

## 2019-10-14 PROCEDURE — 97112 NEUROMUSCULAR REEDUCATION: CPT | Performed by: PHYSICAL THERAPIST

## 2019-10-21 ENCOUNTER — TREATMENT (OUTPATIENT)
Dept: PHYSICAL THERAPY | Age: 59
End: 2019-10-21
Payer: MEDICARE

## 2019-10-21 ENCOUNTER — TREATMENT (OUTPATIENT)
Dept: OCCUPATIONAL THERAPY | Age: 59
End: 2019-10-21
Payer: MEDICARE

## 2019-10-21 DIAGNOSIS — G81.94 LEFT HEMIPLEGIA (HCC): Primary | ICD-10-CM

## 2019-10-21 DIAGNOSIS — G81.94 HEMIPLEGIA AFFECTING LEFT NONDOMINANT SIDE, UNSPECIFIED ETIOLOGY, UNSPECIFIED HEMIPLEGIA TYPE (HCC): Primary | ICD-10-CM

## 2019-10-21 DIAGNOSIS — I63.9 ACUTE CVA (CEREBROVASCULAR ACCIDENT) (HCC): ICD-10-CM

## 2019-10-21 PROCEDURE — 97110 THERAPEUTIC EXERCISES: CPT | Performed by: OCCUPATIONAL THERAPIST

## 2019-10-21 PROCEDURE — 97116 GAIT TRAINING THERAPY: CPT | Performed by: PHYSICAL THERAPIST

## 2019-10-21 PROCEDURE — 97032 APPL MODALITY 1+ESTIM EA 15: CPT | Performed by: OCCUPATIONAL THERAPIST

## 2019-10-21 PROCEDURE — 97530 THERAPEUTIC ACTIVITIES: CPT | Performed by: OCCUPATIONAL THERAPIST

## 2019-10-21 PROCEDURE — 97112 NEUROMUSCULAR REEDUCATION: CPT | Performed by: OCCUPATIONAL THERAPIST

## 2019-10-21 PROCEDURE — 97112 NEUROMUSCULAR REEDUCATION: CPT | Performed by: PHYSICAL THERAPIST

## 2019-10-28 ENCOUNTER — TREATMENT (OUTPATIENT)
Dept: OCCUPATIONAL THERAPY | Age: 59
End: 2019-10-28
Payer: MEDICARE

## 2019-10-28 ENCOUNTER — TREATMENT (OUTPATIENT)
Dept: PHYSICAL THERAPY | Age: 59
End: 2019-10-28
Payer: MEDICARE

## 2019-10-28 DIAGNOSIS — G81.94 HEMIPLEGIA AFFECTING LEFT NONDOMINANT SIDE, UNSPECIFIED ETIOLOGY, UNSPECIFIED HEMIPLEGIA TYPE (HCC): Primary | ICD-10-CM

## 2019-10-28 DIAGNOSIS — G81.94 LEFT HEMIPLEGIA (HCC): Primary | ICD-10-CM

## 2019-10-28 PROCEDURE — 97032 APPL MODALITY 1+ESTIM EA 15: CPT | Performed by: OCCUPATIONAL THERAPIST

## 2019-10-28 PROCEDURE — 97116 GAIT TRAINING THERAPY: CPT | Performed by: PHYSICAL THERAPIST

## 2019-10-28 PROCEDURE — 97112 NEUROMUSCULAR REEDUCATION: CPT | Performed by: PHYSICAL THERAPIST

## 2019-10-28 PROCEDURE — 97530 THERAPEUTIC ACTIVITIES: CPT | Performed by: OCCUPATIONAL THERAPIST

## 2019-10-28 PROCEDURE — 97110 THERAPEUTIC EXERCISES: CPT | Performed by: OCCUPATIONAL THERAPIST

## 2019-11-01 DIAGNOSIS — I10 HTN (HYPERTENSION), BENIGN: Chronic | ICD-10-CM

## 2019-11-01 RX ORDER — AMLODIPINE BESYLATE 2.5 MG/1
TABLET ORAL
Qty: 90 TABLET | Refills: 0 | Status: SHIPPED | OUTPATIENT
Start: 2019-11-01 | End: 2020-01-31 | Stop reason: SDUPTHER

## 2019-11-04 ENCOUNTER — TREATMENT (OUTPATIENT)
Dept: PHYSICAL THERAPY | Age: 59
End: 2019-11-04
Payer: MEDICARE

## 2019-11-04 ENCOUNTER — TREATMENT (OUTPATIENT)
Dept: OCCUPATIONAL THERAPY | Age: 59
End: 2019-11-04
Payer: MEDICARE

## 2019-11-04 DIAGNOSIS — G81.94 LEFT HEMIPLEGIA (HCC): Primary | ICD-10-CM

## 2019-11-04 DIAGNOSIS — G81.94 HEMIPLEGIA AFFECTING LEFT NONDOMINANT SIDE, UNSPECIFIED ETIOLOGY, UNSPECIFIED HEMIPLEGIA TYPE (HCC): Primary | ICD-10-CM

## 2019-11-04 PROCEDURE — 97530 THERAPEUTIC ACTIVITIES: CPT | Performed by: OCCUPATIONAL THERAPIST

## 2019-11-04 PROCEDURE — 97112 NEUROMUSCULAR REEDUCATION: CPT

## 2019-11-04 PROCEDURE — 97116 GAIT TRAINING THERAPY: CPT

## 2019-11-04 PROCEDURE — 97032 APPL MODALITY 1+ESTIM EA 15: CPT | Performed by: OCCUPATIONAL THERAPIST

## 2019-11-04 PROCEDURE — 97110 THERAPEUTIC EXERCISES: CPT | Performed by: OCCUPATIONAL THERAPIST

## 2019-11-04 RX ORDER — SULFAMETHOXAZOLE AND TRIMETHOPRIM 400; 80 MG/1; MG/1
TABLET ORAL
Qty: 90 TABLET | Refills: 0 | Status: SHIPPED
Start: 2019-11-04 | End: 2020-11-25

## 2019-11-05 ENCOUNTER — OFFICE VISIT (OUTPATIENT)
Dept: FAMILY MEDICINE CLINIC | Age: 59
End: 2019-11-05
Payer: MEDICARE

## 2019-11-05 VITALS
RESPIRATION RATE: 16 BRPM | TEMPERATURE: 98.3 F | OXYGEN SATURATION: 97 % | DIASTOLIC BLOOD PRESSURE: 84 MMHG | HEIGHT: 66 IN | SYSTOLIC BLOOD PRESSURE: 138 MMHG | HEART RATE: 64 BPM | WEIGHT: 171.8 LBS | BODY MASS INDEX: 27.61 KG/M2

## 2019-11-05 DIAGNOSIS — I63.231 CEREBROVASCULAR ACCIDENT (CVA) DUE TO OCCLUSION OF RIGHT CAROTID ARTERY (HCC): ICD-10-CM

## 2019-11-05 DIAGNOSIS — G81.94 HEMIPLEGIA AFFECTING LEFT NONDOMINANT SIDE, UNSPECIFIED ETIOLOGY, UNSPECIFIED HEMIPLEGIA TYPE (HCC): ICD-10-CM

## 2019-11-05 DIAGNOSIS — M54.41 CHRONIC MIDLINE LOW BACK PAIN WITH RIGHT-SIDED SCIATICA: Primary | ICD-10-CM

## 2019-11-05 DIAGNOSIS — E11.22 TYPE 2 DIABETES MELLITUS WITH STAGE 3 CHRONIC KIDNEY DISEASE, WITH LONG-TERM CURRENT USE OF INSULIN (HCC): ICD-10-CM

## 2019-11-05 DIAGNOSIS — N18.30 TYPE 2 DIABETES MELLITUS WITH STAGE 3 CHRONIC KIDNEY DISEASE, WITH LONG-TERM CURRENT USE OF INSULIN (HCC): ICD-10-CM

## 2019-11-05 DIAGNOSIS — G89.29 CHRONIC MIDLINE LOW BACK PAIN WITH RIGHT-SIDED SCIATICA: Primary | ICD-10-CM

## 2019-11-05 DIAGNOSIS — Z79.4 TYPE 2 DIABETES MELLITUS WITH STAGE 3 CHRONIC KIDNEY DISEASE, WITH LONG-TERM CURRENT USE OF INSULIN (HCC): ICD-10-CM

## 2019-11-05 PROCEDURE — 3017F COLORECTAL CA SCREEN DOC REV: CPT | Performed by: NURSE PRACTITIONER

## 2019-11-05 PROCEDURE — 3044F HG A1C LEVEL LT 7.0%: CPT | Performed by: NURSE PRACTITIONER

## 2019-11-05 PROCEDURE — 1036F TOBACCO NON-USER: CPT | Performed by: NURSE PRACTITIONER

## 2019-11-05 PROCEDURE — G8598 ASA/ANTIPLAT THER USED: HCPCS | Performed by: NURSE PRACTITIONER

## 2019-11-05 PROCEDURE — 99213 OFFICE O/P EST LOW 20 MIN: CPT | Performed by: NURSE PRACTITIONER

## 2019-11-05 PROCEDURE — G8427 DOCREV CUR MEDS BY ELIG CLIN: HCPCS | Performed by: NURSE PRACTITIONER

## 2019-11-05 PROCEDURE — G8484 FLU IMMUNIZE NO ADMIN: HCPCS | Performed by: NURSE PRACTITIONER

## 2019-11-05 PROCEDURE — G8417 CALC BMI ABV UP PARAM F/U: HCPCS | Performed by: NURSE PRACTITIONER

## 2019-11-05 PROCEDURE — 2022F DILAT RTA XM EVC RTNOPTHY: CPT | Performed by: NURSE PRACTITIONER

## 2019-11-05 RX ORDER — HYDROCODONE BITARTRATE AND ACETAMINOPHEN 7.5; 325 MG/1; MG/1
1 TABLET ORAL EVERY 6 HOURS PRN
Qty: 120 TABLET | Refills: 0 | Status: SHIPPED | OUTPATIENT
Start: 2019-11-05 | End: 2019-12-03 | Stop reason: SDUPTHER

## 2019-11-05 RX ORDER — ATORVASTATIN CALCIUM 20 MG/1
20 TABLET, FILM COATED ORAL NIGHTLY
Qty: 30 TABLET | Refills: 5 | Status: SHIPPED | OUTPATIENT
Start: 2019-11-05 | End: 2019-11-05 | Stop reason: SDUPTHER

## 2019-11-05 RX ORDER — ATORVASTATIN CALCIUM 20 MG/1
TABLET, FILM COATED ORAL
Qty: 90 TABLET | Refills: 5 | Status: SHIPPED | OUTPATIENT
Start: 2019-11-05 | End: 2020-01-31 | Stop reason: SDUPTHER

## 2019-11-05 ASSESSMENT — ENCOUNTER SYMPTOMS
DIARRHEA: 0
BACK PAIN: 1
COUGH: 0
VOMITING: 0
CONSTIPATION: 0
WHEEZING: 0
NAUSEA: 0
SHORTNESS OF BREATH: 0

## 2019-11-11 ENCOUNTER — TREATMENT (OUTPATIENT)
Dept: OCCUPATIONAL THERAPY | Age: 59
End: 2019-11-11
Payer: MEDICARE

## 2019-11-11 ENCOUNTER — TREATMENT (OUTPATIENT)
Dept: PHYSICAL THERAPY | Age: 59
End: 2019-11-11
Payer: MEDICARE

## 2019-11-11 DIAGNOSIS — G81.94 LEFT HEMIPLEGIA (HCC): Primary | ICD-10-CM

## 2019-11-11 DIAGNOSIS — G81.94 HEMIPLEGIA AFFECTING LEFT NONDOMINANT SIDE, UNSPECIFIED ETIOLOGY, UNSPECIFIED HEMIPLEGIA TYPE (HCC): Primary | ICD-10-CM

## 2019-11-11 PROCEDURE — 97116 GAIT TRAINING THERAPY: CPT | Performed by: PHYSICAL THERAPIST

## 2019-11-11 PROCEDURE — 97530 THERAPEUTIC ACTIVITIES: CPT | Performed by: OCCUPATIONAL THERAPIST

## 2019-11-11 PROCEDURE — 97032 APPL MODALITY 1+ESTIM EA 15: CPT | Performed by: OCCUPATIONAL THERAPIST

## 2019-11-11 PROCEDURE — 97112 NEUROMUSCULAR REEDUCATION: CPT | Performed by: PHYSICAL THERAPIST

## 2019-11-11 PROCEDURE — 97110 THERAPEUTIC EXERCISES: CPT | Performed by: OCCUPATIONAL THERAPIST

## 2019-11-18 ENCOUNTER — TREATMENT (OUTPATIENT)
Dept: OCCUPATIONAL THERAPY | Age: 59
End: 2019-11-18
Payer: MEDICARE

## 2019-11-18 ENCOUNTER — TREATMENT (OUTPATIENT)
Dept: PHYSICAL THERAPY | Age: 59
End: 2019-11-18
Payer: MEDICARE

## 2019-11-18 DIAGNOSIS — G81.94 LEFT HEMIPLEGIA (HCC): Primary | ICD-10-CM

## 2019-11-18 DIAGNOSIS — G81.94 HEMIPLEGIA AFFECTING LEFT NONDOMINANT SIDE, UNSPECIFIED ETIOLOGY, UNSPECIFIED HEMIPLEGIA TYPE (HCC): Primary | ICD-10-CM

## 2019-11-18 PROCEDURE — 97110 THERAPEUTIC EXERCISES: CPT | Performed by: OCCUPATIONAL THERAPIST

## 2019-11-18 PROCEDURE — 97032 APPL MODALITY 1+ESTIM EA 15: CPT | Performed by: OCCUPATIONAL THERAPIST

## 2019-11-18 PROCEDURE — 97112 NEUROMUSCULAR REEDUCATION: CPT | Performed by: PHYSICAL THERAPIST

## 2019-11-18 PROCEDURE — 97116 GAIT TRAINING THERAPY: CPT | Performed by: PHYSICAL THERAPIST

## 2019-11-18 PROCEDURE — 97530 THERAPEUTIC ACTIVITIES: CPT | Performed by: OCCUPATIONAL THERAPIST

## 2019-12-03 ENCOUNTER — OFFICE VISIT (OUTPATIENT)
Dept: FAMILY MEDICINE CLINIC | Age: 59
End: 2019-12-03
Payer: MEDICARE

## 2019-12-03 VITALS
WEIGHT: 175 LBS | HEIGHT: 66 IN | OXYGEN SATURATION: 98 % | SYSTOLIC BLOOD PRESSURE: 132 MMHG | RESPIRATION RATE: 14 BRPM | BODY MASS INDEX: 28.12 KG/M2 | DIASTOLIC BLOOD PRESSURE: 84 MMHG | HEART RATE: 69 BPM | TEMPERATURE: 98 F

## 2019-12-03 DIAGNOSIS — M54.41 CHRONIC MIDLINE LOW BACK PAIN WITH RIGHT-SIDED SCIATICA: ICD-10-CM

## 2019-12-03 DIAGNOSIS — N18.30 TYPE 2 DIABETES MELLITUS WITH STAGE 3 CHRONIC KIDNEY DISEASE, WITH LONG-TERM CURRENT USE OF INSULIN (HCC): Primary | ICD-10-CM

## 2019-12-03 DIAGNOSIS — Z79.4 TYPE 2 DIABETES MELLITUS WITH STAGE 3 CHRONIC KIDNEY DISEASE, WITH LONG-TERM CURRENT USE OF INSULIN (HCC): Primary | ICD-10-CM

## 2019-12-03 DIAGNOSIS — G89.29 CHRONIC MIDLINE LOW BACK PAIN WITH RIGHT-SIDED SCIATICA: ICD-10-CM

## 2019-12-03 DIAGNOSIS — E11.22 TYPE 2 DIABETES MELLITUS WITH STAGE 3 CHRONIC KIDNEY DISEASE, WITH LONG-TERM CURRENT USE OF INSULIN (HCC): Primary | ICD-10-CM

## 2019-12-03 LAB — HBA1C MFR BLD: 6.5 %

## 2019-12-03 PROCEDURE — 3017F COLORECTAL CA SCREEN DOC REV: CPT | Performed by: NURSE PRACTITIONER

## 2019-12-03 PROCEDURE — G8417 CALC BMI ABV UP PARAM F/U: HCPCS | Performed by: NURSE PRACTITIONER

## 2019-12-03 PROCEDURE — 2022F DILAT RTA XM EVC RTNOPTHY: CPT | Performed by: NURSE PRACTITIONER

## 2019-12-03 PROCEDURE — 83036 HEMOGLOBIN GLYCOSYLATED A1C: CPT | Performed by: NURSE PRACTITIONER

## 2019-12-03 PROCEDURE — G8598 ASA/ANTIPLAT THER USED: HCPCS | Performed by: NURSE PRACTITIONER

## 2019-12-03 PROCEDURE — 1036F TOBACCO NON-USER: CPT | Performed by: NURSE PRACTITIONER

## 2019-12-03 PROCEDURE — G8427 DOCREV CUR MEDS BY ELIG CLIN: HCPCS | Performed by: NURSE PRACTITIONER

## 2019-12-03 PROCEDURE — 3044F HG A1C LEVEL LT 7.0%: CPT | Performed by: NURSE PRACTITIONER

## 2019-12-03 PROCEDURE — G8484 FLU IMMUNIZE NO ADMIN: HCPCS | Performed by: NURSE PRACTITIONER

## 2019-12-03 PROCEDURE — 99213 OFFICE O/P EST LOW 20 MIN: CPT | Performed by: NURSE PRACTITIONER

## 2019-12-03 RX ORDER — HYDROCODONE BITARTRATE AND ACETAMINOPHEN 7.5; 325 MG/1; MG/1
1 TABLET ORAL EVERY 6 HOURS PRN
Qty: 120 TABLET | Refills: 0 | Status: SHIPPED | OUTPATIENT
Start: 2019-12-03 | End: 2019-12-30 | Stop reason: SDUPTHER

## 2019-12-03 ASSESSMENT — ENCOUNTER SYMPTOMS
NAUSEA: 0
WHEEZING: 0
VOMITING: 0
SHORTNESS OF BREATH: 0
CONSTIPATION: 0
BACK PAIN: 1
DIARRHEA: 0
COUGH: 1

## 2019-12-30 ENCOUNTER — OFFICE VISIT (OUTPATIENT)
Dept: FAMILY MEDICINE CLINIC | Age: 59
End: 2019-12-30
Payer: MEDICARE

## 2019-12-30 VITALS
HEIGHT: 66 IN | DIASTOLIC BLOOD PRESSURE: 84 MMHG | TEMPERATURE: 97.8 F | WEIGHT: 178 LBS | HEART RATE: 70 BPM | BODY MASS INDEX: 28.61 KG/M2 | SYSTOLIC BLOOD PRESSURE: 132 MMHG | OXYGEN SATURATION: 98 % | RESPIRATION RATE: 14 BRPM

## 2019-12-30 DIAGNOSIS — M54.41 CHRONIC MIDLINE LOW BACK PAIN WITH RIGHT-SIDED SCIATICA: ICD-10-CM

## 2019-12-30 DIAGNOSIS — G89.29 CHRONIC MIDLINE LOW BACK PAIN WITH RIGHT-SIDED SCIATICA: ICD-10-CM

## 2019-12-30 PROCEDURE — 1036F TOBACCO NON-USER: CPT | Performed by: NURSE PRACTITIONER

## 2019-12-30 PROCEDURE — G8417 CALC BMI ABV UP PARAM F/U: HCPCS | Performed by: NURSE PRACTITIONER

## 2019-12-30 PROCEDURE — 3017F COLORECTAL CA SCREEN DOC REV: CPT | Performed by: NURSE PRACTITIONER

## 2019-12-30 PROCEDURE — G8598 ASA/ANTIPLAT THER USED: HCPCS | Performed by: NURSE PRACTITIONER

## 2019-12-30 PROCEDURE — G8484 FLU IMMUNIZE NO ADMIN: HCPCS | Performed by: NURSE PRACTITIONER

## 2019-12-30 PROCEDURE — 99213 OFFICE O/P EST LOW 20 MIN: CPT | Performed by: NURSE PRACTITIONER

## 2019-12-30 PROCEDURE — G8427 DOCREV CUR MEDS BY ELIG CLIN: HCPCS | Performed by: NURSE PRACTITIONER

## 2019-12-30 RX ORDER — HYDROCODONE BITARTRATE AND ACETAMINOPHEN 7.5; 325 MG/1; MG/1
1 TABLET ORAL EVERY 6 HOURS PRN
Qty: 120 TABLET | Refills: 0 | Status: SHIPPED | OUTPATIENT
Start: 2019-12-30 | End: 2020-01-31 | Stop reason: SDUPTHER

## 2019-12-30 ASSESSMENT — ENCOUNTER SYMPTOMS
SHORTNESS OF BREATH: 0
CONSTIPATION: 0
WHEEZING: 0
VOMITING: 0
DIARRHEA: 0
BACK PAIN: 1
COUGH: 0
NAUSEA: 0

## 2020-01-27 ENCOUNTER — HOSPITAL ENCOUNTER (OUTPATIENT)
Age: 60
Discharge: HOME | End: 2020-01-27
Payer: MEDICARE

## 2020-01-27 LAB
ALBUMIN SERPL-MCNC: 4.5 G/DL (ref 3.5–5.2)
ALP BLD-CCNC: 129 U/L (ref 35–104)
ALT SERPL-CCNC: 11 U/L (ref 0–32)
ANION GAP SERPL CALCULATED.3IONS-SCNC: 13 MMOL/L (ref 7–16)
AST SERPL-CCNC: 14 U/L (ref 0–31)
BASOPHILS ABSOLUTE: 0.04 E9/L (ref 0–0.2)
BASOPHILS RELATIVE PERCENT: 0.8 % (ref 0–2)
BILIRUB SERPL-MCNC: 0.3 MG/DL (ref 0–1.2)
BUN BLDV-MCNC: 27 MG/DL (ref 6–20)
CALCIUM SERPL-MCNC: 9.6 MG/DL (ref 8.6–10.2)
CHLORIDE BLD-SCNC: 102 MMOL/L (ref 98–107)
CHOLESTEROL, TOTAL: 188 MG/DL (ref 0–199)
CO2: 25 MMOL/L (ref 22–29)
CREAT SERPL-MCNC: 1.5 MG/DL (ref 0.5–1)
EOSINOPHILS ABSOLUTE: 0.16 E9/L (ref 0.05–0.5)
EOSINOPHILS RELATIVE PERCENT: 3.2 % (ref 0–6)
GAMMA GLUTAMYL TRANSFERASE: 14 U/L (ref 6–42)
GFR AFRICAN AMERICAN: 43
GFR NON-AFRICAN AMERICAN: 36 ML/MIN/1.73
GLUCOSE BLD-MCNC: 216 MG/DL (ref 74–99)
HCT VFR BLD CALC: 40.4 % (ref 34–48)
HDLC SERPL-MCNC: 62 MG/DL
HEMOGLOBIN: 13.7 G/DL (ref 11.5–15.5)
IMMATURE GRANULOCYTES #: 0.01 E9/L
IMMATURE GRANULOCYTES %: 0.2 % (ref 0–5)
LDL CHOLESTEROL CALCULATED: 101 MG/DL (ref 0–99)
LYMPHOCYTES ABSOLUTE: 1.24 E9/L (ref 1.5–4)
LYMPHOCYTES RELATIVE PERCENT: 25.2 % (ref 20–42)
MAGNESIUM: 1.6 MG/DL (ref 1.6–2.6)
MCH RBC QN AUTO: 29.9 PG (ref 26–35)
MCHC RBC AUTO-ENTMCNC: 33.9 % (ref 32–34.5)
MCV RBC AUTO: 88.2 FL (ref 80–99.9)
MONOCYTES ABSOLUTE: 0.43 E9/L (ref 0.1–0.95)
MONOCYTES RELATIVE PERCENT: 8.7 % (ref 2–12)
NEUTROPHILS ABSOLUTE: 3.05 E9/L (ref 1.8–7.3)
NEUTROPHILS RELATIVE PERCENT: 61.9 % (ref 43–80)
PDW BLD-RTO: 13.1 FL (ref 11.5–15)
PHOSPHORUS: 4.2 MG/DL (ref 2.5–4.5)
PLATELET # BLD: 182 E9/L (ref 130–450)
PMV BLD AUTO: 11 FL (ref 7–12)
POTASSIUM SERPL-SCNC: 4.7 MMOL/L (ref 3.5–5)
RBC # BLD: 4.58 E12/L (ref 3.5–5.5)
SODIUM BLD-SCNC: 140 MMOL/L (ref 132–146)
TOTAL PROTEIN: 7.2 G/DL (ref 6.4–8.3)
TRIGL SERPL-MCNC: 125 MG/DL (ref 0–149)
VLDLC SERPL CALC-MCNC: 25 MG/DL
WBC # BLD: 4.9 E9/L (ref 4.5–11.5)

## 2020-01-27 PROCEDURE — 87522 HEPATITIS C REVRS TRNSCRPJ: CPT

## 2020-01-27 PROCEDURE — 82977 ASSAY OF GGT: CPT

## 2020-01-27 PROCEDURE — 36415 COLL VENOUS BLD VENIPUNCTURE: CPT

## 2020-01-27 PROCEDURE — 80053 COMPREHEN METABOLIC PANEL: CPT

## 2020-01-27 PROCEDURE — 80061 LIPID PANEL: CPT

## 2020-01-27 PROCEDURE — 83735 ASSAY OF MAGNESIUM: CPT

## 2020-01-27 PROCEDURE — 84100 ASSAY OF PHOSPHORUS: CPT

## 2020-01-27 PROCEDURE — 85025 COMPLETE CBC W/AUTO DIFF WBC: CPT

## 2020-01-28 LAB
Lab: NORMAL
REPORT: NORMAL
THIS TEST SENT TO: NORMAL

## 2020-01-31 ENCOUNTER — OFFICE VISIT (OUTPATIENT)
Dept: FAMILY MEDICINE CLINIC | Age: 60
End: 2020-01-31
Payer: MEDICARE

## 2020-01-31 VITALS
BODY MASS INDEX: 29.57 KG/M2 | SYSTOLIC BLOOD PRESSURE: 134 MMHG | HEIGHT: 66 IN | DIASTOLIC BLOOD PRESSURE: 82 MMHG | HEART RATE: 74 BPM | TEMPERATURE: 97.8 F | OXYGEN SATURATION: 96 % | WEIGHT: 184 LBS | RESPIRATION RATE: 16 BRPM

## 2020-01-31 PROCEDURE — 99213 OFFICE O/P EST LOW 20 MIN: CPT | Performed by: NURSE PRACTITIONER

## 2020-01-31 RX ORDER — MELATONIN 5 MG
1 TABLET,CHEWABLE ORAL NIGHTLY
COMMUNITY
End: 2022-05-23 | Stop reason: SDUPTHER

## 2020-01-31 RX ORDER — AMLODIPINE BESYLATE 2.5 MG/1
TABLET ORAL
Qty: 90 TABLET | Refills: 3 | Status: SHIPPED
Start: 2020-01-31 | End: 2020-03-23 | Stop reason: SDUPTHER

## 2020-01-31 RX ORDER — OMEPRAZOLE 40 MG/1
CAPSULE, DELAYED RELEASE ORAL
Qty: 90 CAPSULE | Refills: 2 | Status: SHIPPED
Start: 2020-01-31 | End: 2020-03-23 | Stop reason: SDUPTHER

## 2020-01-31 RX ORDER — HYDROCODONE BITARTRATE AND ACETAMINOPHEN 7.5; 325 MG/1; MG/1
1 TABLET ORAL EVERY 6 HOURS PRN
Qty: 120 TABLET | Refills: 0 | Status: SHIPPED
Start: 2020-01-31 | End: 2020-02-26 | Stop reason: SDUPTHER

## 2020-01-31 RX ORDER — ATORVASTATIN CALCIUM 20 MG/1
TABLET, FILM COATED ORAL
Qty: 90 TABLET | Refills: 3 | Status: SHIPPED
Start: 2020-01-31 | End: 2020-03-23 | Stop reason: SDUPTHER

## 2020-01-31 RX ORDER — LOSARTAN POTASSIUM 50 MG/1
TABLET ORAL
Qty: 90 TABLET | Refills: 3 | Status: SHIPPED
Start: 2020-01-31 | End: 2020-03-23 | Stop reason: SDUPTHER

## 2020-01-31 RX ORDER — FUROSEMIDE 40 MG/1
40 TABLET ORAL DAILY
Qty: 90 TABLET | Refills: 3 | Status: SHIPPED
Start: 2020-01-31 | End: 2020-03-23 | Stop reason: SDUPTHER

## 2020-01-31 RX ORDER — HYDROCODONE BITARTRATE AND ACETAMINOPHEN 7.5; 325 MG/1; MG/1
1 TABLET ORAL EVERY 6 HOURS PRN
COMMUNITY
End: 2020-01-31

## 2020-01-31 ASSESSMENT — PATIENT HEALTH QUESTIONNAIRE - PHQ9
2. FEELING DOWN, DEPRESSED OR HOPELESS: 0
1. LITTLE INTEREST OR PLEASURE IN DOING THINGS: 0
SUM OF ALL RESPONSES TO PHQ QUESTIONS 1-9: 0
SUM OF ALL RESPONSES TO PHQ QUESTIONS 1-9: 0
SUM OF ALL RESPONSES TO PHQ9 QUESTIONS 1 & 2: 0

## 2020-01-31 ASSESSMENT — ENCOUNTER SYMPTOMS
BACK PAIN: 1
DIARRHEA: 0
NAUSEA: 0
COUGH: 0
WHEEZING: 0
CONSTIPATION: 0
VOMITING: 0
SHORTNESS OF BREATH: 0

## 2020-01-31 NOTE — PROGRESS NOTES
HPI:  Patient comes intoday for   Chief Complaint   Patient presents with    Back Pain     refills rates pain at 7/10   [address] pended   . Complains of pain to low back with radiation to hips and down right leg. Currently not in PT due to insurance but doing exercises at home. She is walking more on her own. Prior to Visit Medications    Medication Sig Taking? Authorizing Provider   HYDROcodone-acetaminophen (NORCO) 7.5-325 MG per tablet Take 1 tablet by mouth every 6 hours as needed for Pain.  Yes Historical Provider, MD   Melatonin 5 MG CHEW Take 1 tablet by mouth nightly Yes Historical Provider, MD   atorvastatin (LIPITOR) 20 MG tablet TAKE 1 TABLET BY MOUTH EVERY NIGHT Yes MIKEL Ruiz CNP   sulfamethoxazole-trimethoprim (BACTRIM;SEPTRA) 400-80 MG per tablet TAKE 1 TABLET EVERY DAY Yes MIKEL Ruiz CNP   amLODIPine (NORVASC) 2.5 MG tablet TAKE 1 TABLET BY MOUTH EVERY DAY Yes MIKEL Ruiz CNP   furosemide (LASIX) 40 MG tablet Take 40 mg by mouth daily Yes Historical Provider, MD   losartan (COZAAR) 50 MG tablet Take one tablet daily, call with BP > 160/100 Yes MIKEL Ruiz CNP   aspirin 81 MG chewable tablet Take 1 tablet by mouth daily Yes Kaila Gama MD   NYSTATIN [id number] UNIT/GM powder APPLY FOUR TIMES DAILY Yes Sandra Mcginnis,    omeprazole (PRILOSEC) 40 MG delayed release capsule TAKE 1 CAPSULE EVERY DAY Yes MIKEL Ruiz CNP   tacrolimus (PROGRAF) 1 MG capsule Take 1 mg by mouth 2 times daily  Yes Historical Provider, MD   guaiFENesin (MUCINEX) 600 MG extended release tablet Take 600 mg by mouth daily  Yes Historical Provider, MD   albuterol sulfate HFA (PROAIR HFA) 108 (90 Base) MCG/ACT inhaler INHALE 2 PUFFS BY MOUTH EVERY 6 HOURS AS NEEDED FOR WHEEZING Yes MIKEL Regalado CNP   diclofenac sodium (VOLTAREN) 1 % GEL Apply 2 g topically 2 times daily Yes MIKEL Ruiz CNP   albuterol ([address] [place] [address]) Tenderness present. Comments: Pain to lumbar spine with bilateral paraspinal tenderness. Left upper and lower extremities 3/5, right 5/5   Lymphadenopathy:      Cervical: No cervical adenopathy. Skin:     General: Skin is warm and dry. Neurological:      Mental Status: She is alert and oriented to person, place, and time. Psychiatric:         Behavior: Behavior normal.           Assessment/Plan:    Casper Lafleur was seen today for back pain and health maintenance. Diagnoses and all orders for this visit:    Chronic midline low back pain with right-sided sciatica  -     HYDROcodone-acetaminophen (NORCO) 7.5-325 MG per tablet; Take 1 tablet by mouth every 6 hours as needed for Pain for up to 30 days.  -The current medical regimen is effective;  continue present plan and medications. Screening for breast cancer  -     [facility] DIGITAL SCREEN W CAD BILATERAL; Future    HTN (hypertension), benign  -     amLODIPine (NORVASC) 2.5 MG tablet; TAKE 1 TABLET BY MOUTH EVERY DAY  -     losartan (COZAAR) 50 MG tablet; Take one tablet daily, call with BP > 160/100  -The current medical regimen is effective;  continue present plan and medications. Gastroesophageal reflux disease, esophagitis presence not specified  -     omeprazole (PRILOSEC) 40 MG delayed release capsule; TAKE 1 CAPSULE EVERY DAY    Encounter for screening mammogram for malignant neoplasm of breast   -     [facility] DIGITAL SCREEN W CAD BILATERAL; Future    Other orders  -     atorvastatin (LIPITOR) 20 MG tablet; TAKE 1 TABLET BY MOUTH EVERY NIGHT  -     furosemide (LASIX) 40 MG tablet; Take 1 tablet by mouth daily    -  Controlled Substance Monitoring:    Acute and Chronic Pain Monitoring:   RX Monitoring 1/31/2020   Attestation -   Periodic Controlled Substance Monitoring No signs of potential drug abuse or diversion identified.            Greater than 15  Minutes was spent with patient and more than 50% of the time was spent face to facecounseling and educating

## 2020-02-03 ENCOUNTER — HOSPITAL ENCOUNTER (OUTPATIENT)
Age: 60
Discharge: HOME | End: 2020-02-03
Payer: MEDICARE

## 2020-02-03 LAB
ALBUMIN SERPL-MCNC: 4.5 G/DL (ref 3.5–5.2)
ALP BLD-CCNC: 133 U/L (ref 35–104)
ALT SERPL-CCNC: 9 U/L (ref 0–32)
ANION GAP SERPL CALCULATED.3IONS-SCNC: 14 MMOL/L (ref 7–16)
AST SERPL-CCNC: 13 U/L (ref 0–31)
BASOPHILS ABSOLUTE: 0.04 E9/L (ref 0–0.2)
BASOPHILS RELATIVE PERCENT: 0.8 % (ref 0–2)
BILIRUB SERPL-MCNC: 0.3 MG/DL (ref 0–1.2)
BUN BLDV-MCNC: 26 MG/DL (ref 6–20)
CALCIUM SERPL-MCNC: 9.4 MG/DL (ref 8.6–10.2)
CHLORIDE BLD-SCNC: 104 MMOL/L (ref 98–107)
CO2: 21 MMOL/L (ref 22–29)
CREAT SERPL-MCNC: 1.3 MG/DL (ref 0.5–1)
EOSINOPHILS ABSOLUTE: 0.18 E9/L (ref 0.05–0.5)
EOSINOPHILS RELATIVE PERCENT: 3.6 % (ref 0–6)
GAMMA GLUTAMYL TRANSFERASE: 14 U/L (ref 6–42)
GFR AFRICAN AMERICAN: 51
GFR NON-AFRICAN AMERICAN: 42 ML/MIN/1.73
GLUCOSE BLD-MCNC: 221 MG/DL (ref 74–99)
HCT VFR BLD CALC: 38.7 % (ref 34–48)
HEMOGLOBIN: 12.8 G/DL (ref 11.5–15.5)
IMMATURE GRANULOCYTES #: 0.02 E9/L
IMMATURE GRANULOCYTES %: 0.4 % (ref 0–5)
LYMPHOCYTES ABSOLUTE: 1.3 E9/L (ref 1.5–4)
LYMPHOCYTES RELATIVE PERCENT: 25.8 % (ref 20–42)
MAGNESIUM: 1.6 MG/DL (ref 1.6–2.6)
MCH RBC QN AUTO: 29.4 PG (ref 26–35)
MCHC RBC AUTO-ENTMCNC: 33.1 % (ref 32–34.5)
MCV RBC AUTO: 89 FL (ref 80–99.9)
MONOCYTES ABSOLUTE: 0.54 E9/L (ref 0.1–0.95)
MONOCYTES RELATIVE PERCENT: 10.7 % (ref 2–12)
NEUTROPHILS ABSOLUTE: 2.95 E9/L (ref 1.8–7.3)
NEUTROPHILS RELATIVE PERCENT: 58.7 % (ref 43–80)
PDW BLD-RTO: 13.1 FL (ref 11.5–15)
PHOSPHORUS: 3.6 MG/DL (ref 2.5–4.5)
PLATELET # BLD: 183 E9/L (ref 130–450)
PMV BLD AUTO: 11.1 FL (ref 7–12)
POTASSIUM SERPL-SCNC: 5 MMOL/L (ref 3.5–5)
RBC # BLD: 4.35 E12/L (ref 3.5–5.5)
SODIUM BLD-SCNC: 139 MMOL/L (ref 132–146)
TOTAL PROTEIN: 6.9 G/DL (ref 6.4–8.3)
WBC # BLD: 5 E9/L (ref 4.5–11.5)

## 2020-02-03 PROCEDURE — 83735 ASSAY OF MAGNESIUM: CPT

## 2020-02-03 PROCEDURE — 80053 COMPREHEN METABOLIC PANEL: CPT

## 2020-02-03 PROCEDURE — 36415 COLL VENOUS BLD VENIPUNCTURE: CPT

## 2020-02-03 PROCEDURE — 85025 COMPLETE CBC W/AUTO DIFF WBC: CPT

## 2020-02-03 PROCEDURE — 82977 ASSAY OF GGT: CPT

## 2020-02-03 PROCEDURE — 84100 ASSAY OF PHOSPHORUS: CPT

## 2020-02-17 LAB
Lab: NORMAL
REPORT: NORMAL
THIS TEST SENT TO: NORMAL

## 2020-02-26 ENCOUNTER — HOSPITAL ENCOUNTER (OUTPATIENT)
Age: 60
Discharge: HOME | End: 2020-02-28
Payer: MEDICARE

## 2020-02-26 ENCOUNTER — OFFICE VISIT (OUTPATIENT)
Dept: FAMILY MEDICINE CLINIC | Age: 60
End: 2020-02-26
Payer: MEDICARE

## 2020-02-26 VITALS
RESPIRATION RATE: 16 BRPM | BODY MASS INDEX: 29.41 KG/M2 | HEART RATE: 64 BPM | SYSTOLIC BLOOD PRESSURE: 128 MMHG | WEIGHT: 183 LBS | TEMPERATURE: 98 F | DIASTOLIC BLOOD PRESSURE: 76 MMHG | OXYGEN SATURATION: 99 % | HEIGHT: 66 IN

## 2020-02-26 LAB
AMPHETAMINE SCREEN, URINE: NOT DETECTED
BARBITURATE SCREEN URINE: NOT DETECTED
BENZODIAZEPINE SCREEN, URINE: NOT DETECTED
CANNABINOID SCREEN URINE: POSITIVE
COCAINE METABOLITE SCREEN URINE: NOT DETECTED
FENTANYL SCREEN, URINE: NOT DETECTED
Lab: ABNORMAL
METHADONE SCREEN, URINE: NOT DETECTED
OPIATE SCREEN URINE: NOT DETECTED
OXYCODONE URINE: NOT DETECTED
PHENCYCLIDINE SCREEN URINE: NOT DETECTED

## 2020-02-26 PROCEDURE — 99213 OFFICE O/P EST LOW 20 MIN: CPT | Performed by: NURSE PRACTITIONER

## 2020-02-26 PROCEDURE — 80307 DRUG TEST PRSMV CHEM ANLYZR: CPT

## 2020-02-26 PROCEDURE — G0480 DRUG TEST DEF 1-7 CLASSES: HCPCS

## 2020-02-26 RX ORDER — METFORMIN HYDROCHLORIDE 500 MG/1
500 TABLET, EXTENDED RELEASE ORAL
Qty: 30 TABLET | Refills: 3 | Status: SHIPPED
Start: 2020-02-26 | End: 2020-03-23 | Stop reason: SDUPTHER

## 2020-02-26 RX ORDER — HYDROCODONE BITARTRATE AND ACETAMINOPHEN 7.5; 325 MG/1; MG/1
1 TABLET ORAL EVERY 8 HOURS PRN
Qty: 90 TABLET | Refills: 0 | Status: SHIPPED | OUTPATIENT
Start: 2020-02-26 | End: 2020-03-27

## 2020-02-26 ASSESSMENT — ENCOUNTER SYMPTOMS
COUGH: 0
NAUSEA: 0
SHORTNESS OF BREATH: 0
VOMITING: 0
DIARRHEA: 0
WHEEZING: 0
BACK PAIN: 1
CONSTIPATION: 0

## 2020-02-26 NOTE — PROGRESS NOTES
HPI:  Patient comes intoday for   Chief Complaint   Patient presents with    Back Pain     refills rates pain at 7/10 last dose Norco saturday    Diabetes     pts transplant Dr wants pt to be prescribed metformin and taken off lantus    . Complains of pain to low back with radiation to hips and down right leg. Currently not in PT due to insurance but doing exercises at home. She is walking more on her own. States she was trying to take norco prn only due to insurance problems. States her insurance has not been covering her medications and she was told it may take several weeks to straighten out her insurance. Patient saw transplant team in [facility] and transplant doctor and pharmacist want patient to be on metformin  mg/day and lantus stopped        Prior to Visit Medications    Medication Sig Taking? Authorizing Provider   HYDROcodone-acetaminophen (NORCO) 7.5-325 MG per tablet Take 1 tablet by mouth every 8 hours as needed for Pain for up to 30 days.  Yes MIKEL Mcconnell CNP   metFORMIN (GLUCOPHAGE XR) 500 MG extended release tablet Take 1 tablet by mouth daily (with breakfast) Yes MIKEL Mcconnell CNP   Melatonin 5 MG CHEW Take 1 tablet by mouth nightly Yes Historical Provider, MD   atorvastatin (LIPITOR) 20 MG tablet TAKE 1 TABLET BY MOUTH EVERY NIGHT Yes MIKEL Mcconnell CNP   amLODIPine (NORVASC) 2.5 MG tablet TAKE 1 TABLET BY MOUTH EVERY DAY Yes MIKEL Mcconnell CNP   omeprazole (PRILOSEC) 40 MG delayed release capsule TAKE 1 CAPSULE EVERY DAY Yes MIKEL Mcconnell CNP   losartan (COZAAR) 50 MG tablet Take one tablet daily, call with BP > 160/100 Yes MIKEL Mcconnell CNP   furosemide (LASIX) 40 MG tablet Take 1 tablet by mouth daily Yes MIKEL Mcconnell CNP   sulfamethoxazole-trimethoprim (BACTRIM;SEPTRA) 400-80 MG per tablet TAKE 1 TABLET EVERY DAY Yes MIKEL Mcconnell CNP   aspirin 81 MG chewable tablet Take 1 tablet by mouth daily Yes Walker Odor Edu Perez MD   NYSTATIN 245884 UNIT/GM powder APPLY FOUR TIMES DAILY Yes Maria Luisa Ugarte DO   tacrolimus (PROGRAF) 1 MG capsule Take 1.5 mg by mouth 2 times daily  Yes Historical Provider, MD   guaiFENesin (MUCINEX) 600 MG extended release tablet Take 600 mg by mouth daily  Yes Historical Provider, MD   albuterol sulfate HFA (PROAIR HFA) 108 (90 Base) MCG/ACT inhaler INHALE 2 PUFFS BY MOUTH EVERY 6 HOURS AS NEEDED FOR WHEEZING Yes Nereida Weinstein, APRN - CNP   diclofenac sodium (VOLTAREN) 1 % GEL Apply 2 g topically 2 times daily Yes Katerina Lawler APRN - CNP   albuterol (PROVENTIL) (2.5 MG/3ML) 0.083% nebulizer solution Take 2.5 mg by nebulization every 6 hours as needed for Wheezing Yes Historical Provider, MD         Allergies   Allergen Reactions    Chantix [Varenicline] Swelling     Tongue swelling Split in 1/2    Heparin      Platelets drop         Review of Systems  Review of Systems   Constitutional: Positive for unexpected weight change (gain). Negative for activity change and appetite change. Respiratory: Negative for cough, shortness of breath and wheezing. Cardiovascular: Negative for chest pain, palpitations and leg swelling. Gastrointestinal: Negative for constipation, diarrhea, nausea and vomiting. Musculoskeletal: Positive for back pain, gait problem and myalgias. Neurological: Positive for dizziness, weakness (generalized and left sided) and light-headedness (improved). Negative for speech difficulty and headaches. VS:  /76   Pulse 64   Temp 98 °F (36.7 °C) (Oral)   Resp 16   Ht 5' 6\" (1.676 m)   Wt 183 lb (83 kg)   LMP  (LMP Unknown)   SpO2 99%   BMI 29.54 kg/m²     Physical Exam  Physical Exam  Constitutional:       General: She is not in acute distress. Appearance: She is well-developed. HENT:      Head: Normocephalic and atraumatic. Neck:      Thyroid: No thyromegaly. Trachea: No tracheal deviation.    Cardiovascular:      Rate and Rhythm: Normal rate and regular rhythm. Heart sounds: Normal heart sounds. No murmur. Pulmonary:      Effort: Pulmonary effort is normal. No respiratory distress. Breath sounds: Normal breath sounds. No wheezing or rales. Chest:      Chest wall: No tenderness. Abdominal:      General: Bowel sounds are normal.      Palpations: Abdomen is soft. Tenderness: There is no abdominal tenderness. Musculoskeletal:         General: Tenderness present. Comments: Pain to lumbar spine with bilateral paraspinal tenderness. Left upper and lower extremities 3/5, right 5/5   Lymphadenopathy:      Cervical: No cervical adenopathy. Skin:     General: Skin is warm and dry. Neurological:      Mental Status: She is alert and oriented to person, place, and time. Psychiatric:         Behavior: Behavior normal.           Assessment/Plan:    Humza Trinidad was seen today for back pain and diabetes. Diagnoses and all orders for this visit:    Chronic midline low back pain with right-sided sciatica  -     HYDROcodone-acetaminophen (NORCO) 7.5-325 MG per tablet; Take 1 tablet by mouth every 8 hours as needed for Pain for up to 30 days.  -dose decreased since patient not taking as previously ordered  -The current medical regimen is effective;  continue present plan and medications. Therapeutic drug monitoring  -     Urine Drug Screen; Future  -     OPIATE, QUANTITATIVE, URINE; Future    Uncontrolled type 2 diabetes mellitus with hypoglycemia without coma (HCC)  -     metFORMIN (GLUCOPHAGE XR) 500 MG extended release tablet; Take 1 tablet by mouth daily (with breakfast)  -stop lantus        -  Controlled Substance Monitoring:    Acute and Chronic Pain Monitoring:   RX Monitoring 2/26/2020   Attestation -   Periodic Controlled Substance Monitoring No signs of potential drug abuse or diversion identified.            Greater than 15  Minutes was spent with patient and more than 50% of the time was spent face to facecounseling

## 2020-02-29 LAB
6AM URINE: <10 NG/ML
CODEINE, URINE: <20 NG/ML
HYDROCODONE, URINE: <20 NG/ML
HYDROMORPHONE, URINE: <20 NG/ML
MORPHINE URINE: <20 NG/ML
NORHYDROCODONE, URINE: <20 NG/ML
NOROXYCODONE, URINE: <20 NG/ML
NOROXYMORPHONE, URINE: <20 NG/ML
OXYCODONE, URINE CONFIRMATION: <20 NG/ML
OXYMORPHONE, URINE: <20 NG/ML

## 2020-03-02 ENCOUNTER — HOSPITAL ENCOUNTER (OUTPATIENT)
Age: 60
Discharge: HOME | End: 2020-03-02
Payer: MEDICARE

## 2020-03-02 LAB
ALBUMIN SERPL-MCNC: 4.1 G/DL (ref 3.5–5.2)
ALP BLD-CCNC: 113 U/L (ref 35–104)
ALT SERPL-CCNC: 8 U/L (ref 0–32)
ANION GAP SERPL CALCULATED.3IONS-SCNC: 14 MMOL/L (ref 7–16)
AST SERPL-CCNC: 11 U/L (ref 0–31)
BASOPHILS ABSOLUTE: 0.05 E9/L (ref 0–0.2)
BASOPHILS RELATIVE PERCENT: 1.1 % (ref 0–2)
BILIRUB SERPL-MCNC: 0.3 MG/DL (ref 0–1.2)
BUN BLDV-MCNC: 23 MG/DL (ref 6–20)
CALCIUM SERPL-MCNC: 9.3 MG/DL (ref 8.6–10.2)
CHLORIDE BLD-SCNC: 103 MMOL/L (ref 98–107)
CO2: 24 MMOL/L (ref 22–29)
CREAT SERPL-MCNC: 1.5 MG/DL (ref 0.5–1)
DIABETIC RETINOPATHY: NEGATIVE
EOSINOPHILS ABSOLUTE: 0.13 E9/L (ref 0.05–0.5)
EOSINOPHILS RELATIVE PERCENT: 3 % (ref 0–6)
GAMMA GLUTAMYL TRANSFERASE: 12 U/L (ref 6–42)
GFR AFRICAN AMERICAN: 43
GFR NON-AFRICAN AMERICAN: 36 ML/MIN/1.73
GLUCOSE BLD-MCNC: 211 MG/DL (ref 74–99)
HCT VFR BLD CALC: 36.7 % (ref 34–48)
HEMOGLOBIN: 12.4 G/DL (ref 11.5–15.5)
IMMATURE GRANULOCYTES #: 0.02 E9/L
IMMATURE GRANULOCYTES %: 0.5 % (ref 0–5)
LYMPHOCYTES ABSOLUTE: 1.2 E9/L (ref 1.5–4)
LYMPHOCYTES RELATIVE PERCENT: 27.3 % (ref 20–42)
MAGNESIUM: 1.7 MG/DL (ref 1.6–2.6)
MCH RBC QN AUTO: 30.8 PG (ref 26–35)
MCHC RBC AUTO-ENTMCNC: 33.8 % (ref 32–34.5)
MCV RBC AUTO: 91.3 FL (ref 80–99.9)
MONOCYTES ABSOLUTE: 0.48 E9/L (ref 0.1–0.95)
MONOCYTES RELATIVE PERCENT: 10.9 % (ref 2–12)
NEUTROPHILS ABSOLUTE: 2.52 E9/L (ref 1.8–7.3)
NEUTROPHILS RELATIVE PERCENT: 57.2 % (ref 43–80)
PDW BLD-RTO: 13.1 FL (ref 11.5–15)
PHOSPHORUS: 4.7 MG/DL (ref 2.5–4.5)
PLATELET # BLD: 180 E9/L (ref 130–450)
PMV BLD AUTO: 10.9 FL (ref 7–12)
POTASSIUM SERPL-SCNC: 4.8 MMOL/L (ref 3.5–5)
RBC # BLD: 4.02 E12/L (ref 3.5–5.5)
SODIUM BLD-SCNC: 141 MMOL/L (ref 132–146)
TOTAL PROTEIN: 6.7 G/DL (ref 6.4–8.3)
WBC # BLD: 4.4 E9/L (ref 4.5–11.5)

## 2020-03-02 PROCEDURE — 85025 COMPLETE CBC W/AUTO DIFF WBC: CPT

## 2020-03-02 PROCEDURE — 83735 ASSAY OF MAGNESIUM: CPT

## 2020-03-02 PROCEDURE — 84100 ASSAY OF PHOSPHORUS: CPT

## 2020-03-02 PROCEDURE — 36415 COLL VENOUS BLD VENIPUNCTURE: CPT

## 2020-03-02 PROCEDURE — 80053 COMPREHEN METABOLIC PANEL: CPT

## 2020-03-02 PROCEDURE — 82977 ASSAY OF GGT: CPT

## 2020-03-05 ENCOUNTER — OFFICE VISIT (OUTPATIENT)
Dept: FAMILY MEDICINE CLINIC | Age: 60
End: 2020-03-05
Payer: MEDICARE

## 2020-03-05 VITALS
TEMPERATURE: 99 F | SYSTOLIC BLOOD PRESSURE: 112 MMHG | RESPIRATION RATE: 16 BRPM | OXYGEN SATURATION: 96 % | HEIGHT: 66 IN | DIASTOLIC BLOOD PRESSURE: 60 MMHG | BODY MASS INDEX: 28.93 KG/M2 | HEART RATE: 74 BPM | WEIGHT: 180 LBS

## 2020-03-05 LAB
INFLUENZA A ANTIGEN, POC: NORMAL
INFLUENZA B ANTIGEN, POC: NORMAL

## 2020-03-05 PROCEDURE — 1036F TOBACCO NON-USER: CPT | Performed by: NURSE PRACTITIONER

## 2020-03-05 PROCEDURE — 99213 OFFICE O/P EST LOW 20 MIN: CPT | Performed by: NURSE PRACTITIONER

## 2020-03-05 PROCEDURE — G8417 CALC BMI ABV UP PARAM F/U: HCPCS | Performed by: NURSE PRACTITIONER

## 2020-03-05 PROCEDURE — G8484 FLU IMMUNIZE NO ADMIN: HCPCS | Performed by: NURSE PRACTITIONER

## 2020-03-05 PROCEDURE — G8427 DOCREV CUR MEDS BY ELIG CLIN: HCPCS | Performed by: NURSE PRACTITIONER

## 2020-03-05 PROCEDURE — 3017F COLORECTAL CA SCREEN DOC REV: CPT | Performed by: NURSE PRACTITIONER

## 2020-03-05 PROCEDURE — 87804 INFLUENZA ASSAY W/OPTIC: CPT | Performed by: NURSE PRACTITIONER

## 2020-03-05 RX ORDER — DEXTROMETHORPHAN HYDROBROMIDE AND PROMETHAZINE HYDROCHLORIDE 15; 6.25 MG/5ML; MG/5ML
5 SYRUP ORAL 4 TIMES DAILY PRN
Qty: 120 ML | Refills: 0 | Status: SHIPPED
Start: 2020-03-05 | End: 2020-10-30 | Stop reason: SDUPTHER

## 2020-03-05 ASSESSMENT — ENCOUNTER SYMPTOMS
VOMITING: 1
DIARRHEA: 0
SINUS PRESSURE: 1
SHORTNESS OF BREATH: 1
SORE THROAT: 0
NAUSEA: 0
COUGH: 1
WHEEZING: 0

## 2020-03-05 NOTE — PROGRESS NOTES
HFA) 108 (90 Base) MCG/ACT inhaler INHALE 2 PUFFS BY MOUTH EVERY 6 HOURS AS NEEDED FOR WHEEZING Yes Nereida Weinstein, APRN - CNP   diclofenac sodium (VOLTAREN) 1 % GEL Apply 2 g topically 2 times daily Yes Stormy Shingles, APRN - CNP   albuterol (PROVENTIL) (2.5 MG/3ML) 0.083% nebulizer solution Take 2.5 mg by nebulization every 6 hours as needed for Wheezing Yes Historical Provider, MD         Allergies   Allergen Reactions    Chantix [Varenicline] Swelling     Tongue swelling Split in 1/2    Heparin      Platelets drop         Review of Systems  Review of Systems   Constitutional: Positive for appetite change, diaphoresis and fatigue. Negative for chills. HENT: Positive for congestion (sinus and chest) and sinus pressure. Negative for ear pain and sore throat. Respiratory: Positive for cough and shortness of breath (when laying down). Negative for wheezing. Gastrointestinal: Positive for vomiting. Negative for diarrhea and nausea. Neurological: Positive for dizziness and headaches. VS:  /60   Pulse 74   Temp 99 °F (37.2 °C) (Oral)   Resp 16   Ht 5' 6\" (1.676 m)   Wt 180 lb (81.6 kg)   LMP  (LMP Unknown)   SpO2 96%   BMI 29.05 kg/m²     Physical Exam  Physical Exam  Constitutional:       Appearance: She is well-developed. HENT:      Head: Normocephalic and atraumatic. Comments: TMs with good light reflex bilaterally, nasal turbinates erythematous and boggy, posterior pharynx erythematous and drainage noted. Neck:      Thyroid: No thyromegaly. Trachea: No tracheal deviation. Cardiovascular:      Rate and Rhythm: Normal rate and regular rhythm. Heart sounds: No murmur. Pulmonary:      Effort: Pulmonary effort is normal.      Breath sounds: Normal breath sounds. No wheezing, rhonchi or rales. Abdominal:      General: Bowel sounds are normal.      Palpations: Abdomen is soft. Tenderness: There is no abdominal tenderness.    Musculoskeletal: Normal range of

## 2020-03-10 LAB
Lab: NORMAL
REPORT: NORMAL
THIS TEST SENT TO: NORMAL

## 2020-03-23 ENCOUNTER — E-VISIT (OUTPATIENT)
Dept: FAMILY MEDICINE CLINIC | Age: 60
End: 2020-03-23
Payer: MEDICARE

## 2020-03-23 PROCEDURE — 99421 OL DIG E/M SVC 5-10 MIN: CPT | Performed by: NURSE PRACTITIONER

## 2020-03-23 RX ORDER — OMEPRAZOLE 40 MG/1
CAPSULE, DELAYED RELEASE ORAL
Qty: 90 CAPSULE | Refills: 2 | Status: SHIPPED
Start: 2020-03-23 | End: 2020-12-28

## 2020-03-23 RX ORDER — FUROSEMIDE 40 MG/1
40 TABLET ORAL DAILY
Qty: 90 TABLET | Refills: 3 | Status: ON HOLD
Start: 2020-03-23 | End: 2020-09-04

## 2020-03-23 RX ORDER — AMLODIPINE BESYLATE 2.5 MG/1
TABLET ORAL
Qty: 90 TABLET | Refills: 3 | Status: SHIPPED
Start: 2020-03-23 | End: 2021-07-06 | Stop reason: SDUPTHER

## 2020-03-23 RX ORDER — METFORMIN HYDROCHLORIDE 500 MG/1
500 TABLET, EXTENDED RELEASE ORAL
Qty: 90 TABLET | Refills: 0 | Status: SHIPPED
Start: 2020-03-23 | End: 2020-05-04 | Stop reason: SDUPTHER

## 2020-03-23 RX ORDER — ATORVASTATIN CALCIUM 20 MG/1
TABLET, FILM COATED ORAL
Qty: 90 TABLET | Refills: 3 | Status: SHIPPED
Start: 2020-03-23 | End: 2021-07-06 | Stop reason: SDUPTHER

## 2020-03-23 RX ORDER — LOSARTAN POTASSIUM 50 MG/1
TABLET ORAL
Qty: 90 TABLET | Refills: 3 | Status: SHIPPED
Start: 2020-03-23 | End: 2021-07-06 | Stop reason: SDUPTHER

## 2020-04-01 ENCOUNTER — TELEMEDICINE (OUTPATIENT)
Dept: FAMILY MEDICINE CLINIC | Age: 60
End: 2020-04-01
Payer: MEDICARE

## 2020-04-01 PROCEDURE — 3017F COLORECTAL CA SCREEN DOC REV: CPT | Performed by: NURSE PRACTITIONER

## 2020-04-01 PROCEDURE — G8427 DOCREV CUR MEDS BY ELIG CLIN: HCPCS | Performed by: NURSE PRACTITIONER

## 2020-04-01 PROCEDURE — 99213 OFFICE O/P EST LOW 20 MIN: CPT | Performed by: NURSE PRACTITIONER

## 2020-04-01 RX ORDER — HYDROCODONE BITARTRATE AND ACETAMINOPHEN 5; 325 MG/1; MG/1
1 TABLET ORAL 2 TIMES DAILY PRN
Qty: 60 TABLET | Refills: 0 | Status: SHIPPED
Start: 2020-04-01 | End: 2020-05-04 | Stop reason: SDUPTHER

## 2020-04-01 ASSESSMENT — ENCOUNTER SYMPTOMS
COUGH: 0
BACK PAIN: 1
NAUSEA: 0
VOMITING: 0
DIARRHEA: 0
WHEEZING: 0
SHORTNESS OF BREATH: 0
CONSTIPATION: 0

## 2020-04-01 NOTE — PROGRESS NOTES
tablet Take one tablet daily, call with BP > 160/100 Yes Rosemary Osuna APRN - CNP   furosemide (LASIX) 40 MG tablet Take 1 tablet by mouth daily Yes MIKEL Ramos - CNP   Melatonin 5 MG CHEW Take 1 tablet by mouth nightly Yes Historical Provider, MD   sulfamethoxazole-trimethoprim (BACTRIM;SEPTRA) 400-80 MG per tablet TAKE 1 TABLET EVERY DAY Yes MIKEL Ramos - CNP   aspirin 81 MG chewable tablet Take 1 tablet by mouth daily Yes Iris Montesinos MD   NYSTATIN [id number] UNIT/GM powder APPLY FOUR TIMES DAILY Yes Ever Sheer, DO   tacrolimus (PROGRAF) 1 MG capsule Take 1.5 mg by mouth 2 times daily  Yes Historical Provider, MD   guaiFENesin (MUCINEX) 600 MG extended release tablet Take 600 mg by mouth daily  Yes Historical Provider, MD   albuterol sulfate HFA (PROAIR HFA) 108 (90 Base) MCG/ACT inhaler INHALE 2 PUFFS BY MOUTH EVERY 6 HOURS AS NEEDED FOR WHEEZING Yes Nereida Weinstein, APRN - CNP   diclofenac sodium (VOLTAREN) 1 % GEL Apply 2 g topically 2 times daily Yes Rosemary Osuna APRN - CNP   albuterol (PROVENTIL) (2.5 MG/3ML) 0.083% nebulizer solution Take 2.5 mg by nebulization every 6 hours as needed for Wheezing Yes Historical Provider, MD         Allergies   Allergen Reactions    Chantix [Varenicline] Swelling     Tongue swelling Split in 1/2    Heparin      Platelets drop         Review of Systems  Review of Systems   Constitutional: Negative for activity change, appetite change and unexpected weight change. Respiratory: Negative for cough, shortness of breath and wheezing. Cardiovascular: Negative for chest pain, palpitations and leg swelling. Gastrointestinal: Negative for constipation, diarrhea, nausea and vomiting. Musculoskeletal: Positive for back pain and myalgias. Neurological: Positive for weakness (legs) and light-headedness. Negative for headaches.          VS:  LMP  (LMP Unknown)     Physical Exam  Physical Exam  -alert, oriented and

## 2020-05-04 ENCOUNTER — TELEMEDICINE (OUTPATIENT)
Dept: FAMILY MEDICINE CLINIC | Age: 60
End: 2020-05-04
Payer: MEDICARE

## 2020-05-04 PROCEDURE — G8427 DOCREV CUR MEDS BY ELIG CLIN: HCPCS | Performed by: NURSE PRACTITIONER

## 2020-05-04 PROCEDURE — 3017F COLORECTAL CA SCREEN DOC REV: CPT | Performed by: NURSE PRACTITIONER

## 2020-05-04 PROCEDURE — 99213 OFFICE O/P EST LOW 20 MIN: CPT | Performed by: NURSE PRACTITIONER

## 2020-05-04 PROCEDURE — 2022F DILAT RTA XM EVC RTNOPTHY: CPT | Performed by: NURSE PRACTITIONER

## 2020-05-04 PROCEDURE — 3046F HEMOGLOBIN A1C LEVEL >9.0%: CPT | Performed by: NURSE PRACTITIONER

## 2020-05-04 RX ORDER — METFORMIN HYDROCHLORIDE 500 MG/1
500 TABLET, EXTENDED RELEASE ORAL
Qty: 90 TABLET | Refills: 0 | Status: SHIPPED
Start: 2020-05-04 | End: 2020-06-03

## 2020-05-04 RX ORDER — HYDROCODONE BITARTRATE AND ACETAMINOPHEN 5; 325 MG/1; MG/1
1 TABLET ORAL 2 TIMES DAILY PRN
Qty: 60 TABLET | Refills: 0 | Status: SHIPPED
Start: 2020-05-04 | End: 2020-06-09 | Stop reason: SDUPTHER

## 2020-05-04 ASSESSMENT — ENCOUNTER SYMPTOMS
WHEEZING: 0
NAUSEA: 0
COUGH: 0
DIARRHEA: 0
BACK PAIN: 1
SHORTNESS OF BREATH: 0
CONSTIPATION: 1
VOMITING: 0

## 2020-05-04 NOTE — PROGRESS NOTES
Cardiovascular: Negative for chest pain and palpitations. Gastrointestinal: Positive for constipation. Negative for diarrhea, nausea and vomiting. Musculoskeletal: Positive for back pain and myalgias. Neurological: Positive for dizziness and weakness (left side, post CVA). Negative for light-headedness and headaches. VS:  LMP  (LMP Unknown)     Physical Exam  Physical Exam  Constitutional:       General: She is not in acute distress. Appearance: Normal appearance. HENT:      Head: Normocephalic and atraumatic. Pulmonary:      Effort: Pulmonary effort is normal. No respiratory distress. Neurological:      Mental Status: She is alert. Mental status is at baseline. Psychiatric:         Mood and Affect: Mood normal.         Behavior: Behavior normal.           Assessment/Plan:  Kulwant Wang was seen today for lower back pain. Diagnoses and all orders for this visit:    Chronic midline low back pain with right-sided sciatica  -     HYDROcodone-acetaminophen (NORCO) 5-325 MG per tablet; Take 1 tablet by mouth 2 times daily as needed for Pain for up to 30 days.  -The current medical regimen is effective;  continue present plan and medications.  -discussed home safety tips  -will consider PT once COVID-19 pandemic situation has improved    Uncontrolled type 2 diabetes mellitus with hypoglycemia without coma (HCC)  -     metFORMIN (GLUCOPHAGE XR) 500 MG extended release tablet; Take 1 tablet by mouth daily (with breakfast)  -The current medical regimen is effective;  continue present plan and medications. Controlled Substance Monitoring:    Acute and Chronic Pain Monitoring:   RX Monitoring 5/4/2020   Attestation -   Periodic Controlled Substance Monitoring No signs of potential drug abuse or diversion identified. Time spent: Greater than [address] is a 63 y.o. female being evaluated by a Virtual Visit (video visit) encounter to address concerns as mentioned above.   PAYAL caregiver was present when appropriate. Due to this being a TeleHealth encounter ([facility]), evaluation of the following organ systems was limited: Vitals/Constitutional/EENT/Resp/CV/GI//MS/Neuro/Skin/Heme-Lymph-Imm. Pursuant to the emergency declaration under the [address] [place] [address], [address] [place] [address] and the [employer] Act, this Virtual Visit was conducted with patient's (and/or legal guardian's) consent, to reduce the patient's risk of exposure to COVID-19 and provide necessary medical care. The patient (and/or legal guardian) has also been advised to contact this office for worsening conditions or problems, and seek emergency medical treatment and/or call 911 if deemed necessary. Patient identification was verified at the start of the visit: Yes      Services were provided through a video synchronous discussion virtually to substitute for in-person clinic visit. Patient and provider were located at their individual homes. --MIKEL Morgan CNP on 5/4/2020 at 10:00 AM    An electronic signature was used to authenticate this note.

## 2020-06-01 RX ORDER — ALBUTEROL SULFATE 90 UG/1
AEROSOL, METERED RESPIRATORY (INHALATION)
Qty: 3 INHALER | Refills: 1 | Status: SHIPPED
Start: 2020-06-01 | End: 2020-12-28

## 2020-06-03 RX ORDER — METFORMIN HYDROCHLORIDE 500 MG/1
500 TABLET, EXTENDED RELEASE ORAL
Qty: 90 TABLET | Refills: 0 | Status: SHIPPED
Start: 2020-06-03 | End: 2020-08-24

## 2020-06-09 ENCOUNTER — OFFICE VISIT (OUTPATIENT)
Dept: FAMILY MEDICINE CLINIC | Age: 60
End: 2020-06-09
Payer: MEDICARE

## 2020-06-09 VITALS
TEMPERATURE: 98.8 F | HEIGHT: 66 IN | OXYGEN SATURATION: 97 % | SYSTOLIC BLOOD PRESSURE: 130 MMHG | BODY MASS INDEX: 28.93 KG/M2 | DIASTOLIC BLOOD PRESSURE: 78 MMHG | HEART RATE: 67 BPM | WEIGHT: 180 LBS

## 2020-06-09 PROCEDURE — 99213 OFFICE O/P EST LOW 20 MIN: CPT | Performed by: FAMILY MEDICINE

## 2020-06-09 PROCEDURE — 3017F COLORECTAL CA SCREEN DOC REV: CPT | Performed by: FAMILY MEDICINE

## 2020-06-09 PROCEDURE — G8427 DOCREV CUR MEDS BY ELIG CLIN: HCPCS | Performed by: FAMILY MEDICINE

## 2020-06-09 PROCEDURE — G8417 CALC BMI ABV UP PARAM F/U: HCPCS | Performed by: FAMILY MEDICINE

## 2020-06-09 PROCEDURE — 1036F TOBACCO NON-USER: CPT | Performed by: FAMILY MEDICINE

## 2020-06-09 RX ORDER — HYDROCODONE BITARTRATE AND ACETAMINOPHEN 5; 325 MG/1; MG/1
1 TABLET ORAL 2 TIMES DAILY PRN
Qty: 60 TABLET | Refills: 0 | Status: SHIPPED | OUTPATIENT
Start: 2020-06-09 | End: 2020-07-13 | Stop reason: SDUPTHER

## 2020-06-09 RX ORDER — FLUTICASONE PROPIONATE 50 MCG
2 SPRAY, SUSPENSION (ML) NASAL DAILY
Qty: 3 BOTTLE | Refills: 1 | Status: SHIPPED
Start: 2020-06-09 | End: 2021-09-22 | Stop reason: SDUPTHER

## 2020-06-09 ASSESSMENT — ENCOUNTER SYMPTOMS
COUGH: 0
NAUSEA: 0
SHORTNESS OF BREATH: 0
CONSTIPATION: 0
BLOOD IN STOOL: 0
WHEEZING: 0
VOMITING: 0
DIARRHEA: 0
ABDOMINAL PAIN: 0

## 2020-06-09 NOTE — PROGRESS NOTES
HPI:  Patient comes in today for   Chief Complaint   Patient presents with    Back Pain     med refills           Review of Systems  Review of Systems   Constitutional: Negative for chills, diaphoresis and fever. HENT: Negative for ear discharge, ear pain, hearing loss, nosebleeds and tinnitus. Respiratory: Negative for cough, shortness of breath and wheezing. Cardiovascular: Negative for chest pain. Gastrointestinal: Negative for abdominal pain, blood in stool, constipation, diarrhea, nausea and vomiting. Genitourinary: Negative for dysuria, flank pain and hematuria. Musculoskeletal: Negative for myalgias. Skin: Negative for rash. Neurological: Negative for headaches. Hematological: Does not bruise/bleed easily. Psychiatric/Behavioral: Negative for hallucinations and suicidal ideas. PE[de-identified]  /78   Pulse 67   Temp 98.8 °F (37.1 °C) (Oral)   Ht 5' 6\" (1.676 m)   Wt 180 lb (81.6 kg)   LMP  (LMP Unknown)   SpO2 97%   BMI 29.05 kg/m²       Physical Exam  Constitutional:       Appearance: Normal appearance. She is well-developed. HENT:      Head: Normocephalic and atraumatic. Eyes:      General: No scleral icterus. Conjunctiva/sclera: Conjunctivae normal.      Pupils: Pupils are equal, round, and reactive to light. Neck:      Musculoskeletal: Neck supple. Thyroid: No thyromegaly. Vascular: No JVD. Trachea: No tracheal deviation. Cardiovascular:      Rate and Rhythm: Normal rate and regular rhythm. Heart sounds: Normal heart sounds. No murmur. No gallop. Pulmonary:      Effort: Pulmonary effort is normal. No respiratory distress. Breath sounds: Normal breath sounds. No wheezing. Abdominal:      Palpations: Abdomen is soft. Musculoskeletal:         General: No tenderness. Skin:     Findings: No erythema or rash. Neurological:      General: No focal deficit present.       Mental Status: She is alert and oriented to person, place, and

## 2020-06-18 ENCOUNTER — TELEPHONE (OUTPATIENT)
Dept: FAMILY MEDICINE CLINIC | Age: 60
End: 2020-06-18

## 2020-06-30 ENCOUNTER — TELEPHONE (OUTPATIENT)
Dept: OCCUPATIONAL THERAPY | Age: 60
End: 2020-06-30

## 2020-06-30 NOTE — TELEPHONE ENCOUNTER
Patient cancelled OT eval for today due to recent exposure to COVID. Pt is self quarantining for 2 weeks. Will reschedule when cleared to resume.

## 2020-07-13 ENCOUNTER — OFFICE VISIT (OUTPATIENT)
Dept: FAMILY MEDICINE CLINIC | Age: 60
End: 2020-07-13
Payer: MEDICARE

## 2020-07-13 VITALS
OXYGEN SATURATION: 98 % | RESPIRATION RATE: 16 BRPM | DIASTOLIC BLOOD PRESSURE: 84 MMHG | HEART RATE: 69 BPM | TEMPERATURE: 98 F | HEIGHT: 66 IN | WEIGHT: 180 LBS | BODY MASS INDEX: 28.93 KG/M2 | SYSTOLIC BLOOD PRESSURE: 128 MMHG

## 2020-07-13 PROCEDURE — 3017F COLORECTAL CA SCREEN DOC REV: CPT | Performed by: FAMILY MEDICINE

## 2020-07-13 PROCEDURE — G8427 DOCREV CUR MEDS BY ELIG CLIN: HCPCS | Performed by: FAMILY MEDICINE

## 2020-07-13 PROCEDURE — 99213 OFFICE O/P EST LOW 20 MIN: CPT | Performed by: FAMILY MEDICINE

## 2020-07-13 PROCEDURE — G8417 CALC BMI ABV UP PARAM F/U: HCPCS | Performed by: FAMILY MEDICINE

## 2020-07-13 PROCEDURE — 1036F TOBACCO NON-USER: CPT | Performed by: FAMILY MEDICINE

## 2020-07-13 RX ORDER — HYDROCODONE BITARTRATE AND ACETAMINOPHEN 5; 325 MG/1; MG/1
1 TABLET ORAL 2 TIMES DAILY PRN
Qty: 60 TABLET | Refills: 0 | Status: CANCELLED | OUTPATIENT
Start: 2020-07-13 | End: 2020-08-12

## 2020-07-13 RX ORDER — HYDROCODONE BITARTRATE AND ACETAMINOPHEN 5; 325 MG/1; MG/1
1 TABLET ORAL 2 TIMES DAILY PRN
Qty: 60 TABLET | Refills: 0 | Status: SHIPPED | OUTPATIENT
Start: 2020-07-13 | End: 2020-08-12 | Stop reason: SDUPTHER

## 2020-07-13 NOTE — PROGRESS NOTES
Chief Complaint   Patient presents with    Back Pain     patient presents for monthly follow up to manage back pain. patient requests refill on norco. patient says back pain is not improving. pt. starts back to PT this month. HPI:  Wilian here for follow-up of BACK PAIN. Patient complains of  Spasm, Tightness and worsening after sitting or standing. Pt needs refills. Scheduled Meds:  Continuous Infusions:  PRN Meds:.    Past Medical History, Surgical History, and Family History has been reviewed and updated. We did discuss pain management and exercises. Review of Systems:  Constitutional:  No fever, no fatigue, no chills, no headaches, no weight change  Dermatology:  No rash, no mole, no dry or sensitive skin  ENT:  No cough, no sore throat, no sinus pain, no runny nose, no ear pain  Cardiology:  No chest pain, no palpitations, no leg edema, no shortness of breath, no PND  Gastroenterology:  No dysphagia, no abdominal pain, no nausea, no Straight Leg Raise Test on the , no constipation, no diarrhea, no heartburn  Musculoskeletal:  Pain and Spasm Lumbar Triangle.    Respiratory:  No shortness of breath, no orthopnea, no wheezing, no ZURITA, no hemoptysis  Urology:  No blood in the urine, no urinary frequency, no urinary incontinence, no urinary urgency, no nocturia, no dysuria    ON EXAMINATION    Vitals:    07/13/20 0826   BP: 128/84   Pulse: 69   Resp: 16   Temp: 98 °F (36.7 °C)   TempSrc: Oral   SpO2: 98%   Weight: 180 lb (81.6 kg)   Height: 5' 6\" (1.676 m)       Spinal Examination: decreased ROM, palpable pain and straight leg raising pain,     General:  Patient alert and oriented x 3, NAD, pleasant  Neck:  Supple, no goiter, no carotid bruits, no LAD  Lungs:  CTA Bilaterally  Heart:  RRR, no murmurs, gallops or rubs  Abdomen:  Soft/nt/nd, + bowel sounds  Extremities:  No clubbing, cyanosis or edema      Assessment/Plan:  Natural history/expected course discussed, and patient's questions

## 2020-07-21 ENCOUNTER — EVALUATION (OUTPATIENT)
Dept: OCCUPATIONAL THERAPY | Age: 60
End: 2020-07-21
Payer: MEDICARE

## 2020-07-21 ENCOUNTER — EVALUATION (OUTPATIENT)
Dept: PHYSICAL THERAPY | Age: 60
End: 2020-07-21
Payer: MEDICARE

## 2020-07-21 PROCEDURE — 97165 OT EVAL LOW COMPLEX 30 MIN: CPT | Performed by: OCCUPATIONAL THERAPIST

## 2020-07-21 PROCEDURE — 97162 PT EVAL MOD COMPLEX 30 MIN: CPT | Performed by: PHYSICAL THERAPIST

## 2020-07-21 NOTE — PROGRESS NOTES
[address] [facility] OUTPATIENT REHABILITATION  PHYSICAL THERAPY INITIAL EVALUATION         Date:  2020   Patient: Tej Alvarado  : 1960  MRN: [id number]  Referring Provider: MIKEL Castillo - BERNARDO Ed[address] [facility] Diagnosis:      Diagnosis Orders   1. Hemiplegia affecting left nondominant side, unspecified etiology, unspecified hemiplegia type ([address]          SUBJECTIVE:     History: Patient reports decreased functional mobility over the past year due to CVA with L hemiparesis. She was seen in outpatient rehab at [facility] last year from 2019 - 2019. Since that time she reports a gradual decline in function. She reports she is sedentary and is poorly motivated to exercise at home. She also reports a decrease in confidence relating to ambulating around her home. She reports she does not use an assistive device in the home; rather she chooses to use furniture for support. She admits to 2 falls in the last 3 months. Both falls happened on level ground in the home and occurred when her hand placement was blocked by clutter on a table. On a positive note, she reports she is now able to walk out to the car and her trash cans. She has done this twice. Unfortunately, she did not use an assistive device when she did this. Time was spent at this time in the interview to encourage patient to use an assistive device for ambulation. At this time, patient stated she will not listen to anything I have to say and will do her own thing. She is not receptive to any safety information or patient advocacy programming. I asked her what she wanted to get out of PT. Here is her report:     Patient goals:   A little steadier in walking. She wants to be able to walk with a cup of coffee in her home without stopping to rest.  Do more outdoors such as check the mail.   Wants to get into her daughter's above ground pool (she says she does stairs)    Previous PT: [facility] last year from 2019 - 2019. Chief complaint: decreased mobility, decreased balance    Pain:   Current: 7/10         Symptom Type/Quality: sharp, aching  Location[de-identified] back and R LE  I also emphasized the need to use an assistive device when in back pain with leg pain in order to provide support and assist balance. Patient refused to consider this. Imaging results: No results found.     Past Medical History:  Past Medical History:   Diagnosis Date    Arthritis     right knee    Blood circulation, collateral     Chronic fatigue 2016    Cirrhosis of liver (HCC)     end stage    COPD exacerbation ([facility] 2015    pt denies having    Diabetes mellitus ([facility]     Essential hypertension 2/15/2016    Gall stones     did have stent placed    GERD (gastroesophageal reflux disease)     Hep C w/o coma, chronic ([facility]     Sucessfully treated with Harvoni-no longer has viral load    History of blood transfusion     Neuropathy     PFO (patent foramen ovale)     Pneumonia     Type 2 diabetes mellitus with stage 3 chronic kidney disease, with long-term current use of insulin (HCC)     Unspecified cerebral artery occlusion with cerebral infarction     Varices      Past Surgical History:   Procedure Laterality Date    BRAIN SURGERY      CARDIAC CATHETERIZATION      cerebral angiogrem to check cavernous malformation in head - negative    CARDIAC SURGERY  2014    heart cath     SECTION      x 2     SECTION   and     COLONOSCOPY      ECHO COMPL W DOP COLOR FLOW  2012         ENDOSCOPY, COLON, DIAGNOSTIC      ESOPHAGEAL VARICE LIGATION      banding    LIVER TRANSPLANT      OTHER SURGICAL HISTORY Right 16    Right Knee Arthroscopy with Debridement partial lateral menisectomy, chondroplasty, synovectomy       Medications:   Current Outpatient Medications   Medication Sig Dispense Refill    above    Contraindications/Precautions: falls risk    OBJECTIVE:     Observations: well nourished female, normal affect    Inspection: normal orthopedic exam       Gait: Holds L leg rigidly; little push off from floor. Uses step-to gait and demonstrates frontal plane sway. Again, recommended using cane; patient refused. Functional Strength:   Able to raise up on tip toes and heels with wall support. Decreased balance noted. Range of Motion:    Neck:    Flexion:  [x] Normal   [] Limited    Extension:  [x] Normal   [] Limited     Right Rotation: [x] Normal   [] Limited    Left Rotation:  [x] Normal   [] Limited    Right Side Bending: [x] Normal   [] Limited    Left Side Bending: [x] Normal   [] Limited     Upper Extremity:   Right:   [x] Normal   [] Limited    Left:   [] Normal   [x] Limited   L shoulder and elbow normal.  Distal UE deferred to OT. Trunk:   Flexion:  [x] Normal   [] Limited    Extension:  [x] Normal   [] Limited     Right Rotation: [x] Normal   [] Limited    Left Rotation:  [x] Normal   [] Limited    Right Side Bending: [x] Normal   [] Limited    Left Side Bending: [x] Normal   [] Limited     Lower Extremity:   Right:   [x] Normal   [] Limited    Left:   [x] Normal   [] Limited       Strength:     Neck: 5/5   Trunk: 5/5   R UE: 5/5   L UE: 4/5   R LE: 5/5   L LE: 4/5    Functional Mobility/Tests:  Test    Basic Transfer Slow, guarded, hand hold   Sit/Stand See test   Squat Slow, guarded, hand hold   Double stance, feet together, eyes open Unsteady. Hand hold. Double stance, feet together, eyes closed Unable    Step stool touches Unable    360 degree turns Poor control   Dysdiadochokinesia Present    Dysmetria  Present      TUG Test:  _17_  Seconds. Test date: 07/21/2020  Notes: Holds L leg rigidly; little push off from floor. Uses step-to gait and demonstrates frontal plane sway. Again, recommended using cane; patient refused. An older adult who takes ? 12 seconds to complete the TUG is at high risk for falling. 30-Sec. Chair Stand Test:  Number:  _10_  Test date: 07/21/2020    Notes: Patient lost balance 2 times and had to be prompted to slow down to maintain control. Scoring criteria. Chair Stand--Below Average Scores Age  Men  Women    60-64  < 14  < 12    65-69  < 12  < 11    70-74  < 12  < 10    75-79  < 11  < 10    80-84  < 10  < 9    85-89  < 8  < 8    90-94  < 7  < 4         ASSESSMENT     Outcome Measure:   LEFS 74% impairment    Problems:    Strength decreased   Balance limitations in gait, transfers   Decreased functional ability with walking, ADLs, IADLs    [] There are no barriers affecting plan of care or recovery    [x] Barriers to this patient's plan of care or recovery include: chronic nature of problem, patient compliance. Domestic Concerns:  [x] No  [] Yes:    Short Term goals (2-3 weeks)   Demonstrate improved balance in transfers    Long Term goals (4-6 weeks)   Increase Strength to 4+/5    Demonstrate improved balance in transfers, gait   Able to perform/complete the following functions/tasks: ambulate with greater skill and improved gait pattern. Will encourage device as appropriate.  Independent with Home Exercise Programs     Rehab Potential: [x] Good  [] Fair  [] Poor    PLAN       Treatment Plan:  [x] Therapeutic Exercise  [x] Therapeutic Activity  [x] Neuromuscular Re-education   [x] Gait Training  [x] Balance Training  [] Aerobic conditioning  [] Manual Therapy  [] Massage/Fascial release   [] Work/Sport specific activities    [] Pain Neuroscience [] Cold/hotpack  [] Vasocompression  [] Electrical Stimulation  [] Lumbar/Cervical Traction  [] Ultrasound   [] Iontophoresis: 4 mg/mL Dexamethasone Sodium Phosphate 40-80 mAmin        [x] Instruction in HEP      []  Medication allergies reviewed for use of Dexamethasone Sodium Phosphate 4mg/ml  with iontophoresis treatments. Patient is not allergic.       The following CPT codes are likely to be used in the care of this patient: 02541 PT Evaluation: Moderate Complexity       Suggested Professional Referral: [x] No  [] Yes:     Patient Education:  [x] Plans/Goals, Risks/Benefits discussed  [x] Home exercise program  Method of Education: [x] Verbal  [x] Demo  [x] Written  Comprehension of Education:  [x] Verbalizes understanding. [x] Demonstrates understanding. [] Needs Review. [] Demonstrates/verbalizes understanding of HEP/Ed previously given. Frequency:  1-2 days per week for 4-6 weeks    Patient understands diagnosis/prognosis and consents to treatment, plan and goals: [x] Yes    [] No     Thank you for the opportunity to work with your patient. If you have questions or comments, please contact me at [phone]; fax: [phone]. Electronically signed by: Jono Andersen PT         Please sign Physician's Certification and return to: [address] [place] [address]  Dept: [phone]  Dept Fax: [phone] Certification / Comments     Frequency/Duration 1-2 days per week for 4-6 weeks. Certification period from 7/21/2020  to 10/21/2020. I have reviewed the Plan of Care established for skilled therapy services and certify that the services are required and that they will be provided while the patient is under my care.     Physician's Comments/Revisions:               Physician's Printed Name:                                           [de-identified] Signature:                                                               Date:

## 2020-07-21 NOTE — PROGRESS NOTES
Debridement partial lateral menisectomy, chondroplasty, synovectomy       Reason for Referral: Patient presents for outpatient Occupational Therapy with a Hx of multiple CVAs affectiing the frontal lobe and cerebellar/ pontine. ( 3-3-19 to 4-11-19) On 4-23-19, she under went a external to internal cranial bypass of the occlusion, followed by inpatient acute rehabilitation. She completed outpatient Occupational Therapy from 6-3-19 to 11-18-19 with a focus on rehabilitation of the left sided weakness and improving daily function. She recently went for her rehab driving test at another facility. Therapy was now recommended for left neglect due to her increase in errors on the left side. Home Living: Lives with her daughter and family  Prior Level of Function: Mod Independent/ ambulates with no device  IADL History  Homemaking Responsibility: secondary  Shopping Responsibility: secondary  Mode of Transportation: car (not approved to drive yet)  Leisure & Hobbies: dating, time with family/ dogs  Work: NA    Pain Level: No pain complaints today    Cognition:   Alert/Oriented x3     ADL  Self care: Mod I  Homemaking: Pt is Mod I with light cooking, laundry and light household chores    UE Assessment: RHD    L UE Status: Pt has residual left sided deficits  AROM: WFL throughout except for limited digital extension d/t tone    Strength:   Shld 4-/5  Elbow 4-/5  Wrist 5/5    - L- 13#     (R- 46#)  Lateral pinch- L- 4.5#     (R-13. 5)    Vision / Perception: Pt wears glasses for reading. . Visual fields are functional with some limits in the top of the quadrants. . Neglect- moderate impairment (compensates well in familiar environments)  . Tracking- fair (challenging to the far right and far left of the visual field)  . Spatial relations: WFL  . Depth perception: [facility]    Coordination: Use of the R UE is WNL. Use of the left UE is limited to a fair gross assist due to limited hand function.      Assessment of current deficits   Functional mobility []  ADLs [] Strength []  Cognition []  Functional transfers  [] IADLs [] Safety Awareness []  Endurance []  Fine Motor Coordination [] Balance [] Vision/perception [x] Sensation []   Gross Motor Coordination [] ROM []  Work []  Leisure[x]     Eval Complexity: Low  Profile and History- interview, old OT notes  Assessment of Occupational Performance and Identification of Deficits- 2 current deficts to be addressed  Clinical Decision Making- modifications needed/ co-morbidity left hemiparesis    Rehab Potential:   [x] Good  [] Fair  [] Poor   Suggested Professional Referral: [x] No  [] Yes:  Barriers to Goal Achievement[de-identified]   [x] No  [] Yes:  Domestic Concerns:     [] No  [] Yes:    Goal Formulation: Patient \" I want to get the neglect better so I can pass my driving test\". Time In: 6292  Time Out: 1040  Timed Code Treatment Minutes: 50 min      PLAN     Plan   Plan: Plan of care initiated. Frequency Pt will be seen 1x a week for 5 weeks or 5 sessions as needed. Treatment to include:   [] Instruction in HEP   Modalities:  [x] Therapeutic Exercise [] Ultrasound   [] Electrical Stimulation/Attended  [] PROM/Stretching             [] Fluidotherapy          []  Paraffin                   []AAROM  [] AROM             [] Iontophoresis: 4 mg/mL;  Dexamethasone Sodium           [] Desensitization                           Phosphate 40-80 mAmin     [] Neuromuscular Re-education    [] Splinting    [x] Therapeutic Activity            [] Pain Management with/without modalities PRN        [x] Visual perceptual activity   [] ADL/IADL re-training        [] Tendon Glides                   []Joint Protection/Training  []Ergonomics       [] Joint Mobilization  []Adaptive Equipment Assessment/Training          [] Manual Edema Mobilization    [] Energy Conservation/Work Simplification  [] GM/FM Coordination  []  Safety retraining/education per  individual diagnosis/goals      GOALS (Long term same as Short term):  1) Patient will demonstrate good understanding of home program(exercises/activities/diagnosis/prognosis/goals) with good accuracy. 2) Pt will attend to the left visual field and tracking with >90% accuracy and with good safety awareness. Patient. Education:  [x] Plans/Goals, Risks/Benefits discussed  [] Home exercise program  Method of Education: [x] Verbal  [] Demo  [] Written  Comprehension of Education:  [x] Verbalizes understanding. [] Demonstrates understanding. [] Needs Review. [] Demonstrates/verbalizes understanding of HEP/Ed previously given. Patient understands diagnosis/prognosis and consents to treatment, plan and goals: [x] Yes    [] No         Electronically signed by: Alison Gutierrez OT/L  [id number]      [email or web address] Certification / Comments     Frequency/Duration 1x / week for 5 visits. Certification period From: 7-21-20  To: 9-21-20    I have reviewed the Plan of Care established for skilled therapy services and certify that the services are required and that they will be provided while the patient is under my care. Physician's Comments/Revisions:         [de-identified] Printed Name:    Dr Vanessa Butler                                       Physician's Signature:                                                               Date:       Please review Patient's OT evaluation and if you agree sign/date and fax back to us at our [facility] fax # [phone].

## 2020-07-28 PROBLEM — R41.4 LEFT-SIDED VISUAL NEGLECT: Status: ACTIVE | Noted: 2020-07-28

## 2020-07-29 ENCOUNTER — TREATMENT (OUTPATIENT)
Dept: PHYSICAL THERAPY | Age: 60
End: 2020-07-29
Payer: MEDICARE

## 2020-07-29 ENCOUNTER — TREATMENT (OUTPATIENT)
Dept: OCCUPATIONAL THERAPY | Age: 60
End: 2020-07-29
Payer: MEDICARE

## 2020-07-29 PROCEDURE — 97110 THERAPEUTIC EXERCISES: CPT

## 2020-07-29 PROCEDURE — 97530 THERAPEUTIC ACTIVITIES: CPT

## 2020-07-29 PROCEDURE — 97530 THERAPEUTIC ACTIVITIES: CPT | Performed by: OCCUPATIONAL THERAPIST

## 2020-07-29 NOTE — PROGRESS NOTES
Physical Therapy Daily Treatment Note    Date: 2020  Patient Name: Seng Jean Baptiste  : 1960   MRN: [id number]  DOInjury: DOSx:   Referring Provider:  MIKEL Miller CNP    Medical Diagnosis:    Diagnosis Orders   1. Hemiplegia affecting left nondominant side, unspecified etiology, unspecified hemiplegia type ([address]     2. Left hemiplegia (HCC)         Outcome Measure:  Lower Extremity Functional Scale (LEFS) 74% impairment    S: no changes. Going to move into a handicap apt   O:  Time 5053-0934     Visit  Repeat outcome measure at mid point and end. Pain 7/10     ROM      Modalities            Exercise      Nustep   L6/ 5 min           Squats      Calf Raises             Marching      Alt. Sidekicks            Sit/Stands 10x /30 sec           Gait training 135 ft   No AD SBA/CGA unsteady    Steps Fwd 8\" 25x     Steps Lat 8 \"15x Max asst for LOB          Marching Gait      Sidestepping      Leg press Left leg 5#  10~ to fatigue  Support left leg     Both LE 40# 2 x 20                       A:  Tolerated well. When pt was side stepping on 8\" step LOB occurred stepping up sideways max asst required to prevent pt from falling toward the left. Very impulse and using unsafe tech with all activities. Pt REFUSES to use AD, uses support of staff and environmental support. Constant VC's required for safety.   Supported left Leg on leg press due to ER.    P: Continue with rehab plan  Ubaldo Shea PTA    Treatment Charges: Mins Units   Initial Evaluation     Re-Evaluation     Ther Exercise         TE 15 1   Manual Therapy     MT     Ther Activities        TA 30 2   Gait Training          GT     Neuro Re-education NR     Modalities     Non-Billable Service Time     Other     Total Time/Units 45 3

## 2020-07-29 NOTE — PROGRESS NOTES
to obtain maximum benefits from skilled OT intervention.   []  [address] of care:   []  Discharge:      Sun Bunch OT/L  [id number]

## 2020-08-05 ENCOUNTER — TREATMENT (OUTPATIENT)
Dept: OCCUPATIONAL THERAPY | Age: 60
End: 2020-08-05
Payer: MEDICARE

## 2020-08-05 ENCOUNTER — TREATMENT (OUTPATIENT)
Dept: PHYSICAL THERAPY | Age: 60
End: 2020-08-05
Payer: MEDICARE

## 2020-08-05 PROCEDURE — 97110 THERAPEUTIC EXERCISES: CPT

## 2020-08-05 PROCEDURE — 97530 THERAPEUTIC ACTIVITIES: CPT

## 2020-08-05 PROCEDURE — 97530 THERAPEUTIC ACTIVITIES: CPT | Performed by: OCCUPATIONAL THERAPIST

## 2020-08-05 NOTE — PROGRESS NOTES
Physical Therapy Daily Treatment Note    Date: 2020  Patient Name: Raheem Duque  : 1960   MRN: [id number]  DOInjury: DOSx:   Referring Provider:  MIKEL Aragon CNP    Medical Diagnosis:   1. Hemiplegia affecting left nondominant side, unspecified etiology, unspecified hemiplegia type ([address]      2. Left hemiplegia (HCC)           Outcome Measure:  Lower Extremity Functional Scale (LEFS) 74% impairment    S: no changes. Going to move into a handicap apt   O:  Time 1994-1900     Visit 3/18 Repeat outcome measure at mid point and end. Pain 7/10     ROM      Modalities            Exercise      Nustep   L6/ 5 min           Squats      Calf Raises             Marching      Alt. Sidekicks            Sit/Stands 13x /30 sec12x/ 30 sec           Gait training 135 ft   No AD SBA/CGA unsteady    Steps Fwd 8\" 25x     Steps Lat 8 \"25x Max asst for LOB          Marching Gait      Sidestepping      Leg press Left leg 5#  2 x 10~ to fatigue  Support left leg     Both LE 40# 2 x 20                       A:  Tolerated well. When pt was side stepping on 8\" step LOB occurred stepping up sideways max asst required to prevent pt from falling toward the left. Very impulse and using unsafe tech with all activities. Pt REFUSES to use AD, uses support of staff and environmental support. Constant VC's required for safety.   Supported left Leg on leg press due to ER.    P: Continue with rehab plan  Margo Haney PTA    Treatment Charges: Mins Units   Initial Evaluation     Re-Evaluation     Ther Exercise         TE 15 1   Manual Therapy     MT     Ther Activities        TA 30 2   Gait Training          GT     Neuro Re-education NR     Modalities     Non-Billable Service Time     Other     Total Time/Units 45 3

## 2020-08-05 NOTE — PROGRESS NOTES
Units   Modalities     Ther Exercise     Manual Therapy     Thera Activities 55 4   ADL/Home Mgt      Neuro Re-education     Group Therapy     Non-Billable Service Time     Other     Total Time/Units 55 4       -Response to Treatment: Left neglect is getting better. Goals: Goals for pt can be seen on initial eval occurring on 7-21-20    Plan:   [x]  Continue Plan of care: Pt education continues at each visit to obtain maximum benefits from skilled OT intervention.   []  [facility] of care:   []  Discharge:      Krystin Poster OT/L  [id number]

## 2020-08-07 ENCOUNTER — TELEPHONE (OUTPATIENT)
Dept: FAMILY MEDICINE CLINIC | Age: 60
End: 2020-08-07

## 2020-08-12 ENCOUNTER — OFFICE VISIT (OUTPATIENT)
Dept: FAMILY MEDICINE CLINIC | Age: 60
End: 2020-08-12
Payer: MEDICARE

## 2020-08-12 ENCOUNTER — HOSPITAL ENCOUNTER (OUTPATIENT)
Age: 60
Discharge: HOME | End: 2020-08-14
Payer: MEDICARE

## 2020-08-12 VITALS
DIASTOLIC BLOOD PRESSURE: 68 MMHG | HEIGHT: 66 IN | OXYGEN SATURATION: 98 % | BODY MASS INDEX: 29.05 KG/M2 | RESPIRATION RATE: 14 BRPM | HEART RATE: 71 BPM | SYSTOLIC BLOOD PRESSURE: 114 MMHG | TEMPERATURE: 97.6 F

## 2020-08-12 LAB
ALBUMIN SERPL-MCNC: 4.2 G/DL (ref 3.5–5.2)
ALP BLD-CCNC: 106 U/L (ref 35–104)
ALT SERPL-CCNC: 13 U/L (ref 0–32)
ANION GAP SERPL CALCULATED.3IONS-SCNC: 14 MMOL/L (ref 7–16)
AST SERPL-CCNC: 13 U/L (ref 0–31)
BASOPHILS ABSOLUTE: 0.05 E9/L (ref 0–0.2)
BASOPHILS RELATIVE PERCENT: 0.9 % (ref 0–2)
BILIRUB SERPL-MCNC: 0.4 MG/DL (ref 0–1.2)
BUN BLDV-MCNC: 17 MG/DL (ref 6–20)
CALCIUM SERPL-MCNC: 10.3 MG/DL (ref 8.6–10.2)
CHLORIDE BLD-SCNC: 100 MMOL/L (ref 98–107)
CO2: 25 MMOL/L (ref 22–29)
CREAT SERPL-MCNC: 1.5 MG/DL (ref 0.5–1)
EOSINOPHILS ABSOLUTE: 0.12 E9/L (ref 0.05–0.5)
EOSINOPHILS RELATIVE PERCENT: 2.2 % (ref 0–6)
GFR AFRICAN AMERICAN: 43
GFR NON-AFRICAN AMERICAN: 36 ML/MIN/1.73
GLUCOSE BLD-MCNC: 176 MG/DL (ref 74–99)
HBA1C MFR BLD: 7.4 %
HCT VFR BLD CALC: 42.2 % (ref 34–48)
HEMOGLOBIN: 14.1 G/DL (ref 11.5–15.5)
IMMATURE GRANULOCYTES #: 0.02 E9/L
IMMATURE GRANULOCYTES %: 0.4 % (ref 0–5)
LYMPHOCYTES ABSOLUTE: 1.09 E9/L (ref 1.5–4)
LYMPHOCYTES RELATIVE PERCENT: 19.9 % (ref 20–42)
MCH RBC QN AUTO: 30.3 PG (ref 26–35)
MCHC RBC AUTO-ENTMCNC: 33.4 % (ref 32–34.5)
MCV RBC AUTO: 90.6 FL (ref 80–99.9)
MONOCYTES ABSOLUTE: 0.45 E9/L (ref 0.1–0.95)
MONOCYTES RELATIVE PERCENT: 8.2 % (ref 2–12)
NEUTROPHILS ABSOLUTE: 3.75 E9/L (ref 1.8–7.3)
NEUTROPHILS RELATIVE PERCENT: 68.4 % (ref 43–80)
PDW BLD-RTO: 13.2 FL (ref 11.5–15)
PLATELET # BLD: 236 E9/L (ref 130–450)
PMV BLD AUTO: 10.7 FL (ref 7–12)
POTASSIUM SERPL-SCNC: 4.6 MMOL/L (ref 3.5–5)
RBC # BLD: 4.66 E12/L (ref 3.5–5.5)
SODIUM BLD-SCNC: 139 MMOL/L (ref 132–146)
TOTAL PROTEIN: 6.7 G/DL (ref 6.4–8.3)
TSH SERPL DL<=0.05 MIU/L-ACNC: 2.33 UIU/ML (ref 0.27–4.2)
VITAMIN D 25-HYDROXY: 11 NG/ML (ref 30–100)
WBC # BLD: 5.5 E9/L (ref 4.5–11.5)

## 2020-08-12 PROCEDURE — 80053 COMPREHEN METABOLIC PANEL: CPT

## 2020-08-12 PROCEDURE — 83036 HEMOGLOBIN GLYCOSYLATED A1C: CPT | Performed by: NURSE PRACTITIONER

## 2020-08-12 PROCEDURE — 3017F COLORECTAL CA SCREEN DOC REV: CPT | Performed by: NURSE PRACTITIONER

## 2020-08-12 PROCEDURE — 3051F HG A1C>EQUAL 7.0%<8.0%: CPT | Performed by: NURSE PRACTITIONER

## 2020-08-12 PROCEDURE — G0439 PPPS, SUBSEQ VISIT: HCPCS | Performed by: NURSE PRACTITIONER

## 2020-08-12 PROCEDURE — 82306 VITAMIN D 25 HYDROXY: CPT

## 2020-08-12 PROCEDURE — 84443 ASSAY THYROID STIM HORMONE: CPT

## 2020-08-12 PROCEDURE — 85025 COMPLETE CBC W/AUTO DIFF WBC: CPT

## 2020-08-12 RX ORDER — HYDROCODONE BITARTRATE AND ACETAMINOPHEN 5; 325 MG/1; MG/1
1 TABLET ORAL 2 TIMES DAILY PRN
Qty: 60 TABLET | Refills: 0 | Status: SHIPPED
Start: 2020-08-12 | End: 2020-09-16 | Stop reason: SDUPTHER

## 2020-08-12 RX ORDER — NYSTATIN 100000 [USP'U]/G
POWDER TOPICAL
Qty: 60 G | Refills: 0 | Status: SHIPPED
Start: 2020-08-12 | End: 2021-07-06 | Stop reason: SDUPTHER

## 2020-08-12 ASSESSMENT — PATIENT HEALTH QUESTIONNAIRE - PHQ9
SUM OF ALL RESPONSES TO PHQ QUESTIONS 1-9: 2
1. LITTLE INTEREST OR PLEASURE IN DOING THINGS: 1
SUM OF ALL RESPONSES TO PHQ QUESTIONS 1-9: 2
2. FEELING DOWN, DEPRESSED OR HOPELESS: 1
SUM OF ALL RESPONSES TO PHQ9 QUESTIONS 1 & 2: 2

## 2020-08-12 NOTE — PROGRESS NOTES
Medicare Annual Wellness Visit  Name: Yuniel Salinas Date: 2020   MRN: [id number] Sex: Female   Age: 63 y.o. Ethnicity: Non-/Non    : 1960 Race: Cedrick Harrison is here for Medicare AWV; Back Pain (refill of norco. last dose yesterday. pain today 6/10); and Health Maintenance (mammo ordered in january. does not want mammo van. has difficulty getting ride. )    Complains of chronic pain to low back. Taking medication prn only. Recently moved to an apartment and has been more active since move. Patient has not used walker or wheelchair recently    Screenings for behavioral, psychosocial and functional/safety risks, and cognitive dysfunction are all negative except as indicated below. These results, as well as other patient data from the [employer] form, are documented in Flowsheets linked to this Encounter. Allergies   Allergen Reactions    Chantix [Varenicline] Swelling     Tongue swelling Split in 1/2    Heparin      Platelets drop         Prior to Visit Medications    Medication Sig Taking? Authorizing Provider   nystatin (NYSTATIN) 518607 UNIT/GM powder APPLY FOUR TIMES DAILY Yes Mary Lama, APRN - CNP   HYDROcodone-acetaminophen (NORCO) 5-325 MG per tablet Take 1 tablet by mouth 2 times daily as needed for Pain for up to 30 days.  Yes Mary Lama, APRN - CNP   fluticasone (FLONASE) 50 MCG/ACT nasal spray 2 sprays by Each Nostril route daily Yes Brett Amin,    metFORMIN (GLUCOPHAGE-XR) 500 MG extended release tablet TAKE 1 TABLET BY MOUTH DAILY (WITH BREAKFAST) Yes Mary Lama, APRN - CNP   albuterol sulfate HFA (PROAIR HFA) 108 (90 Base) MCG/ACT inhaler INHALE 2 PUFFS BY MOUTH EVERY 6 HOURS AS NEEDED FOR WHEEZING Yes Mary Lama, APRN - CNP   atorvastatin (LIPITOR) 20 MG tablet TAKE 1 TABLET BY MOUTH EVERY NIGHT Yes Mary Lama, APRN - CNP   amLODIPine (NORVASC) 2.5 MG tablet TAKE 1 TABLET BY MOUTH EVERY DAY Yes Nestor Mayo Mabel Huffman CNP   omeprazole (PRILOSEC) 40 MG delayed release capsule TAKE 1 CAPSULE EVERY DAY Yes MIKEL Bird CNP   losartan (COZAAR) 50 MG tablet Take one tablet daily, call with BP > 160/100 Yes MIKEL Bird CNP   furosemide (LASIX) 40 MG tablet Take 1 tablet by mouth daily Yes MIKEL Bird CNP   Melatonin 5 MG CHEW Take 1 tablet by mouth nightly Yes Historical Provider, MD   sulfamethoxazole-trimethoprim (BACTRIM;SEPTRA) 400-80 MG per tablet TAKE 1 TABLET EVERY DAY Yes MIKEL Bird CNP   aspirin 81 MG chewable tablet Take 1 tablet by mouth daily Yes Fernando Ingram MD   tacrolimus (PROGRAF) 1 MG capsule Take 1.5 mg by mouth 2 times daily  Yes Historical Provider, MD   guaiFENesin (MUCINEX) 600 MG extended release tablet Take 600 mg by mouth daily  Yes Historical Provider, MD   diclofenac sodium (VOLTAREN) 1 % GEL Apply 2 g topically 2 times daily Yes MIKEL Bird CNP         Past Medical History:   Diagnosis Date    Arthritis     right knee    Blood circulation, collateral     Chronic fatigue 4/11/2016    Cirrhosis of liver ([facility]     end stage    COPD exacerbation ([address] 4/29/2015    pt denies having    Diabetes mellitus ([address]     Essential hypertension 2/15/2016    Gall stones     did have stent placed    GERD (gastroesophageal reflux disease)     Hep C w/o coma, chronic ([address]     Sucessfully treated with Betty Revel longer has viral load    History of blood transfusion     Neuropathy     PFO (patent foramen ovale)     Pneumonia     Type 2 diabetes mellitus with stage 3 chronic kidney disease, with long-term current use of insulin ([address]     Unspecified cerebral artery occlusion with cerebral infarction     Varices        Past Surgical History:   Procedure Laterality Date    BRAIN SURGERY      CARDIAC CATHETERIZATION      cerebral angiogrem to check cavernous malformation in head - negative    CARDIAC SURGERY  2014    heart cath   [facility] SECTION      x 2     SECTION   and     COLONOSCOPY      ECHO COMPL W DOP COLOR FLOW  2012         ENDOSCOPY, COLON, DIAGNOSTIC      ESOPHAGEAL VARICE LIGATION      banding    LIVER TRANSPLANT      OTHER SURGICAL HISTORY Right 16    Right Knee Arthroscopy with Debridement partial lateral menisectomy, chondroplasty, synovectomy         Family History   Adopted: Yes       CareTeam (Including outside providers/suppliers regularly involved in providing care):   Patient Care Team:  Llana Snellen, DO as PCP - General  Llana Snellen, DO as PCP - [facility] Empaneled Provider  Ashley Wagner MD (Internal Medicine)  Patric Collins MD (Neurosurgery)  Jag Servin MD (Cardiology)  Chuy Mayer (Psychiatry)    Wt Readings from Last 3 Encounters:   20 180 lb (81.6 kg)   20 180 lb (81.6 kg)   20 180 lb (81.6 kg)     Vitals:    20 0812   BP: 114/68   Pulse: 71   Resp: 14   Temp: 97.6 °F (36.4 °C)   TempSrc: Temporal   SpO2: 98%   Height: 5' 6\" (1.676 m)     Body mass index is 29.05 kg/m². Based upon direct observation of the patient, evaluation of cognition reveals recent and remote memory intact.     General Appearance: alert and oriented to person, place and time, well developed and well- nourished, in no acute distress  Skin: warm and dry, no rash or erythema  Head: normocephalic and atraumatic  Eyes: pupils equal, round, and reactive to light, extraocular eye movements intact, conjunctivae normal  ENT: tympanic membrane, external ear and ear canal normal bilaterally, nose without deformity, nasal mucosa and turbinates normal without polyps  Neck: supple and non-tender without mass, no thyromegaly or thyroid nodules, no cervical lymphadenopathy  Pulmonary/Chest: clear to auscultation bilaterally- no wheezes, rales or rhonchi, normal air movement, no respiratory distress  Cardiovascular: normal rate, regular rhythm, normal S1 and S2, no murmurs, rubs, clicks, or gallops, distal pulses intact, no carotid bruits  Abdomen: soft, non-tender, non-distended, normal bowel sounds, no masses or organomegaly  Extremities: no cyanosis, clubbing or edema  Musculoskeletal: pain to thoracic and lumbar spine with bilateral paraspinal tenderness. ROM limited due to pain. Negative SLR bilateral. Upper and lower extremities equal and strong. Neurologic:  Left grasp weaker than right, left hand contracted, antalgic gait    Patient's complete Health Risk Assessment and screening values have been reviewed and are found in Flowsheets. The following problems were reviewed today and where indicated follow up appointments were made and/or referrals ordered. Positive Risk Factor Screenings with Interventions:     Fall Risk:  2 or more falls in past year?: (!) yes  Fall with injury in past year?: no  Fall Risk Interventions:    · Home safety tips provided    General Health:  General  In general, how would you say your health is?: Fair  In the past 7 days, have you experienced any of the following?  New or Increased Pain, New or Increased Fatigue, Loneliness, Social Isolation, Stress or Anger?: (!) New or Increased Pain, New or Increased Fatigue, Stress, Anger  Do you get the social and emotional support that you need?: Yes  Do you have a Living Will?: Yes  General Health Risk Interventions:  · Pain issues: patient declines any further evaluation/treatment for this issue  · Fatigue: patient declines any further evaluation/treatment for this issue, states does not sleep well  · Stress: relaxation techniques discussed, patient declines any further evaluation/treatment for this issue  · Anger: relaxation techniques discussed, patient declines any further evaluation/treatment for this issue    Health Habits/Nutrition:  Health Habits/Nutrition  Do you exercise for at least 20 minutes 2-3 times per week?: Yes  Have you lost any weight without trying in the past 3 months?: No  Do you eat fewer than 2 meals per day?: (!) Yes  Have you seen a dentist within the past year?: (!) No  Body mass index is 29.05 kg/m². Health Habits/Nutrition Interventions:  · Nutritional issues:  patient is not ready to address his/her nutritional/weight issues at this time  · Dental exam overdue:  patient encouraged to make appointment with his/her dentist    Safety:  Safety  Do you have working smoke detectors?: Yes  Have all throw rugs been removed or fastened?: Yes  Do you have non-slip mats or surfaces in all bathtubs/showers?: Yes  Do all of your stairways have a railing or banister?: Yes  Are your doorways, halls and stairs free of clutter?: Yes  Do you always fasten your seatbelt when you are in a car?: (!) No  Safety Interventions:  · Home safety tips provided    ADL:  ADLs  In the past 7 days, did you need help from others to perform any of the following everyday activities? Eating, dressing, grooming, bathing, toileting, or walking/balance?: (!) Walking/Balance  In the past 7 days, did you need help from others to take care of any of the following? Laundry, housekeeping, banking/finances, shopping, telephone use, food preparation, transportation, or taking medications?: [employer], Shopping, Transportation  ADL Interventions:  · discussed medical transportation.   children help with others    Personalized Preventive Plan   Current Health Maintenance Status  Immunization History   Administered Date(s) Administered    Hepatitis B Adult (Engerix-B) 03/19/2012    Influenza A (R2V5-11) Vaccine PF IM 01/11/2010    Influenza, Quadv, IM, PF (6 mo and older Fluzone, Flulaval, Fluarix, and 3 yrs and older Afluria) 11/02/2017, 10/19/2018    Pneumococcal Conjugate 13-valent ([id number]) 06/24/2016    Pneumococcal Polysaccharide ([id number]) 05/12/2013, 12/03/2013        Health Maintenance   Topic Date Due    Diabetic foot exam  12/15/1971    DTaP/Tdap/Td vaccine (1 - Tdap) 12/15/1980    Cervical cancer screen Monitoring 8/12/2020   Attestation -   Periodic Controlled Substance Monitoring No signs of potential drug abuse or diversion identified.

## 2020-08-12 NOTE — PATIENT INSTRUCTIONS
Personalized Preventive Plan for Thao Martinez - 8/12/2020  Medicare offers a range of preventive health benefits. Some of the tests and screenings are paid in full while other may be subject to a deductible, co-insurance, and/or copay. Some of these benefits include a comprehensive review of your medical history including lifestyle, illnesses that may run in your family, and various assessments and screenings as appropriate. After reviewing your medical record and screening and assessments performed today your provider may have ordered immunizations, labs, imaging, and/or referrals for you. A list of these orders (if applicable) as well as your Preventive Care list are included within your After Visit Summary for your review. Other Preventive Recommendations:    · A preventive eye exam performed by an eye specialist is recommended every 1-2 years to screen for glaucoma; cataracts, macular degeneration, and other eye disorders. · A preventive dental visit is recommended every 6 months. · Try to get at least 150 minutes of exercise per week or 10,000 steps per day on a pedometer . · Order or download the FREE \"Exercise & Physical Activity: Your Everyday Guide\" from [employer]. Call 9-173.417.8876 or search [employer] online. · You need 9370-0708 mg of calcium and 8742-6527 IU of vitamin D per day. It is possible to meet your calcium requirement with diet alone, but a vitamin D supplement is usually necessary to meet this goal.  · When exposed to the sun, use a sunscreen that protects against both UVA and UVB radiation with an SPF of 30 or greater. Reapply every 2 to 3 hours or after sweating, drying off with a towel, or swimming. · Always wear a seat belt when traveling in a car. Always wear a helmet when riding a bicycle or motorcycle.

## 2020-08-14 RX ORDER — ERGOCALCIFEROL 1.25 MG/1
CAPSULE ORAL
Qty: 25 CAPSULE | Refills: 0 | Status: SHIPPED
Start: 2020-08-14 | End: 2020-10-30

## 2020-08-14 RX ORDER — ERGOCALCIFEROL 1.25 MG/1
50000 CAPSULE ORAL
Qty: 24 CAPSULE | Refills: 0 | Status: SHIPPED
Start: 2020-08-17 | End: 2020-08-14

## 2020-08-24 RX ORDER — METFORMIN HYDROCHLORIDE 500 MG/1
500 TABLET, EXTENDED RELEASE ORAL
Qty: 90 TABLET | Refills: 0 | Status: SHIPPED
Start: 2020-08-24 | End: 2021-07-06 | Stop reason: SDUPTHER

## 2020-09-01 ENCOUNTER — TELEPHONE (OUTPATIENT)
Dept: FAMILY MEDICINE CLINIC | Age: 60
End: 2020-09-01

## 2020-09-03 ENCOUNTER — TELEPHONE (OUTPATIENT)
Dept: FAMILY MEDICINE CLINIC | Age: 60
End: 2020-09-03

## 2020-09-03 NOTE — TELEPHONE ENCOUNTER
I do not know anything about this. If she is able to find out more information about this program and what she needs from me I will definitely help her.

## 2020-09-03 NOTE — TELEPHONE ENCOUNTER
Patient reports trouble with mobility and meal preparation since moving into apartment by herself. Patient says there's a waiver program through medicaid she'd like to be on.   Do you know anything about this program?

## 2020-09-04 ENCOUNTER — APPOINTMENT (OUTPATIENT)
Dept: CT IMAGING | Age: 60
End: 2020-09-04
Payer: MEDICARE

## 2020-09-04 ENCOUNTER — APPOINTMENT (OUTPATIENT)
Dept: GENERAL RADIOLOGY | Age: 60
End: 2020-09-04
Payer: MEDICARE

## 2020-09-04 ENCOUNTER — HOSPITAL ENCOUNTER (OUTPATIENT)
Age: 60
Setting detail: OBSERVATION
Discharge: HOME | End: 2020-09-05
Attending: EMERGENCY MEDICINE | Admitting: INTERNAL MEDICINE
Payer: MEDICARE

## 2020-09-04 PROBLEM — R53.1 LEFT-SIDED WEAKNESS: Status: ACTIVE | Noted: 2020-09-04

## 2020-09-04 PROBLEM — N39.0 UTI (URINARY TRACT INFECTION): Status: ACTIVE | Noted: 2020-09-04

## 2020-09-04 LAB
ACETAMINOPHEN LEVEL: <5 MCG/ML (ref 10–30)
ALBUMIN SERPL-MCNC: 4 G/DL (ref 3.5–5.2)
ALP BLD-CCNC: 120 U/L (ref 35–104)
ALT SERPL-CCNC: 9 U/L (ref 0–32)
AMMONIA: 15 UMOL/L (ref 11–51)
AMPHETAMINE SCREEN, URINE: NOT DETECTED
ANION GAP SERPL CALCULATED.3IONS-SCNC: 15 MMOL/L (ref 7–16)
APTT: 31 SEC (ref 24.5–35.1)
AST SERPL-CCNC: 12 U/L (ref 0–31)
BACTERIA: ABNORMAL /HPF
BARBITURATE SCREEN URINE: NOT DETECTED
BASOPHILS ABSOLUTE: 0.04 E9/L (ref 0–0.2)
BASOPHILS RELATIVE PERCENT: 0.8 % (ref 0–2)
BENZODIAZEPINE SCREEN, URINE: NOT DETECTED
BILIRUB SERPL-MCNC: 0.5 MG/DL (ref 0–1.2)
BILIRUBIN URINE: NEGATIVE
BLOOD, URINE: ABNORMAL
BUN BLDV-MCNC: 15 MG/DL (ref 6–20)
CALCIUM SERPL-MCNC: 8.9 MG/DL (ref 8.6–10.2)
CANNABINOID SCREEN URINE: NOT DETECTED
CHLORIDE BLD-SCNC: 102 MMOL/L (ref 98–107)
CLARITY: ABNORMAL
CO2: 24 MMOL/L (ref 22–29)
COCAINE METABOLITE SCREEN URINE: NOT DETECTED
COLOR: YELLOW
CREAT SERPL-MCNC: 1.4 MG/DL (ref 0.5–1)
EOSINOPHILS ABSOLUTE: 0.07 E9/L (ref 0.05–0.5)
EOSINOPHILS RELATIVE PERCENT: 1.4 % (ref 0–6)
ETHANOL: <10 MG/DL (ref 0–0.08)
FENTANYL SCREEN, URINE: NOT DETECTED
GFR AFRICAN AMERICAN: 47
GFR NON-AFRICAN AMERICAN: 39 ML/MIN/1.73
GLUCOSE BLD-MCNC: 149 MG/DL (ref 74–99)
GLUCOSE URINE: NEGATIVE MG/DL
HCT VFR BLD CALC: 39.7 % (ref 34–48)
HEMOGLOBIN: 13.5 G/DL (ref 11.5–15.5)
IMMATURE GRANULOCYTES #: 0.02 E9/L
IMMATURE GRANULOCYTES %: 0.4 % (ref 0–5)
INR BLD: 1
KETONES, URINE: NEGATIVE MG/DL
LEUKOCYTE ESTERASE, URINE: ABNORMAL
LYMPHOCYTES ABSOLUTE: 0.98 E9/L (ref 1.5–4)
LYMPHOCYTES RELATIVE PERCENT: 19.2 % (ref 20–42)
Lab: ABNORMAL
MCH RBC QN AUTO: 30.3 PG (ref 26–35)
MCHC RBC AUTO-ENTMCNC: 34 % (ref 32–34.5)
MCV RBC AUTO: 89.2 FL (ref 80–99.9)
METER GLUCOSE: 125 MG/DL (ref 74–99)
METHADONE SCREEN, URINE: NOT DETECTED
MONOCYTES ABSOLUTE: 0.42 E9/L (ref 0.1–0.95)
MONOCYTES RELATIVE PERCENT: 8.2 % (ref 2–12)
NEUTROPHILS ABSOLUTE: 3.57 E9/L (ref 1.8–7.3)
NEUTROPHILS RELATIVE PERCENT: 70 % (ref 43–80)
NITRITE, URINE: POSITIVE
OPIATE SCREEN URINE: POSITIVE
OXYCODONE URINE: NOT DETECTED
PDW BLD-RTO: 12.9 FL (ref 11.5–15)
PH UA: 6 (ref 5–9)
PHENCYCLIDINE SCREEN URINE: NOT DETECTED
PLATELET # BLD: 184 E9/L (ref 130–450)
PMV BLD AUTO: 10.8 FL (ref 7–12)
POTASSIUM REFLEX MAGNESIUM: 3.9 MMOL/L (ref 3.5–5)
PROTEIN UA: 100 MG/DL
PROTHROMBIN TIME: 11.6 SEC (ref 9.3–12.4)
RBC # BLD: 4.45 E12/L (ref 3.5–5.5)
RBC UA: ABNORMAL /HPF (ref 0–2)
SALICYLATE, SERUM: <0.3 MG/DL (ref 0–30)
SODIUM BLD-SCNC: 141 MMOL/L (ref 132–146)
SPECIFIC GRAVITY UA: 1.02 (ref 1–1.03)
TOTAL CK: 73 U/L (ref 20–180)
TOTAL PROTEIN: 6.4 G/DL (ref 6.4–8.3)
TRICYCLIC ANTIDEPRESSANTS SCREEN SERUM: NEGATIVE NG/ML
TROPONIN: 0.02 NG/ML (ref 0–0.03)
TSH SERPL DL<=0.05 MIU/L-ACNC: 0.72 UIU/ML (ref 0.27–4.2)
UROBILINOGEN, URINE: 0.2 E.U./DL
WBC # BLD: 5.1 E9/L (ref 4.5–11.5)
WBC UA: ABNORMAL /HPF (ref 0–5)

## 2020-09-04 PROCEDURE — 87077 CULTURE AEROBIC IDENTIFY: CPT

## 2020-09-04 PROCEDURE — 70450 CT HEAD/BRAIN W/O DYE: CPT

## 2020-09-04 PROCEDURE — 82140 ASSAY OF AMMONIA: CPT

## 2020-09-04 PROCEDURE — 82962 GLUCOSE BLOOD TEST: CPT

## 2020-09-04 PROCEDURE — 82550 ASSAY OF CK (CPK): CPT

## 2020-09-04 PROCEDURE — 96361 HYDRATE IV INFUSION ADD-ON: CPT

## 2020-09-04 PROCEDURE — 80053 COMPREHEN METABOLIC PANEL: CPT

## 2020-09-04 PROCEDURE — 85610 PROTHROMBIN TIME: CPT

## 2020-09-04 PROCEDURE — 99285 EMERGENCY DEPT VISIT HI MDM: CPT

## 2020-09-04 PROCEDURE — 84443 ASSAY THYROID STIM HORMONE: CPT

## 2020-09-04 PROCEDURE — 6360000002 HC RX W HCPCS: Performed by: PHYSICIAN ASSISTANT

## 2020-09-04 PROCEDURE — 87088 URINE BACTERIA CULTURE: CPT

## 2020-09-04 PROCEDURE — G0378 HOSPITAL OBSERVATION PER HR: HCPCS

## 2020-09-04 PROCEDURE — 96374 THER/PROPH/DIAG INJ IV PUSH: CPT

## 2020-09-04 PROCEDURE — G0480 DRUG TEST DEF 1-7 CLASSES: HCPCS

## 2020-09-04 PROCEDURE — 71045 X-RAY EXAM CHEST 1 VIEW: CPT

## 2020-09-04 PROCEDURE — 2580000003 HC RX 258: Performed by: PHYSICIAN ASSISTANT

## 2020-09-04 PROCEDURE — 80307 DRUG TEST PRSMV CHEM ANLYZR: CPT

## 2020-09-04 PROCEDURE — 85730 THROMBOPLASTIN TIME PARTIAL: CPT

## 2020-09-04 PROCEDURE — 81001 URINALYSIS AUTO W/SCOPE: CPT

## 2020-09-04 PROCEDURE — 93005 ELECTROCARDIOGRAM TRACING: CPT | Performed by: EMERGENCY MEDICINE

## 2020-09-04 PROCEDURE — 74176 CT ABD & PELVIS W/O CONTRAST: CPT

## 2020-09-04 PROCEDURE — 2580000003 HC RX 258: Performed by: EMERGENCY MEDICINE

## 2020-09-04 PROCEDURE — 6370000000 HC RX 637 (ALT 250 FOR IP): Performed by: PHYSICIAN ASSISTANT

## 2020-09-04 PROCEDURE — 85025 COMPLETE CBC W/AUTO DIFF WBC: CPT

## 2020-09-04 PROCEDURE — 84484 ASSAY OF TROPONIN QUANT: CPT

## 2020-09-04 PROCEDURE — 87186 SC STD MICRODIL/AGAR DIL: CPT

## 2020-09-04 RX ORDER — POTASSIUM CHLORIDE 7.45 MG/ML
10 INJECTION INTRAVENOUS PRN
Status: DISCONTINUED | OUTPATIENT
Start: 2020-09-04 | End: 2020-09-05 | Stop reason: HOSPADM

## 2020-09-04 RX ORDER — ATORVASTATIN CALCIUM 20 MG/1
20 TABLET, FILM COATED ORAL NIGHTLY
Status: DISCONTINUED | OUTPATIENT
Start: 2020-09-04 | End: 2020-09-05 | Stop reason: HOSPADM

## 2020-09-04 RX ORDER — LOSARTAN POTASSIUM 50 MG/1
50 TABLET ORAL DAILY
Status: DISCONTINUED | OUTPATIENT
Start: 2020-09-04 | End: 2020-09-05 | Stop reason: HOSPADM

## 2020-09-04 RX ORDER — HYDROCODONE BITARTRATE AND ACETAMINOPHEN 5; 325 MG/1; MG/1
1 TABLET ORAL 2 TIMES DAILY PRN
Status: DISCONTINUED | OUTPATIENT
Start: 2020-09-04 | End: 2020-09-05 | Stop reason: HOSPADM

## 2020-09-04 RX ORDER — ASPIRIN 81 MG/1
81 TABLET, CHEWABLE ORAL DAILY
Status: DISCONTINUED | OUTPATIENT
Start: 2020-09-04 | End: 2020-09-05 | Stop reason: HOSPADM

## 2020-09-04 RX ORDER — AMLODIPINE BESYLATE 2.5 MG/1
2.5 TABLET ORAL DAILY
Status: DISCONTINUED | OUTPATIENT
Start: 2020-09-04 | End: 2020-09-05 | Stop reason: HOSPADM

## 2020-09-04 RX ORDER — FUROSEMIDE 40 MG/1
40 TABLET ORAL DAILY
Status: DISCONTINUED | OUTPATIENT
Start: 2020-09-04 | End: 2020-09-05 | Stop reason: HOSPADM

## 2020-09-04 RX ORDER — ALBUTEROL SULFATE 2.5 MG/3ML
2.5 SOLUTION RESPIRATORY (INHALATION) EVERY 6 HOURS PRN
Status: DISCONTINUED | OUTPATIENT
Start: 2020-09-04 | End: 2020-09-05 | Stop reason: HOSPADM

## 2020-09-04 RX ORDER — GUAIFENESIN 400 MG/1
400 TABLET ORAL 2 TIMES DAILY
Status: DISCONTINUED | OUTPATIENT
Start: 2020-09-04 | End: 2020-09-05 | Stop reason: HOSPADM

## 2020-09-04 RX ORDER — FLUTICASONE PROPIONATE 50 MCG
2 SPRAY, SUSPENSION (ML) NASAL DAILY
Status: DISCONTINUED | OUTPATIENT
Start: 2020-09-04 | End: 2020-09-05 | Stop reason: HOSPADM

## 2020-09-04 RX ORDER — ACETAMINOPHEN 650 MG/1
650 SUPPOSITORY RECTAL EVERY 6 HOURS PRN
Status: DISCONTINUED | OUTPATIENT
Start: 2020-09-04 | End: 2020-09-05 | Stop reason: HOSPADM

## 2020-09-04 RX ORDER — 0.9 % SODIUM CHLORIDE 0.9 %
1000 INTRAVENOUS SOLUTION INTRAVENOUS ONCE
Status: COMPLETED | OUTPATIENT
Start: 2020-09-04 | End: 2020-09-04

## 2020-09-04 RX ORDER — SODIUM CHLORIDE 0.9 % (FLUSH) 0.9 %
10 SYRINGE (ML) INJECTION PRN
Status: DISCONTINUED | OUTPATIENT
Start: 2020-09-04 | End: 2020-09-05 | Stop reason: HOSPADM

## 2020-09-04 RX ORDER — DEXTROSE MONOHYDRATE 25 G/50ML
12.5 INJECTION, SOLUTION INTRAVENOUS PRN
Status: DISCONTINUED | OUTPATIENT
Start: 2020-09-04 | End: 2020-09-05 | Stop reason: HOSPADM

## 2020-09-04 RX ORDER — DEXTROSE MONOHYDRATE 50 MG/ML
100 INJECTION, SOLUTION INTRAVENOUS PRN
Status: DISCONTINUED | OUTPATIENT
Start: 2020-09-04 | End: 2020-09-05 | Stop reason: HOSPADM

## 2020-09-04 RX ORDER — SULFAMETHOXAZOLE AND TRIMETHOPRIM 400; 80 MG/1; MG/1
1 TABLET ORAL DAILY
Status: DISCONTINUED | OUTPATIENT
Start: 2020-09-04 | End: 2020-09-05 | Stop reason: HOSPADM

## 2020-09-04 RX ORDER — ALBUTEROL SULFATE 90 UG/1
2 AEROSOL, METERED RESPIRATORY (INHALATION) EVERY 6 HOURS PRN
Status: DISCONTINUED | OUTPATIENT
Start: 2020-09-04 | End: 2020-09-04 | Stop reason: CLARIF

## 2020-09-04 RX ORDER — POTASSIUM CHLORIDE 20 MEQ/1
40 TABLET, EXTENDED RELEASE ORAL PRN
Status: DISCONTINUED | OUTPATIENT
Start: 2020-09-04 | End: 2020-09-05 | Stop reason: HOSPADM

## 2020-09-04 RX ORDER — ACETAMINOPHEN 325 MG/1
650 TABLET ORAL EVERY 6 HOURS PRN
Status: DISCONTINUED | OUTPATIENT
Start: 2020-09-04 | End: 2020-09-05 | Stop reason: HOSPADM

## 2020-09-04 RX ORDER — PROMETHAZINE HYDROCHLORIDE 25 MG/1
12.5 TABLET ORAL EVERY 6 HOURS PRN
Status: DISCONTINUED | OUTPATIENT
Start: 2020-09-04 | End: 2020-09-05 | Stop reason: HOSPADM

## 2020-09-04 RX ORDER — LANOLIN ALCOHOL/MO/W.PET/CERES
6 CREAM (GRAM) TOPICAL NIGHTLY
Status: DISCONTINUED | OUTPATIENT
Start: 2020-09-04 | End: 2020-09-05 | Stop reason: HOSPADM

## 2020-09-04 RX ORDER — PANTOPRAZOLE SODIUM 40 MG/1
40 TABLET, DELAYED RELEASE ORAL EVERY MORNING
Status: DISCONTINUED | OUTPATIENT
Start: 2020-09-05 | End: 2020-09-05 | Stop reason: HOSPADM

## 2020-09-04 RX ORDER — SODIUM CHLORIDE 0.9 % (FLUSH) 0.9 %
10 SYRINGE (ML) INJECTION EVERY 12 HOURS SCHEDULED
Status: DISCONTINUED | OUTPATIENT
Start: 2020-09-04 | End: 2020-09-05 | Stop reason: HOSPADM

## 2020-09-04 RX ORDER — ONDANSETRON 2 MG/ML
4 INJECTION INTRAMUSCULAR; INTRAVENOUS EVERY 6 HOURS PRN
Status: DISCONTINUED | OUTPATIENT
Start: 2020-09-04 | End: 2020-09-05 | Stop reason: HOSPADM

## 2020-09-04 RX ORDER — NICOTINE POLACRILEX 4 MG
15 LOZENGE BUCCAL PRN
Status: DISCONTINUED | OUTPATIENT
Start: 2020-09-04 | End: 2020-09-05 | Stop reason: HOSPADM

## 2020-09-04 RX ADMIN — AMLODIPINE BESYLATE 2.5 MG: 2.5 TABLET ORAL at 21:26

## 2020-09-04 RX ADMIN — TACROLIMUS 1.5 MG: 1 CAPSULE ORAL at 21:26

## 2020-09-04 RX ADMIN — CEFTRIAXONE SODIUM 1 G: 1 INJECTION, POWDER, FOR SOLUTION INTRAMUSCULAR; INTRAVENOUS at 21:00

## 2020-09-04 RX ADMIN — GUAIFENESIN 400 MG: 400 TABLET, FILM COATED ORAL at 21:26

## 2020-09-04 RX ADMIN — MELATONIN 3 MG ORAL TABLET 6 MG: 3 TABLET ORAL at 21:25

## 2020-09-04 RX ADMIN — SODIUM CHLORIDE 1000 ML: 9 INJECTION, SOLUTION INTRAVENOUS at 14:18

## 2020-09-04 RX ADMIN — ATORVASTATIN CALCIUM 20 MG: 20 TABLET, FILM COATED ORAL at 21:26

## 2020-09-04 RX ADMIN — SODIUM CHLORIDE, PRESERVATIVE FREE 10 ML: 5 INJECTION INTRAVENOUS at 21:26

## 2020-09-04 RX ADMIN — FLUTICASONE PROPIONATE 2 SPRAY: 50 SPRAY, METERED NASAL at 21:25

## 2020-09-04 RX ADMIN — SULFAMETHOXAZOLE AND TRIMETHOPRIM 1 TABLET: 400; 80 TABLET ORAL at 21:25

## 2020-09-04 RX ADMIN — LOSARTAN POTASSIUM 50 MG: 50 TABLET, FILM COATED ORAL at 21:26

## 2020-09-04 RX ADMIN — ASPIRIN 81 MG CHEWABLE TABLET 81 MG: 81 TABLET CHEWABLE at 21:40

## 2020-09-04 ASSESSMENT — PAIN SCALES - GENERAL
PAINLEVEL_OUTOF10: 4
PAINLEVEL_OUTOF10: 5

## 2020-09-04 ASSESSMENT — PAIN DESCRIPTION - PAIN TYPE: TYPE: ACUTE PAIN

## 2020-09-04 ASSESSMENT — PAIN DESCRIPTION - LOCATION: LOCATION: GENERALIZED

## 2020-09-04 NOTE — ED NOTES
Patient doesn't want to stay and wants Po antibiotics and to sign out AMA. Dr. Mary Christine made aware.      Dona St RN  09/04/20 [id number]

## 2020-09-04 NOTE — ED PROVIDER NOTES
Department of Emergency Medicine   ED  Provider Note  Admit Date/RoomTime: 9/4/2020  1:11 PM  ED Room: 8210/8210-A          History of Present Illness:  9/4/20, Time: 1:23 PM EDT  Chief Complaint   Patient presents with    Extremity Weakness     per pt has had weakaness in legs for three days. Hx of 3 strokes w/baseline left sided weakness.  Tremors     ongoing for three days. Tigist Tariq is a 63 y.o. female presenting to the ED for generalized fatigue and not feeling well, beginning 3 days ago. The complaint has been constant, moderate in severity, and worsened by nothing. Patient reports he is felt tired for last 3 days. She felt generally weak with shakiness. She reports she is not getting better so she came to the hospital for further evaluation. She denies any unilateral weakness. No fevers or chills. No chest pain or shortness of breath. He does complain of some nausea with some nonbloody, nonbilious emesis. Mild abdominal discomfort as well. She has a history of liver cirrhosis and status post liver transplant multiple years ago. She is still keeping down her antirejection meds. She denies any cough or shortness of breath. No chest pain. No exposure to novel coronavirus. She has some left-sided residual weakness from prior stroke but able to move both sides. She feels more weak for the lat 3 days. She denies any bleeding. She denies any syncope. She denies any other complaints.     Review of Systems:   Pertinent positives and negatives are stated within HPI, all other systems reviewed and are negative.        --------------------------------------------- PAST HISTORY ---------------------------------------------  Past Medical History:  has a past medical history of Arthritis, Blood circulation, collateral, Chronic fatigue, Cirrhosis of liver ([facility] [facility] Diabetes mellitus ([facility] Essential hypertension, Gall stones, GERD (gastroesophageal reflux disease), Hep C w/o coma, chronic ([facility]), History of blood transfusion, Neuropathy, PFO (patent foramen ovale), Pneumonia, Type 2 diabetes mellitus with stage 3 chronic kidney disease, with long-term current use of insulin ([facility] Unspecified cerebral artery occlusion with cerebral infarction, and Varices. Past Surgical History:  has a past surgical history that includes  section; Esophageal varice ligation ();  section ( and ); ECHO Compl W Dop Color Flow (2012); Colonoscopy; Endoscopy, colon, diagnostic; Cardiac surgery (); Cardiac catheterization; other surgical history (Right, 16); Liver transplant; and brain surgery. Social History:  reports that she quit smoking about 14 months ago. Her smoking use included cigarettes. She has a 15.00 pack-year smoking history. She has never used smokeless tobacco. She reports that she does not drink alcohol or use drugs. Family History: family history is not on file. She was adopted. . Unless otherwise noted, family history is non contributory    The patients home medications have been reviewed. Allergies: Chantix [varenicline] and Heparin    I have reviewed the past medical history, past surgical history, social history, and family history    ---------------------------------------------------PHYSICAL EXAM--------------------------------------    Constitutional/General: Alert and oriented x3  Head: Normocephalic and atraumatic  Eyes: PERRL, EOMI, sclera non icteric  Mouth: Oropharynx clear, handling secretions, no trismus, no asymmetry of the posterior oropharynx or uvular edema  Neck: Supple, full ROM, no stridor, no meningeal signs  Respiratory: Lungs clear to auscultation bilaterally, no wheezes, rales, or rhonchi. Not in respiratory distress  Cardiovascular:  Regular rate. Regular rhythm. No murmurs, no aortic murmurs, no gallops, no rubs. 2+ distal pulses. Equal extremity pulses.    Chest: No chest wall tenderness  Gastrointestinal:  Abdomen Soft, Non tender, Non distended. No rebound, guarding, or rigidity. No pulsatile masses. Musculoskeletal: Moves all extremities x 4. Warm and well perfused, no clubbing, no cyanosis, no edema. Capillary refill <3 seconds  Skin: skin warm and dry. No rashes. Neurologic: GCS 15,  left-sided weakness but able to lift both arms and both legs. She has some increased weakness to the left arm (for 3 days) which she says may be worse. Psychiatric: Normal Affect          -------------------------------------------------- RESULTS -------------------------------------------------  I have personally reviewed all laboratory and imaging results for this patient. Results are listed below.      LABS: (Lab results interpreted by me)  Results for orders placed or performed during the hospital encounter of 09/04/20   CBC Auto Differential   Result Value Ref Range    WBC 5.1 4.5 - 11.5 E9/L    RBC 4.45 3.50 - 5.50 E12/L    Hemoglobin 13.5 11.5 - 15.5 g/dL    Hematocrit 39.7 34.0 - 48.0 %    MCV 89.2 80.0 - 99.9 fL    MCH 30.3 26.0 - 35.0 pg    MCHC 34.0 32.0 - 34.5 %    RDW 12.9 11.5 - 15.0 fL    Platelets 007 988 - 584 E9/L    MPV 10.8 7.0 - 12.0 fL    Neutrophils % 70.0 43.0 - 80.0 %    Immature Granulocytes % 0.4 0.0 - 5.0 %    Lymphocytes % 19.2 (L) 20.0 - 42.0 %    Monocytes % 8.2 2.0 - 12.0 %    Eosinophils % 1.4 0.0 - 6.0 %    Basophils % 0.8 0.0 - 2.0 %    Neutrophils Absolute 3.57 1.80 - 7.30 E9/L    Immature Granulocytes # 0.02 E9/L    Lymphocytes Absolute 0.98 (L) 1.50 - 4.00 E9/L    Monocytes Absolute 0.42 0.10 - 0.95 E9/L    Eosinophils Absolute 0.07 0.05 - 0.50 E9/L    Basophils Absolute 0.04 0.00 - 0.20 E9/L   Comprehensive Metabolic Panel w/ Reflex to MG   Result Value Ref Range    Sodium 141 132 - 146 mmol/L    Potassium reflex Magnesium 3.9 3.5 - 5.0 mmol/L    Chloride 102 98 - 107 mmol/L    CO2 24 22 - 29 mmol/L    Anion Gap 15 7 - 16 mmol/L    Glucose 149 (H) 74 - 99 mg/dL BUN 15 6 - 20 mg/dL    CREATININE 1.4 (H) 0.5 - 1.0 mg/dL    GFR Non-African American 39 >=60 mL/min/1.73    GFR African American 47     Calcium 8.9 8.6 - 10.2 mg/dL    Total Protein 6.4 6.4 - 8.3 g/dL    Alb 4.0 3.5 - 5.2 g/dL    Total Bilirubin 0.5 0.0 - 1.2 mg/dL    Alkaline Phosphatase 120 (H) 35 - 104 U/L    ALT 9 0 - 32 U/L    AST 12 0 - 31 U/L   Ammonia   Result Value Ref Range    Ammonia 15.0 11.0 - 51.0 umol/L   TSH without Reflex   Result Value Ref Range    TSH 0.724 0.270 - 4.200 uIU/mL   Protime-INR   Result Value Ref Range    Protime 11.6 9.3 - 12.4 sec    INR 1.0    APTT   Result Value Ref Range    aPTT 31.0 24.5 - 35.1 sec   Troponin   Result Value Ref Range    Troponin 0.02 0.00 - 0.03 ng/mL   Urinalysis   Result Value Ref Range    Color, UA Yellow Straw/Yellow    Clarity, UA CLOUDY (A) Clear    Glucose, Ur Negative Negative mg/dL    Bilirubin Urine Negative Negative    Ketones, Urine Negative Negative mg/dL    Specific Gravity, UA 1.020 1.005 - 1.030    Blood, Urine SMALL (A) Negative    pH, UA 6.0 5.0 - 9.0    Protein,  (A) Negative mg/dL    Urobilinogen, Urine 0.2 <2.0 E.U./dL    Nitrite, Urine POSITIVE (A) Negative    Leukocyte Esterase, Urine MODERATE (A) Negative   Urine Drug Screen   Result Value Ref Range    Amphetamine Screen, Urine NOT DETECTED Negative <1000 ng/mL    Barbiturate Screen, Ur NOT DETECTED Negative < 200 ng/mL    Benzodiazepine Screen, Urine NOT DETECTED Negative < 200 ng/mL    Cannabinoid Scrn, Ur NOT DETECTED Negative < 50ng/mL    Cocaine Metabolite Screen, Urine NOT DETECTED Negative < 300 ng/mL    Opiate Scrn, Ur POSITIVE (A) Negative < 300ng/mL    PCP Screen, Urine NOT DETECTED Negative < 25 ng/mL    Methadone Screen, Urine NOT DETECTED Negative <300 ng/mL    Oxycodone Urine NOT DETECTED Negative <100 ng/mL    FENTANYL SCREEN, URINE NOT DETECTED Negative <1 ng/mL    Drug Screen Comment: see below    Serum Drug Screen   Result Value Ref Range    Ethanol Lvl <10 (has no administration in time range)   magnesium hydroxide (MILK OF MAGNESIA) 400 MG/5ML suspension 30 mL (has no administration in time range)   promethazine (PHENERGAN) tablet 12.5 mg (has no administration in time range)     Or   ondansetron (ZOFRAN) injection 4 mg (has no administration in time range)   enoxaparin (LOVENOX) injection 40 mg (40 mg Subcutaneous Not Given 9/4/20 1950)   insulin lispro (HUMALOG) injection vial 0-6 Units (0 Units Subcutaneous Not Given 9/4/20 2127)   insulin lispro (HUMALOG) injection vial 0-3 Units (0 Units Subcutaneous Not Given 9/4/20 2126)   glucose (GLUTOSE) 40 % oral gel 15 g (has no administration in time range)   dextrose 50 % IV solution (has no administration in time range)   glucagon (rDNA) injection 1 mg (has no administration in time range)   dextrose 5 % solution (has no administration in time range)   albuterol (PROVENTIL) nebulizer solution 2.5 mg (has no administration in time range)   0.9 % sodium chloride bolus (0 mLs Intravenous Stopped 9/4/20 1801)             I, Dr. Camille Buenrostro, am the primary provider of record    Medical Decision Making:   Overall weakness and she says she has slightly increased weakness, ?new stroke. ? UTI related. Also with some abdominal pain. No diarrhea. CT pending, likely admission but dispo based on imaging         This patient's ED course included: a personal history and physicial examination, re-evaluation prior to disposition, IV medications and complex medical decision making and emergency management    This patient has remained hemodynamically stable during their ED course. Counseling: The emergency provider has spoken with the patient and discussed todays results, in addition to providing specific details for the plan of care and counseling regarding the diagnosis and prognosis.   Questions are answered at this time and they are agreeable with the plan.       --------------------------------- IMPRESSION AND DISPOSITION

## 2020-09-05 VITALS
TEMPERATURE: 96.5 F | SYSTOLIC BLOOD PRESSURE: 174 MMHG | WEIGHT: 197.1 LBS | HEIGHT: 66 IN | DIASTOLIC BLOOD PRESSURE: 75 MMHG | HEART RATE: 64 BPM | OXYGEN SATURATION: 97 % | BODY MASS INDEX: 31.68 KG/M2 | RESPIRATION RATE: 18 BRPM

## 2020-09-05 PROBLEM — I69.354 HEMIPARESIS AFFECTING LEFT SIDE AS LATE EFFECT OF CEREBROVASCULAR ACCIDENT (HCC): Chronic | Status: ACTIVE | Noted: 2020-09-04

## 2020-09-05 PROBLEM — R53.1 GENERALIZED WEAKNESS: Status: ACTIVE | Noted: 2020-09-05

## 2020-09-05 PROBLEM — D84.9 IMMUNOSUPPRESSED STATUS (HCC): Chronic | Status: ACTIVE | Noted: 2020-09-05

## 2020-09-05 LAB
ANION GAP SERPL CALCULATED.3IONS-SCNC: 11 MMOL/L (ref 7–16)
BASOPHILS ABSOLUTE: 0.04 E9/L (ref 0–0.2)
BASOPHILS RELATIVE PERCENT: 0.8 % (ref 0–2)
BUN BLDV-MCNC: 17 MG/DL (ref 6–20)
CALCIUM SERPL-MCNC: 8.8 MG/DL (ref 8.6–10.2)
CHLORIDE BLD-SCNC: 105 MMOL/L (ref 98–107)
CHOLESTEROL, TOTAL: 132 MG/DL (ref 0–199)
CO2: 25 MMOL/L (ref 22–29)
CREAT SERPL-MCNC: 1.6 MG/DL (ref 0.5–1)
EKG ATRIAL RATE: 70 BPM
EKG P AXIS: 61 DEGREES
EKG P-R INTERVAL: 156 MS
EKG Q-T INTERVAL: 412 MS
EKG QRS DURATION: 78 MS
EKG QTC CALCULATION (BAZETT): 444 MS
EKG R AXIS: 8 DEGREES
EKG T AXIS: 49 DEGREES
EKG VENTRICULAR RATE: 70 BPM
EOSINOPHILS ABSOLUTE: 0.12 E9/L (ref 0.05–0.5)
EOSINOPHILS RELATIVE PERCENT: 2.4 % (ref 0–6)
GFR AFRICAN AMERICAN: 40
GFR NON-AFRICAN AMERICAN: 33 ML/MIN/1.73
GLUCOSE BLD-MCNC: 154 MG/DL (ref 74–99)
HBA1C MFR BLD: 6.3 % (ref 4–5.6)
HCT VFR BLD CALC: 38.4 % (ref 34–48)
HDLC SERPL-MCNC: 44 MG/DL
HEMOGLOBIN: 13 G/DL (ref 11.5–15.5)
IMMATURE GRANULOCYTES #: 0.01 E9/L
IMMATURE GRANULOCYTES %: 0.2 % (ref 0–5)
LDL CHOLESTEROL CALCULATED: 57 MG/DL (ref 0–99)
LYMPHOCYTES ABSOLUTE: 0.98 E9/L (ref 1.5–4)
LYMPHOCYTES RELATIVE PERCENT: 19.8 % (ref 20–42)
MAGNESIUM: 2 MG/DL (ref 1.6–2.6)
MCH RBC QN AUTO: 30.2 PG (ref 26–35)
MCHC RBC AUTO-ENTMCNC: 33.9 % (ref 32–34.5)
MCV RBC AUTO: 89.1 FL (ref 80–99.9)
METER GLUCOSE: 113 MG/DL (ref 74–99)
METER GLUCOSE: 157 MG/DL (ref 74–99)
MONOCYTES ABSOLUTE: 0.49 E9/L (ref 0.1–0.95)
MONOCYTES RELATIVE PERCENT: 9.9 % (ref 2–12)
NEUTROPHILS ABSOLUTE: 3.31 E9/L (ref 1.8–7.3)
NEUTROPHILS RELATIVE PERCENT: 66.9 % (ref 43–80)
PDW BLD-RTO: 12.9 FL (ref 11.5–15)
PLATELET # BLD: 172 E9/L (ref 130–450)
PMV BLD AUTO: 10.9 FL (ref 7–12)
POTASSIUM REFLEX MAGNESIUM: 3.5 MMOL/L (ref 3.5–5)
RBC # BLD: 4.31 E12/L (ref 3.5–5.5)
SODIUM BLD-SCNC: 141 MMOL/L (ref 132–146)
TRIGL SERPL-MCNC: 154 MG/DL (ref 0–149)
VLDLC SERPL CALC-MCNC: 31 MG/DL
WBC # BLD: 5 E9/L (ref 4.5–11.5)

## 2020-09-05 PROCEDURE — 85025 COMPLETE CBC W/AUTO DIFF WBC: CPT

## 2020-09-05 PROCEDURE — 6360000002 HC RX W HCPCS: Performed by: PHYSICIAN ASSISTANT

## 2020-09-05 PROCEDURE — 83735 ASSAY OF MAGNESIUM: CPT

## 2020-09-05 PROCEDURE — 83036 HEMOGLOBIN GLYCOSYLATED A1C: CPT

## 2020-09-05 PROCEDURE — 36415 COLL VENOUS BLD VENIPUNCTURE: CPT

## 2020-09-05 PROCEDURE — 80048 BASIC METABOLIC PNL TOTAL CA: CPT

## 2020-09-05 PROCEDURE — 6370000000 HC RX 637 (ALT 250 FOR IP): Performed by: PHYSICIAN ASSISTANT

## 2020-09-05 PROCEDURE — 80061 LIPID PANEL: CPT

## 2020-09-05 PROCEDURE — 87088 URINE BACTERIA CULTURE: CPT

## 2020-09-05 PROCEDURE — 2580000003 HC RX 258: Performed by: PHYSICIAN ASSISTANT

## 2020-09-05 PROCEDURE — 82962 GLUCOSE BLOOD TEST: CPT

## 2020-09-05 PROCEDURE — G0378 HOSPITAL OBSERVATION PER HR: HCPCS

## 2020-09-05 PROCEDURE — 93010 ELECTROCARDIOGRAM REPORT: CPT | Performed by: INTERNAL MEDICINE

## 2020-09-05 RX ORDER — CEPHALEXIN 500 MG/1
500 CAPSULE ORAL 2 TIMES DAILY
Qty: 10 CAPSULE | Refills: 0 | Status: SHIPPED | OUTPATIENT
Start: 2020-09-05 | End: 2020-09-16 | Stop reason: ALTCHOICE

## 2020-09-05 RX ADMIN — FUROSEMIDE 40 MG: 40 TABLET ORAL at 08:46

## 2020-09-05 RX ADMIN — ASPIRIN 81 MG CHEWABLE TABLET 81 MG: 81 TABLET CHEWABLE at 08:46

## 2020-09-05 RX ADMIN — GUAIFENESIN 400 MG: 400 TABLET, FILM COATED ORAL at 08:46

## 2020-09-05 RX ADMIN — FLUTICASONE PROPIONATE 2 SPRAY: 50 SPRAY, METERED NASAL at 08:50

## 2020-09-05 RX ADMIN — LOSARTAN POTASSIUM 50 MG: 50 TABLET, FILM COATED ORAL at 08:46

## 2020-09-05 RX ADMIN — AMLODIPINE BESYLATE 2.5 MG: 2.5 TABLET ORAL at 08:46

## 2020-09-05 RX ADMIN — SULFAMETHOXAZOLE AND TRIMETHOPRIM 1 TABLET: 400; 80 TABLET ORAL at 08:47

## 2020-09-05 RX ADMIN — SODIUM CHLORIDE, PRESERVATIVE FREE 10 ML: 5 INJECTION INTRAVENOUS at 08:47

## 2020-09-05 RX ADMIN — PANTOPRAZOLE SODIUM 40 MG: 40 TABLET, DELAYED RELEASE ORAL at 08:52

## 2020-09-05 RX ADMIN — TACROLIMUS 1.5 MG: 1 CAPSULE ORAL at 08:46

## 2020-09-05 ASSESSMENT — PAIN SCALES - GENERAL: PAINLEVEL_OUTOF10: 0

## 2020-09-05 NOTE — H&P
[address] [facility]  History and Physical      CHIEF COMPLAINT: Weakness      HISTORY OF PRESENT ILLNESS:      The patient is a 63 y.o. female patient of Dr. Francisco King history of cirrhosis status post transplant, COPD, diabetes, CVA, varices who presents with leg weakness. Patient presented emergency room reports of generalized weakness. Reports worsening for 3 days. Associated with fatigue. Patient notes considerable leg weakness and generalized shakiness. Denies focal weakness or seizure-like activity.  + Vomiting denies diarrhea cough fever chills. No COVID-19 exposure.  + urgency no fever    Past Medical History:    Past Medical History:   Diagnosis Date    Arthritis     right knee    Blood circulation, collateral     Chronic fatigue 2016    Cirrhosis of liver (HCC)     end stage    COPD exacerbation ([facility] 2015    pt denies having    Diabetes mellitus ([facility]     Essential hypertension 2/15/2016    Gall stones     did have stent placed    GERD (gastroesophageal reflux disease)     Hep C w/o coma, chronic ([facility]     Sucessfully treated with Harvoni-no longer has viral load    History of blood transfusion     Neuropathy     PFO (patent foramen ovale)     Pneumonia     Type 2 diabetes mellitus with stage 3 chronic kidney disease, with long-term current use of insulin (HCC)     Unspecified cerebral artery occlusion with cerebral infarction     Varices        Past Surgical History:    Past Surgical History:   Procedure Laterality Date    BRAIN SURGERY      CARDIAC CATHETERIZATION      cerebral angiogrem to check cavernous malformation in head - negative    CARDIAC SURGERY  2014    heart cath     SECTION      x 2     SECTION   and     COLONOSCOPY      ECHO COMPL W DOP COLOR FLOW  2012         ENDOSCOPY, COLON, DIAGNOSTIC      ESOPHAGEAL VARICE LIGATION      banding    LIVER TRANSPLANT      OTHER SURGICAL HISTORY Right 16 drugs. Family History:   family history is not on file. She was adopted. REVIEW OF SYSTEMS:  As above in the HPI, otherwise negative    PHYSICAL EXAM:    Vitals:  BP (!) 174/75   Pulse 64   Temp 96.5 °F (35.8 °C) (Temporal)   Resp 18   Ht 5' 6\" (1.676 m)   Wt 197 lb 1.6 oz (89.4 kg)   LMP  (LMP Unknown)   SpO2 97%   BMI 31.81 kg/m²     General:  Awake, alert, oriented X 3. Well developed, well nourished, well groomed. No apparent distress. HEENT:  Normocephalic, atraumatic. Pupils equal, round, reactive to light. No scleral icterus. No conjunctival injection. Normal lips, teeth, and gums. No nasal discharge. Neck:  Supple  Heart:  RRR, no murmurs, gallops, rubs  Lungs:  CTA bilaterally, bilat symmetrical expansion, no wheeze, rales, or rhonchi  Abdomen:   Bowel sounds present, soft, nontender, no masses, no organomegaly, no peritoneal signs  Extremities:  No clubbing, cyanosis, or edema  Skin:  Warm and dry, no open lesions or rash  Neuro:  Cranial nerves 2-12 intact, chronic L weakness  Breast: deferred  Rectal: deferred  Genitalia:  deferred    LABS:    CBC with Differential:    Lab Results   Component Value Date    WBC 5.0 09/05/2020    RBC 4.31 09/05/2020    HGB 13.0 09/05/2020    HCT 38.4 09/05/2020     09/05/2020    MCV 89.1 09/05/2020    MCH 30.2 09/05/2020    MCHC 33.9 09/05/2020    RDW 12.9 09/05/2020    NRBC 0.9 12/29/2016    SEGSPCT 44 02/13/2014    LYMPHOPCT 19.8 09/05/2020    MONOPCT 9.9 09/05/2020    MYELOPCT 0.9 12/29/2016    BASOPCT 0.8 09/05/2020    MONOSABS 0.49 09/05/2020    LYMPHSABS 0.98 09/05/2020    EOSABS 0.12 09/05/2020    BASOSABS 0.04 09/05/2020     CMP:    Lab Results   Component Value Date     09/05/2020    K 3.5 09/05/2020     09/05/2020    CO2 25 09/05/2020    BUN 17 09/05/2020    CREATININE 1.6 09/05/2020    GFRAA 40 09/05/2020    LABGLOM 33 09/05/2020    GLUCOSE 154 09/05/2020    GLUCOSE 105 05/22/2012    PROT 6.4 09/04/2020    LABALBU 4.0 09/04/2020    LABALBU 3.1 05/22/2012    CALCIUM 8.8 09/05/2020    BILITOT 0.5 09/04/2020    ALKPHOS 120 09/04/2020    AST 12 09/04/2020    ALT 9 09/04/2020     Magnesium:    Lab Results   Component Value Date    MG 2.0 09/05/2020     Phosphorus:    Lab Results   Component Value Date    PHOS 4.7 03/02/2020     PT/INR:    Lab Results   Component Value Date    PROTIME 11.6 09/04/2020    PROTIME 13.2 05/22/2012    INR 1.0 09/04/2020     Last 3 Troponin:    Lab Results   Component Value Date    TROPONINI 0.02 09/04/2020    TROPONINI <0.01 07/07/2019    TROPONINI <0.01 07/06/2019     U/A:    Lab Results   Component Value Date    COLORU Yellow 09/04/2020    PROTEINU 100 09/04/2020    PHUR 6.0 09/04/2020    LABCAST RARE 08/22/2016    WBCUA PACKED 09/04/2020    WBCUA NONE 05/21/2012    RBCUA 5-10 09/04/2020    RBCUA NONE 07/29/2013    YEAST FEW 07/29/2013    BACTERIA MANY 09/04/2020    CLARITYU CLOUDY 09/04/2020    SPECGRAV 1.020 09/04/2020    LEUKOCYTESUR MODERATE 09/04/2020    UROBILINOGEN 0.2 09/04/2020    BILIRUBINUR Negative 09/04/2020    BILIRUBINUR NEGATIVE 05/21/2012    BLOODU SMALL 09/04/2020    GLUCOSEU Negative 09/04/2020    GLUCOSEU NEGATIVE 05/21/2012    AMORPHOUS FEW 08/01/2016     ABG:    Lab Results   Component Value Date    PH 7.381 10/28/2016    PH 7.493 02/10/2011    PCO2 49.0 10/28/2016    PO2 467.2 10/28/2016    HCO3 28.4 10/28/2016    BE 2.8 10/28/2016    O2SAT 99.7 10/28/2016     HgBA1c:    Lab Results   Component Value Date    LABA1C 6.3 09/05/2020     FLP:    Lab Results   Component Value Date    TRIG 154 09/05/2020    HDL 44 09/05/2020    LDLCALC 57 09/05/2020    LABVLDL 31 09/05/2020     TSH:    Lab Results   Component Value Date    TSH 0.724 09/04/2020       XR CHEST PORTABLE   Final Result   No acute cardiopulmonary pathology. CT HEAD WO CONTRAST   Final Result   1. No acute intracranial abnormality.    2. Prominent chronic right frontal lobe infarction      CT ABDOMEN PELVIS WO CONTRAST Additional Contrast? None   Final Result         1. Mild apparent mural thickening of the ascending colon and hepatic   flexure is likely due to underdistention. Mild colitis cannot be   excluded. 2. No urolithiasis or hydronephrosis . 3. Small cyst in the right lobe of the liver. Otherwise, the   transplanted liver is morphologically unremarkable. 4. Severe sigmoid diverticulosis without diverticulitis                ASSESSMENT:      Principal Problem:    UTI (urinary tract infection)  Active Problems:    Status post liver transplantation ([address]    Diabetes mellitus type 2, uncontrolled (HCC)    CKD (chronic kidney disease) stage 3, GFR 30-59 ml/min (HCC)    Essential hypertension    Hemiparesis affecting left side as late effect of cerebrovascular accident ([address]    Generalized weakness    Immunosuppressed status ([address]  Resolved Problems:    * No resolved hospital problems.  *      PLAN:    Admit  IV abx  UCx  BCx  IVF  Monitor labs closely  Medications for other co morbidities cont as appropriate w dosage adjustments as necessary PT/OT  DVT PPx  DC planning        Electronically signed by Nata Harris MD on 9/5/2020 at 8:05 AM

## 2020-09-05 NOTE — PROGRESS NOTES
Dr. Renetta Salgado notified that pt is refusing MRI of brain, neurology consult, and US of bilat carotid arteries

## 2020-09-05 NOTE — DISCHARGE SUMMARY
Physician Discharge Summary     Patient ID:  Thao Martinez  [id number]  63 y.o.  1960    Admit date: 9/4/2020    Discharge date and time:  9/5/2020    Discharge Diagnoses: Principal Problem:    UTI (urinary tract infection)  Active Problems:    Status post liver transplantation ([facility]    Diabetes mellitus type 2, uncontrolled (HCC)    CKD (chronic kidney disease) stage 3, GFR 30-59 ml/min (HCC)    Essential hypertension    Hemiparesis affecting left side as late effect of cerebrovascular accident ([facility]    Generalized weakness    Immunosuppressed status ([facility]  Resolved Problems:    * No resolved hospital problems. *      Consults: IP CONSULT TO HOSPITALIST  IP CONSULT TO SOCIAL WORK    Procedures: See below    Hospital Course: Principal Problem:    UTI (urinary tract infection)  Active Problems:    Status post liver transplantation ([facility]    Diabetes mellitus type 2, uncontrolled ([facility]    CKD (chronic kidney disease) stage 3, GFR 30-59 ml/min (HCC)    Essential hypertension    Hemiparesis affecting left side as late effect of cerebrovascular accident ([facility]    Generalized weakness    Immunosuppressed status ([facility]  Resolved Problems:    * No resolved hospital problems.  *       PLAN:     Admit  IV abx rocephin, RAÚL on Keflex  UCx pending  BCx  IVF  Monitor labs closely  Medications for other co morbidities cont as appropriate w dosage adjustments as necessary PT/OT  DVT PPx  DC planning  F/U PCP re UCx       Discharge Exam:  See progress note from today    Condition:  Stable    Disposition: home    Patient Instructions:   Current Discharge Medication List      START taking these medications    Details   cephALEXin (KEFLEX) 500 MG capsule Take 1 capsule by mouth 2 times daily  Qty: 10 capsule, Refills: 0         CONTINUE these medications which have NOT CHANGED    Details   metFORMIN (GLUCOPHAGE-XR) 500 MG extended release tablet TAKE 1 TABLET BY MOUTH DAILY (WITH BREAKFAST)  Qty: 90 tablet, Refills: 0    Associated Diagnoses: Uncontrolled type 2 diabetes mellitus with hypoglycemia without coma (HCC)      vitamin D (ERGOCALCIFEROL) 1.25 MG (50573 UT) CAPS capsule TAKE 1 CAPSULE EVERY WEDNESDAY AND SUNDAY AND 2000 UNITS THE OTHER 5 DAYS OF THE WEEK  Qty: 25 capsule, Refills: 0    Comments: **Patient requests 90 days supply**  Associated Diagnoses: Vitamin D deficiency      HYDROcodone-acetaminophen (NORCO) 5-325 MG per tablet Take 1 tablet by mouth 2 times daily as needed for Pain for up to 30 days.   Qty: 60 tablet, Refills: 0    Comments: Reduce doses taken as pain becomes manageable  Associated Diagnoses: Chronic midline low back pain with right-sided sciatica      albuterol sulfate HFA (PROAIR HFA) 108 (90 Base) MCG/ACT inhaler INHALE 2 PUFFS BY MOUTH EVERY 6 HOURS AS NEEDED FOR WHEEZING  Qty: 3 Inhaler, Refills: 1    Associated Diagnoses: Shortness of breath      atorvastatin (LIPITOR) 20 MG tablet TAKE 1 TABLET BY MOUTH EVERY NIGHT  Qty: 90 tablet, Refills: 3    Comments: **Patient requests 90 days supply**      amLODIPine (NORVASC) 2.5 MG tablet TAKE 1 TABLET BY MOUTH EVERY DAY  Qty: 90 tablet, Refills: 3    Associated Diagnoses: HTN (hypertension), benign      omeprazole (PRILOSEC) 40 MG delayed release capsule TAKE 1 CAPSULE EVERY DAY  Qty: 90 capsule, Refills: 2    Associated Diagnoses: Gastroesophageal reflux disease, esophagitis presence not specified      losartan (COZAAR) 50 MG tablet Take one tablet daily, call with BP > 160/100  Qty: 90 tablet, Refills: 3    Comments: **Patient requests 90 days supply**  Associated Diagnoses: HTN (hypertension), benign      Melatonin 5 MG CHEW Take 1 tablet by mouth nightly      sulfamethoxazole-trimethoprim (BACTRIM;SEPTRA) 400-80 MG per tablet TAKE 1 TABLET EVERY DAY  Qty: 90 tablet, Refills: 0      aspirin 81 MG chewable tablet Take 1 tablet by mouth daily  Qty: 30 tablet, Refills: 0      tacrolimus (PROGRAF) 1 MG capsule Take 1.5 mg by mouth 2 times daily       nystatin (NYSTATIN) 665522 UNIT/GM powder APPLY FOUR TIMES DAILY  Qty: 60 g, Refills: 0    Associated Diagnoses: Skin yeast infection      fluticasone (FLONASE) 50 MCG/ACT nasal spray 2 sprays by Each Nostril route daily  Qty: 3 Bottle, Refills: 1    Associated Diagnoses: Seasonal allergic rhinitis due to pollen      guaiFENesin (MUCINEX) 600 MG extended release tablet Take 600 mg by mouth daily       diclofenac sodium (VOLTAREN) 1 % GEL Apply 2 g topically 2 times daily  Qty: 1 Tube, Refills: 3    Associated Diagnoses: Bilateral thumb pain         STOP taking these medications       furosemide (LASIX) 40 MG tablet Comments:   Reason for Stopping:             Activity: activity as tolerated  Diet: cardiac diet    Follow-up with 1wk PCP    Signed:  Carmela Curtis MD  9/5/2020  1:15 PM

## 2020-09-05 NOTE — PROGRESS NOTES
Pt states that she is going to refuse insulin during hospital stay. States that she takes 500mg metformin daily at home and states that the insulin will drop her sugar too low.

## 2020-09-06 LAB
ORGANISM: ABNORMAL
URINE CULTURE, ROUTINE: ABNORMAL

## 2020-09-07 LAB — URINE CULTURE, ROUTINE: NORMAL

## 2020-09-08 NOTE — TELEPHONE ENCOUNTER
Patient given phone number for [facility]) per [facility]..

## 2020-09-09 ENCOUNTER — TELEPHONE (OUTPATIENT)
Dept: FAMILY MEDICINE CLINIC | Age: 60
End: 2020-09-09

## 2020-09-11 ENCOUNTER — TELEPHONE (OUTPATIENT)
Dept: FAMILY MEDICINE CLINIC | Age: 60
End: 2020-09-11

## 2020-09-16 ENCOUNTER — OFFICE VISIT (OUTPATIENT)
Dept: FAMILY MEDICINE CLINIC | Age: 60
End: 2020-09-16
Payer: MEDICARE

## 2020-09-16 ENCOUNTER — TELEPHONE (OUTPATIENT)
Dept: OCCUPATIONAL THERAPY | Age: 60
End: 2020-09-16

## 2020-09-16 ENCOUNTER — TELEPHONE (OUTPATIENT)
Dept: FAMILY MEDICINE CLINIC | Age: 60
End: 2020-09-16

## 2020-09-16 VITALS
TEMPERATURE: 96.7 F | OXYGEN SATURATION: 98 % | RESPIRATION RATE: 14 BRPM | BODY MASS INDEX: 31.81 KG/M2 | HEART RATE: 68 BPM | DIASTOLIC BLOOD PRESSURE: 66 MMHG | SYSTOLIC BLOOD PRESSURE: 118 MMHG | HEIGHT: 66 IN

## 2020-09-16 PROCEDURE — 99213 OFFICE O/P EST LOW 20 MIN: CPT | Performed by: NURSE PRACTITIONER

## 2020-09-16 PROCEDURE — 1036F TOBACCO NON-USER: CPT | Performed by: NURSE PRACTITIONER

## 2020-09-16 PROCEDURE — G8417 CALC BMI ABV UP PARAM F/U: HCPCS | Performed by: NURSE PRACTITIONER

## 2020-09-16 PROCEDURE — G8427 DOCREV CUR MEDS BY ELIG CLIN: HCPCS | Performed by: NURSE PRACTITIONER

## 2020-09-16 PROCEDURE — 3017F COLORECTAL CA SCREEN DOC REV: CPT | Performed by: NURSE PRACTITIONER

## 2020-09-16 RX ORDER — HYDROCODONE BITARTRATE AND ACETAMINOPHEN 5; 325 MG/1; MG/1
1 TABLET ORAL 2 TIMES DAILY PRN
Qty: 60 TABLET | Refills: 0 | Status: SHIPPED
Start: 2020-09-16 | End: 2020-10-14 | Stop reason: SDUPTHER

## 2020-09-16 ASSESSMENT — ENCOUNTER SYMPTOMS
COUGH: 0
VOMITING: 0
WHEEZING: 0
SHORTNESS OF BREATH: 0
DIARRHEA: 0
BACK PAIN: 1
NAUSEA: 0
CONSTIPATION: 0

## 2020-09-16 NOTE — TELEPHONE ENCOUNTER
Patient did not show for today's OT session. This is the 3rd consecutive missed visit. Chart review shows pt is attempting to get therapy in the home. OT is discharged at this point.

## 2020-09-16 NOTE — PROGRESS NOTES
HPI:  Patient comes intoday for   Chief Complaint   Patient presents with    Back Pain     refill of norco. last dose yesterday. pain today 6/10    Other     wants to discuss in home therapy   . Chronic pain to low back with radiation down legs at times, right worse than left. Her pain is aggravated by bending. She is walking with a walker or using walls for balance/support. She would like to try home PT. She did go to [facility] for an evaluation in July. She was not able to keep the schedule due to transportation issues. Prior to Visit Medications    Medication Sig Taking? Authorizing Provider   HYDROcodone-acetaminophen (NORCO) 5-325 MG per tablet Take 1 tablet by mouth 2 times daily as needed for Pain for up to 30 days.  Yes MIKEL Weaver CNP   metFORMIN (GLUCOPHAGE-XR) 500 MG extended release tablet TAKE 1 TABLET BY MOUTH DAILY (WITH BREAKFAST) Yes MIKEL Weaver CNP   vitamin D (ERGOCALCIFEROL) 1.25 MG (03156 UT) CAPS capsule TAKE 1 CAPSULE EVERY WEDNESDAY AND SUNDAY AND 2000 UNITS THE OTHER 5 DAYS OF THE WEEK Yes MIKEL Regalado CNP   nystatin (NYSTATIN) 558292 UNIT/GM powder APPLY FOUR TIMES DAILY Yes MIKEL Weaver CNP   fluticasone (FLONASE) 50 MCG/ACT nasal spray 2 sprays by Each Nostril route daily Yes Ok Diop,    albuterol sulfate HFA (PROAIR HFA) 108 (90 Base) MCG/ACT inhaler INHALE 2 PUFFS BY MOUTH EVERY 6 HOURS AS NEEDED FOR WHEEZING Yes MIKEL Weaver CNP   atorvastatin (LIPITOR) 20 MG tablet TAKE 1 TABLET BY MOUTH EVERY NIGHT Yes MIKEL Weaver CNP   amLODIPine (NORVASC) 2.5 MG tablet TAKE 1 TABLET BY MOUTH EVERY DAY Yes MIKEL Weaver CNP   omeprazole (PRILOSEC) 40 MG delayed release capsule TAKE 1 CAPSULE EVERY DAY Yes MIKEL Weaver CNP   losartan (COZAAR) 50 MG tablet Take one tablet daily, call with BP > 160/100 Yes MIKEL Weaver CNP   Melatonin 5 MG CHEW Take 1 tablet by mouth nightly Yes Historical Provider, MD   sulfamethoxazole-trimethoprim (BACTRIM;SEPTRA) 400-80 MG per tablet TAKE 1 TABLET EVERY DAY Yes MIKEL Doran CNP   aspirin 81 MG chewable tablet Take 1 tablet by mouth daily Yes Pelon Mays MD   tacrolimus (PROGRAF) 1 MG capsule Take 1.5 mg by mouth 2 times daily  Yes Historical Provider, MD   guaiFENesin (MUCINEX) 600 MG extended release tablet Take 600 mg by mouth daily  Yes Historical Provider, MD   diclofenac sodium (VOLTAREN) 1 % GEL Apply 2 g topically 2 times daily Yes MIKEL Doran CNP         Allergies   Allergen Reactions    Chantix [Varenicline] Swelling     Tongue swelling Split in 1/2    Heparin      Platelets drop         Review of Systems  Review of Systems   Constitutional: Negative for activity change, appetite change and unexpected weight change. Respiratory: Negative for cough, shortness of breath and wheezing. Cardiovascular: Negative for chest pain, palpitations and leg swelling. Gastrointestinal: Negative for constipation, diarrhea, nausea and vomiting. Musculoskeletal: Positive for back pain, gait problem and myalgias. Neurological: Positive for weakness, light-headedness and headaches. VS:  /66   Pulse 68   Temp 96.7 °F (35.9 °C) (Temporal)   Resp 14   Ht 5' 6\" (1.676 m)   LMP  (LMP Unknown)   SpO2 98%   BMI 31.81 kg/m²     Physical Exam  Physical Exam  Constitutional:       General: She is not in acute distress. Appearance: She is well-developed. HENT:      Head: Normocephalic and atraumatic. Neck:      Thyroid: No thyromegaly. Trachea: No tracheal deviation. Cardiovascular:      Rate and Rhythm: Normal rate and regular rhythm. Heart sounds: No murmur. Pulmonary:      Effort: Pulmonary effort is normal.      Breath sounds: Normal breath sounds. No wheezing or rales. Chest:      Chest wall: No tenderness.    Abdominal:      General: Bowel sounds are normal.      Palpations: Abdomen is soft. Tenderness: There is no abdominal tenderness. Musculoskeletal:         General: Tenderness present. Comments: Pain to thoracic and lumbar spine with bilateral paraspinal tenderness. ROM limited due to pain. Negative SLR bilateral.      Lymphadenopathy:      Cervical: No cervical adenopathy. Skin:     General: Skin is warm and dry. Neurological:      Mental Status: She is alert and oriented to person, place, and time. Motor: Weakness (left grasp and arm strength<right) present. Gait: Gait abnormal (in wheelchair). Psychiatric:         Behavior: Behavior normal.           Assessment/Plan:  Cara Neves was seen today for back pain and other. Diagnoses and all orders for this visit:    Chronic midline low back pain with right-sided sciatica  -     HYDROcodone-acetaminophen (NORCO) 5-325 MG per tablet; Take 1 tablet by mouth 2 times daily as needed for Pain for up to 30 days.  -The current medical regimen is effective;  continue present plan and medications. Hemiplegia affecting left nondominant side, unspecified etiology, unspecified hemiplegia type [facility])  -     [facility]  -PT/OT    Controlled Substance Monitoring:    Acute and Chronic Pain Monitoring:   RX Monitoring 9/16/2020   Attestation -   Periodic Controlled Substance Monitoring No signs of potential drug abuse or diversion identified.            Greater than 15  Minutes was spent with patient and more than 50% of the time was spent face to facecounseling and educating regarding diagnoses

## 2020-09-22 ENCOUNTER — TELEPHONE (OUTPATIENT)
Dept: FAMILY MEDICINE CLINIC | Age: 60
End: 2020-09-22

## 2020-09-23 ENCOUNTER — HOSPITAL ENCOUNTER (OUTPATIENT)
Age: 60
Discharge: HOME | End: 2020-09-23
Payer: MEDICARE

## 2020-09-23 LAB
BACTERIA: NORMAL /HPF
BILIRUBIN URINE: NEGATIVE
BLOOD, URINE: NEGATIVE
CLARITY: CLEAR
COLOR: YELLOW
GLUCOSE URINE: NEGATIVE MG/DL
KETONES, URINE: ABNORMAL MG/DL
LEUKOCYTE ESTERASE, URINE: NEGATIVE
NITRITE, URINE: NEGATIVE
PH UA: 6 (ref 5–9)
PROTEIN UA: 100 MG/DL
RBC UA: NORMAL /HPF (ref 0–2)
SPECIFIC GRAVITY UA: 1.01 (ref 1–1.03)
UROBILINOGEN, URINE: 0.2 E.U./DL
WBC UA: NORMAL /HPF (ref 0–5)

## 2020-09-23 PROCEDURE — 87591 N.GONORRHOEAE DNA AMP PROB: CPT

## 2020-09-23 PROCEDURE — 81001 URINALYSIS AUTO W/SCOPE: CPT

## 2020-09-23 PROCEDURE — 87491 CHLMYD TRACH DNA AMP PROBE: CPT

## 2020-09-28 LAB
C. TRACHOMATIS DNA ,URINE: NEGATIVE
N. GONORRHOEAE DNA, URINE: NEGATIVE
SOURCE: NORMAL

## 2020-10-04 PROBLEM — N39.0 UTI (URINARY TRACT INFECTION): Status: RESOLVED | Noted: 2020-09-04 | Resolved: 2020-10-04

## 2020-10-14 ENCOUNTER — OFFICE VISIT (OUTPATIENT)
Dept: FAMILY MEDICINE CLINIC | Age: 60
End: 2020-10-14
Payer: MEDICARE

## 2020-10-14 VITALS
BODY MASS INDEX: 28.35 KG/M2 | HEART RATE: 78 BPM | HEIGHT: 66 IN | DIASTOLIC BLOOD PRESSURE: 84 MMHG | SYSTOLIC BLOOD PRESSURE: 138 MMHG | OXYGEN SATURATION: 94 % | TEMPERATURE: 97.9 F | RESPIRATION RATE: 16 BRPM | WEIGHT: 176.4 LBS

## 2020-10-14 PROCEDURE — 3017F COLORECTAL CA SCREEN DOC REV: CPT | Performed by: NURSE PRACTITIONER

## 2020-10-14 PROCEDURE — G8484 FLU IMMUNIZE NO ADMIN: HCPCS | Performed by: NURSE PRACTITIONER

## 2020-10-14 PROCEDURE — G8427 DOCREV CUR MEDS BY ELIG CLIN: HCPCS | Performed by: NURSE PRACTITIONER

## 2020-10-14 PROCEDURE — 99213 OFFICE O/P EST LOW 20 MIN: CPT | Performed by: NURSE PRACTITIONER

## 2020-10-14 PROCEDURE — 1036F TOBACCO NON-USER: CPT | Performed by: NURSE PRACTITIONER

## 2020-10-14 PROCEDURE — G8417 CALC BMI ABV UP PARAM F/U: HCPCS | Performed by: NURSE PRACTITIONER

## 2020-10-14 RX ORDER — HYDROCODONE BITARTRATE AND ACETAMINOPHEN 5; 325 MG/1; MG/1
1 TABLET ORAL 2 TIMES DAILY PRN
Qty: 60 TABLET | Refills: 0 | Status: SHIPPED
Start: 2020-10-14 | End: 2020-10-30 | Stop reason: SDUPTHER

## 2020-10-14 ASSESSMENT — ENCOUNTER SYMPTOMS
DIARRHEA: 0
VOMITING: 0
NAUSEA: 0
BACK PAIN: 1
SHORTNESS OF BREATH: 0
CONSTIPATION: 0
WHEEZING: 0
COUGH: 0

## 2020-10-14 NOTE — PROGRESS NOTES
HPI:  Patient comes intoday for   Chief Complaint   Patient presents with    Back Pain     pain 6/10 radiates down left leg    Fatigue     concerned coming from medications   . Chronic pain to low back with radiation down legs at times, left worse than right now. Her pain is aggravated by bending. States when standing without walker she has a shooting pain in left leg. She is walking with a walker or using walls for balance/support. She is having in home PT/OT    Complains of fatigue after taking morning medications    Prior to Visit Medications    Medication Sig Taking? Authorizing Provider   HYDROcodone-acetaminophen (NORCO) 5-325 MG per tablet Take 1 tablet by mouth 2 times daily as needed for Pain for up to 30 days.  Yes MIKEL Fuentes CNP   metFORMIN (GLUCOPHAGE-XR) 500 MG extended release tablet TAKE 1 TABLET BY MOUTH DAILY (WITH BREAKFAST) Yes MIKEL Fuentes CNP   vitamin D (ERGOCALCIFEROL) 1.25 MG (85935 UT) CAPS capsule TAKE 1 CAPSULE EVERY WEDNESDAY AND SUNDAY AND 2000 UNITS THE OTHER 5 DAYS OF THE WEEK Yes MIKEL Regalado CNP   nystatin (NYSTATIN) 636995 UNIT/GM powder APPLY FOUR TIMES DAILY Yes MIKEL Fuentes CNP   fluticasone (FLONASE) 50 MCG/ACT nasal spray 2 sprays by Each Nostril route daily Yes Leticia Navarrete,    albuterol sulfate HFA (PROAIR HFA) 108 (90 Base) MCG/ACT inhaler INHALE 2 PUFFS BY MOUTH EVERY 6 HOURS AS NEEDED FOR WHEEZING Yes MIKEL Fuentes CNP   atorvastatin (LIPITOR) 20 MG tablet TAKE 1 TABLET BY MOUTH EVERY NIGHT Yes MIKEL Fuentes CNP   amLODIPine (NORVASC) 2.5 MG tablet TAKE 1 TABLET BY MOUTH EVERY DAY Yes MIKEL Fuentes CNP   omeprazole (PRILOSEC) 40 MG delayed release capsule TAKE 1 CAPSULE EVERY DAY Yes MIKEL Fuentes CNP   losartan (COZAAR) 50 MG tablet Take one tablet daily, call with BP > 160/100 Yes MIKEL Fuentes CNP   Melatonin 5 MG CHEW Take 1 tablet by mouth nightly Yes Historical Provider, MD   sulfamethoxazole-trimethoprim (BACTRIM;SEPTRA) 400-80 MG per tablet TAKE 1 TABLET EVERY DAY Yes Saida Dominguez APRN - CNP   aspirin 81 MG chewable tablet Take 1 tablet by mouth daily Yes Lindsay Hernandez MD   tacrolimus (PROGRAF) 1 MG capsule Take 1.5 mg by mouth 2 times daily  Yes Historical Provider, MD   guaiFENesin (MUCINEX) 600 MG extended release tablet Take 600 mg by mouth daily  Yes Historical Provider, MD   diclofenac sodium (VOLTAREN) 1 % GEL Apply 2 g topically 2 times daily Yes Saida Dominguez APRN - CNP         Allergies   Allergen Reactions    Chantix [Varenicline] Swelling     Tongue swelling Split in 1/2    Heparin      Platelets drop         Review of Systems  Review of Systems   Constitutional: Positive for fatigue. Negative for activity change, appetite change and unexpected weight change. Respiratory: Negative for cough, shortness of breath and wheezing. Cardiovascular: Negative for chest pain, palpitations and leg swelling. Gastrointestinal: Negative for constipation, diarrhea, nausea and vomiting. Musculoskeletal: Positive for back pain, gait problem and myalgias. Neurological: Positive for weakness, light-headedness and headaches. VS:  /84   Pulse 78   Temp 97.9 °F (36.6 °C)   Resp 16   Ht 5' 6\" (1.676 m)   Wt 176 lb 6.4 oz (80 kg)   LMP  (LMP Unknown)   SpO2 94%   BMI 28.47 kg/m²     Physical Exam  Physical Exam  Constitutional:       General: She is not in acute distress. Appearance: She is well-developed. HENT:      Head: Normocephalic and atraumatic. Neck:      Thyroid: No thyromegaly. Trachea: No tracheal deviation. Cardiovascular:      Rate and Rhythm: Normal rate and regular rhythm. Heart sounds: No murmur. Pulmonary:      Effort: Pulmonary effort is normal.      Breath sounds: Normal breath sounds. No wheezing or rales. Chest:      Chest wall: No tenderness.    Abdominal:      General: Bowel sounds are normal. Palpations: Abdomen is soft. Tenderness: There is no abdominal tenderness. Musculoskeletal:         General: Tenderness present. Comments: Pain to thoracic and lumbar spine with bilateral paraspinal tenderness. ROM limited due to pain. Negative SLR bilateral.      Lymphadenopathy:      Cervical: No cervical adenopathy. Skin:     General: Skin is warm and dry. Neurological:      Mental Status: She is alert and oriented to person, place, and time. Motor: Weakness (left grasp and arm strength<right) present. Gait: Gait abnormal (in wheelchair). Psychiatric:         Behavior: Behavior normal.           Assessment/Plan:    Becky Cunningham was seen today for back pain and fatigue. Diagnoses and all orders for this visit:    Chronic midline low back pain with right-sided sciatica  -     HYDROcodone-acetaminophen (NORCO) 5-325 MG per tablet; Take 1 tablet by mouth 2 times daily as needed for Pain for up to 30 days.  -The current medical regimen is effective;  continue present plan and medications.  -ongoing PT    Fatigue, unspecified type  -will try taking amlodipine later in the day  -follow up if no improvement      Controlled Substance Monitoring:    Acute and Chronic Pain Monitoring:   RX Monitoring 10/14/2020   Attestation -   Periodic Controlled Substance Monitoring No signs of potential drug abuse or diversion identified.            Greater than 15  Minutes was spent with patient and more than 50% of the time was spent face to facecounseling and educating regarding diagnoses

## 2020-10-30 ENCOUNTER — TELEMEDICINE (OUTPATIENT)
Dept: FAMILY MEDICINE CLINIC | Age: 60
End: 2020-10-30
Payer: MEDICARE

## 2020-10-30 PROCEDURE — G8417 CALC BMI ABV UP PARAM F/U: HCPCS | Performed by: NURSE PRACTITIONER

## 2020-10-30 PROCEDURE — 99213 OFFICE O/P EST LOW 20 MIN: CPT | Performed by: NURSE PRACTITIONER

## 2020-10-30 PROCEDURE — 1036F TOBACCO NON-USER: CPT | Performed by: NURSE PRACTITIONER

## 2020-10-30 PROCEDURE — G8427 DOCREV CUR MEDS BY ELIG CLIN: HCPCS | Performed by: NURSE PRACTITIONER

## 2020-10-30 PROCEDURE — 3017F COLORECTAL CA SCREEN DOC REV: CPT | Performed by: NURSE PRACTITIONER

## 2020-10-30 PROCEDURE — G8484 FLU IMMUNIZE NO ADMIN: HCPCS | Performed by: NURSE PRACTITIONER

## 2020-10-30 RX ORDER — DEXTROMETHORPHAN HYDROBROMIDE AND PROMETHAZINE HYDROCHLORIDE 15; 6.25 MG/5ML; MG/5ML
5 SYRUP ORAL 4 TIMES DAILY PRN
Qty: 120 ML | Refills: 0 | Status: SHIPPED | OUTPATIENT
Start: 2020-10-30 | End: 2020-11-09

## 2020-10-30 RX ORDER — HYDROCODONE BITARTRATE AND ACETAMINOPHEN 5; 325 MG/1; MG/1
1 TABLET ORAL 2 TIMES DAILY PRN
Qty: 60 TABLET | Refills: 0 | Status: SHIPPED
Start: 2020-10-30 | End: 2020-11-25 | Stop reason: SDUPTHER

## 2020-10-30 RX ORDER — GUAIFENESIN 600 MG/1
600 TABLET, EXTENDED RELEASE ORAL 2 TIMES DAILY
Qty: 30 TABLET | Refills: 0 | Status: SHIPPED
Start: 2020-10-30 | End: 2021-01-18 | Stop reason: ALTCHOICE

## 2020-10-30 ASSESSMENT — ENCOUNTER SYMPTOMS
CONSTIPATION: 0
CHEST TIGHTNESS: 0
SORE THROAT: 1
VOMITING: 0
DIARRHEA: 1
COUGH: 1
NAUSEA: 0
SINUS PRESSURE: 1
WHEEZING: 0
SHORTNESS OF BREATH: 0

## 2020-10-30 NOTE — PROGRESS NOTES
TeleMedicine Video Visit    This visit was performed as a virtual video visit using a synchronous, two-way, audio-video telehealth technology platform. Patient identification was verified at the start of the visit, including the patient's telephone number and physical location. I discussed with the patient the nature of our telehealth visits, that:     1. Due to the nature of an audio- video modality, the only components of a physical exam that could be done are the elements supported by direct observation. 2. I would evaluate the patient and recommend diagnostics and treatments based on my assessment. 3. If it was felt that the patient should be evaluated in clinic or an emergency room setting, then they would be directed there. 4. Our sessions are not being recorded and that personal health information is protected. 5. Our team would provide follow up care in person if/when the patient needs it. Patient does agree to proceed with telemedicine consultation. Patient's location: home address in [address] other people involved in call, none. Time spent: Greater than 15    This visit was completed virtually using My Chart/Haiku/Ayala        HPI:  Patient comes in today for   Chief Complaint   Patient presents with    Pharyngitis     started after dental work on Wednesday    Fatigue    Nasal Congestion    Cough   . Her throat started hurting after dental procedure on Wednesday. Does not feel worse or better but she does feel like she has a lot of mucous. She had PT today and she states she was more tired and didn't have stamina she usually dose. Sinus congestion. Dry cough. Not using albuterol inhlaer more due to shakes. Denies SOB. Prior to Visit Medications    Medication Sig Taking? Authorizing Provider   HYDROcodone-acetaminophen (NORCO) 5-325 MG per tablet Take 1 tablet by mouth 2 times daily as needed for Pain for up to 30 days.  Yes Rubin Lambert, APRN - CNP promethazine-dextromethorphan (PROMETHAZINE-DM) 6.25-15 MG/5ML syrup Take 5 mLs by mouth 4 times daily as needed for Cough Yes MIKEL Regalado CNP   guaiFENesin (MUCINEX) 600 MG extended release tablet Take 1 tablet by mouth 2 times daily Yes MIKEL Lawson CNP   metFORMIN (GLUCOPHAGE-XR) 500 MG extended release tablet TAKE 1 TABLET BY MOUTH DAILY (WITH BREAKFAST) Yes Manolo Telfair, APRN - CNP   nystatin (NYSTATIN) 796226 UNIT/GM powder APPLY FOUR TIMES DAILY Yes Manolo Telfair, APRN - CNP   fluticasone (FLONASE) 50 MCG/ACT nasal spray 2 sprays by Each Nostril route daily Yes Brayden Mcqueenix,    albuterol sulfate HFA (PROAIR HFA) 108 (90 Base) MCG/ACT inhaler INHALE 2 PUFFS BY MOUTH EVERY 6 HOURS AS NEEDED FOR WHEEZING Yes Manolo Maurisio APRN - CNP   atorvastatin (LIPITOR) 20 MG tablet TAKE 1 TABLET BY MOUTH EVERY NIGHT Yes Manolo Telfair, APRN - CNP   amLODIPine (NORVASC) 2.5 MG tablet TAKE 1 TABLET BY MOUTH EVERY DAY Yes Manolo Telfair, APRN - CNP   omeprazole (PRILOSEC) 40 MG delayed release capsule TAKE 1 CAPSULE EVERY DAY Yes Manolo Telfair, APRN - CNP   losartan (COZAAR) 50 MG tablet Take one tablet daily, call with BP > 160/100 Yes Manolo Telfair, APRN - CNP   Melatonin 5 MG CHEW Take 1 tablet by mouth nightly Yes Historical Provider, MD   sulfamethoxazole-trimethoprim (BACTRIM;SEPTRA) 400-80 MG per tablet TAKE 1 TABLET EVERY DAY Yes Manolo Telfair, APRN - CNP   aspirin 81 MG chewable tablet Take 1 tablet by mouth daily Yes Ash Fernández MD   tacrolimus (PROGRAF) 1 MG capsule Take 1.5 mg by mouth 2 times daily  Yes Historical Provider, MD   diclofenac sodium (VOLTAREN) 1 % GEL Apply 2 g topically 2 times daily Yes Manolo Maurisio, APRN - BERNARDO         Allergies   Allergen Reactions    Chantix [Varenicline] Swelling     Tongue swelling Split in 1/2    Heparin      Platelets drop         Review of Systems  Review of Systems   Constitutional: Positive for activity change (decreased) and fatigue. Negative for appetite change, chills, diaphoresis and fever. No loss of taste or smell   HENT: Positive for congestion, sinus pressure and sore throat. Negative for ear pain. Respiratory: Positive for cough (dry). Negative for chest tightness, shortness of breath and wheezing. Cardiovascular: Negative for chest pain and palpitations. Gastrointestinal: Positive for diarrhea (once on Wed but has resolved). Negative for constipation, nausea and vomiting. Endocrine: Positive for cold intolerance. Genitourinary: Positive for frequency. Negative for difficulty urinating. Neurological: Positive for headaches (sinus). Negative for dizziness and weakness. VS:  LMP  (LMP Unknown)     Physical Exam  Physical Exam  Pulmonary:      Effort: Pulmonary effort is normal. No respiratory distress. Neurological:      Mental Status: She is alert and oriented to person, place, and time. Psychiatric:         Mood and Affect: Mood normal.         Behavior: Behavior normal.           Assessment/Plan:  Michael Gregory was seen today for pharyngitis, fatigue, nasal congestion and cough. Diagnoses and all orders for this visit:    Viral URI  -     promethazine-dextromethorphan (PROMETHAZINE-DM) 6.25-15 MG/5ML syrup; Take 5 mLs by mouth 4 times daily as needed for Cough  -     guaiFENesin (MUCINEX) 600 MG extended release tablet; Take 1 tablet by mouth 2 times daily  - Reviewed side effects of medication and patient verbalizes understanding.   - Advised to call back directly if there are further questions, or if these symptoms fail to improve as anticipated or worsen. - Warm salt water gargles      Samy Decker is a 63 y.o. female being evaluated by a Virtual Visit (video visit) encounter to address concerns as mentioned above. A caregiver was present when appropriate.  Due to this being a TeleHealth encounter (During [facility]-31 public health emergency), evaluation of the following organ systems was limited: Vitals/Constitutional/EENT/Resp/CV/GI//MS/Neuro/Skin/Heme-Lymph-Imm. Pursuant to the emergency declaration under the [address] [place] [address] and the [employer] and [employer] Act, this Virtual Visit was conducted with patient's (and/or legal guardian's) consent, to reduce the patient's risk of exposure to COVID-19 and provide necessary medical care. The patient (and/or legal guardian) has also been advised to contact this office for worsening conditions or problems, and seek emergency medical treatment and/or call 911 if deemed necessary. Patient identification was verified at the start of the visit: Yes    Total time spent for this encounter: 15    Services were provided through a video synchronous discussion virtually to substitute for in-person clinic visit. Patient and provider were located at their individual homes. --MIKEL Ward CNP on 10/30/2020 at 3:51 PM    An electronic signature was used to authenticate this note.

## 2020-11-25 ENCOUNTER — OFFICE VISIT (OUTPATIENT)
Dept: FAMILY MEDICINE CLINIC | Age: 60
End: 2020-11-25
Payer: MEDICARE

## 2020-11-25 VITALS
OXYGEN SATURATION: 96 % | BODY MASS INDEX: 28.47 KG/M2 | HEIGHT: 66 IN | RESPIRATION RATE: 14 BRPM | SYSTOLIC BLOOD PRESSURE: 126 MMHG | HEART RATE: 70 BPM | DIASTOLIC BLOOD PRESSURE: 74 MMHG | TEMPERATURE: 97.4 F

## 2020-11-25 DIAGNOSIS — E55.9 VITAMIN D DEFICIENCY: ICD-10-CM

## 2020-11-25 DIAGNOSIS — R53.1 WEAKNESS GENERALIZED: ICD-10-CM

## 2020-11-25 DIAGNOSIS — R53.83 FATIGUE, UNSPECIFIED TYPE: ICD-10-CM

## 2020-11-25 LAB
ALBUMIN SERPL-MCNC: 4.2 G/DL (ref 3.5–5.2)
ALP BLD-CCNC: 125 U/L (ref 35–104)
ALT SERPL-CCNC: 19 U/L (ref 0–32)
ANION GAP SERPL CALCULATED.3IONS-SCNC: 14 MMOL/L (ref 7–16)
AST SERPL-CCNC: 23 U/L (ref 0–31)
BACTERIA: ABNORMAL /HPF
BASOPHILS ABSOLUTE: 0.05 E9/L (ref 0–0.2)
BASOPHILS RELATIVE PERCENT: 1.1 % (ref 0–2)
BILIRUB SERPL-MCNC: 0.4 MG/DL (ref 0–1.2)
BILIRUBIN URINE: NEGATIVE
BLOOD, URINE: ABNORMAL
BUN BLDV-MCNC: 17 MG/DL (ref 6–20)
CALCIUM SERPL-MCNC: 9.7 MG/DL (ref 8.6–10.2)
CHLORIDE BLD-SCNC: 102 MMOL/L (ref 98–107)
CLARITY: ABNORMAL
CO2: 24 MMOL/L (ref 22–29)
COLOR: YELLOW
CREAT SERPL-MCNC: 1.2 MG/DL (ref 0.5–1)
EOSINOPHILS ABSOLUTE: 0.16 E9/L (ref 0.05–0.5)
EOSINOPHILS RELATIVE PERCENT: 3.4 % (ref 0–6)
GFR AFRICAN AMERICAN: 56
GFR NON-AFRICAN AMERICAN: 46 ML/MIN/1.73
GLUCOSE BLD-MCNC: 104 MG/DL (ref 74–99)
GLUCOSE URINE: NEGATIVE MG/DL
HCT VFR BLD CALC: 44 % (ref 34–48)
HEMOGLOBIN: 14.3 G/DL (ref 11.5–15.5)
IMMATURE GRANULOCYTES #: 0.01 E9/L
IMMATURE GRANULOCYTES %: 0.2 % (ref 0–5)
KETONES, URINE: NEGATIVE MG/DL
LEUKOCYTE ESTERASE, URINE: ABNORMAL
LYMPHOCYTES ABSOLUTE: 0.91 E9/L (ref 1.5–4)
LYMPHOCYTES RELATIVE PERCENT: 19.2 % (ref 20–42)
MCH RBC QN AUTO: 29.3 PG (ref 26–35)
MCHC RBC AUTO-ENTMCNC: 32.5 % (ref 32–34.5)
MCV RBC AUTO: 90.2 FL (ref 80–99.9)
MONOCYTES ABSOLUTE: 0.42 E9/L (ref 0.1–0.95)
MONOCYTES RELATIVE PERCENT: 8.9 % (ref 2–12)
NEUTROPHILS ABSOLUTE: 3.18 E9/L (ref 1.8–7.3)
NEUTROPHILS RELATIVE PERCENT: 67.2 % (ref 43–80)
NITRITE, URINE: NEGATIVE
PDW BLD-RTO: 14 FL (ref 11.5–15)
PH UA: 6.5 (ref 5–9)
PLATELET # BLD: 191 E9/L (ref 130–450)
PMV BLD AUTO: 10.6 FL (ref 7–12)
POTASSIUM SERPL-SCNC: 4.8 MMOL/L (ref 3.5–5)
PROTEIN UA: 100 MG/DL
RBC # BLD: 4.88 E12/L (ref 3.5–5.5)
RBC UA: ABNORMAL /HPF (ref 0–2)
SODIUM BLD-SCNC: 140 MMOL/L (ref 132–146)
SPECIFIC GRAVITY UA: 1.01 (ref 1–1.03)
TOTAL PROTEIN: 6.9 G/DL (ref 6.4–8.3)
TSH SERPL DL<=0.05 MIU/L-ACNC: 1.5 UIU/ML (ref 0.27–4.2)
UROBILINOGEN, URINE: 0.2 E.U./DL
VITAMIN D 25-HYDROXY: 20 NG/ML (ref 30–100)
WBC # BLD: 4.7 E9/L (ref 4.5–11.5)
WBC UA: >20 /HPF (ref 0–5)

## 2020-11-25 PROCEDURE — 99213 OFFICE O/P EST LOW 20 MIN: CPT | Performed by: NURSE PRACTITIONER

## 2020-11-25 PROCEDURE — G0008 ADMIN INFLUENZA VIRUS VAC: HCPCS | Performed by: NURSE PRACTITIONER

## 2020-11-25 PROCEDURE — G8427 DOCREV CUR MEDS BY ELIG CLIN: HCPCS | Performed by: NURSE PRACTITIONER

## 2020-11-25 PROCEDURE — 1036F TOBACCO NON-USER: CPT | Performed by: NURSE PRACTITIONER

## 2020-11-25 PROCEDURE — G8482 FLU IMMUNIZE ORDER/ADMIN: HCPCS | Performed by: NURSE PRACTITIONER

## 2020-11-25 PROCEDURE — 3017F COLORECTAL CA SCREEN DOC REV: CPT | Performed by: NURSE PRACTITIONER

## 2020-11-25 PROCEDURE — G8417 CALC BMI ABV UP PARAM F/U: HCPCS | Performed by: NURSE PRACTITIONER

## 2020-11-25 PROCEDURE — 90688 IIV4 VACCINE SPLT 0.5 ML IM: CPT | Performed by: NURSE PRACTITIONER

## 2020-11-25 RX ORDER — HYDROCODONE BITARTRATE AND ACETAMINOPHEN 5; 325 MG/1; MG/1
1 TABLET ORAL 2 TIMES DAILY PRN
Qty: 60 TABLET | Refills: 0 | Status: SHIPPED
Start: 2020-11-25 | End: 2020-12-21 | Stop reason: SDUPTHER

## 2020-11-25 RX ORDER — SULFAMETHOXAZOLE AND TRIMETHOPRIM 800; 160 MG/1; MG/1
1 TABLET ORAL 2 TIMES DAILY
Qty: 14 TABLET | Refills: 0 | Status: SHIPPED | OUTPATIENT
Start: 2020-11-25 | End: 2020-12-02

## 2020-11-25 ASSESSMENT — ENCOUNTER SYMPTOMS
BACK PAIN: 1
CONSTIPATION: 0
NAUSEA: 0
COUGH: 0
WHEEZING: 0
SHORTNESS OF BREATH: 0
VOMITING: 0
DIARRHEA: 1

## 2020-11-25 NOTE — PROGRESS NOTES
tablet Take one tablet daily, call with BP > 160/100 Yes MIKEL Gillespie CNP   Melatonin 5 MG CHEW Take 1 tablet by mouth nightly Yes Historical Provider, MD   sulfamethoxazole-trimethoprim (BACTRIM;SEPTRA) 400-80 MG per tablet TAKE 1 TABLET EVERY DAY Yes MIKEL Gillespie CNP   aspirin 81 MG chewable tablet Take 1 tablet by mouth daily Yes Min Gonzalez MD   tacrolimus (PROGRAF) 1 MG capsule Take 1.5 mg by mouth 2 times daily  Yes Historical Provider, MD   diclofenac sodium (VOLTAREN) 1 % GEL Apply 2 g topically 2 times daily Yes MIKEL Gillespie CNP         Allergies   Allergen Reactions    Chantix [Varenicline] Swelling     Tongue swelling Split in 1/2    Heparin      Platelets drop         Review of Systems  Review of Systems   Constitutional: Positive for fatigue. Negative for activity change, appetite change and unexpected weight change. Respiratory: Negative for cough, shortness of breath and wheezing. Cardiovascular: Negative for chest pain, palpitations and leg swelling. Gastrointestinal: Positive for diarrhea (occasional). Negative for constipation, nausea and vomiting. Genitourinary: Positive for frequency. Negative for dysuria and hematuria. Musculoskeletal: Positive for back pain, gait problem and myalgias. Neurological: Positive for weakness, light-headedness and headaches. VS:  /74   Pulse 70   Temp 97.4 °F (36.3 °C) (Temporal)   Resp 14   Ht 5' 6\" (1.676 m)   LMP  (LMP Unknown)   SpO2 96%   BMI 28.47 kg/m²     Physical Exam  Physical Exam  Constitutional:       General: She is not in acute distress. Appearance: She is well-developed. HENT:      Head: Normocephalic and atraumatic. Neck:      Thyroid: No thyromegaly. Trachea: No tracheal deviation. Cardiovascular:      Rate and Rhythm: Normal rate and regular rhythm. Heart sounds: No murmur.    Pulmonary:      Effort: Pulmonary effort is normal.      Breath sounds: Normal breath sounds. No wheezing or rales. Chest:      Chest wall: No tenderness. Abdominal:      General: Bowel sounds are normal.      Palpations: Abdomen is soft. Tenderness: There is no abdominal tenderness. Musculoskeletal:         General: Tenderness present. Comments: Pain to thoracic and lumbar spine with bilateral paraspinal tenderness. ROM limited due to pain. Negative SLR bilateral.      Lymphadenopathy:      Cervical: No cervical adenopathy. Skin:     General: Skin is warm and dry. Neurological:      Mental Status: She is alert and oriented to person, place, and time. Motor: Weakness (left grasp and arm strength<right) present. Gait: Gait abnormal (in wheelchair). Psychiatric:         Behavior: Behavior normal.           Assessment/Plan:    Michael Spaulding was seen today for back pain, health maintenance and fatigue. Diagnoses and all orders for this visit:    Chronic midline low back pain with right-sided sciatica  -     HYDROcodone-acetaminophen (NORCO) 5-325 MG per tablet; Take 1 tablet by mouth 2 times daily as needed for Pain for up to 30 days.  -The current medical regimen is effective;  continue present plan and medications. Weakness generalized  -     CBC Auto Differential; Future  -     Comprehensive Metabolic Panel; Future  -     TSH without Reflex; Future  -     Urinalysis; Future  -     XR CHEST (2 VW); Future    Fatigue, unspecified type  -     CBC Auto Differential; Future  -     Comprehensive Metabolic Panel; Future  -     TSH without Reflex; Future  -     Urinalysis; Future  -     XR CHEST (2 VW); Future    Vitamin D deficiency  -     Vitamin D 25 Hydroxy; Future    Flu vaccine need  -     INFLUENZA, QUADV, 6-35 MO, IM, MDV, 0.25ML (AFLURIA QUADV)        Controlled Substance Monitoring:    Acute and Chronic Pain Monitoring:   RX Monitoring 11/25/2020   Attestation -   Periodic Controlled Substance Monitoring No signs of potential drug abuse or diversion identified. Greater than 15  Minutes was spent with patient and more than 50% of the time was spent face to facecounseling and educating regarding diagnoses

## 2020-11-29 LAB
ORGANISM: ABNORMAL
URINE CULTURE, ROUTINE: ABNORMAL

## 2020-11-30 RX ORDER — AMPICILLIN 500 MG/1
500 CAPSULE ORAL 4 TIMES DAILY
Qty: 40 CAPSULE | Refills: 0 | Status: SHIPPED | OUTPATIENT
Start: 2020-11-30 | End: 2020-12-10

## 2020-12-21 ENCOUNTER — TELEMEDICINE (OUTPATIENT)
Dept: FAMILY MEDICINE CLINIC | Age: 60
End: 2020-12-21
Payer: MEDICARE

## 2020-12-21 PROCEDURE — 99213 OFFICE O/P EST LOW 20 MIN: CPT | Performed by: NURSE PRACTITIONER

## 2020-12-21 PROCEDURE — G8427 DOCREV CUR MEDS BY ELIG CLIN: HCPCS | Performed by: NURSE PRACTITIONER

## 2020-12-21 PROCEDURE — 3017F COLORECTAL CA SCREEN DOC REV: CPT | Performed by: NURSE PRACTITIONER

## 2020-12-21 RX ORDER — HYDROCODONE BITARTRATE AND ACETAMINOPHEN 5; 325 MG/1; MG/1
1 TABLET ORAL 2 TIMES DAILY PRN
Qty: 60 TABLET | Refills: 0 | Status: SHIPPED
Start: 2020-12-21 | End: 2021-01-18 | Stop reason: SDUPTHER

## 2020-12-21 ASSESSMENT — ENCOUNTER SYMPTOMS
SHORTNESS OF BREATH: 0
BACK PAIN: 1
WHEEZING: 0
VOMITING: 0
CONSTIPATION: 0
NAUSEA: 1
COUGH: 0
DIARRHEA: 0

## 2020-12-21 NOTE — PROGRESS NOTES
TeleMedicine Patient Consent    This visit was performed as a virtual video visit using a synchronous, two-way, audio-video telehealth technology platform. Patient identification was verified at the start of the visit, including the patient's telephone number and physical location. I discussed with the patient the nature of our telehealth visits, that:     1. Due to the nature of an audio- video modality, the only components of a physical exam that could be done are the elements supported by direct observation. 2. I would evaluate the patient and recommend diagnostics and treatments based on my assessment. 3. If it was felt that the patient should be evaluated in clinic or an emergency room setting, then they would be directed there. 4. Our sessions are not being recorded and that personal health information is protected. 5. Our team would provide follow up care in person if/when the patient needs it. Patient does agree to proceed with telemedicine consultation. Patient's location: home address in [facility]  Physician  location other address in [place] other people involved in call n/a    HPI:  Patient comes intoday for   Chief Complaint   Patient presents with    Back Pain     refill of norco. last dose was yesterday. pain today 7/10   . Chronic pain to low back with radiation down legs at times, left worse than right. She does use a cane, walker or wheelchair. She was discharged from PT yesterday. Prior to Visit Medications    Medication Sig Taking? Authorizing Provider   HYDROcodone-acetaminophen (NORCO) 5-325 MG per tablet Take 1 tablet by mouth 2 times daily as needed for Pain for up to 30 days.  Yes MIKEL Arias CNP   guaiFENesin (MUCINEX) 600 MG extended release tablet Take 1 tablet by mouth 2 times daily Yes MIKEL Regalado CNP   metFORMIN (GLUCOPHAGE-XR) 500 MG extended release tablet TAKE 1 TABLET BY MOUTH DAILY (WITH BREAKFAST) Yes MIKEL Arias CNP nystatin (NYSTATIN) 424374 UNIT/GM powder APPLY FOUR TIMES DAILY Yes MIKEL Cunningham CNP   fluticasone (FLONASE) 50 MCG/ACT nasal spray 2 sprays by Each Nostril route daily Yes Vladimir Love DO   albuterol sulfate HFA (PROAIR HFA) 108 (90 Base) MCG/ACT inhaler INHALE 2 PUFFS BY MOUTH EVERY 6 HOURS AS NEEDED FOR WHEEZING Yes MIKEL Cunningham CNP   atorvastatin (LIPITOR) 20 MG tablet TAKE 1 TABLET BY MOUTH EVERY NIGHT Yes MIKEL Cunningham CNP   amLODIPine (NORVASC) 2.5 MG tablet TAKE 1 TABLET BY MOUTH EVERY DAY Yes MIKEL Cunningham CNP   omeprazole (PRILOSEC) 40 MG delayed release capsule TAKE 1 CAPSULE EVERY DAY Yes MIKEL Cunningham CNP   losartan (COZAAR) 50 MG tablet Take one tablet daily, call with BP > 160/100 Yes MIKEL Cunningham CNP   Melatonin 5 MG CHEW Take 1 tablet by mouth nightly Yes Historical Provider, MD   aspirin 81 MG chewable tablet Take 1 tablet by mouth daily Yes Tino Almanza MD   tacrolimus (PROGRAF) 1 MG capsule Take 1.5 mg by mouth 2 times daily  Yes Historical Provider, MD   diclofenac sodium (VOLTAREN) 1 % GEL Apply 2 g topically 2 times daily Yes MIKEL Cunningham CNP         Allergies   Allergen Reactions    Chantix [Varenicline] Swelling     Tongue swelling Split in 1/2    Heparin      Platelets drop         Review of Systems  Review of Systems   Constitutional: Positive for activity change (increased). Negative for appetite change and unexpected weight change. HENT: Positive for congestion (chronic). Respiratory: Negative for cough, shortness of breath and wheezing. Cardiovascular: Negative for chest pain, palpitations and leg swelling. Gastrointestinal: Positive for nausea (intermittently). Negative for constipation, diarrhea and vomiting. Musculoskeletal: Positive for back pain, gait problem and myalgias. Neurological: Positive for weakness (left side since CVA), light-headedness (with position changes) and headaches (occasional ). VS:  LMP  (LMP Unknown)     Physical Exam  Physical Exam  Constitutional:       General: She is not in acute distress. Appearance: Normal appearance. HENT:      Head: Normocephalic and atraumatic. Eyes:      Conjunctiva/sclera: Conjunctivae normal.   Pulmonary:      Effort: Pulmonary effort is normal. No respiratory distress. Neurological:      Mental Status: She is alert and oriented to person, place, and time. Psychiatric:         Mood and Affect: Mood normal.         Behavior: Behavior normal.           Assessment/Plan:  Delfino Watson was seen today for back pain. Diagnoses and all orders for this visit:    Chronic midline low back pain with right-sided sciatica  -     HYDROcodone-acetaminophen (NORCO) 5-325 MG per tablet; Take 1 tablet by mouth 2 times daily as needed for Pain for up to 30 days.  -The current medical regimen is effective;  continue present plan and medications. Controlled Substance Monitoring:    Acute and Chronic Pain Monitoring:   RX Monitoring 12/21/2020   Attestation -   Periodic Controlled Substance Monitoring No signs of potential drug abuse or diversion identified.            Time spent: Greater than 15    This visit was completed virtually using My Chart/Haiku/Ayala

## 2020-12-28 RX ORDER — OMEPRAZOLE 40 MG/1
CAPSULE, DELAYED RELEASE ORAL
Qty: 90 CAPSULE | Refills: 2 | Status: SHIPPED
Start: 2020-12-28 | End: 2021-04-21 | Stop reason: CLARIF

## 2020-12-28 RX ORDER — ALBUTEROL SULFATE 90 UG/1
AEROSOL, METERED RESPIRATORY (INHALATION)
Qty: 1 INHALER | Refills: 2 | Status: SHIPPED
Start: 2020-12-28 | End: 2021-12-17

## 2021-01-18 ENCOUNTER — TELEMEDICINE (OUTPATIENT)
Dept: FAMILY MEDICINE CLINIC | Age: 61
End: 2021-01-18
Payer: MEDICARE
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
## 2021-01-18 DIAGNOSIS — G89.29 CHRONIC MIDLINE LOW BACK PAIN WITH RIGHT-SIDED SCIATICA: Primary | ICD-10-CM

## 2021-01-18 DIAGNOSIS — M54.41 CHRONIC MIDLINE LOW BACK PAIN WITH RIGHT-SIDED SCIATICA: Primary | ICD-10-CM

## 2021-01-18 DIAGNOSIS — G81.94 LEFT HEMIPLEGIA (HCC): ICD-10-CM

## 2021-01-18 DIAGNOSIS — R53.1 WEAKNESS GENERALIZED: ICD-10-CM

## 2021-01-18 PROCEDURE — 99213 OFFICE O/P EST LOW 20 MIN: CPT | Performed by: NURSE PRACTITIONER

## 2021-01-18 RX ORDER — HYDROCODONE BITARTRATE AND ACETAMINOPHEN 5; 325 MG/1; MG/1
1 TABLET ORAL 2 TIMES DAILY PRN
Qty: 60 TABLET | Refills: 0 | Status: SHIPPED
Start: 2021-01-18 | End: 2021-02-19 | Stop reason: SDUPTHER

## 2021-01-18 RX ORDER — SULFAMETHOXAZOLE AND TRIMETHOPRIM 800; 160 MG/1; MG/1
1 TABLET ORAL ONCE
COMMUNITY
End: 2021-07-06 | Stop reason: SDUPTHER

## 2021-01-18 ASSESSMENT — ENCOUNTER SYMPTOMS
CONSTIPATION: 0
COUGH: 0
SHORTNESS OF BREATH: 0
WHEEZING: 0
NAUSEA: 0
BACK PAIN: 1
VOMITING: 0
DIARRHEA: 0

## 2021-01-18 ASSESSMENT — PATIENT HEALTH QUESTIONNAIRE - PHQ9
SUM OF ALL RESPONSES TO PHQ QUESTIONS 1-9: 2
SUM OF ALL RESPONSES TO PHQ9 QUESTIONS 1 & 2: 2

## 2021-01-18 NOTE — PROGRESS NOTES
shortness of breath and wheezing. Cardiovascular: Negative for chest pain and palpitations. Gastrointestinal: Negative for constipation, diarrhea, nausea and vomiting. Musculoskeletal: Positive for arthralgias, back pain and myalgias. Neurological: Negative for weakness, light-headedness and headaches. Patient-Reported Vitals 12/21/2020   Patient-Reported Temperature 98.2   Patient-Reported SpO2 -        Physical Exam  Constitutional:       General: She is not in acute distress. Appearance: Normal appearance. She is well-developed. HENT:      Head: Normocephalic and atraumatic. Eyes:      Extraocular Movements: Extraocular movements intact. Conjunctiva/sclera: Conjunctivae normal.   Neck:      Thyroid: No thyromegaly. Trachea: No tracheal deviation. Pulmonary:      Effort: Pulmonary effort is normal. No respiratory distress. Musculoskeletal:      Comments: Right shoulder ROM normal   Neurological:      Mental Status: She is alert and oriented to person, place, and time. Psychiatric:         Mood and Affect: Mood normal.         Behavior: Behavior normal.           NAHUM Paredes is a 62 y.o. female being evaluated by a Virtual Visit (video visit) encounter to address concerns as mentioned above. A caregiver was present when appropriate. Due to this being a TeleHealth encounter (During [facility]-12 public health emergency), evaluation of the following organ systems was limited: Vitals/Constitutional/EENT/Resp/CV/GI//MS/Neuro/Skin/Heme-Lymph-Imm. Pursuant to the emergency declaration under the [address] [place] [address], [address] [place] [address] and the [employer] and [employer] Act, this Virtual Visit was conducted with patient's (and/or legal guardian's) consent, to reduce the patient's risk of exposure to COVID-19 and provide necessary medical care.   The patient (and/or legal guardian) has also been advised to contact this office for worsening conditions or problems, and seek emergency medical treatment and/or call 911 if deemed necessary. Patient identification was verified at the start of the visit: Yes    Services were provided through a video synchronous discussion virtually to substitute for in-person clinic visit. Patient was located at home and provider was located in office or at home. An electronic signature was used to authenticate this note.     --Lester Clinton, MIKEL - CNP

## 2021-02-10 ENCOUNTER — NURSE TRIAGE (OUTPATIENT)
Dept: OTHER | Facility: CLINIC | Age: 61
End: 2021-02-10

## 2021-02-10 NOTE — TELEPHONE ENCOUNTER
Reason for Disposition   Headache  (and neurologic deficit)    Answer Assessment - Initial Assessment Questions  1. MAIN CONCERN OR SYMPTOM:  \"What is your main concern right now? \" \"What question do you have? \" \"What's the main symptom you're worried about? \" (e.g., fever, pain, redness, swelling)     Headache post vaccine    2. VACCINE: \"What vaccination did you receive? \" \"Is this your first or second shot? \" (e.g., none; [place], other)  Moderna    3. SYMPTOM ONSET: \"When did the headache and arm pain  begin? \" (e.g., not relevant; hours, days)  Headache left side head and arm stiffness. Yesterday    4. SYMPTOM SEVERITY: \"How bad is it? \"   7 out of 10 pain- headache    5. FEVER: \"Is there a fever? \" If so, ask: \"What is it, how was it measured, and when did it start? \"       Denies    6. PAST REACTIONS: \"Have you reacted to immunizations before? \" If so, ask: \"What happened? \"       No  7. OTHER SYMPTOMS: \"Do you have any other symptoms? \"    Chills. Answer Assessment - Initial Assessment Questions  1. SYMPTOM: \"What is the main symptom you are concerned about? \" (e.g., weakness, numbness)      Left arm heaviness    2. ONSET: \"When did this start? \" (minutes, hours, days; while sleeping)     Last night    3. LAST NORMAL: \"When was the last time you were normal (no symptoms)? \"  Had history of stroke and left arm  weakness but it is more weak. 4. PATTERN \"Does this come and go, or has it been constant since it started? \"  \"Is it present now? \"      No  5. CARDIAC SYMPTOMS: \"Have you had any of the following symptoms: chest pain, difficulty breathing, palpitations? \"       No information discussed    6. NEUROLOGIC SYMPTOMS: \"Have you had any of the following symptoms: headache, dizziness, vision loss, double vision, changes in speech, unsteady on your feet? \"      Left arm heaviness, headache on left side of the head    7. OTHER SYMPTOMS: \"Do you have any other symptoms? \"      Left arm more heavy and daughter who is on all with patient indicates that pt's speech is different and she is pronouncing words differently, fatigue. She is in bd with left side headache. 8. PREGNANCY: \"Is there any chance you are pregnant? \" \"When was your last menstrual period? \"     Post menopausal    Protocols used: NEUROLOGIC DEFICIT-ADULT-AH, CORONAVIRUS (COVID-19) VACCINE QUESTIONS AND REACTIONS-ADULT-AH    Call received on [address]. Brief description of triage:     Triage indicates for patient to go to ER. She declined recommendation. States that she will see PCP tomorrow if she does not feel better. Care advice provided. Recommended using local department of health website for testing locations and up to date information. Caller verbalizes understanding, denies any other questions or concerns; instructed to call back for any new or worsening symptoms.

## 2021-02-19 ENCOUNTER — OFFICE VISIT (OUTPATIENT)
Dept: FAMILY MEDICINE CLINIC | Age: 61
End: 2021-02-19
Payer: MEDICARE

## 2021-02-19 ENCOUNTER — TELEPHONE (OUTPATIENT)
Dept: FAMILY MEDICINE CLINIC | Age: 61
End: 2021-02-19

## 2021-02-19 VITALS
HEIGHT: 66 IN | RESPIRATION RATE: 16 BRPM | BODY MASS INDEX: 27.64 KG/M2 | TEMPERATURE: 96.6 F | HEART RATE: 81 BPM | DIASTOLIC BLOOD PRESSURE: 120 MMHG | SYSTOLIC BLOOD PRESSURE: 164 MMHG | OXYGEN SATURATION: 98 % | WEIGHT: 172 LBS

## 2021-02-19 DIAGNOSIS — G89.29 CHRONIC MIDLINE LOW BACK PAIN WITH LEFT-SIDED SCIATICA: ICD-10-CM

## 2021-02-19 DIAGNOSIS — M54.42 CHRONIC MIDLINE LOW BACK PAIN WITH LEFT-SIDED SCIATICA: ICD-10-CM

## 2021-02-19 PROCEDURE — 99213 OFFICE O/P EST LOW 20 MIN: CPT | Performed by: NURSE PRACTITIONER

## 2021-02-19 RX ORDER — HYDROCODONE BITARTRATE AND ACETAMINOPHEN 5; 325 MG/1; MG/1
1 TABLET ORAL 2 TIMES DAILY PRN
Qty: 60 TABLET | Refills: 0 | Status: SHIPPED
Start: 2021-02-19 | End: 2021-03-22 | Stop reason: SDUPTHER

## 2021-02-19 RX ORDER — TACROLIMUS 0.5 MG/1
0.5 CAPSULE ORAL 2 TIMES DAILY
COMMUNITY
Start: 2020-03-23 | End: 2021-02-19

## 2021-02-19 ASSESSMENT — ENCOUNTER SYMPTOMS
COUGH: 0
VOMITING: 0
WHEEZING: 0
SHORTNESS OF BREATH: 0
DIARRHEA: 0
CONSTIPATION: 0
BACK PAIN: 1
NAUSEA: 0

## 2021-02-19 NOTE — PROGRESS NOTES
Amanda Esqueda (:  1960) is a 62 y.o. female,Established patient, here for evaluation of the following chief complaint(s):  1 Month Follow-Up, Back Pain, and Medication Refill      ASSESSMENT/PLAN:  1. Chronic midline low back pain with left-sided sciatica  -     HYDROcodone-acetaminophen (NORCO) 5-325 MG per tablet; Take 1 tablet by mouth 2 times daily as needed for Pain for up to 30 days. , Disp-60 tablet, R-0Normal  The current medical regimen is effective;  continue present plan and medications. Controlled Substance Monitoring:    Acute and Chronic Pain Monitoring:   RX Monitoring 2021   Attestation -   Periodic Controlled Substance Monitoring No signs of potential drug abuse or diversion identified. Return in about 1 month (around 3/19/2021), or if symptoms worsen or fail to improve. SUBJECTIVE/OBJECTIVE:  Chronic pain to low back with radiation down legs at times, left worse than right. She is doing in home PT twice weekly. Pain aggravated by bending. She is using wheelchair and occasionally uses walker at home. Review of Systems   Constitutional: Positive for appetite change (down). Negative for activity change and unexpected weight change. Respiratory: Negative for cough, shortness of breath and wheezing. Cardiovascular: Negative for chest pain and palpitations. Gastrointestinal: Negative for constipation, diarrhea, nausea and vomiting. Musculoskeletal: Positive for back pain, gait problem and myalgias. Neurological: Positive for headaches. Negative for weakness and light-headedness. Physical Exam  Constitutional:       General: She is not in acute distress. Appearance: She is well-developed. HENT:      Head: Normocephalic and atraumatic. Neck:      Thyroid: No thyromegaly. Trachea: No tracheal deviation. Cardiovascular:      Rate and Rhythm: Normal rate and regular rhythm. Heart sounds: No murmur.    Pulmonary: Effort: Pulmonary effort is normal.      Breath sounds: Normal breath sounds. No wheezing or rales. Chest:      Chest wall: No tenderness. Abdominal:      General: Bowel sounds are normal.      Palpations: Abdomen is soft. Tenderness: There is no abdominal tenderness. Musculoskeletal:         General: Tenderness present. Right lower leg: No edema. Left lower leg: No edema. Comments: Pain to lumbar spine with bilateral paraspinal tenderness. Negative SLR bilateral   Lymphadenopathy:      Cervical: No cervical adenopathy. Skin:     General: Skin is warm and dry. Neurological:      Mental Status: She is alert and oriented to person, place, and time. Comments: Left upper and lower extremities weaker than right. Left hand contacture   Psychiatric:         Behavior: Behavior normal.         [facility] low      An electronic signature was used to authenticate this note.     --MIKEL Romero - CNP

## 2021-02-19 NOTE — TELEPHONE ENCOUNTER
Pt states when she goes out of the house she malin not take her bp medication. Says it makes her sleepy. Pt was instructed to take medication now.

## 2021-03-19 ENCOUNTER — OFFICE VISIT (OUTPATIENT)
Dept: FAMILY MEDICINE CLINIC | Age: 61
End: 2021-03-19
Payer: MEDICARE

## 2021-03-19 VITALS
SYSTOLIC BLOOD PRESSURE: 162 MMHG | BODY MASS INDEX: 26.36 KG/M2 | RESPIRATION RATE: 20 BRPM | DIASTOLIC BLOOD PRESSURE: 94 MMHG | HEIGHT: 66 IN | TEMPERATURE: 96.7 F | HEART RATE: 71 BPM | OXYGEN SATURATION: 98 % | WEIGHT: 164 LBS

## 2021-03-19 DIAGNOSIS — K21.9 GASTROESOPHAGEAL REFLUX DISEASE, UNSPECIFIED WHETHER ESOPHAGITIS PRESENT: Primary | Chronic | ICD-10-CM

## 2021-03-19 DIAGNOSIS — M54.42 CHRONIC MIDLINE LOW BACK PAIN WITH LEFT-SIDED SCIATICA: ICD-10-CM

## 2021-03-19 DIAGNOSIS — G89.29 CHRONIC MIDLINE LOW BACK PAIN WITH LEFT-SIDED SCIATICA: ICD-10-CM

## 2021-03-19 PROCEDURE — 99213 OFFICE O/P EST LOW 20 MIN: CPT | Performed by: NURSE PRACTITIONER

## 2021-03-19 RX ORDER — PANTOPRAZOLE SODIUM 40 MG/1
40 TABLET, DELAYED RELEASE ORAL DAILY
Qty: 30 TABLET | Refills: 3 | Status: SHIPPED
Start: 2021-03-19 | End: 2021-04-21 | Stop reason: SDUPTHER

## 2021-03-19 RX ORDER — PREGABALIN 50 MG/1
50 CAPSULE ORAL 2 TIMES DAILY
Qty: 60 CAPSULE | Refills: 0 | Status: SHIPPED
Start: 2021-03-19 | End: 2021-04-23

## 2021-03-19 SDOH — ECONOMIC STABILITY: INCOME INSECURITY: HOW HARD IS IT FOR YOU TO PAY FOR THE VERY BASICS LIKE FOOD, HOUSING, MEDICAL CARE, AND HEATING?: HARD

## 2021-03-19 ASSESSMENT — ENCOUNTER SYMPTOMS
BACK PAIN: 1
DIARRHEA: 0
COUGH: 0
NAUSEA: 0
CONSTIPATION: 0
VOMITING: 0
WHEEZING: 0
SHORTNESS OF BREATH: 0

## 2021-03-19 NOTE — PROGRESS NOTES
Teresa Luciano (:  1960) is a 62 y.o. female,Established patient, here for evaluation of the following chief complaint(s):  Forms (power wheelchair forms) and GI Problem (possible increase of omeprazole)      ASSESSMENT/PLAN:  1. Gastroesophageal reflux disease, unspecified whether esophagitis present  -     pantoprazole (PROTONIX) 40 MG tablet; Take 1 tablet by mouth daily, Disp-30 tablet, R-3Normal  -stop omeprazole    2. Chronic midline low back pain with left-sided sciatica  -     pregabalin (LYRICA) 50 MG capsule; Take 1 capsule by mouth 2 times daily for 30 days. , Disp-60 capsule, R-0Normal  -The current medical regimen is effective;  continue present plan and medications. Patient will contact [facility] and schedule evaluation with PT    Controlled Substance Monitoring:    Acute and Chronic Pain Monitoring:   RX Monitoring 3/19/2021   Attestation -   Periodic Controlled Substance Monitoring No signs of potential drug abuse or diversion identified. Return in about 1 month (around 2021), or if symptoms worsen or fail to improve. SUBJECTIVE/OBJECTIVE:  Complains of heartburn. States previously on protonix with good results. Omeprazole is not effective  Complains of shooting pain down legs. She has tried stretching. Gabapentin caused tardive dyskensia       Review of Systems   Respiratory: Negative for cough, shortness of breath and wheezing. Cardiovascular: Negative for chest pain and palpitations. Gastrointestinal: Negative for constipation, diarrhea, nausea and vomiting. Musculoskeletal: Positive for back pain and myalgias. Neurological: Negative for dizziness, weakness and headaches. Physical Exam  Exam conducted with a chaperone present. Constitutional:       General: She is not in acute distress. Appearance: Normal appearance. She is well-developed. HENT:      Head: Normocephalic and atraumatic. Neck:      Thyroid: No thyromegaly.       Trachea: No tracheal deviation. Cardiovascular:      Rate and Rhythm: Normal rate and regular rhythm. Heart sounds: No murmur. Pulmonary:      Effort: Pulmonary effort is normal.      Breath sounds: Normal breath sounds. No wheezing or rales. Chest:      Chest wall: No tenderness. Abdominal:      General: Bowel sounds are normal.      Palpations: Abdomen is soft. Tenderness: There is no abdominal tenderness. Musculoskeletal:         General: Tenderness present. Right lower leg: No edema. Left lower leg: No edema. Comments: Pain to thoracic and lumbar spine with paraspinal tenderness. ROM limited due to pain. Left hand contracted, left leg weaker than right   Lymphadenopathy:      Cervical: No cervical adenopathy. Skin:     General: Skin is warm and dry. Neurological:      Mental Status: She is alert and oriented to person, place, and time. Psychiatric:         Behavior: Behavior normal.           On this date 3/19/2021 I have spent 20 minutes reviewing previous notes, test results and face to face with the patient discussing the diagnosis and importance of compliance with the treatment plan as well as documenting on the day of the visit. An electronic signature was used to authenticate this note.     --MIKEL Odom - CNP

## 2021-03-22 DIAGNOSIS — G89.29 CHRONIC MIDLINE LOW BACK PAIN WITH LEFT-SIDED SCIATICA: ICD-10-CM

## 2021-03-22 DIAGNOSIS — M54.42 CHRONIC MIDLINE LOW BACK PAIN WITH LEFT-SIDED SCIATICA: ICD-10-CM

## 2021-03-22 RX ORDER — HYDROCODONE BITARTRATE AND ACETAMINOPHEN 5; 325 MG/1; MG/1
1 TABLET ORAL 2 TIMES DAILY PRN
Qty: 60 TABLET | Refills: 0 | Status: SHIPPED
Start: 2021-03-22 | End: 2021-04-21 | Stop reason: SDUPTHER

## 2021-03-24 ENCOUNTER — TELEPHONE (OUTPATIENT)
Dept: FAMILY MEDICINE CLINIC | Age: 61
End: 2021-03-24

## 2021-03-24 NOTE — TELEPHONE ENCOUNTER
As I discussed with Marvin Wright, she will need a power wheelchair/scooter evaluation.   We do not do these or the forms for anisaveround

## 2021-03-24 NOTE — TELEPHONE ENCOUNTER
Pt spoke with  - she needs to have a powered Smappocooters sent to [facility] not medicare.

## 2021-04-21 ENCOUNTER — TELEMEDICINE (OUTPATIENT)
Dept: FAMILY MEDICINE CLINIC | Age: 61
End: 2021-04-21
Payer: MEDICARE

## 2021-04-21 DIAGNOSIS — G89.29 CHRONIC MIDLINE LOW BACK PAIN WITH LEFT-SIDED SCIATICA: Primary | ICD-10-CM

## 2021-04-21 DIAGNOSIS — K21.9 GASTROESOPHAGEAL REFLUX DISEASE, UNSPECIFIED WHETHER ESOPHAGITIS PRESENT: Chronic | ICD-10-CM

## 2021-04-21 DIAGNOSIS — R06.02 SOB (SHORTNESS OF BREATH): Primary | ICD-10-CM

## 2021-04-21 DIAGNOSIS — M54.42 CHRONIC MIDLINE LOW BACK PAIN WITH LEFT-SIDED SCIATICA: Primary | ICD-10-CM

## 2021-04-21 DIAGNOSIS — R06.02 SHORTNESS OF BREATH: ICD-10-CM

## 2021-04-21 PROCEDURE — 99213 OFFICE O/P EST LOW 20 MIN: CPT | Performed by: NURSE PRACTITIONER

## 2021-04-21 RX ORDER — HYDROCODONE BITARTRATE AND ACETAMINOPHEN 5; 325 MG/1; MG/1
1 TABLET ORAL 2 TIMES DAILY PRN
Qty: 60 TABLET | Refills: 0 | Status: SHIPPED
Start: 2021-04-21 | End: 2021-07-06 | Stop reason: SDUPTHER

## 2021-04-21 RX ORDER — PANTOPRAZOLE SODIUM 40 MG/1
40 TABLET, DELAYED RELEASE ORAL DAILY
Qty: 30 TABLET | Refills: 3 | Status: SHIPPED
Start: 2021-04-21 | End: 2021-07-06 | Stop reason: SDUPTHER

## 2021-04-21 ASSESSMENT — ENCOUNTER SYMPTOMS
NAUSEA: 0
VOMITING: 0
COUGH: 0
WHEEZING: 0
CONSTIPATION: 0
DIARRHEA: 0
BACK PAIN: 1
SHORTNESS OF BREATH: 1

## 2021-04-21 NOTE — PROGRESS NOTES
Rosemary Rodriguez (:  1960) is a 62 y.o. female,Established patient, here for evaluation of the following chief complaint(s): Shortness of Breath (had to wear oxygen the other night seems to be continuing at night) and Back Pain (low back pain radiates down bilateral legs 10)      ASSESSMENT/PLAN:  Tiff Madsen was seen today for shortness of breath and back pain. Diagnoses and all orders for this visit:    Chronic midline low back pain with left-sided sciatica  -     HYDROcodone-acetaminophen (NORCO) 5-325 MG per tablet; Take 1 tablet by mouth 2 times daily as needed for Pain for up to 30 days.  -The current medical regimen is effective;  continue present plan and medications. Gastroesophageal reflux disease, unspecified whether esophagitis present  -     pantoprazole (PROTONIX) 40 MG tablet; Take 1 tablet by mouth daily  -The current medical regimen is effective;  continue present plan and medications. Shortness of breath  -will order a nocturnal O2 study through [employer] (current oxygen supplier)          Controlled Substance Monitoring:    Acute and Chronic Pain Monitoring:   RX Monitoring 2021   Attestation -   Periodic Controlled Substance Monitoring No signs of potential drug abuse or diversion identified. Return in about 1 month (around 2021), or if symptoms worsen or fail to improve. SUBJECTIVE/OBJECTIVE:  Complains of SOB one night (). States she woke up on her stomach and felt SOB. She has home oxygen and used it that night only. She has not used it since that night. Her  advised her to go to ER but she refused. She did check her pulse ox and states it was 96%  Complains of chronic low back pain which radiates down legs and up left shoulder. Her left side is weaker since having her CVA. No recent injury. Pain is aggravated by extended physical exertion      Shortness of Breath  Pertinent negatives include no chest pain, headaches, vomiting or wheezing. Back Pain  Associated symptoms include weakness (left sided-post CVA). Pertinent negatives include no chest pain or headaches. Review of Systems   Constitutional: Positive for appetite change (decreased). Negative for activity change and unexpected weight change. Respiratory: Positive for shortness of breath. Negative for cough and wheezing. Cardiovascular: Negative for chest pain and palpitations. Gastrointestinal: Negative for constipation, diarrhea, nausea and vomiting. Musculoskeletal: Positive for arthralgias, back pain and myalgias. Neurological: Positive for weakness (left sided-post CVA). Negative for light-headedness and headaches. Patient-Reported Vitals 4/21/2021   Patient-Reported Systolic 995   Patient-Reported Diastolic 76   Patient-Reported Temperature -   Patient-Reported SpO2 98        Physical Exam  Constitutional:       General: She is not in acute distress. Appearance: Normal appearance. She is well-developed. HENT:      Head: Normocephalic and atraumatic. Eyes:      Extraocular Movements: Extraocular movements intact. Conjunctiva/sclera: Conjunctivae normal.   Neck:      Thyroid: No thyromegaly. Trachea: No tracheal deviation. Pulmonary:      Effort: Pulmonary effort is normal. No respiratory distress. Neurological:      Mental Status: She is alert and oriented to person, place, and time. Psychiatric:         Mood and Affect: Mood normal.         Behavior: Behavior normal.           NAHUM Zhang is a 62 y.o. female being evaluated by a Virtual Visit (video visit) encounter to address concerns as mentioned above. A caregiver was present when appropriate. Due to this being a TeleHealth encounter (During [place] [id number] public [facility] emergency), evaluation of the following organ systems was limited: Vitals/Constitutional/EENT/Resp/CV/GI//MS/Neuro/Skin/Heme-Lymph-Imm.   Pursuant to the emergency declaration under the [address] and the National Emergencies Act, [address] [facility] waiver authority and the [employer] and [employer] Act, this Virtual Visit was conducted with patient's (and/or legal guardian's) consent, to reduce the patient's risk of exposure to COVID-19 and provide necessary medical care. The patient (and/or legal guardian) has also been advised to contact this office for worsening conditions or problems, and seek emergency medical treatment and/or call 911 if deemed necessary. Patient identification was verified at the start of the visit: Yes    Services were provided through a video synchronous discussion virtually to substitute for in-person clinic visit. Patient was located at home and provider was located in office or at home. An electronic signature was used to authenticate this note.     --Erma Nielsen, MIKEL - CNP

## 2021-04-23 DIAGNOSIS — G89.29 CHRONIC MIDLINE LOW BACK PAIN WITH LEFT-SIDED SCIATICA: ICD-10-CM

## 2021-04-23 DIAGNOSIS — M54.42 CHRONIC MIDLINE LOW BACK PAIN WITH LEFT-SIDED SCIATICA: ICD-10-CM

## 2021-04-23 RX ORDER — PREGABALIN 50 MG/1
CAPSULE ORAL
Qty: 60 CAPSULE | Refills: 0 | Status: SHIPPED
Start: 2021-04-23 | End: 2021-09-15 | Stop reason: CLARIF

## 2021-07-06 ENCOUNTER — TELEMEDICINE (OUTPATIENT)
Dept: FAMILY MEDICINE CLINIC | Age: 61
End: 2021-07-06
Payer: MEDICARE

## 2021-07-06 DIAGNOSIS — E11.649 UNCONTROLLED TYPE 2 DIABETES MELLITUS WITH HYPOGLYCEMIA WITHOUT COMA (HCC): Chronic | ICD-10-CM

## 2021-07-06 DIAGNOSIS — G47.00 INSOMNIA, UNSPECIFIED TYPE: Primary | ICD-10-CM

## 2021-07-06 DIAGNOSIS — M54.42 CHRONIC MIDLINE LOW BACK PAIN WITH LEFT-SIDED SCIATICA: ICD-10-CM

## 2021-07-06 DIAGNOSIS — G89.29 CHRONIC MIDLINE LOW BACK PAIN WITH LEFT-SIDED SCIATICA: ICD-10-CM

## 2021-07-06 DIAGNOSIS — I10 HTN (HYPERTENSION), BENIGN: Chronic | ICD-10-CM

## 2021-07-06 DIAGNOSIS — K21.9 GASTROESOPHAGEAL REFLUX DISEASE, UNSPECIFIED WHETHER ESOPHAGITIS PRESENT: Chronic | ICD-10-CM

## 2021-07-06 DIAGNOSIS — G81.94 LEFT HEMIPLEGIA (HCC): ICD-10-CM

## 2021-07-06 DIAGNOSIS — B37.2 SKIN YEAST INFECTION: ICD-10-CM

## 2021-07-06 PROCEDURE — 3017F COLORECTAL CA SCREEN DOC REV: CPT | Performed by: NURSE PRACTITIONER

## 2021-07-06 PROCEDURE — G8427 DOCREV CUR MEDS BY ELIG CLIN: HCPCS | Performed by: NURSE PRACTITIONER

## 2021-07-06 PROCEDURE — 2022F DILAT RTA XM EVC RTNOPTHY: CPT | Performed by: NURSE PRACTITIONER

## 2021-07-06 PROCEDURE — 99213 OFFICE O/P EST LOW 20 MIN: CPT | Performed by: NURSE PRACTITIONER

## 2021-07-06 PROCEDURE — 3046F HEMOGLOBIN A1C LEVEL >9.0%: CPT | Performed by: NURSE PRACTITIONER

## 2021-07-06 RX ORDER — METFORMIN HYDROCHLORIDE 500 MG/1
500 TABLET, EXTENDED RELEASE ORAL
Qty: 90 TABLET | Refills: 0 | Status: SHIPPED
Start: 2021-07-06 | End: 2021-10-18 | Stop reason: SDUPTHER

## 2021-07-06 RX ORDER — SULFAMETHOXAZOLE AND TRIMETHOPRIM 800; 160 MG/1; MG/1
1 TABLET ORAL ONCE
Qty: 1 TABLET | Refills: 0 | Status: SHIPPED | OUTPATIENT
Start: 2021-07-06 | End: 2021-07-06

## 2021-07-06 RX ORDER — LOSARTAN POTASSIUM 50 MG/1
TABLET ORAL
Qty: 90 TABLET | Refills: 3 | Status: SHIPPED
Start: 2021-07-06 | End: 2021-09-15 | Stop reason: SDUPTHER

## 2021-07-06 RX ORDER — ATORVASTATIN CALCIUM 20 MG/1
TABLET, FILM COATED ORAL
Qty: 90 TABLET | Refills: 3 | Status: SHIPPED
Start: 2021-07-06 | End: 2021-09-15 | Stop reason: SDUPTHER

## 2021-07-06 RX ORDER — NYSTATIN 100000 [USP'U]/G
POWDER TOPICAL
Qty: 60 G | Refills: 0 | Status: SHIPPED
Start: 2021-07-06 | End: 2021-09-15 | Stop reason: SDUPTHER

## 2021-07-06 RX ORDER — MIRTAZAPINE 15 MG/1
15 TABLET, FILM COATED ORAL NIGHTLY
Qty: 30 TABLET | Refills: 0 | Status: SHIPPED
Start: 2021-07-06 | End: 2021-09-15 | Stop reason: DRUGHIGH

## 2021-07-06 RX ORDER — PREGABALIN 50 MG/1
CAPSULE ORAL
Qty: 60 CAPSULE | Refills: 0 | Status: CANCELLED | OUTPATIENT
Start: 2021-07-06

## 2021-07-06 RX ORDER — AMLODIPINE BESYLATE 2.5 MG/1
TABLET ORAL
Qty: 90 TABLET | Refills: 3 | Status: SHIPPED
Start: 2021-07-06 | End: 2021-09-15 | Stop reason: SDUPTHER

## 2021-07-06 RX ORDER — TACROLIMUS 1 MG/1
1.5 CAPSULE ORAL 2 TIMES DAILY
Qty: 60 CAPSULE | Refills: 0 | Status: CANCELLED | OUTPATIENT
Start: 2021-07-06

## 2021-07-06 RX ORDER — PANTOPRAZOLE SODIUM 40 MG/1
40 TABLET, DELAYED RELEASE ORAL DAILY
Qty: 30 TABLET | Refills: 3 | Status: SHIPPED
Start: 2021-07-06 | End: 2021-09-14 | Stop reason: SDUPTHER

## 2021-07-06 RX ORDER — HYDROCODONE BITARTRATE AND ACETAMINOPHEN 5; 325 MG/1; MG/1
1 TABLET ORAL 2 TIMES DAILY PRN
Qty: 60 TABLET | Refills: 0 | Status: SHIPPED
Start: 2021-07-06 | End: 2021-09-22 | Stop reason: SDUPTHER

## 2021-07-06 ASSESSMENT — ENCOUNTER SYMPTOMS
DIARRHEA: 0
BACK PAIN: 1
SHORTNESS OF BREATH: 1
NAUSEA: 1
COUGH: 0
WHEEZING: 0
VOMITING: 0
CONSTIPATION: 0

## 2021-07-06 NOTE — PROGRESS NOTES
OP    Other orders  -     sulfamethoxazole-trimethoprim (BACTRIM DS;SEPTRA DS) 800-160 MG per tablet; Take 1 tablet by mouth once for 1 dose          Controlled Substance Monitoring:    Acute and Chronic Pain Monitoring:   RX Monitoring 7/6/2021   Attestation -   Periodic Controlled Substance Monitoring Obtaining appropriate analgesic effect of treatment. Return in about 4 weeks (around 8/3/2021), or if symptoms worsen or fail to improve. SUBJECTIVE/OBJECTIVE:    Complains of chronic low back pain which radiates down legs and up left shoulder. Her left side is weaker since having her CVA. No recent injury. Pain is aggravated by extended physical exertion  Complains of insomnia. No relief with melatonin. Difficulty staying asleep. States no relief in leg pain with lyrica and thinks she is having nausea since starting it. Shortness of Breath  Pertinent negatives include no chest pain, headaches, vomiting or wheezing. Back Pain  Associated symptoms include weakness (left sided-post CVA). Pertinent negatives include no chest pain or headaches. Other  Associated symptoms include arthralgias, myalgias, nausea and weakness (left sided-post CVA). Pertinent negatives include no chest pain, coughing, headaches or vomiting. Emesis   Associated symptoms include arthralgias and myalgias. Pertinent negatives include no chest pain, coughing, diarrhea or headaches. Review of Systems   Constitutional: Positive for appetite change (decreased). Negative for activity change and unexpected weight change. Respiratory: Positive for shortness of breath. Negative for cough and wheezing. Cardiovascular: Negative for chest pain and palpitations. Gastrointestinal: Positive for nausea. Negative for constipation, diarrhea and vomiting. Musculoskeletal: Positive for arthralgias, back pain and myalgias. Neurological: Positive for weakness (left sided-post CVA).  Negative for light-headedness and headaches. Psychiatric/Behavioral: Positive for sleep disturbance (difficulty staying asleep). Patient-Reported Vitals 7/6/2021   Patient-Reported Weight 165 lb   Patient-Reported Height 5' 6''   Patient-Reported Systolic -   Patient-Reported Diastolic -   Patient-Reported Temperature -   Patient-Reported SpO2 -        Physical Exam  Constitutional:       General: She is not in acute distress. Appearance: Normal appearance. She is well-developed. HENT:      Head: Normocephalic and atraumatic. Eyes:      Extraocular Movements: Extraocular movements intact. Conjunctiva/sclera: Conjunctivae normal.   Neck:      Thyroid: No thyromegaly. Trachea: No tracheal deviation. Pulmonary:      Effort: Pulmonary effort is normal. No respiratory distress. Neurological:      Mental Status: She is alert and oriented to person, place, and time. Psychiatric:         Mood and Affect: Mood normal.         Behavior: Behavior normal.           NAHUM Leo is a 62 y.o. female being evaluated by a Virtual Visit (video visit) encounter to address concerns as mentioned above. A caregiver was present when appropriate. Due to this being a TeleHealth encounter (During [place] public health emergency), evaluation of the following organ systems was limited: Vitals/Constitutional/EENT/Resp/CV/GI//MS/Neuro/Skin/Heme-Lymph-Imm. Pursuant to the emergency declaration under the [facility], [address] [place] [address] authority and the [employer] and [employer] Act, this Virtual Visit was conducted with patient's (and/or legal guardian's) consent, to reduce the patient's risk of exposure to COVID-19 and provide necessary medical care. The patient (and/or legal guardian) has also been advised to contact this office for worsening conditions or problems, and seek emergency medical treatment and/or call 911 if deemed necessary.      Patient identification was verified at the start of the visit: Yes    Services were provided through a video synchronous discussion virtually to substitute for in-person clinic visit. Patient was located at home and provider was located in office or at home. An electronic signature was used to authenticate this note.     --MIKEL Hatfield - CNP

## 2021-07-13 RX ORDER — SULFAMETHOXAZOLE AND TRIMETHOPRIM 400; 80 MG/1; MG/1
1 TABLET ORAL DAILY
Qty: 30 TABLET | Refills: 0 | Status: SHIPPED | OUTPATIENT
Start: 2021-07-13 | End: 2021-08-12

## 2021-08-12 ENCOUNTER — TELEPHONE (OUTPATIENT)
Dept: FAMILY MEDICINE CLINIC | Age: 61
End: 2021-08-12

## 2021-08-12 NOTE — TELEPHONE ENCOUNTER
Pt had a visit with insurance nurse. Nurse states that pt bp was elevated at 172/100 on second check. Nurse had to motivate pt to take medication when she was there. Medication should have been taken early in the am but was taken until late morning almost noon. Nurse concerned that pt is not taking her meds as directed.

## 2021-08-12 NOTE — TELEPHONE ENCOUNTER
Spoke with pt - pt declined making an appointment. States she is unable to find transportation. Family is too busy and insurance transportation will only get her to parking lot. They will not help her in the door. Insurance rep told her to call the office and we could help her in. Pt declined that idea. She \"just isn't doing that\". Pt informed me that her bp medication makes her sleepy so she doesn't like to take it early because she is home by herself.

## 2021-09-14 DIAGNOSIS — K21.9 GASTROESOPHAGEAL REFLUX DISEASE, UNSPECIFIED WHETHER ESOPHAGITIS PRESENT: Chronic | ICD-10-CM

## 2021-09-14 RX ORDER — PANTOPRAZOLE SODIUM 40 MG/1
40 TABLET, DELAYED RELEASE ORAL DAILY
Qty: 30 TABLET | Refills: 3 | Status: SHIPPED
Start: 2021-09-14 | End: 2021-09-21

## 2021-09-15 ENCOUNTER — TELEMEDICINE (OUTPATIENT)
Dept: FAMILY MEDICINE CLINIC | Age: 61
End: 2021-09-15
Payer: MEDICARE

## 2021-09-15 DIAGNOSIS — J01.90 ACUTE SINUSITIS, RECURRENCE NOT SPECIFIED, UNSPECIFIED LOCATION: Primary | ICD-10-CM

## 2021-09-15 DIAGNOSIS — I10 HTN (HYPERTENSION), BENIGN: Chronic | ICD-10-CM

## 2021-09-15 DIAGNOSIS — B37.2 SKIN YEAST INFECTION: ICD-10-CM

## 2021-09-15 DIAGNOSIS — E11.649 UNCONTROLLED TYPE 2 DIABETES MELLITUS WITH HYPOGLYCEMIA WITHOUT COMA (HCC): Chronic | ICD-10-CM

## 2021-09-15 DIAGNOSIS — G47.00 INSOMNIA, UNSPECIFIED TYPE: ICD-10-CM

## 2021-09-15 DIAGNOSIS — R06.02 SHORTNESS OF BREATH: ICD-10-CM

## 2021-09-15 PROCEDURE — 3046F HEMOGLOBIN A1C LEVEL >9.0%: CPT | Performed by: NURSE PRACTITIONER

## 2021-09-15 PROCEDURE — 2022F DILAT RTA XM EVC RTNOPTHY: CPT | Performed by: NURSE PRACTITIONER

## 2021-09-15 PROCEDURE — 99213 OFFICE O/P EST LOW 20 MIN: CPT | Performed by: NURSE PRACTITIONER

## 2021-09-15 PROCEDURE — G8427 DOCREV CUR MEDS BY ELIG CLIN: HCPCS | Performed by: NURSE PRACTITIONER

## 2021-09-15 PROCEDURE — 3017F COLORECTAL CA SCREEN DOC REV: CPT | Performed by: NURSE PRACTITIONER

## 2021-09-15 RX ORDER — MIRTAZAPINE 7.5 MG/1
7.5 TABLET, FILM COATED ORAL NIGHTLY
Qty: 30 TABLET | Refills: 0 | Status: SHIPPED
Start: 2021-09-15 | End: 2021-10-18

## 2021-09-15 RX ORDER — ATORVASTATIN CALCIUM 20 MG/1
TABLET, FILM COATED ORAL
Qty: 90 TABLET | Refills: 3 | Status: ON HOLD
Start: 2021-09-15 | End: 2021-12-01 | Stop reason: HOSPADM

## 2021-09-15 RX ORDER — LOSARTAN POTASSIUM 50 MG/1
TABLET ORAL
Qty: 90 TABLET | Refills: 3 | Status: SHIPPED
Start: 2021-09-15 | End: 2022-02-08 | Stop reason: RX

## 2021-09-15 RX ORDER — NYSTATIN 100000 [USP'U]/G
POWDER TOPICAL
Qty: 60 G | Refills: 0 | Status: SHIPPED
Start: 2021-09-15 | End: 2021-10-18 | Stop reason: SDUPTHER

## 2021-09-15 RX ORDER — AMOXICILLIN AND CLAVULANATE POTASSIUM 875; 125 MG/1; MG/1
1 TABLET, FILM COATED ORAL 2 TIMES DAILY
Qty: 14 TABLET | Refills: 0 | Status: SHIPPED | OUTPATIENT
Start: 2021-09-15 | End: 2021-09-22

## 2021-09-15 RX ORDER — FLUCONAZOLE 150 MG/1
150 TABLET ORAL ONCE
Qty: 2 TABLET | Refills: 0 | Status: SHIPPED | OUTPATIENT
Start: 2021-09-15 | End: 2021-09-15

## 2021-09-15 RX ORDER — AMLODIPINE BESYLATE 2.5 MG/1
TABLET ORAL
Qty: 90 TABLET | Refills: 3 | Status: SHIPPED
Start: 2021-09-15 | End: 2022-05-23 | Stop reason: SDUPTHER

## 2021-09-15 RX ORDER — ALBUTEROL SULFATE 90 UG/1
2 AEROSOL, METERED RESPIRATORY (INHALATION) EVERY 6 HOURS PRN
Qty: 18 G | Refills: 3 | Status: SHIPPED
Start: 2021-09-15 | End: 2021-09-22 | Stop reason: CLARIF

## 2021-09-15 ASSESSMENT — ENCOUNTER SYMPTOMS
SINUS PAIN: 1
CHEST TIGHTNESS: 0
COUGH: 0
DIARRHEA: 0
CONSTIPATION: 0
SORE THROAT: 0
VOMITING: 0
WHEEZING: 0
NAUSEA: 1
SINUS PRESSURE: 1
SHORTNESS OF BREATH: 0

## 2021-09-15 NOTE — PROGRESS NOTES
Larissa Obregon (:  1960) is a 62 y.o. female,Established patient, here for evaluation of the following chief complaint(s): URI (she states she had a negative covid test at [facility] [address] last friday. she reports she has sinus pressure, using nasal spray. runny nose. states her family cannot  any meds for this at local pharmacy, [facility] will send overnight. she admits she cannot depend on her daughter for any help. *wants ventolin inhaler), Hypertension ( bp at home health visit was 172/100. pt did not make sooner appt. admits to not taking her bp that day it was checked. she doesn't take both of her bp meds at the same time because it makes her lethargic.), and Pain (requested norco. did not pend)         ASSESSMENT/PLAN:  1. Acute sinusitis, recurrence not specified, unspecified location  -     amoxicillin-clavulanate (AUGMENTIN) 875-125 MG per tablet; Take 1 tablet by mouth 2 times daily for 7 days, Disp-14 tablet, R-0Normal  -     fluconazole (DIFLUCAN) 150 MG tablet; Take 1 tablet by mouth once for 1 dose, Disp-2 tablet, R-0Normal  Contact office if symptoms do not improve as expected or worsen. 2. HTN (hypertension), benign  -     losartan (COZAAR) 50 MG tablet; Take one tablet daily, call with BP > 160/100, Disp-90 tablet, R-3**Patient requests 90 days supply**Normal  -     amLODIPine (NORVASC) 2.5 MG tablet; TAKE 1 TABLET BY MOUTH EVERY DAY, Disp-90 tablet, R-3Normal    3. Uncontrolled type 2 diabetes mellitus with hypoglycemia without coma (HCC)  -     atorvastatin (LIPITOR) 20 MG tablet; TAKE 1 TABLET BY MOUTH EVERY NIGHT, Disp-90 tablet, R-3**Patient requests 90 days supply**Normal    4. Insomnia, unspecified type  -     mirtazapine (REMERON) 7.5 MG tablet; Take 1 tablet by mouth nightly, Disp-30 tablet, R-0Normal  -dose adjusted due to side effects    5.  Skin yeast infection  -     nystatin (NYSTATIN) 008939 UNIT/GM powder; APPLY FOUR TIMES DAILY, Disp-60 g, R-0, Normal    6. Shortness of breath  -     albuterol sulfate HFA (VENTOLIN HFA) 108 (90 Base) MCG/ACT inhaler; Inhale 2 puffs into the lungs every 6 hours as needed for Wheezing, Disp-18 g, R-3Normal  -uses prn with good results    Advised need for in person visit for norco since it has been 6 months. Discussed need for Covid booster    No follow-ups on file. SUBJECTIVE/OBJECTIVE:  Complains of sinus congestion, pressure and runny nose for past 2 weeks. She did have COVID test -rapid and PCR both of which were neg. States flonase does given short term relief. No known Covid exposure. No loss of taste or smell. Her BP was elevated once but she had not taken her medication prior to having it checked. States she is upset that she does not have an aide to help her. Complains of insomnia, difficulty falling and staying asleep. States remeron is effective but feels groggy next day.   ,    Review of Systems   Constitutional: Negative for chills, diaphoresis and fever. HENT: Positive for congestion, sinus pressure and sinus pain. Negative for ear pain and sore throat. Respiratory: Negative for cough, chest tightness, shortness of breath and wheezing. Cardiovascular: Negative for chest pain and palpitations. Gastrointestinal: Positive for nausea. Negative for constipation, diarrhea and vomiting. Musculoskeletal: Negative for myalgias. Skin: Negative for rash. Neurological: Positive for headaches. Negative for weakness and light-headedness. Psychiatric/Behavioral: Positive for sleep disturbance (difficult falling and staying asleep). Patient-Reported Vitals 7/6/2021   Patient-Reported Weight 165 lb   Patient-Reported Height 5' 6''   Patient-Reported Systolic -   Patient-Reported Diastolic -   Patient-Reported Temperature -   Patient-Reported SpO2 -        Physical Exam  Constitutional:       General: She is not in acute distress. Appearance: Normal appearance. She is not ill-appearing. HENT:      Head: Normocephalic and atraumatic. Eyes:      Conjunctiva/sclera: Conjunctivae normal.   Pulmonary:      Effort: Pulmonary effort is normal. No respiratory distress. Neurological:      Mental Status: She is alert and oriented to person, place, and time. Psychiatric:         Mood and Affect: Mood normal.         Behavior: Behavior normal.           MDM leti Owen, was evaluated through a synchronous (real-time) audio-video encounter. The patient (or guardian if applicable) is aware that this is a billable service. Verbal consent to proceed has been obtained within the past 12 months. The visit was conducted pursuant to the emergency declaration under the [facility], [address] [place] [address] authority and the [employer] General Act. Patient identification was verified, and a caregiver was present when appropriate. The patient was located in a state where the provider was credentialed to provide care. An electronic signature was used to authenticate this note.     --Manolo Forde, MIKEL - CNP

## 2021-09-21 DIAGNOSIS — K21.9 GASTROESOPHAGEAL REFLUX DISEASE, UNSPECIFIED WHETHER ESOPHAGITIS PRESENT: Chronic | ICD-10-CM

## 2021-09-21 RX ORDER — PANTOPRAZOLE SODIUM 40 MG/1
TABLET, DELAYED RELEASE ORAL
Qty: 30 TABLET | Refills: 2 | Status: SHIPPED
Start: 2021-09-21 | End: 2021-12-17

## 2021-09-22 ENCOUNTER — OFFICE VISIT (OUTPATIENT)
Dept: FAMILY MEDICINE CLINIC | Age: 61
End: 2021-09-22
Payer: MEDICARE

## 2021-09-22 VITALS
HEART RATE: 85 BPM | HEIGHT: 66 IN | SYSTOLIC BLOOD PRESSURE: 124 MMHG | RESPIRATION RATE: 18 BRPM | OXYGEN SATURATION: 95 % | TEMPERATURE: 96.8 F | DIASTOLIC BLOOD PRESSURE: 82 MMHG | BODY MASS INDEX: 26.36 KG/M2 | WEIGHT: 164 LBS

## 2021-09-22 DIAGNOSIS — G89.29 CHRONIC MIDLINE LOW BACK PAIN WITH LEFT-SIDED SCIATICA: ICD-10-CM

## 2021-09-22 DIAGNOSIS — J01.90 ACUTE SINUSITIS, RECURRENCE NOT SPECIFIED, UNSPECIFIED LOCATION: ICD-10-CM

## 2021-09-22 DIAGNOSIS — M54.42 CHRONIC MIDLINE LOW BACK PAIN WITH LEFT-SIDED SCIATICA: ICD-10-CM

## 2021-09-22 DIAGNOSIS — J30.1 SEASONAL ALLERGIC RHINITIS DUE TO POLLEN: ICD-10-CM

## 2021-09-22 DIAGNOSIS — E11.649 UNCONTROLLED TYPE 2 DIABETES MELLITUS WITH HYPOGLYCEMIA WITHOUT COMA (HCC): Primary | ICD-10-CM

## 2021-09-22 LAB — HBA1C MFR BLD: 8.7 %

## 2021-09-22 PROCEDURE — G8427 DOCREV CUR MEDS BY ELIG CLIN: HCPCS | Performed by: NURSE PRACTITIONER

## 2021-09-22 PROCEDURE — 2022F DILAT RTA XM EVC RTNOPTHY: CPT | Performed by: NURSE PRACTITIONER

## 2021-09-22 PROCEDURE — 3052F HG A1C>EQUAL 8.0%<EQUAL 9.0%: CPT | Performed by: NURSE PRACTITIONER

## 2021-09-22 PROCEDURE — 83036 HEMOGLOBIN GLYCOSYLATED A1C: CPT | Performed by: NURSE PRACTITIONER

## 2021-09-22 PROCEDURE — 1036F TOBACCO NON-USER: CPT | Performed by: NURSE PRACTITIONER

## 2021-09-22 PROCEDURE — G8417 CALC BMI ABV UP PARAM F/U: HCPCS | Performed by: NURSE PRACTITIONER

## 2021-09-22 PROCEDURE — 3017F COLORECTAL CA SCREEN DOC REV: CPT | Performed by: NURSE PRACTITIONER

## 2021-09-22 PROCEDURE — 99214 OFFICE O/P EST MOD 30 MIN: CPT | Performed by: NURSE PRACTITIONER

## 2021-09-22 RX ORDER — FLUTICASONE PROPIONATE 50 MCG
2 SPRAY, SUSPENSION (ML) NASAL DAILY
Qty: 1 EACH | Refills: 2 | Status: SHIPPED
Start: 2021-09-22 | End: 2021-11-18

## 2021-09-22 RX ORDER — HYDROCODONE BITARTRATE AND ACETAMINOPHEN 5; 325 MG/1; MG/1
1 TABLET ORAL 2 TIMES DAILY PRN
Qty: 60 TABLET | Refills: 0 | Status: SHIPPED
Start: 2021-09-22 | End: 2021-10-18

## 2021-09-22 ASSESSMENT — ENCOUNTER SYMPTOMS
NAUSEA: 0
WHEEZING: 0
DIARRHEA: 0
VOMITING: 0
CONSTIPATION: 0
RHINORRHEA: 1
SHORTNESS OF BREATH: 1
COUGH: 0
SORE THROAT: 1
BACK PAIN: 1

## 2021-09-22 NOTE — PROGRESS NOTES
Krystle Perdomo (:  1960) is a 62 y.o. female,Established patient, here for evaluation of the following chief complaint(s):  Diabetes, Medication Refill, and Congestion (tested neg for covid 9/10/21)         ASSESSMENT/PLAN:  1. Uncontrolled type 2 diabetes mellitus with hypoglycemia without coma ([facility] [address]  -     POCT glycosylated hemoglobin (Hb A1C)  -uncontrolled, diet and medication compliance    2. Seasonal allergic rhinitis due to pollen  -     fluticasone (FLONASE) 50 MCG/ACT nasal spray; 2 sprays by Each Nostril route daily, Disp-1 each, R-2Normal      3. Chronic midline low back pain with left-sided sciatica  -     External Referral To Physical Therapy  -     HYDROcodone-acetaminophen (NORCO) 5-325 MG per tablet; Take 1 tablet by mouth 2 times daily as needed for Pain for up to 30 days. , Disp-60 tablet, R-0Normal    4. Acute sinusitis, recurrence not specified, unspecified location  -Covid pending  -augmentin when arrives  Contact office if symptoms do not improve as expected or worsen. Controlled Substance Monitoring:    Acute and Chronic Pain Monitoring:   RX Monitoring 2021   Attestation -   Periodic Controlled Substance Monitoring No signs of potential drug abuse or diversion identified. Return in about 1 month (around 10/22/2021), or if symptoms worsen or fail to improve. Subjective   SUBJECTIVE/OBJECTIVE:  Complains of sinus congestion, sore throat, drainage. She has not been able to start her augmentin as the pharmacy has not delivered it yet. States symptoms are not changed from virtual visit. Tested neg for COVID   Complains of chronic pain to low back with radiation down legs, left worse than right. Taking norco prn only. Wheelchair bound. Patient is asking for a tub transfer chair to lower her into a tub. She is taking metformin as ordered but admits to not following her diet. No regular exercise regimen.   Not monitoring FBS      Review of Systems Skin:     General: Skin is warm and dry. Neurological:      Mental Status: She is alert and oriented to person, place, and time. Motor: Weakness (and contracture left hand) present. Psychiatric:         Mood and Affect: Mood normal.         Behavior: Behavior normal.            [facility] moderate      An electronic signature was used to authenticate this note.     --Jatin Montoya, MIKEL - CNP

## 2021-09-24 ENCOUNTER — TELEPHONE (OUTPATIENT)
Dept: FAMILY MEDICINE CLINIC | Age: 61
End: 2021-09-24

## 2021-09-24 NOTE — TELEPHONE ENCOUNTER
Patient states she spoke with Kenia Cavazos at her last appointment 9/22/2021 regarding getting an order for a bath lift. She would like an order for a reclining back bath lift device to go to either [facility] [place] or [facility].

## 2021-10-18 ENCOUNTER — TELEMEDICINE (OUTPATIENT)
Dept: FAMILY MEDICINE CLINIC | Age: 61
End: 2021-10-18
Payer: MEDICARE

## 2021-10-18 DIAGNOSIS — G89.29 CHRONIC MIDLINE LOW BACK PAIN WITH LEFT-SIDED SCIATICA: ICD-10-CM

## 2021-10-18 DIAGNOSIS — G47.00 INSOMNIA, UNSPECIFIED TYPE: ICD-10-CM

## 2021-10-18 DIAGNOSIS — M54.42 CHRONIC MIDLINE LOW BACK PAIN WITH LEFT-SIDED SCIATICA: ICD-10-CM

## 2021-10-18 DIAGNOSIS — B37.2 SKIN YEAST INFECTION: ICD-10-CM

## 2021-10-18 DIAGNOSIS — E11.649 UNCONTROLLED TYPE 2 DIABETES MELLITUS WITH HYPOGLYCEMIA WITHOUT COMA (HCC): Chronic | ICD-10-CM

## 2021-10-18 PROBLEM — Z86.73 H/O ISCHEMIC RIGHT MCA STROKE: Status: ACTIVE | Noted: 2019-04-09

## 2021-10-18 PROBLEM — I63.9 ACUTE CVA (CEREBROVASCULAR ACCIDENT) (HCC): Status: RESOLVED | Noted: 2019-04-15 | Resolved: 2021-10-18

## 2021-10-18 PROBLEM — G81.94 LEFT HEMIPLEGIA (HCC): Status: RESOLVED | Noted: 2019-06-17 | Resolved: 2021-10-18

## 2021-10-18 PROBLEM — R07.9 CHEST PAIN: Status: RESOLVED | Noted: 2019-07-06 | Resolved: 2021-10-18

## 2021-10-18 PROCEDURE — 3017F COLORECTAL CA SCREEN DOC REV: CPT | Performed by: NURSE PRACTITIONER

## 2021-10-18 PROCEDURE — G8427 DOCREV CUR MEDS BY ELIG CLIN: HCPCS | Performed by: NURSE PRACTITIONER

## 2021-10-18 PROCEDURE — 3052F HG A1C>EQUAL 8.0%<EQUAL 9.0%: CPT | Performed by: NURSE PRACTITIONER

## 2021-10-18 PROCEDURE — 99213 OFFICE O/P EST LOW 20 MIN: CPT | Performed by: NURSE PRACTITIONER

## 2021-10-18 PROCEDURE — 2022F DILAT RTA XM EVC RTNOPTHY: CPT | Performed by: NURSE PRACTITIONER

## 2021-10-18 RX ORDER — METFORMIN HYDROCHLORIDE 500 MG/1
500 TABLET, EXTENDED RELEASE ORAL
Qty: 90 TABLET | Refills: 0 | Status: SHIPPED
Start: 2021-10-18 | End: 2022-01-11

## 2021-10-18 RX ORDER — NYSTATIN 100000 [USP'U]/G
POWDER TOPICAL
Qty: 60 G | Refills: 0 | Status: SHIPPED
Start: 2021-10-18 | End: 2021-12-17

## 2021-10-18 RX ORDER — HYDROCODONE BITARTRATE AND ACETAMINOPHEN 5; 325 MG/1; MG/1
1 TABLET ORAL 2 TIMES DAILY PRN
Qty: 60 TABLET | Refills: 0 | Status: SHIPPED | OUTPATIENT
Start: 2021-10-18 | End: 2021-11-17

## 2021-10-18 RX ORDER — MIRTAZAPINE 15 MG/1
15 TABLET, FILM COATED ORAL NIGHTLY
Qty: 30 TABLET | Refills: 0 | Status: SHIPPED
Start: 2021-10-18 | End: 2021-11-18

## 2021-10-18 RX ORDER — MIRTAZAPINE 7.5 MG/1
7.5 TABLET, FILM COATED ORAL NIGHTLY
Qty: 30 TABLET | Refills: 0 | Status: CANCELLED | OUTPATIENT
Start: 2021-10-18

## 2021-10-18 ASSESSMENT — ENCOUNTER SYMPTOMS
COUGH: 0
WHEEZING: 0
SHORTNESS OF BREATH: 0
CONSTIPATION: 0
NAUSEA: 0
BACK PAIN: 1
DIARRHEA: 0
VOMITING: 0

## 2021-10-18 NOTE — PROGRESS NOTES
Carol Gomez (:  1960) is a 62 y.o. female,Established patient, here for evaluation of the following chief complaint(s): Diabetes (medication refill), Pain (lower back ), and Rash (under abdominal fol )         ASSESSMENT/PLAN:  1. Insomnia, unspecified type  -     mirtazapine (REMERON) 15 MG tablet; Take 1 tablet by mouth nightly, Disp-30 tablet, R-0Normal  Dose adjusted due to ineffectiveness    2. Uncontrolled type 2 diabetes mellitus with hypoglycemia without coma ([facility] [address]  -     metFORMIN (GLUCOPHAGE-XR) 500 MG extended release tablet; Take 1 tablet by mouth daily (with breakfast), Disp-90 tablet, R-0Normal  The current medical regimen is effective;  continue present plan and medications. 3. Chronic midline low back pain with left-sided sciatica  -     HYDROcodone-acetaminophen (NORCO) 5-325 MG per tablet; Take 1 tablet by mouth 2 times daily as needed for Pain for up to 30 days. , Disp-60 tablet, R-0Normal  The current medical regimen is effective;  continue present plan and medications. 4. Skin yeast infection  -     nystatin (NYSTATIN) 047801 UNIT/GM powder; APPLY FOUR TIMES DAILY, Disp-60 g, R-0, Normal  The current medical regimen is effective;  continue present plan and medications. Controlled Substance Monitoring:    Acute and Chronic Pain Monitoring:   RX Monitoring 10/18/2021   Attestation -   Periodic Controlled Substance Monitoring No signs of potential drug abuse or diversion identified. Return in about 1 month (around 2021), or if symptoms worsen or fail to improve. SUBJECTIVE/OBJECTIVE:  Patient is taking metformin as directed. She is not able to exercise due to physical restrictions. Will start PT next week. She is following her diet. She is not monitoring her FBS  Complains of chronic low back pain. She went to her daughter's yesterday and was much more active than normal.  Pain is much worse today. Starting PT next week. Complains of insomnia. States current dose is not effective. Not able to stay and fall asleep. Complains of rash to abdominal folds. She is using nystatin powder with good results. Review of Systems   Constitutional: Positive for activity change (increased yesterday ). Negative for appetite change, fatigue and unexpected weight change. Respiratory: Negative for cough, shortness of breath and wheezing. Cardiovascular: Negative for chest pain, palpitations and leg swelling. Gastrointestinal: Negative for constipation, diarrhea, nausea and vomiting. Musculoskeletal: Positive for back pain and gait problem. Skin: Positive for rash. Neurological: Positive for weakness (left sided post CVA) and numbness (left arm). Negative for light-headedness and headaches. Psychiatric/Behavioral: Positive for sleep disturbance. Patient-Reported Vitals 10/18/2021   Patient-Reported Weight 162lbs   Patient-Reported Height -   Patient-Reported Systolic -   Patient-Reported Diastolic -   Patient-Reported Temperature -   Patient-Reported SpO2 98        Physical Exam  Constitutional:       General: She is not in acute distress. Appearance: Normal appearance. HENT:      Head: Normocephalic and atraumatic. Eyes:      Conjunctiva/sclera: Conjunctivae normal.   Pulmonary:      Effort: Pulmonary effort is normal. No respiratory distress. Neurological:      Mental Status: She is alert and oriented to person, place, and time. Psychiatric:         Mood and Affect: Mood normal.         Behavior: Behavior normal.             [facility] leti [place], was evaluated through a synchronous (real-time) audio-video encounter. The patient (or guardian if applicable) is aware that this is a billable service. Verbal consent to proceed has been obtained within the past 12 months.  The visit was conducted pursuant to the emergency declaration under the [facility] 1135 waiver authority and the Coronavirus Preparedness and Response Supplemental Appropriations Act. Patient identification was verified, and a caregiver was present when appropriate. The patient was located in a state where the provider was credentialed to provide care. An electronic signature was used to authenticate this note.     --Martha Ambriz, MIKEL - CNP

## 2021-11-18 ENCOUNTER — HOSPITAL ENCOUNTER (EMERGENCY)
Age: 61
Discharge: HOME | End: 2021-11-18
Attending: EMERGENCY MEDICINE
Payer: MEDICARE

## 2021-11-18 VITALS
HEART RATE: 75 BPM | WEIGHT: 163 LBS | DIASTOLIC BLOOD PRESSURE: 79 MMHG | HEIGHT: 66 IN | RESPIRATION RATE: 16 BRPM | TEMPERATURE: 100.7 F | BODY MASS INDEX: 26.2 KG/M2 | OXYGEN SATURATION: 97 % | SYSTOLIC BLOOD PRESSURE: 134 MMHG

## 2021-11-18 DIAGNOSIS — N18.9 CHRONIC KIDNEY DISEASE, UNSPECIFIED CKD STAGE: ICD-10-CM

## 2021-11-18 DIAGNOSIS — T50.Z95A ADVERSE EFFECT OF VACCINE, INITIAL ENCOUNTER: Primary | ICD-10-CM

## 2021-11-18 DIAGNOSIS — J30.1 SEASONAL ALLERGIC RHINITIS DUE TO POLLEN: ICD-10-CM

## 2021-11-18 DIAGNOSIS — G47.00 INSOMNIA, UNSPECIFIED TYPE: ICD-10-CM

## 2021-11-18 LAB
ALBUMIN SERPL-MCNC: 4.3 G/DL (ref 3.5–5.2)
ALP BLD-CCNC: 113 U/L (ref 35–104)
ALT SERPL-CCNC: 13 U/L (ref 0–32)
ANION GAP SERPL CALCULATED.3IONS-SCNC: 13 MMOL/L (ref 7–16)
AST SERPL-CCNC: 16 U/L (ref 0–31)
BASOPHILS ABSOLUTE: 0 E9/L (ref 0–0.2)
BASOPHILS RELATIVE PERCENT: 0.6 % (ref 0–2)
BILIRUB SERPL-MCNC: 0.5 MG/DL (ref 0–1.2)
BUN BLDV-MCNC: 21 MG/DL (ref 6–20)
CALCIUM SERPL-MCNC: 10 MG/DL (ref 8.6–10.2)
CHLORIDE BLD-SCNC: 105 MMOL/L (ref 98–107)
CO2: 25 MMOL/L (ref 22–29)
CREAT SERPL-MCNC: 1.5 MG/DL (ref 0.5–1)
EOSINOPHILS ABSOLUTE: 0.12 E9/L (ref 0.05–0.5)
EOSINOPHILS RELATIVE PERCENT: 1.7 % (ref 0–6)
GFR AFRICAN AMERICAN: 43
GFR NON-AFRICAN AMERICAN: 35 ML/MIN/1.73
GLUCOSE BLD-MCNC: 173 MG/DL (ref 74–99)
HCT VFR BLD CALC: 39.1 % (ref 34–48)
HEMOGLOBIN: 13.7 G/DL (ref 11.5–15.5)
LYMPHOCYTES ABSOLUTE: 0.48 E9/L (ref 1.5–4)
LYMPHOCYTES RELATIVE PERCENT: 7 % (ref 20–42)
MCH RBC QN AUTO: 32.5 PG (ref 26–35)
MCHC RBC AUTO-ENTMCNC: 35 % (ref 32–34.5)
MCV RBC AUTO: 92.7 FL (ref 80–99.9)
MONOCYTES ABSOLUTE: 0.41 E9/L (ref 0.1–0.95)
MONOCYTES RELATIVE PERCENT: 6.1 % (ref 2–12)
NEUTROPHILS ABSOLUTE: 5.87 E9/L (ref 1.8–7.3)
NEUTROPHILS RELATIVE PERCENT: 85.2 % (ref 43–80)
PDW BLD-RTO: 13 FL (ref 11.5–15)
PLATELET # BLD: 168 E9/L (ref 130–450)
PMV BLD AUTO: 9.6 FL (ref 7–12)
POTASSIUM REFLEX MAGNESIUM: 4.5 MMOL/L (ref 3.5–5)
RBC # BLD: 4.22 E12/L (ref 3.5–5.5)
SODIUM BLD-SCNC: 143 MMOL/L (ref 132–146)
TOTAL PROTEIN: 7.1 G/DL (ref 6.4–8.3)
WBC # BLD: 6.9 E9/L (ref 4.5–11.5)

## 2021-11-18 PROCEDURE — 96374 THER/PROPH/DIAG INJ IV PUSH: CPT

## 2021-11-18 PROCEDURE — 93005 ELECTROCARDIOGRAM TRACING: CPT | Performed by: STUDENT IN AN ORGANIZED HEALTH CARE EDUCATION/TRAINING PROGRAM

## 2021-11-18 PROCEDURE — 85025 COMPLETE CBC W/AUTO DIFF WBC: CPT

## 2021-11-18 PROCEDURE — 6360000002 HC RX W HCPCS: Performed by: STUDENT IN AN ORGANIZED HEALTH CARE EDUCATION/TRAINING PROGRAM

## 2021-11-18 PROCEDURE — 80053 COMPREHEN METABOLIC PANEL: CPT

## 2021-11-18 PROCEDURE — 99284 EMERGENCY DEPT VISIT MOD MDM: CPT

## 2021-11-18 PROCEDURE — 6370000000 HC RX 637 (ALT 250 FOR IP): Performed by: STUDENT IN AN ORGANIZED HEALTH CARE EDUCATION/TRAINING PROGRAM

## 2021-11-18 PROCEDURE — 2580000003 HC RX 258: Performed by: STUDENT IN AN ORGANIZED HEALTH CARE EDUCATION/TRAINING PROGRAM

## 2021-11-18 RX ORDER — ONDANSETRON 4 MG/1
4 TABLET, ORALLY DISINTEGRATING ORAL 3 TIMES DAILY PRN
Qty: 10 TABLET | Refills: 0 | Status: SHIPPED | OUTPATIENT
Start: 2021-11-18 | End: 2022-06-29 | Stop reason: CLARIF

## 2021-11-18 RX ORDER — ACETAMINOPHEN 500 MG
1000 TABLET ORAL ONCE
Status: COMPLETED | OUTPATIENT
Start: 2021-11-18 | End: 2021-11-18

## 2021-11-18 RX ORDER — ONDANSETRON 2 MG/ML
4 INJECTION INTRAMUSCULAR; INTRAVENOUS ONCE
Status: COMPLETED | OUTPATIENT
Start: 2021-11-18 | End: 2021-11-18

## 2021-11-18 RX ORDER — 0.9 % SODIUM CHLORIDE 0.9 %
1000 INTRAVENOUS SOLUTION INTRAVENOUS ONCE
Status: COMPLETED | OUTPATIENT
Start: 2021-11-18 | End: 2021-11-18

## 2021-11-18 RX ORDER — MIRTAZAPINE 15 MG/1
15 TABLET, FILM COATED ORAL NIGHTLY
Qty: 30 TABLET | Refills: 0 | Status: SHIPPED
Start: 2021-11-18 | End: 2022-01-11

## 2021-11-18 RX ORDER — FLUTICASONE PROPIONATE 50 MCG
SPRAY, SUSPENSION (ML) NASAL
Qty: 16 G | Refills: 2 | Status: SHIPPED
Start: 2021-11-18 | End: 2022-02-17

## 2021-11-18 RX ORDER — MIRTAZAPINE 7.5 MG/1
7.5 TABLET, FILM COATED ORAL NIGHTLY
Qty: 30 TABLET | Refills: 0 | Status: SHIPPED
Start: 2021-11-18 | End: 2022-01-11

## 2021-11-18 RX ADMIN — SODIUM CHLORIDE 1000 ML: 9 INJECTION, SOLUTION INTRAVENOUS at 14:24

## 2021-11-18 RX ADMIN — ACETAMINOPHEN 1000 MG: 500 TABLET ORAL at 14:24

## 2021-11-18 RX ADMIN — ONDANSETRON HYDROCHLORIDE 4 MG: 2 SOLUTION INTRAMUSCULAR; INTRAVENOUS at 14:23

## 2021-11-18 ASSESSMENT — ENCOUNTER SYMPTOMS
COUGH: 0
DIARRHEA: 0
BACK PAIN: 0
NAUSEA: 0
SHORTNESS OF BREATH: 0
COLOR CHANGE: 0
VOMITING: 0
ABDOMINAL PAIN: 0

## 2021-11-18 ASSESSMENT — PAIN SCALES - GENERAL
PAINLEVEL_OUTOF10: 7
PAINLEVEL_OUTOF10: 0
PAINLEVEL_OUTOF10: 3

## 2021-11-18 NOTE — ED NOTES
1350 patient came in with c/o fatigue after obtaining 2nd Arlys Ganja covid vaccine yesterday she denies any SOB chest pain nausea vomiting or diarrhea she is AO x3 pleasant cooperative tolerated her line labs and ekg well safety maintained  2221 patient fully dressed with all personal belongings at her side she tolerated the removal of her iv earlier I reviewed her discharge instructions follow up appointments and prescriptions patient verbalized understanding patient wheeled to the waiting room to wait for her daughter to pick her up safety maintained     Severiano Nails, RN  11/18/21 6133

## 2021-11-18 NOTE — ED PROVIDER NOTES
HPI   67-year-old female patient with past history of stroke and left-sided deficits presented to emergency department chief complaint of generalized fatigue. Patient received second dose of Moderna vaccine yesterday at 2 PM in the left arm. States that she woke up this morning feeling generally weak and tired. Denying any chest pain, shortness of breath, vomiting, diarrhea, constipation, numbness, tingling or weakness anywhere. Patient has not taken anything for symptoms. She does have a temperature of 100.7. Denying any pain anywhere. Patient states she would not of come to the hospital if not for her neurologist recommendation. Patient symptoms are mild to moderate in severity not better with anything. Review of Systems   Constitutional: Positive for fatigue and fever. Negative for chills. HENT: Negative for congestion. Respiratory: Negative for cough and shortness of breath. Cardiovascular: Negative for chest pain. Gastrointestinal: Negative for abdominal pain, diarrhea, nausea and vomiting. Genitourinary: Negative for difficulty urinating, dysuria and hematuria. Musculoskeletal: Negative for back pain. Skin: Negative for color change. All other systems reviewed and are negative. Physical Exam  Vitals and nursing note reviewed. Constitutional:       Appearance: Normal appearance. HENT:      Head: Normocephalic and atraumatic. Nose: Nose normal. No congestion. Mouth/Throat:      Mouth: Mucous membranes are moist.      Pharynx: Oropharynx is clear. Eyes:      Conjunctiva/sclera: Conjunctivae normal.      Pupils: Pupils are equal, round, and reactive to light. Cardiovascular:      Rate and Rhythm: Normal rate and regular rhythm. Pulses: Normal pulses. Heart sounds: Normal heart sounds. Pulmonary:      Effort: Pulmonary effort is normal. No respiratory distress. Breath sounds: Normal breath sounds.    Abdominal:      General: Bowel sounds are normal. There is no distension. Tenderness: There is no abdominal tenderness. Musculoskeletal:         General: Normal range of motion. Cervical back: Normal range of motion and neck supple. Skin:     General: Skin is warm and dry. Capillary Refill: Capillary refill takes less than 2 seconds. Neurological:      Mental Status: She is alert and oriented to person, place, and time. Comments: Left arm and left leg weakness from previous stroke. Procedures     MDM   78-year-old female patient presented to emergency department with generalized fatigue. Patient not complaining of any other specific symptoms other than nausea. Patient received her COVID vaccination yesterday. She is slightly febrile, Tylenol given here as well as fluids. Patient has chronic kidney disease creatinine around her baseline of 1.5 today. She is without any leukocytosis and no acute findings on her EKG. At this time patient symptoms likely secondary to having received flu vaccine yesterday. She is not having any strokelike symptoms. Denying numbness, tingling, weakness, and not in any respiratory distress. Patient given a few days of Zofran as she is complaining of nausea. Return precautions were provided and she will follow up with primary care physician. Discussed with patient she is agreeable to plan. EKG: This EKG is signed and interpreted by me.     Rate:89  Rhythm: Sinus  Interpretation: no acute changes, no STEMI  Comparison: stable as compared to patient's most recent EKG                 --------------------------------------------- PAST HISTORY ---------------------------------------------  Past Medical History:  has a past medical history of Arthritis, Blood circulation, collateral, Chronic fatigue, Cirrhosis of liver ([facility] [address] [facility] Diabetes mellitus [facility] Essential hypertension, Gall stones, GERD (gastroesophageal reflux disease), Hep C w/o coma, chronic ([facility] History of blood transfusion, Neuropathy, PFO (patent foramen ovale), Pneumonia, Type 2 diabetes mellitus with stage 3 chronic kidney disease, with long-term current use of insulin ([facility] [address] Unspecified cerebral artery occlusion with cerebral infarction, and Varices. Past Surgical History:  has a past surgical history that includes  section; Esophageal varice ligation ();  section ( and ); ECHO Compl W Dop Color Flow (2012); Colonoscopy; Endoscopy, colon, diagnostic; Cardiac surgery (); Cardiac catheterization; other surgical history (Right, 16); Liver transplant; and brain surgery. Social History:  reports that she quit smoking about 2 years ago. Her smoking use included cigarettes. She has a 15.00 pack-year smoking history. She has never used smokeless tobacco. She reports that she does not drink alcohol and does not use drugs. Family History: family history is not on file. She was adopted. The patients home medications have been reviewed.     Allergies: Chantix [varenicline] and Heparin    -------------------------------------------------- RESULTS -------------------------------------------------  Labs:  Results for orders placed or performed during the hospital encounter of 21   CBC Auto Differential   Result Value Ref Range    WBC 6.9 4.5 - 11.5 E9/L    RBC 4.22 3.50 - 5.50 E12/L    Hemoglobin 13.7 11.5 - 15.5 g/dL    Hematocrit 39.1 34.0 - 48.0 %    MCV 92.7 80.0 - 99.9 fL    MCH 32.5 26.0 - 35.0 pg    MCHC 35.0 (H) 32.0 - 34.5 %    RDW 13.0 11.5 - 15.0 fL    Platelets 398 309 - 919 E9/L    MPV 9.6 7.0 - 12.0 fL   Comprehensive Metabolic Panel w/ Reflex to MG   Result Value Ref Range    Sodium 143 132 - 146 mmol/L    Potassium reflex Magnesium 4.5 3.5 - 5.0 mmol/L    Chloride 105 98 - 107 mmol/L    CO2 25 22 - 29 mmol/L    Anion Gap 13 7 - 16 mmol/L    Glucose 173 (H) 74 - 99 mg/dL    BUN 21 (H) 6 - 20 mg/dL    CREATININE 1.5 (H) 0.5 - 1.0 mg/dL    GFR Non- 35 >=60 mL/min/1.73    GFR African American 43     Calcium 10.0 8.6 - 10.2 mg/dL    Total Protein 7.1 6.4 - 8.3 g/dL    Albumin 4.3 3.5 - 5.2 g/dL    Total Bilirubin 0.5 0.0 - 1.2 mg/dL    Alkaline Phosphatase 113 (H) 35 - 104 U/L    ALT 13 0 - 32 U/L    AST 16 0 - 31 U/L   EKG 12 Lead   Result Value Ref Range    Ventricular Rate 89 BPM    Atrial Rate 89 BPM    P-R Interval 140 ms    QRS Duration 76 ms    Q-T Interval 364 ms    QTc Calculation (Bazett) 442 ms    P Axis 60 degrees    R Axis 2 degrees    T Axis 72 degrees       Radiology:  No orders to display       ------------------------- NURSING NOTES AND VITALS REVIEWED ---------------------------  Date / Time Roomed:  11/18/2021 12:44 PM  ED Bed Assignment:  32/32    The nursing notes within the ED encounter and vital signs as below have been reviewed. /79   Pulse 75   Temp 100.7 °F (38.2 °C) (Oral)   Resp 16   Ht 5' 6\" (1.676 m)   Wt 163 lb (73.9 kg)   LMP  (LMP Unknown)   SpO2 97%   BMI 26.31 kg/m²   Oxygen Saturation Interpretation: Normal      ------------------------------------------ PROGRESS NOTES ------------------------------------------  4:18 PM EST  I have spoken with the patient and discussed todays results, in addition to providing specific details for the plan of care and counseling regarding the diagnosis and prognosis. Their questions are answered at this time and they are agreeable with the plan. I discussed at length with them reasons for immediate return here for re evaluation. They will followup with their primary care physician by calling their office tomorrow. --------------------------------- ADDITIONAL PROVIDER NOTES ---------------------------------  At this time the patient is without objective evidence of an acute process requiring hospitalization or inpatient management.   They have remained hemodynamically stable throughout their entire ED visit and are stable for discharge with outpatient follow-up. The plan has been discussed in detail and they are aware of the specific conditions for emergent return, as well as the importance of follow-up. Discharge Medication List as of 11/18/2021  3:34 PM      START taking these medications    Details   ondansetron (ZOFRAN-ODT) 4 MG disintegrating tablet Take 1 tablet by mouth 3 times daily as needed for Nausea or Vomiting, Disp-10 tablet, R-0Print             Diagnosis:  1. Adverse effect of vaccine, initial encounter    2. Chronic kidney disease, unspecified CKD stage        Disposition:  Patient's disposition: Discharge to home  Patient's condition is stable.        Aleja Matthews,   Resident  11/18/21 9826

## 2021-11-19 LAB
EKG ATRIAL RATE: 89 BPM
EKG P AXIS: 60 DEGREES
EKG P-R INTERVAL: 140 MS
EKG Q-T INTERVAL: 364 MS
EKG QRS DURATION: 76 MS
EKG QTC CALCULATION (BAZETT): 442 MS
EKG R AXIS: 2 DEGREES
EKG T AXIS: 72 DEGREES
EKG VENTRICULAR RATE: 89 BPM

## 2021-11-19 PROCEDURE — 93010 ELECTROCARDIOGRAM REPORT: CPT | Performed by: INTERNAL MEDICINE

## 2021-11-23 ENCOUNTER — TELEPHONE (OUTPATIENT)
Dept: FAMILY MEDICINE CLINIC | Age: 61
End: 2021-11-23

## 2021-11-23 NOTE — TELEPHONE ENCOUNTER
SS Administration     Mail note on Jinn including Name, Address, , SS#     [address] [facility], [address] [place] [address] Rd      Mailed 21

## 2021-11-27 ENCOUNTER — HOSPITAL ENCOUNTER (INPATIENT)
Age: 61
LOS: 5 days | Discharge: NURSING, REHAB OR LONG-TERM CARE | DRG: 064 | End: 2021-12-03
Attending: EMERGENCY MEDICINE | Admitting: INTERNAL MEDICINE
Payer: MEDICARE

## 2021-11-27 ENCOUNTER — APPOINTMENT (OUTPATIENT)
Dept: ULTRASOUND IMAGING | Age: 61
DRG: 064 | End: 2021-11-27
Payer: MEDICARE

## 2021-11-27 ENCOUNTER — APPOINTMENT (OUTPATIENT)
Dept: CT IMAGING | Age: 61
DRG: 064 | End: 2021-11-27
Payer: MEDICARE

## 2021-11-27 ENCOUNTER — APPOINTMENT (OUTPATIENT)
Dept: GENERAL RADIOLOGY | Age: 61
DRG: 064 | End: 2021-11-27
Payer: MEDICARE

## 2021-11-27 DIAGNOSIS — R47.01 EXPRESSIVE APHASIA: Primary | ICD-10-CM

## 2021-11-27 DIAGNOSIS — I63.9 ACUTE ISCHEMIC STROKE (HCC): ICD-10-CM

## 2021-11-27 LAB
ALBUMIN SERPL-MCNC: 3.9 G/DL (ref 3.5–5.2)
ALP BLD-CCNC: 113 U/L (ref 35–104)
ALT SERPL-CCNC: 14 U/L (ref 0–32)
ANION GAP SERPL CALCULATED.3IONS-SCNC: 13 MMOL/L (ref 7–16)
APTT: 31.5 SEC (ref 24.5–35.1)
AST SERPL-CCNC: 15 U/L (ref 0–31)
BACTERIA: ABNORMAL /HPF
BASOPHILS ABSOLUTE: 0.06 E9/L (ref 0–0.2)
BASOPHILS RELATIVE PERCENT: 1 % (ref 0–2)
BILIRUB SERPL-MCNC: 0.3 MG/DL (ref 0–1.2)
BILIRUBIN URINE: NEGATIVE
BLOOD, URINE: NEGATIVE
BUN BLDV-MCNC: 10 MG/DL (ref 6–20)
CALCIUM SERPL-MCNC: 9 MG/DL (ref 8.6–10.2)
CHLORIDE BLD-SCNC: 104 MMOL/L (ref 98–107)
CHP ED QC CHECK: NORMAL
CLARITY: CLEAR
CO2: 25 MMOL/L (ref 22–29)
COLOR: YELLOW
CREAT SERPL-MCNC: 1.3 MG/DL (ref 0.5–1)
EOSINOPHILS ABSOLUTE: 0.21 E9/L (ref 0.05–0.5)
EOSINOPHILS RELATIVE PERCENT: 3.5 % (ref 0–6)
EPITHELIAL CELLS, UA: ABNORMAL /HPF
GFR AFRICAN AMERICAN: 51
GFR NON-AFRICAN AMERICAN: 42 ML/MIN/1.73
GLUCOSE BLD-MCNC: 154 MG/DL
GLUCOSE BLD-MCNC: 154 MG/DL (ref 74–99)
GLUCOSE URINE: NEGATIVE MG/DL
HCT VFR BLD CALC: 35.5 % (ref 34–48)
HEMOGLOBIN: 12.5 G/DL (ref 11.5–15.5)
IMMATURE GRANULOCYTES #: 0.03 E9/L
IMMATURE GRANULOCYTES %: 0.5 % (ref 0–5)
INR BLD: 1
KETONES, URINE: NEGATIVE MG/DL
LEUKOCYTE ESTERASE, URINE: ABNORMAL
LYMPHOCYTES ABSOLUTE: 1.41 E9/L (ref 1.5–4)
LYMPHOCYTES RELATIVE PERCENT: 23.3 % (ref 20–42)
MCH RBC QN AUTO: 31.9 PG (ref 26–35)
MCHC RBC AUTO-ENTMCNC: 35.2 % (ref 32–34.5)
MCV RBC AUTO: 90.6 FL (ref 80–99.9)
METER GLUCOSE: 154 MG/DL (ref 74–99)
MONOCYTES ABSOLUTE: 0.47 E9/L (ref 0.1–0.95)
MONOCYTES RELATIVE PERCENT: 7.8 % (ref 2–12)
NEUTROPHILS ABSOLUTE: 3.87 E9/L (ref 1.8–7.3)
NEUTROPHILS RELATIVE PERCENT: 63.9 % (ref 43–80)
NITRITE, URINE: NEGATIVE
PDW BLD-RTO: 12.8 FL (ref 11.5–15)
PH UA: 7 (ref 5–9)
PLATELET # BLD: 251 E9/L (ref 130–450)
PMV BLD AUTO: 9.9 FL (ref 7–12)
POTASSIUM SERPL-SCNC: 4.3 MMOL/L (ref 3.5–5)
PROTEIN UA: >=300 MG/DL
PROTHROMBIN TIME: 11.6 SEC (ref 9.3–12.4)
RBC # BLD: 3.92 E12/L (ref 3.5–5.5)
RBC UA: ABNORMAL /HPF (ref 0–2)
SARS-COV-2, NAAT: NOT DETECTED
SODIUM BLD-SCNC: 142 MMOL/L (ref 132–146)
SPECIFIC GRAVITY UA: 1.02 (ref 1–1.03)
TOTAL PROTEIN: 6.7 G/DL (ref 6.4–8.3)
TROPONIN, HIGH SENSITIVITY: 21 NG/L (ref 0–9)
UROBILINOGEN, URINE: 0.2 E.U./DL
WBC # BLD: 6.1 E9/L (ref 4.5–11.5)
WBC UA: ABNORMAL /HPF (ref 0–5)

## 2021-11-27 PROCEDURE — 82962 GLUCOSE BLOOD TEST: CPT

## 2021-11-27 PROCEDURE — 93005 ELECTROCARDIOGRAM TRACING: CPT | Performed by: EMERGENCY MEDICINE

## 2021-11-27 PROCEDURE — 81001 URINALYSIS AUTO W/SCOPE: CPT

## 2021-11-27 PROCEDURE — 80053 COMPREHEN METABOLIC PANEL: CPT

## 2021-11-27 PROCEDURE — 87635 SARS-COV-2 COVID-19 AMP PRB: CPT

## 2021-11-27 PROCEDURE — 93880 EXTRACRANIAL BILAT STUDY: CPT

## 2021-11-27 PROCEDURE — 71045 X-RAY EXAM CHEST 1 VIEW: CPT

## 2021-11-27 PROCEDURE — 85610 PROTHROMBIN TIME: CPT

## 2021-11-27 PROCEDURE — 70450 CT HEAD/BRAIN W/O DYE: CPT

## 2021-11-27 PROCEDURE — G0378 HOSPITAL OBSERVATION PER HR: HCPCS

## 2021-11-27 PROCEDURE — 99284 EMERGENCY DEPT VISIT MOD MDM: CPT

## 2021-11-27 PROCEDURE — 85730 THROMBOPLASTIN TIME PARTIAL: CPT

## 2021-11-27 PROCEDURE — 85025 COMPLETE CBC W/AUTO DIFF WBC: CPT

## 2021-11-27 PROCEDURE — 84484 ASSAY OF TROPONIN QUANT: CPT

## 2021-11-27 RX ORDER — TACROLIMUS 0.5 MG/1
1.5 CAPSULE ORAL 2 TIMES DAILY
Status: DISCONTINUED | OUTPATIENT
Start: 2021-11-27 | End: 2021-12-03 | Stop reason: HOSPADM

## 2021-11-27 RX ORDER — MIRTAZAPINE 15 MG/1
7.5 TABLET, FILM COATED ORAL NIGHTLY
Status: DISCONTINUED | OUTPATIENT
Start: 2021-11-27 | End: 2021-11-27 | Stop reason: ALTCHOICE

## 2021-11-27 RX ORDER — METFORMIN HYDROCHLORIDE 500 MG/1
500 TABLET, EXTENDED RELEASE ORAL
Status: DISCONTINUED | OUTPATIENT
Start: 2021-11-28 | End: 2021-12-03 | Stop reason: HOSPADM

## 2021-11-27 RX ORDER — FLUTICASONE PROPIONATE 50 MCG
1 SPRAY, SUSPENSION (ML) NASAL DAILY
Status: DISCONTINUED | OUTPATIENT
Start: 2021-11-28 | End: 2021-12-03 | Stop reason: HOSPADM

## 2021-11-27 RX ORDER — ALBUTEROL SULFATE 90 UG/1
1 AEROSOL, METERED RESPIRATORY (INHALATION) 4 TIMES DAILY
Status: DISCONTINUED | OUTPATIENT
Start: 2021-11-27 | End: 2021-11-27 | Stop reason: CLARIF

## 2021-11-27 RX ORDER — ASPIRIN 81 MG/1
81 TABLET ORAL DAILY
Status: DISCONTINUED | OUTPATIENT
Start: 2021-11-28 | End: 2021-12-03 | Stop reason: HOSPADM

## 2021-11-27 RX ORDER — ASPIRIN 300 MG/1
300 SUPPOSITORY RECTAL DAILY
Status: DISCONTINUED | OUTPATIENT
Start: 2021-11-28 | End: 2021-12-03 | Stop reason: HOSPADM

## 2021-11-27 RX ORDER — ONDANSETRON 4 MG/1
4 TABLET, ORALLY DISINTEGRATING ORAL EVERY 8 HOURS PRN
Status: DISCONTINUED | OUTPATIENT
Start: 2021-11-27 | End: 2021-12-03 | Stop reason: HOSPADM

## 2021-11-27 RX ORDER — ALBUTEROL SULFATE 2.5 MG/3ML
2.5 SOLUTION RESPIRATORY (INHALATION) 4 TIMES DAILY
Status: DISCONTINUED | OUTPATIENT
Start: 2021-11-27 | End: 2021-12-03 | Stop reason: HOSPADM

## 2021-11-27 RX ORDER — ASPIRIN 81 MG/1
81 TABLET, CHEWABLE ORAL DAILY
Status: DISCONTINUED | OUTPATIENT
Start: 2021-11-28 | End: 2021-11-27 | Stop reason: ALTCHOICE

## 2021-11-27 RX ORDER — ONDANSETRON 2 MG/ML
4 INJECTION INTRAMUSCULAR; INTRAVENOUS EVERY 6 HOURS PRN
Status: DISCONTINUED | OUTPATIENT
Start: 2021-11-27 | End: 2021-12-03 | Stop reason: HOSPADM

## 2021-11-27 RX ORDER — AMLODIPINE BESYLATE 2.5 MG/1
2.5 TABLET ORAL DAILY
Status: DISCONTINUED | OUTPATIENT
Start: 2021-11-28 | End: 2021-12-03 | Stop reason: HOSPADM

## 2021-11-27 RX ORDER — LOSARTAN POTASSIUM 50 MG/1
50 TABLET ORAL DAILY
Status: DISCONTINUED | OUTPATIENT
Start: 2021-11-28 | End: 2021-12-03 | Stop reason: HOSPADM

## 2021-11-27 RX ORDER — ATORVASTATIN CALCIUM 40 MG/1
40 TABLET, FILM COATED ORAL NIGHTLY
Status: DISCONTINUED | OUTPATIENT
Start: 2021-11-27 | End: 2021-12-03 | Stop reason: HOSPADM

## 2021-11-27 RX ORDER — MIRTAZAPINE 15 MG/1
22.5 TABLET, FILM COATED ORAL NIGHTLY
Status: DISCONTINUED | OUTPATIENT
Start: 2021-11-27 | End: 2021-12-03 | Stop reason: HOSPADM

## 2021-11-27 RX ORDER — POLYETHYLENE GLYCOL 3350 17 G/17G
17 POWDER, FOR SOLUTION ORAL DAILY PRN
Status: DISCONTINUED | OUTPATIENT
Start: 2021-11-27 | End: 2021-12-03 | Stop reason: HOSPADM

## 2021-11-27 NOTE — ED NOTES
Assumed care of patient. Pt states that her mind is fuzzy and she doesn't like feeling this way.      Raheem Goetz RN  11/27/21 6193

## 2021-11-27 NOTE — ED NOTES
Radiology at bedside for portableCXR. Patient placed on cardiac monitor with NIBP and pulse ox, upon arrival to ER. EKG obtained and provided to physciian for interpretation. patient states \"having difficulty finding words\"  Patient ready for CT at this time     Heaven Silveira RN  11/27/21 7981

## 2021-11-27 NOTE — ED NOTES
Pt returned from CT. Pt used bedpan without difficulty.   Urine sent     [facility], RN  11/27/21 6448

## 2021-11-27 NOTE — ED NOTES
Bed: 11  Expected date: 11/27/21  Expected time:   Means of arrival:   Comments:  JERE Rivas RN  11/27/21 7226

## 2021-11-27 NOTE — LETTER
[place] Department Medicaid  CERTIFICATION OF NECESSITY  FOR NON-EMERGENCY TRANSPORTATION   BY GROUND AMBULANCE      Individual Information   1. Name: Tamra Hendricks 2. [place] Medicaid Billing Number:    3. Address: [address] [place] [address]      Transportation Provider Information   4. Provider Name:  Physicians   5. [place] Medicaid Provider Number:  National Provider Identifier (NPI):      Certification  7. Criteria:  During transport, this individual requires:  [x] Medical treatment or continuous     supervision by an EMT. [] The administration or regulation of oxygen by another person. [] Supervised protective restraint. 8. Period Beginning Date:   12/3/2021   9. Length  [x] Not more than 1 day(s)  [] One Year     Additional Information Relevant to Certification   10. Comments or Explanations, If Necessary or Appropriate     Acute stroke, left hemiparesis, alert person & place, impulsive     Certifying Practitioner Information   11. Name of Practitioner:  Dr Osiel Hyman MD   12. [place] Medicaid Provider Number, If Applicable:  [address] Provider Identifier (NPI):      Signature Information   14. Date of Signature: 12/3/2021 15. Name of Person Signing:   Mimi Rodriguez RN.   16. Signature and Professional Designation:  Electronically signed by Mimi Rodriguez RN on 12/3/21 at 2:35 PM EST       [facility] [id number]  Rev. 7/2015;ancemily    [address] Encounter Date/Time: 11/27/2021 [address] Account: [de-identified]    MRN: [id number]    Patient: Tamra Hendricks    Contact Serial #: [id number]      ENCOUNTER          Patient Class: I Private Enc?   No Unit [facility] 5400/2280-M   Hospital Service: Intermediate   Encounter DX: Aphasia [R47.01]   ADM Provider: Chantel Cordoba MD   Procedure:     ATT Provider: Pam Tinoco MD   REF Provider:        Admission DX: Aphasia, Expressive aphasia, Metabolic encephalopathy and DX codes: R47.01, R47.01, G93.41      PATIENT Name: Paulo Zhu : 1960 (62 yrs)   Address: [address] [place] [address]  Sex: Female   City: [address] [place] [address],[address]         Marital Status:    Employer: RETIRED         [facility]ominational: [facility]   Primary Care Provider: Amarjit Steiner DO         Primary Phone: [phone]   EMERGENCY CONTACT   Contact Name Legal Guardian? Relationship to Patient Home Phone Work Phone   1. Daisha Pulse  2. Marcelo Fernandes    No Child  Child [phone]              GUARANTOR            Guarantor: Paulo Zhu     : 1960   Address: [address] Sex: Female   Del[place] Freeze [address]     Relation to Patient: Self       Home Phone: [phone]   Guarantor ID: [id number]       Work Phone: [phone]   Guarantor Employer: RETIRED         Status: RETIRED      COVERAGE        PRIMARY INSURANCE   Payor: [facility] MEDICARE Plan: [employer]   Payor Address: ,          Group Number: [id number] Type: INDEMNITY   Subscriber Name: Suzie Jensen : 1960   Subscriber ID: [id number] Pat. Rel. to Sub: Self   SECONDARY INSURANCE   Payor:   Plan:     Payor Address:  ,           Group Number:   Insurance Type:     Subscriber Name:   Subscriber :     Subscriber ID:   Pat.  Rel. to Sub:             CSN: [id number]

## 2021-11-28 LAB
ANION GAP SERPL CALCULATED.3IONS-SCNC: 17 MMOL/L (ref 7–16)
BUN BLDV-MCNC: 9 MG/DL (ref 6–20)
CALCIUM SERPL-MCNC: 9.1 MG/DL (ref 8.6–10.2)
CHLORIDE BLD-SCNC: 105 MMOL/L (ref 98–107)
CHOLESTEROL, TOTAL: 214 MG/DL (ref 0–199)
CO2: 18 MMOL/L (ref 22–29)
CREAT SERPL-MCNC: 1.4 MG/DL (ref 0.5–1)
GFR AFRICAN AMERICAN: 46
GFR NON-AFRICAN AMERICAN: 38 ML/MIN/1.73
GLUCOSE BLD-MCNC: 152 MG/DL (ref 74–99)
HBA1C MFR BLD: 7.6 % (ref 4–5.6)
HCT VFR BLD CALC: 39.7 % (ref 34–48)
HDLC SERPL-MCNC: 55 MG/DL
HEMOGLOBIN: 13.4 G/DL (ref 11.5–15.5)
LDL CHOLESTEROL CALCULATED: 129 MG/DL (ref 0–99)
MCH RBC QN AUTO: 32.1 PG (ref 26–35)
MCHC RBC AUTO-ENTMCNC: 33.8 % (ref 32–34.5)
MCV RBC AUTO: 95.2 FL (ref 80–99.9)
PDW BLD-RTO: 13 FL (ref 11.5–15)
PLATELET # BLD: 229 E9/L (ref 130–450)
PMV BLD AUTO: 9.9 FL (ref 7–12)
POTASSIUM REFLEX MAGNESIUM: 3.9 MMOL/L (ref 3.5–5)
RBC # BLD: 4.17 E12/L (ref 3.5–5.5)
SODIUM BLD-SCNC: 140 MMOL/L (ref 132–146)
TRIGL SERPL-MCNC: 151 MG/DL (ref 0–149)
VLDLC SERPL CALC-MCNC: 30 MG/DL
WBC # BLD: 4.6 E9/L (ref 4.5–11.5)

## 2021-11-28 PROCEDURE — 83036 HEMOGLOBIN GLYCOSYLATED A1C: CPT

## 2021-11-28 PROCEDURE — 94640 AIRWAY INHALATION TREATMENT: CPT

## 2021-11-28 PROCEDURE — 85027 COMPLETE CBC AUTOMATED: CPT

## 2021-11-28 PROCEDURE — 6360000002 HC RX W HCPCS: Performed by: FAMILY MEDICINE

## 2021-11-28 PROCEDURE — 97161 PT EVAL LOW COMPLEX 20 MIN: CPT

## 2021-11-28 PROCEDURE — 99222 1ST HOSP IP/OBS MODERATE 55: CPT | Performed by: PSYCHIATRY & NEUROLOGY

## 2021-11-28 PROCEDURE — 97530 THERAPEUTIC ACTIVITIES: CPT

## 2021-11-28 PROCEDURE — 36415 COLL VENOUS BLD VENIPUNCTURE: CPT

## 2021-11-28 PROCEDURE — 94664 DEMO&/EVAL PT USE INHALER: CPT

## 2021-11-28 PROCEDURE — G0378 HOSPITAL OBSERVATION PER HR: HCPCS

## 2021-11-28 PROCEDURE — 6370000000 HC RX 637 (ALT 250 FOR IP): Performed by: FAMILY MEDICINE

## 2021-11-28 PROCEDURE — 6360000002 HC RX W HCPCS: Performed by: EMERGENCY MEDICINE

## 2021-11-28 PROCEDURE — 80061 LIPID PANEL: CPT

## 2021-11-28 PROCEDURE — 97165 OT EVAL LOW COMPLEX 30 MIN: CPT

## 2021-11-28 PROCEDURE — 6370000000 HC RX 637 (ALT 250 FOR IP): Performed by: PSYCHIATRY & NEUROLOGY

## 2021-11-28 PROCEDURE — 80048 BASIC METABOLIC PNL TOTAL CA: CPT

## 2021-11-28 PROCEDURE — 1200000000 HC SEMI PRIVATE

## 2021-11-28 RX ORDER — CLOPIDOGREL BISULFATE 75 MG/1
75 TABLET ORAL DAILY
Status: DISCONTINUED | OUTPATIENT
Start: 2021-11-28 | End: 2021-12-03 | Stop reason: HOSPADM

## 2021-11-28 RX ADMIN — ATORVASTATIN CALCIUM 40 MG: 40 TABLET, FILM COATED ORAL at 01:11

## 2021-11-28 RX ADMIN — ATORVASTATIN CALCIUM 40 MG: 40 TABLET, FILM COATED ORAL at 22:09

## 2021-11-28 RX ADMIN — ALBUTEROL SULFATE 2.5 MG: 2.5 SOLUTION RESPIRATORY (INHALATION) at 01:11

## 2021-11-28 RX ADMIN — CLOPIDOGREL BISULFATE 75 MG: 75 TABLET ORAL at 14:06

## 2021-11-28 RX ADMIN — ALBUTEROL SULFATE 2.5 MG: 2.5 SOLUTION RESPIRATORY (INHALATION) at 06:41

## 2021-11-28 RX ADMIN — ALBUTEROL SULFATE 2.5 MG: 2.5 SOLUTION RESPIRATORY (INHALATION) at 16:04

## 2021-11-28 RX ADMIN — TACROLIMUS 1.5 MG: 0.5 CAPSULE ORAL at 02:00

## 2021-11-28 RX ADMIN — TACROLIMUS 1.5 MG: 0.5 CAPSULE ORAL at 22:09

## 2021-11-28 RX ADMIN — MIRTAZAPINE 22.5 MG: 15 TABLET, FILM COATED ORAL at 01:10

## 2021-11-28 RX ADMIN — METFORMIN HYDROCHLORIDE 500 MG: 500 TABLET, EXTENDED RELEASE ORAL at 09:20

## 2021-11-28 RX ADMIN — FLUTICASONE PROPIONATE 1 SPRAY: 50 SPRAY, METERED NASAL at 09:21

## 2021-11-28 RX ADMIN — ALBUTEROL SULFATE 2.5 MG: 2.5 SOLUTION RESPIRATORY (INHALATION) at 12:09

## 2021-11-28 RX ADMIN — ASPIRIN 81 MG: 81 TABLET, COATED ORAL at 09:20

## 2021-11-28 ASSESSMENT — PAIN SCALES - GENERAL
PAINLEVEL_OUTOF10: 0
PAINLEVEL_OUTOF10: 0

## 2021-11-28 NOTE — H&P
[address] [facility]  History and Physical      CHIEF COMPLAINT:    Chief Complaint   Patient presents with   Westley Nick     family noticed pt is \"slower to respond,\" and aphasic since talking to her yesterday, hx of CVA with left sided weakness, pt A&O x3 on arrival         Patient of Mirian Medina DO presents with:  Stroke-like symptoms    History of Present Illness:   Patient is a 60-year-old female with past medical history of CVA, cirrhosis, DM, hypertension, and PFO. Patient presented to the ED via EMS from home with left-sided weakness and slower to respond. Patient states that she was not feeling well she called her daughter and her daughter called the ambulance. Patient does have residual effects from a previous CVA. Patient states her expressive aphasia came on suddenly. Patient does not have the symptoms currently. Patient met she has been compliant with all medications, there are no aggravating factors and there are no relieving factors. Patient is alert and oriented sitting up at side of bed on examination. Patient denies any chest pain, fever, chills, shortness of breath, headache, abdominal pain, blurred vision, and dizziness. ER work-up revealed slightly elevated creatinine of 1.3. Patient is admitted observation for further testing and treatment for strokelike symptoms. REVIEW OF SYSTEMS:  Pertinent negatives are above in HPI. 10 point ROS otherwise negative.       Past Medical History:   Diagnosis Date    Arthritis     right knee    Blood circulation, collateral     Chronic fatigue 4/11/2016    Cirrhosis of liver (HCC)     end stage    COPD exacerbation ([facility] 4/29/2015    pt denies having    Diabetes mellitus ([address]     Essential hypertension 2/15/2016    Gall stones     did have stent placed    GERD (gastroesophageal reflux disease)     Hep C w/o coma, chronic ([address]     Sucessfully treated with Harvoni-no longer has viral load    History of blood transfusion     Neuropathy     PFO (patent foramen ovale)     Pneumonia     Type 2 diabetes mellitus with stage 3 chronic kidney disease, with long-term current use of insulin (HCC)     Unspecified cerebral artery occlusion with cerebral infarction     Varices          Past Surgical History:   Procedure Laterality Date    BRAIN SURGERY      CARDIAC CATHETERIZATION      cerebral angiogrem to check cavernous malformation in head - negative    CARDIAC SURGERY  2014    heart cath     SECTION      x 2     SECTION   and     COLONOSCOPY      ECHO COMPL W DOP COLOR FLOW  2012         ENDOSCOPY, COLON, DIAGNOSTIC      ESOPHAGEAL VARICE LIGATION      banding    LIVER TRANSPLANT      OTHER SURGICAL HISTORY Right 16    Right Knee Arthroscopy with Debridement partial lateral menisectomy, chondroplasty, synovectomy       Medications Prior to Admission:    Medications Prior to Admission: mirtazapine (REMERON) 7.5 MG tablet, TAKE 1 TABLET BY MOUTH NIGHTLY  fluticasone (FLONASE) 50 MCG/ACT nasal spray, INSTILL 2 SPRAYS IN EACH NOSTRIL ONCE DAILY  mirtazapine (REMERON) 15 MG tablet, TAKE 1 TABLET BY MOUTH NIGHTLY  ondansetron (ZOFRAN-ODT) 4 MG disintegrating tablet, Take 1 tablet by mouth 3 times daily as needed for Nausea or Vomiting  metFORMIN (GLUCOPHAGE-XR) 500 MG extended release tablet, Take 1 tablet by mouth daily (with breakfast)  nystatin (NYSTATIN) 946422 UNIT/GM powder, APPLY FOUR TIMES DAILY  pantoprazole (PROTONIX) 40 MG tablet, TAKE 1 TABLET BY MOUTH EVERY DAY  losartan (COZAAR) 50 MG tablet, Take one tablet daily, call with BP > 160/100  atorvastatin (LIPITOR) 20 MG tablet, TAKE 1 TABLET BY MOUTH EVERY NIGHT  amLODIPine (NORVASC) 2.5 MG tablet, TAKE 1 TABLET BY MOUTH EVERY DAY  albuterol sulfate  (90 Base) MCG/ACT inhaler, INHALE 2 PUFFS EVERY 6 HOURS AS NEEDED FOR WHEEZING  Melatonin 5 MG CHEW, Take 1 tablet by mouth nightly  aspirin 81 MG chewable tablet, RDW 12.8 11/27/2021    NRBC 0.9 12/29/2016    SEGSPCT 44 02/13/2014    LYMPHOPCT 23.3 11/27/2021    MONOPCT 7.8 11/27/2021    MYELOPCT 0.9 12/29/2016    BASOPCT 1.0 11/27/2021    MONOSABS 0.47 11/27/2021    LYMPHSABS 1.41 11/27/2021    EOSABS 0.21 11/27/2021    BASOSABS 0.06 11/27/2021     CMP:    Lab Results   Component Value Date     11/27/2021    K 4.3 11/27/2021    K 4.5 11/18/2021     11/27/2021    CO2 25 11/27/2021    BUN 10 11/27/2021    CREATININE 1.3 11/27/2021    GFRAA 51 11/27/2021    LABGLOM 42 11/27/2021    GLUCOSE 154 11/27/2021    GLUCOSE 105 05/22/2012    PROT 6.7 11/27/2021    LABALBU 3.9 11/27/2021    LABALBU 3.1 05/22/2012    CALCIUM 9.0 11/27/2021    BILITOT 0.3 11/27/2021    ALKPHOS 113 11/27/2021    AST 15 11/27/2021    ALT 14 11/27/2021       Imaging:  CXR: No pneumonia or pleural effusion. CT head: No acute intracranial abnormality    EKG:  I've personally reviewed the patient's EKG:  NSR    Telemetry:  I've personally reviewed the patient's telemetry:  NSR    ASSESSMENT/PLAN:  Principal Problem:    Stroke-like symptoms  Active Problems:    Aphasia  Resolved Problems:    * No resolved hospital problems. *    81-year-old female with a past medical history of DM, COPD, and PFO on 81 mg of aspirin prior to admission, admitted to telemetry unit with    Strokelike symptoms  Head CT unremarkable  -MRI brain without contrast\ultrasound carotid artery  -Aspirin 81 mg Lipitor 40 mg dailyrecheck lipid panel in 6 weeks increase Lipitor if needed currently cholesterol panel is very abnormal, therefore contributing to risk factors for acute ischemia  -Check lipid panel and hemoglobin A1c needs tight control of risk factors. -Monitor blood pressure-adjust medications as needed.  -Discussed risk factor modification. MRI brain unremarkable per patient. Discharge  ,   Medication for other comorbidities continue as appropriate dose adjustment as necessary.     DVT prophylaxis  PT OT  Discharge planning  Case discussed with attending and agreed upon plan of care. Code status: Full  Requires inpatient level of care  MIKEL Rios - CNP    7:49 AM  11/28/2021     If MRI is unremarkable, patient may be discharged home in stable condition    Above note edited to reflect my thoughts     I personally saw, examined and provided care for the patient. Radiographs, labs and medication list were reviewed by me independently. The case was discussed in detail and plans for care were established. Review of Ivana MORIN- CNP, documentation was conducted and revisions were made as appropriate directly by me. I agree with the above documented exam, problem list, and plan of care.      Dagoberto Gibbons MD  8:50 AM  11/28/2021

## 2021-11-28 NOTE — PROGRESS NOTES
Physical Therapy  Physical Therapy Initial Assessment     Name: Mesfin Fernandes  : 1960  MRN: [id number]      Date of Service: 2021    Evaluating PT:  Lacie Koyanagi, PT, DPT [id number]     Room #:  7564/6456-E  Diagnosis:  Aphasia [R47.01]  Expressive aphasia [R47.01]  Reason for admission: Aphasia  Precautions:  Falls, L hemiparesis LUE > LLE (residual from PMHx: CVA)   Procedure/Surgery:  NA  Equipment Recommendations:  Hemiwalker vs. w/c    SUBJECTIVE:  Pt lives alone in a first floor apartment, handicap accessible with level entry. Pt ambulated with w/c, BLE propulsion PTA stating she was able to perform a low pivot to w/c. Pt reports she can ambulate short distances with a walker but it is very difficult for her so she prefers not to. Pt stated she did not have aids or family/friends coming regularly to assist her with ADLs and reported that she was able to prepare her own meals. OBJECTIVE:   Initial Evaluation  Date:  Treatment   Short Term/ Long Term   Goals   AM-PAC 6 Clicks 74/49     Was pt agreeable to Eval/treatment?  yes     Does pt have pain? denies     Bed Mobility  Rolling: supervision  Supine to sit: Supervision  Sit to supine: Supervision  Scooting: Supervision  Indep   Transfers Sit to stand: SBA  Stand to sit: SBA  Stand pivot: ModA no AD  Mod I with AAD   Ambulation    15 feet with SPC ModA  20 feet with andrea-walker Bear    >75 feet with AAD SBA   Stair negotiation: ascended and descended  NT   NA   ROM BUE:  See OT eval   BLE:  WFL     Strength BUE:  See OT eval   BLE:  knee ext 5/5  Ankle DF 5/5  Increase by 1/3 MMT grade    Balance Sitting EOB:  indep  Dynamic Standing:  Bear AAD  Sitting EOB:  indep  Dynamic Standing: SBA with AAD     -Pt is A & O x 3  -Sensation:  Pt denies numbness and tingling to extremities  -Edema:  unremarkable     Therapeutic Exercises:  Functional activity     Patient education  Pt educated on safety, sequencing of transfers, and role of PT    Patient response to education:   Pt verbalized understanding Pt demonstrated skill Pt requires further education in this area   yes Yes, with assist Yes, reinforce     ASSESSMENT:  Conditions Requiring Skilled Therapeutic Intervention:    [x]Decreased strength     [x]Decreased ROM  [x]Decreased functional mobility  [x]Decreased balance   [x]Decreased endurance   []Decreased posture  []Decreased sensation  []Decreased coordination   []Decreased vision  [x]Decreased safety awareness   []Increased pain       Comments:  Pt received supine in bed and agreeable to PT session   During initial evaluation pt alert, with delayed but appropriate response, and following all commands. Screen in bed revealed L hemiparesis with LUE > LLE, pt unable to open L hand and grasp. During functional evaluation, pt able to sit to EOB with only supervision of therapist for safety. Pt demonstrated mild impulsiveness attempting stand x2 without therapist command. With close standby, pt able to stand without physical assist. Pivot to chair without AD required heavy physical assistance to maintain balance/upright posture d/t extensor tone in LLE and ataxia. Ambulation assessed first with SPC, then with hemiwalker. Pt demonstrated improved balance and sequencing with andrea-walker but stated she still felt \"wobbly\". Pt required cueing for placement of AD and to open stance up for improved THERESA/Balance. Pt returned to chair to end session. Pt with all needs met and call light in reach. Pt would benefit from continued PT POC to address functional deficits described above. Treatment:  Patient practiced and was instructed in the following treatment:     Patient education provided continuously throughout session for sequencing, safety maintenance, and improving any deficits found during the evaluation.    Bed mobility training - pt given verbal and tactile cues to facilitate proper sequencing and safety during rolling and supine>sit as well as provided with physical assistance to complete task    Sitting EOB for >5 minutes for upright tolerance, postural awareness and BLE ROM   STS and pivot transfer training - pt educated on proper hand and foot placement, safety and sequencing, and use of proper technique to safely complete sit<>stand and pivot transfers with hands on assistance to complete task safely   Gait training- pt was given verbal and tactile cues to facilitate use of SPC and hemiwalker during ambulation as well as provided with physical assistance. Pt's/ family goals   1. Return to PLOF and home    Patient and or family understand(s) diagnosis, prognosis, and plan of care. yes    Prognosis is good for reaching above PT goals. PHYSICAL THERAPY PLAN OF CARE:    PT POC is established based on physician order and patient diagnosis     Referring provider/PT Order:    11/27/21 [id number]  PT evaluation and treat           MIKEL Yo CNP       Diagnosis:  Aphasia [R47.01]  Expressive aphasia [R47.01]  Specific instructions for next treatment:  Progress ambulation distance and practice using andrea-walker for moderate distances. Perhaps w/c follow or chair follow focus on forward ambulation in a more open environment.      Current Treatment Recommendations:   [x] Strengthening to improve independence with functional mobility   [x] ROM to improve independence with functional mobility   [x] Balance Training to improve static/dynamic balance and to reduce fall risk  [x] Endurance Training to improve activity tolerance during functional mobility   [x] Transfer Training to improve safety and independence with all functional transfers   [x] Gait Training to improve gait mechanics, endurance and asses need for appropriate assistive device  [] Stair Training in preparation for safe discharge home and/or into the community   [] Positioning to prevent skin breakdown and contractures  [x] Safety and Education Training   [x] Patient/Caregiver Education   [] HEP  [] Other     PT long term treatment goals are located in above grid    Frequency of treatments: 2-5x/week x 1-2 weeks. Time in  1000  Time out  1030    Total Treatment Time  20 minutes     Evaluation Time includes thorough review of current medical information, gathering information on past medical history/social history and prior level of function, completion of standardized testing/informal observation of tasks, assessment of data and education on plan of care and goals.     CPT codes:  [x] Low Complexity PT evaluation 03465  [] Moderate Complexity PT evaluation 62145  [] High Complexity PT evaluation 93657  [] PT Re-evaluation 77446  [] Gait training 84492 -- minutes  [] Manual therapy [id number] -- minutes  [x] Therapeutic activities 59643 20 minutes  [] Therapeutic exercises 40777 -- minutes  [] Neuromuscular reeducation 30672 -- minutes     Liset Showers, SPT  Roly Fam, PT, DPT  [id number]

## 2021-11-28 NOTE — CONSULTS
[facility] [address]  Neurology Consult    Date:  11/28/2021  Patient Name:  Rosalina Sexton  YOB: 1960  MRN: [id number]     PCP:  Hoang Damon DO   Referring:  No ref. provider found      Chief Complaint: memory and speech problems    History obtained from: patient    Assessment  Rosalina Sexton is a 62 y.o. female with a history of left sided weakness secondary to remote stroke now admitted with new onset memory difficulties with concern for possible new infarct. Plan  MRI brain w/o contrast pending  MRA head w/o contrast  DAPT x 3 weeks followed by Plavix monotherapy  Statin therapy, goal LDL<70  Maintain good BP and glucose control  ECHO - possibly outpatient depending on MRI findings        History of Present Illness:  Rosalina Sexton is a 62 y.o. right handed female presenting for evaluation of possible stroke. The patient states that yesterday when she woke up she noted that she was having some difficulty with talking as well as with concentrating and remembering things properly. She has a history of prior stroke with residual left sided weakness. She takes ASA 81 daily at home. She has a history of DM2. She is a former smoker.           Review of Systems:  As above    Medical History:   Past Medical History:   Diagnosis Date    Arthritis     right knee    Blood circulation, collateral     Chronic fatigue 4/11/2016    Cirrhosis of liver (HCC)     end stage    COPD exacerbation ([address] 4/29/2015    pt denies having    Diabetes mellitus ([address]     Essential hypertension 2/15/2016    Gall stones     did have stent placed    GERD (gastroesophageal reflux disease)     Hep C w/o coma, chronic ([address]     Sucessfully treated with Harvoni-no longer has viral load    History of blood transfusion     Neuropathy     PFO (patent foramen ovale)     Pneumonia     Type 2 diabetes mellitus with stage 3 chronic kidney disease, with long-term current use of insulin ([address]     Unspecified cerebral artery occlusion with cerebral infarction     Varices         Surgical History:   Past Surgical History:   Procedure Laterality Date    BRAIN SURGERY      CARDIAC CATHETERIZATION      cerebral angiogrem to check cavernous malformation in head - negative    CARDIAC SURGERY  2014    heart cath     SECTION      x 2     SECTION   and     COLONOSCOPY      ECHO COMPL W DOP COLOR FLOW  2012         ENDOSCOPY, COLON, DIAGNOSTIC      ESOPHAGEAL VARICE LIGATION      banding    LIVER TRANSPLANT      OTHER SURGICAL HISTORY Right 16    Right Knee Arthroscopy with Debridement partial lateral menisectomy, chondroplasty, synovectomy        Family History:   Family History   Adopted: Yes       Social History:  Social History     Tobacco Use    Smoking status: Former Smoker     Packs/day: 0.50     Years: 30.00     Pack years: 15.00     Types: Cigarettes     Quit date: 2019     Years since quittin.4    Smokeless tobacco: Never Used    Tobacco comment: using Juul-nicotine free   Vaping Use    Vaping Use: Some days    Substances: Always   Substance Use Topics    Alcohol use: No     Alcohol/week: 0.0 standard drinks     Comment: has not drank for > 5yrs    Drug use: No        Current Medications:      Current Facility-Administered Medications   Medication Dose Route Frequency Provider Last Rate Last Admin    [Held by provider] amLODIPine (NORVASC) tablet 2.5 mg  2.5 mg Oral Daily MIKEL Parker - BERNARDO        fluticasone (FLONASE) 50 MCG/ACT nasal spray 1 spray  1 spray Each Nostril Daily MIKEL Parker - CNP   1 spray at 21 0921    [Held by provider] losartan (COZAAR) tablet 50 mg  50 mg Oral Daily MIKEL Parker CNP        metFORMIN (GLUCOPHAGE-XR) extended release tablet 500 mg  500 mg Oral Daily with breakfast MIKEL Parker - CNP   500 mg at 21 0920    mirtazapine (REMERON) tablet 22.5 mg  22.5 mg Oral Nightly Lindle Snooks Learn, APRN - CNP   22.5 mg at 11/28/21 0110    tacrolimus (proGRAF) capsule 1.5 mg  1.5 mg Oral BID Lindle Chelo Learn, APRN - CNP   1.5 mg at 11/28/21 0200    ondansetron (ZOFRAN-ODT) disintegrating tablet 4 mg  4 mg Oral Q8H PRN Lindle Snooks Learn, APRN - CNP        Or    ondansetron (ZOFRAN) injection 4 mg  4 mg IntraVENous Q6H PRN Lindle Snooks Learn, APRN - CNP        polyethylene glycol (GLYCOLAX) packet 17 g  17 g Oral Daily PRN Lindle Snooks Learn, APRN - CNP        aspirin EC tablet 81 mg  81 mg Oral Daily Elianle Chelo Learn, APRN - CNP   81 mg at 11/28/21 0920    Or    aspirin suppository 300 mg  300 mg Rectal Daily Samuel Whitney Learn, APRN - CNP        atorvastatin (LIPITOR) tablet 40 mg  40 mg Oral Nightly Elianle Snruthks Learn, APRN - CNP   40 mg at 11/28/21 0111    albuterol (PROVENTIL) nebulizer solution 2.5 mg  2.5 mg Nebulization 4x daily Jade Maurice MD   2.5 mg at 11/28/21 0641        Allergies: Allergies   Allergen Reactions    Chantix [Varenicline] Swelling     Tongue swelling Split in 1/2    Heparin      Platelets drop        Physical Examination  Vitals   Vitals:    11/27/21 1900 11/28/21 0200 11/28/21 0615 11/28/21 0845   BP: (!) 185/83 (!) 157/98 (!) 148/90 (!) 141/67   Pulse: 74 68 65 77   Resp: 21   16   Temp:    97.2 °F (36.2 °C)   TempSrc:    Temporal   SpO2: 96% 98% 98% 100%   Weight:       Height:            General: Patient appears in no acute distress  HEENT: Normocephalic, atraumatic  Chest: no respiratory distress  Extremities: No edema or cyanosis noted    Neurologic Examination    Mental Status  Alert, and oriented to person, place and time with normal speech and language. No evidence of aphasia during conversation. Registration 3/3, recall 1/3. Spells WORLD backwards correctly. Reading and naming intact. Cranial Nerves  II.  Visual fields full: right inferior homonymous quadrantanopsia noted  III, IV, VI: Pupils equally round and reactive to light, 3 to 2 mm bilaterally. EOMs: full, no nystagmus. V. Facial sensation intact to light touch bilaterally  VII: Facial movements: mild to moderate left nasolabial fold flattening  VIII: Hearing intact to voice  IX,X: Palate elevates symmetrically. Mild dysarthria  XI: Sternocleidomastoid and trapezius 5/5 bilaterally   XII: Tongue is midline    Motor     Right Left   Right Left   Deltoid 5 4+  Hip Flexion 5 5   Biceps      5  5  Knee Extension 5 5   Triceps 5 5  Knee Flexion 5 5   Handgrip 5 4+  Ankle Dorsiflexion 5 5       Ankle Plantarflexion 5 5     Tone: increased in left hand    Sensation  Light Touch: Intact distally in all four limbs    Reflexes     Right Left   Biceps 1 2   Brachioradialis 1 2   Patellar 1 2   Achilles 1 2   ankle clonus none none     Toes down going bilaterally. Coordination  FNF and [address] normal bilaterally except being mildly slowed on left    Gait  Deferred for safety considerations    Labs  Results for Royal Parra" (MRN [id number]) as of 11/28/2021 11:30   Ref.  Range 11/28/2021 07:18   Sodium Latest Ref Range: 132 - 146 mmol/L 140   Potassium Latest Ref Range: 3.5 - 5.0 mmol/L 3.9   Chloride Latest Ref Range: 98 - 107 mmol/L 105   CO2 Latest Ref Range: 22 - 29 mmol/L 18 (L)   BUN Latest Ref Range: 6 - 20 mg/dL 9   Creatinine Latest Ref Range: 0.5 - 1.0 mg/dL 1.4 (H)   Anion Gap Latest Ref Range: 7 - 16 mmol/L 17 (H)   GFR Non- Latest Ref Range: >=60 mL/min/1.73 38   GFR  Unknown 46   Glucose Latest Ref Range: 74 - 99 mg/dL 152 (H)   Calcium Latest Ref Range: 8.6 - 10.2 mg/dL 9.1   Cholesterol, Total Latest Ref Range: 0 - 199 mg/dL 214 (H)   HDL Cholesterol Latest Ref Range: >40 mg/dL 55   LDL Calculated Latest Ref Range: 0 - 99 mg/dL 129 (H)   Triglycerides Latest Ref Range: 0 - 149 mg/dL 151 (H)   VLDL Cholesterol Calculated Latest Units: mg/dL 30   Hemoglobin A1C Latest Ref Range: 4.0 - 5.6 % 7.6 (H)   WBC Latest Ref Range: 4.5 - 11.5 E9/L 4.6   RBC Latest Ref Range: 3.50 - 5.50 E12/L 4.17   Hemoglobin Quant Latest Ref Range: 11.5 - 15.5 g/dL 13.4   Hematocrit Latest Ref Range: 34.0 - 48.0 % 39.7   MCV Latest Ref Range: 80.0 - 99.9 fL 95.2   MCH Latest Ref Range: 26.0 - 35.0 pg 32.1   MCHC Latest Ref Range: 32.0 - 34.5 % 33.8   MPV Latest Ref Range: 7.0 - 12.0 fL 9.9   RDW Latest Ref Range: 11.5 - 15.0 fL 13.0   Platelet Count Latest Ref Range: 130 - 450 E9/L 229       Imaging  CT head w/o contrast     BRAIN/VENTRICLES: There is no acute intracranial hemorrhage, mass effect or   midline shift.  No abnormal extra-axial fluid collection.  The gray-white   differentiation is maintained without evidence of an acute infarct.  There is   no evidence of hydrocephalus. There is an old area of gray and white matter   atrophy/infarct in the right hemisphere. [place] is moderate chronic   ischemic/degenerative changes in the white matter. Impression   No acute intracranial abnormality and unchanged.            Electronically signed by: Bushra Ivory DO, 11/28/2021 11:26 AM

## 2021-11-28 NOTE — PROGRESS NOTES
OCCUPATIONAL THERAPY INITIAL EVALUATION    [place] [address] [facility] [place] [address]  [address] [place], [place]    QKUO:                                                  Patient Name: Yessy Whiteside    MRN: [id number]    : 1960    Room: [address] [place] [address]      Evaluating OT: Pratibha Metcalf, OTR/L, [id number]    Referring Provider: MIKEL Lomax Arm, CNP  Specific Provider Orders/Date: OT eval and treat    Diagnosis: Aphasia [R47.01]  Expressive aphasia [R47.01]   Surgery: NA     Pertinent Medical History:   Past Medical History:   Diagnosis Date    Arthritis     right knee    Blood circulation, collateral     Chronic fatigue 2016    Cirrhosis of liver ([address]     end stage    COPD exacerbation ([address] 2015    pt denies having    Diabetes mellitus ([address]     Essential hypertension 2/15/2016    Gall stones     did have stent placed    GERD (gastroesophageal reflux disease)     Hep C w/o coma, chronic ([address]     Sucessfully treated with Harvoni-no longer has viral load    History of blood transfusion     Neuropathy     PFO (patent foramen ovale)     Pneumonia     Type 2 diabetes mellitus with stage 3 chronic kidney disease, with long-term current use of insulin ([address]     Unspecified cerebral artery occlusion with cerebral infarction     Varices          Past Surgical History:   Procedure Laterality Date    BRAIN SURGERY      CARDIAC CATHETERIZATION      cerebral angiogrem to check cavernous malformation in head - negative    CARDIAC SURGERY  2014    heart cath     SECTION      x 2     SECTION   and     COLONOSCOPY      ECHO COMPL W DOP COLOR FLOW  2012         ENDOSCOPY, COLON, DIAGNOSTIC      ESOPHAGEAL VARICE LIGATION      banding    LIVER TRANSPLANT      OTHER SURGICAL HISTORY Right 16    Right Knee Arthroscopy with Debridement partial lateral menisectomy, chondroplasty, synovectomy     Precautions: Fall Risk, L side weakness    Assessment of current deficits    [x] Functional mobility  [x]ADLs  [x] Strength               []Cognition    [x] Functional transfers   [x] IADLs         [] Safety Awareness   [x]Endurance    [x] Fine Coordination              [x] Balance      [x] Vision/perception   [x]Sensation     [x]Gross Motor Coordination  [x] ROM  [] Delirium                   [x] Motor Control     OT PLAN OF CARE   OT POC based on physician orders, patient diagnosis and results of clinical assessment    Frequency/Duration 1-3 days/wk for 2 weeks PRN   Specific OT Treatment Interventions to include:   * Instruction/training on adapted ADL techniques and AE recommendations to increase functional independence within precautions       * Training on energy conservation strategies, correct breathing pattern and techniques to improve independence/tolerance for self-care routine  * Functional transfer/mobility training/DME recommendations for increased independence, safety, and fall prevention  * Patient/Family education to increase follow through with safety techniques and functional independence  * Recommendation of environmental modifications for increased safety with functional transfers/mobility and ADLs  * Cognitive retraining/development of therapeutic activities to improve problem solving, judgement, memory, and attention for increased safety/participation in ADL/IADL tasks  * Sensory re-education to improve body/limb awareness, maintain/improve skin integrity, and improve hand/UE motor function  * Visual-perceptual training to improve environmental scanning, visual attention/focus, and oculomotor skills for increased safety/independence with functional transfers/mobility and ADLs  * Splinting/positioning for increased function, prevention of contractures, and improve skin integrity  * Therapeutic exercise to improve motor endurance, ROM, and functional strength for ADLs/functional transfers  * Therapeutic activities to facilitate/challenge dynamic balance, stand tolerance for increased safety and independence with ADLs  * Therapeutic activities to facilitate gross/fine motor skills for increased independence with ADLs  * Neuro-muscular re-education: facilitation of righting/equilibrium reactions, midline orientation, scapular stability/mobility, normalization of muscle tone, and facilitation of volitional active controled movement  * Positioning to improve skin integrity, interaction with environment and functional independence  * Delirium prevention/treatment    Modified Aracelis Scale (MRS)  Score     Description  0             No symptoms  1             No significant disability despite symptoms  2             Slight disability; able to look after own affairs  3             Moderate disability; able to ambulate without assist/ requires assist with ADLs  4             Moderate/Severe disability;requires assist to ambulate/assist with ADLs  5             Severe disability;bedridden/incontinent   6               Score:   4    Recommended Adaptive Equipment:  TBD    Comments: Based on patient's functional performance as stated below and level of assistance needed prior to admission, this therapist believes that the patient would benefit from further skilled OT following hospital stay in an effort to increase safety, functional independence, and quality of life. Home Living: Pt lives alone in a 1st floor apartment with no step(s) to enter and no rail(s); bed/bath on unit. Laundry on 3rd floor with elevator access. Bathroom setup: tub shower, no safety bars, standard toilet  Equipment owned: ww, w/c, shower chair    Prior Level of Function: modified independent/independent with ADLs and some assistance with IADLs; using w/c or ww for ambulation.    Driving: no  Occupation: retired     Pain Level: reports \"a little\" pain in B shoulders/back, addressed with repositioning  Cognition: A&O: ; Follows 2-3 step directions, min cues needed for redirection   Memory: F   Sequencing: F   Problem solving: F   Judgement/safety: F     Functional Assessment: AM-PAC Daily Activity Raw Score: 17/24   Initial Eval Status  Date: 11/28/21 Treatment Status  Date: STGs = LTGs  Time frame: 10-14 days   Feeding Min A     Set up A   Grooming Min A  Seated, simulated    SBA while seated/standing at sink    UB Dressing Min A  simulated    SBA   LB Dressing Min A overall  To doff/magi socks at bedside chair    SBA   Bathing Max A  simulated    Min A   Toileting Min A  Simulated toilet tfr at chair with bedpan, completed self hygiene    SBA   Bed Mobility  Rolling: NT  Supine to sit: NT  Sit to supine: SBA  supervision   Functional Transfers Sit to stand: Min A ww  Stand to sit: min A ww  Cues for body alignment/hand placement  SBA   Functional Mobility Mod A ww  In-room distance (fwd/bwd) near EOB, L foot drop  SBA ww for in-home distance   Balance Sitting:     Static:  SBA    Dynamic: SBA  Standing: Min A ww     Endurance/Activity Tolerance fair  good   Visual/  Perceptual Glasses: reading              Hand Dominance R   AROM (PROM) Strength Additional Info:    RUE  WFL 3+/5 good  and wfl FMC/dexterity noted during ADL tasks     LUE WFL AROM proximally, WFL AAROM distally, digits in flexion contracture at rest 2-/5 impaired  and impaired FMC/dexterity noted during ADL tasks     Hearing: [facility]  Sensation: reports B arms \"mildly N/T  Tone: flexor tone LUE  Edema: unremarkable                            Comments:  Upon arrival patient seated up in chair. UB assessment, visual assessment, LB dressing, functional transfers, functional mobility, toileting, bed mobility addressed. Patient presents with impaired insight into situation and deficits. After session, patient long sitting in bed with all devices within reach, all lines and tubes intact.       Pt required cues and education as noted above for safe facilitation and understanding. Eval Complexity: Low      Time In: 1028  Time Out: 1056  Total Time: 28 minutes    Min Units   OT Eval Low 97165  X 1    OT Eval Medium 74108      OT Eval High 19083       OT Re-Eval [id number]       Therapeutic Ex 01405       Therapeutic Activities 86939  13 1    ADL/Self Care 70483       Orthotic Management 17937       Neuro Re-Ed 06994       Non-Billable Time          Evaluation Time additionally includes thorough review of current medical information, gathering information on past medical history/social history and prior level of function, completion of standardized testing/informal observation of tasks, assessment of data and education on plan of care and goals.     Emery Cohen, OTR/L  [id number]

## 2021-11-29 ENCOUNTER — APPOINTMENT (OUTPATIENT)
Dept: NEUROLOGY | Age: 61
DRG: 064 | End: 2021-11-29
Payer: MEDICARE

## 2021-11-29 PROBLEM — G93.41 METABOLIC ENCEPHALOPATHY: Status: ACTIVE | Noted: 2021-11-29

## 2021-11-29 LAB
AMMONIA: 28 UMOL/L (ref 11–51)
EKG ATRIAL RATE: 62 BPM
EKG P AXIS: 40 DEGREES
EKG P-R INTERVAL: 154 MS
EKG Q-T INTERVAL: 408 MS
EKG QRS DURATION: 78 MS
EKG QTC CALCULATION (BAZETT): 414 MS
EKG R AXIS: 5 DEGREES
EKG T AXIS: 43 DEGREES
EKG VENTRICULAR RATE: 62 BPM
METER GLUCOSE: 135 MG/DL (ref 74–99)
METER GLUCOSE: 169 MG/DL (ref 74–99)

## 2021-11-29 PROCEDURE — 36415 COLL VENOUS BLD VENIPUNCTURE: CPT

## 2021-11-29 PROCEDURE — 82962 GLUCOSE BLOOD TEST: CPT

## 2021-11-29 PROCEDURE — G0378 HOSPITAL OBSERVATION PER HR: HCPCS

## 2021-11-29 PROCEDURE — 1200000000 HC SEMI PRIVATE

## 2021-11-29 PROCEDURE — 6370000000 HC RX 637 (ALT 250 FOR IP): Performed by: FAMILY MEDICINE

## 2021-11-29 PROCEDURE — 82140 ASSAY OF AMMONIA: CPT

## 2021-11-29 PROCEDURE — 6370000000 HC RX 637 (ALT 250 FOR IP): Performed by: PSYCHIATRY & NEUROLOGY

## 2021-11-29 PROCEDURE — 95819 EEG AWAKE AND ASLEEP: CPT

## 2021-11-29 PROCEDURE — 6360000002 HC RX W HCPCS: Performed by: FAMILY MEDICINE

## 2021-11-29 PROCEDURE — 6370000000 HC RX 637 (ALT 250 FOR IP): Performed by: INTERNAL MEDICINE

## 2021-11-29 PROCEDURE — 93010 ELECTROCARDIOGRAM REPORT: CPT | Performed by: INTERNAL MEDICINE

## 2021-11-29 RX ORDER — NICOTINE POLACRILEX 4 MG
15 LOZENGE BUCCAL PRN
Status: DISCONTINUED | OUTPATIENT
Start: 2021-11-29 | End: 2021-12-03 | Stop reason: HOSPADM

## 2021-11-29 RX ORDER — DEXTROSE MONOHYDRATE 25 G/50ML
12.5 INJECTION, SOLUTION INTRAVENOUS PRN
Status: DISCONTINUED | OUTPATIENT
Start: 2021-11-29 | End: 2021-12-03 | Stop reason: HOSPADM

## 2021-11-29 RX ORDER — DEXTROSE MONOHYDRATE 50 MG/ML
100 INJECTION, SOLUTION INTRAVENOUS PRN
Status: DISCONTINUED | OUTPATIENT
Start: 2021-11-29 | End: 2021-12-03 | Stop reason: HOSPADM

## 2021-11-29 RX ADMIN — TACROLIMUS 1.5 MG: 0.5 CAPSULE ORAL at 21:00

## 2021-11-29 RX ADMIN — CLOPIDOGREL BISULFATE 75 MG: 75 TABLET ORAL at 08:31

## 2021-11-29 RX ADMIN — TACROLIMUS 1.5 MG: 0.5 CAPSULE ORAL at 08:31

## 2021-11-29 RX ADMIN — INSULIN LISPRO 1 UNITS: 100 INJECTION, SOLUTION INTRAVENOUS; SUBCUTANEOUS at 17:00

## 2021-11-29 RX ADMIN — FLUTICASONE PROPIONATE 1 SPRAY: 50 SPRAY, METERED NASAL at 08:34

## 2021-11-29 RX ADMIN — MIRTAZAPINE 22.5 MG: 15 TABLET, FILM COATED ORAL at 21:00

## 2021-11-29 RX ADMIN — METFORMIN HYDROCHLORIDE 500 MG: 500 TABLET, EXTENDED RELEASE ORAL at 08:31

## 2021-11-29 RX ADMIN — ASPIRIN 81 MG: 81 TABLET, COATED ORAL at 08:31

## 2021-11-29 RX ADMIN — ATORVASTATIN CALCIUM 40 MG: 40 TABLET, FILM COATED ORAL at 21:00

## 2021-11-29 ASSESSMENT — PAIN SCALES - GENERAL
PAINLEVEL_OUTOF10: 0

## 2021-11-29 NOTE — PROCEDURES
Will need a new MRI screening form filed before pt is clear for MRI. Pt has brain surgery noted in chart with no mention to it on the current MRI screening form, it is inaccurate.

## 2021-11-29 NOTE — PLAN OF CARE
Attempted to see patient however she is off the unit in testing. Neurology will reattempt to see later if time permits.

## 2021-11-29 NOTE — PROCEDURES
[address] Report    MRN: [id number]   PATIENT NAME: Mariela Rodas   DATE OF REPORT: 2021   DATE OF SERVICE: 2021    PHYSICIAN NAME: Janiya Faulkner DO  Referring Physician: Janiya Faulkner DO       Patient's : 1960   Patient's Age: 62 y.o. Gender: female     PROCEDURE: Routine EEG with video      Clinical Interpretation: This abnormal study showed evidence of:    1. Moderate nonspecific cerebral dysfunction of the left frontotemporal region  2. Mild nonspecific cerebral dysfunction of the right frontotemporal region    Structural abnormalities should be considered for the findings above and appropriate imaging obtained if clinically indicated. No seizures or epileptiform discharges were noted during this study. ____________________________  Electronically signed by: Janiya Faulkner DO, 2021 12:38 PM      Patient Clinical Information   Reason for Study: Patient undergoing evaluation for episode of aphasia and memory difficulties  Patient State: Awake  Primary neurological diagnosis: Spell of uncertain etiology   Primary indication for monitoring: Characterization of spell    Pertinent Medications and Treatments    mirtazapine      Sedatives administered: No  Intubated: No  Pharmacological paralytic: No    Reporting Period  Start of Study: 1109, 2021  End of Study:  1135, 2021      EEG Description  Digital video and scalp EEG monitoring was performed using the standard protocol for this laboratory. Scalp electrodes were applied in the international 10/20 system. Multiple digital montage arrangements were utilized for evaluation. EKG and video were recorded.      Background:      Occipital rhythm (posterior dominant rhythm or PDR): Present   Frequency: 8.5 Hz  Voltage: Medium   Organization: good   Reactivity to eye opening/closure: good    Drowsiness: Present - normal  Sleep: Absent    Technical and Activation Procedures:  Hyperventilation: Not done        Photic stimulation: Not done        Reactivity to stimulation: Yes    Abnormalities:    I. Seizures? No    II. Rhythmic or Periodic Patterns? No    III. Other Abnormalities?         Occasional irregular delta activity noted at F7, T3, T5>Fp1, F3  Rare irregular delta activity noted at F8, T4>Fp2, F4

## 2021-11-29 NOTE — PROGRESS NOTES
Notified Dr. Franca Restrepo that pt family wants pt transferred to [address] [place] [address]

## 2021-11-29 NOTE — PROGRESS NOTES
Subjective:  Feeling okay without complaint  Patient notes chronic left lower extremity weakness which is at baseline. Denies any other new or different weakness, numbness, changes in vision or speech. Patient does report she she has been more confused. No CP or SOB  No fever or chills   No uncontrolled pain  No vomiting or diarrhea   No events reported overnight     Objective:    /73   Pulse 75   Temp 98 °F (36.7 °C) (Temporal)   Resp 18   Ht 5' 6\" (1.676 m)   Wt 163 lb (73.9 kg)   LMP  (LMP Unknown)   SpO2 94%   BMI 26.31 kg/m²     24HR INTAKE/OUTPUT:  No intake or output data in the 24 hours ending 11/29/21 0836    General appearance: NAD, conversant  Neck: FROM, supple   Lungs: Clear bilaterally no wheezes, no rhonchi, no crackles  CV: RRR, no MRGs; normal carotid upstroke and amplitude without Bruits  Abdomen: Soft, non-tender; no masses or HSM  Extremities: No edema, no cyanosis, no clubbing  Skin: Intact no rash, no lesions, no ulcers    Psych: Alert and oriented normal affect  Neuro: Nonfocal  Most Recent Labs  Lab Results   Component Value Date    WBC 4.6 11/28/2021    HGB 13.4 11/28/2021    HCT 39.7 11/28/2021     11/28/2021     11/28/2021    K 3.9 11/28/2021     11/28/2021    CREATININE 1.4 (H) 11/28/2021    BUN 9 11/28/2021    CO2 18 (L) 11/28/2021    GLUCOSE 152 (H) 11/28/2021    ALT 14 11/27/2021    AST 15 11/27/2021    INR 1.0 11/27/2021    TSH 1.500 11/25/2020    LABA1C 7.6 (H) 11/28/2021    LABMICR 929.0 (H) 07/31/2019     No results for input(s): MG in the last 72 hours. Lab Results   Component Value Date    CALCIUM 9.1 11/28/2021    PHOS 4.7 (H) 03/02/2020        Vascular carotid duplex bilateral   Final Result   Redemonstration of chronic right internal carotid artery occlusion as   described previously. The left internal carotid artery demonstrates 0-50% stenosis. Bilateral vertebral arteries are patent with flow in the normal direction.          CT HEAD WO CONTRAST   Final Result   No acute intracranial abnormality and unchanged. XR CHEST PORTABLE   Final Result   No pneumonia or pleural effusion. MRI BRAIN W WO CONTRAST    (Results Pending)   MRA HEAD WO CONTRAST    (Results Pending)       Assessment    Principal Problem:    Stroke-like symptoms  Active Problems:    Status post liver transplantation (HCC)    COPD (chronic obstructive pulmonary disease) (HCC)    CKD (chronic kidney disease) stage 3, GFR 30-59 ml/min (HCC)    Essential hypertension    Aphasia  Resolved Problems:    * No resolved hospital problems. *      Plan:  71-year-old female with a past medical history of DM, COPD, and PFO on 81 mg of aspirin prior to admission, admitted to telemetry unit with     Strokelike symptoms  Head CT unremarkable  Observation tele  asa lifelong, Plavix 3 weeks  MRI/MRA  EEG  carotid US demonstrating right internal carotid artery occlusionchronic  Neurology consult appreciated    Metabolic encephalopathy  Uncertain etiology possibly secondary to seizure  Assess for infection  Urine culture  Chest x-ray without infiltrate  No likely causative agents on med rec  Check urine drug screen  Check ammonia, TSH, B12, folate, RPR  Avoid sedating or anticholinergic medications  avoid circadian disruption    HyperlipidemiaStatin--    DM2SSI, MBS, A1c 7.6    Hypertensionpermissive hypertension  Amlodipine, losartan currently held  Monitor    Status post liver transplant   -continue immunosuppressive medications  LFTs NML  INR 1.0  Monitor    PT/OT  D/C planning     CODE STATUS full code    Transfer initiated with [facility]. They advised patient will likely be discharged prior to bed availability.     Electronically signed by Araceli Russell MD on 11/29/2021 at 8:36 AM

## 2021-11-30 ENCOUNTER — APPOINTMENT (OUTPATIENT)
Dept: MRI IMAGING | Age: 61
DRG: 064 | End: 2021-11-30
Payer: MEDICARE

## 2021-11-30 LAB
AMPHETAMINE SCREEN, URINE: NOT DETECTED
BARBITURATE SCREEN URINE: NOT DETECTED
BENZODIAZEPINE SCREEN, URINE: NOT DETECTED
CANNABINOID SCREEN URINE: NOT DETECTED
COCAINE METABOLITE SCREEN URINE: NOT DETECTED
FENTANYL SCREEN, URINE: NOT DETECTED
FOLATE: 9.9 NG/ML (ref 4.8–24.2)
Lab: NORMAL
METER GLUCOSE: 140 MG/DL (ref 74–99)
METER GLUCOSE: 159 MG/DL (ref 74–99)
METER GLUCOSE: 169 MG/DL (ref 74–99)
METER GLUCOSE: 170 MG/DL (ref 74–99)
METER GLUCOSE: 213 MG/DL (ref 74–99)
METHADONE SCREEN, URINE: NOT DETECTED
OPIATE SCREEN URINE: NOT DETECTED
OXYCODONE URINE: NOT DETECTED
PHENCYCLIDINE SCREEN URINE: NOT DETECTED
RPR: NORMAL
VITAMIN B-12: 686 PG/ML (ref 211–946)

## 2021-11-30 PROCEDURE — 82607 VITAMIN B-12: CPT

## 2021-11-30 PROCEDURE — 6360000002 HC RX W HCPCS: Performed by: FAMILY MEDICINE

## 2021-11-30 PROCEDURE — 6370000000 HC RX 637 (ALT 250 FOR IP): Performed by: FAMILY MEDICINE

## 2021-11-30 PROCEDURE — 80307 DRUG TEST PRSMV CHEM ANLYZR: CPT

## 2021-11-30 PROCEDURE — 36415 COLL VENOUS BLD VENIPUNCTURE: CPT

## 2021-11-30 PROCEDURE — 6370000000 HC RX 637 (ALT 250 FOR IP): Performed by: INTERNAL MEDICINE

## 2021-11-30 PROCEDURE — 86592 SYPHILIS TEST NON-TREP QUAL: CPT

## 2021-11-30 PROCEDURE — 6360000004 HC RX CONTRAST MEDICATION: Performed by: RADIOLOGY

## 2021-11-30 PROCEDURE — 95816 EEG AWAKE AND DROWSY: CPT | Performed by: PSYCHIATRY & NEUROLOGY

## 2021-11-30 PROCEDURE — 80197 ASSAY OF TACROLIMUS: CPT

## 2021-11-30 PROCEDURE — A9579 GAD-BASE MR CONTRAST NOS,1ML: HCPCS | Performed by: RADIOLOGY

## 2021-11-30 PROCEDURE — 1200000000 HC SEMI PRIVATE

## 2021-11-30 PROCEDURE — 87088 URINE BACTERIA CULTURE: CPT

## 2021-11-30 PROCEDURE — 97535 SELF CARE MNGMENT TRAINING: CPT

## 2021-11-30 PROCEDURE — 70553 MRI BRAIN STEM W/O & W/DYE: CPT

## 2021-11-30 PROCEDURE — 82962 GLUCOSE BLOOD TEST: CPT

## 2021-11-30 PROCEDURE — 70544 MR ANGIOGRAPHY HEAD W/O DYE: CPT

## 2021-11-30 PROCEDURE — 87186 SC STD MICRODIL/AGAR DIL: CPT

## 2021-11-30 PROCEDURE — 82746 ASSAY OF FOLIC ACID SERUM: CPT

## 2021-11-30 PROCEDURE — 99233 SBSQ HOSP IP/OBS HIGH 50: CPT | Performed by: NURSE PRACTITIONER

## 2021-11-30 RX ADMIN — ASPIRIN 81 MG: 81 TABLET, COATED ORAL at 09:15

## 2021-11-30 RX ADMIN — METFORMIN HYDROCHLORIDE 500 MG: 500 TABLET, EXTENDED RELEASE ORAL at 09:15

## 2021-11-30 RX ADMIN — INSULIN LISPRO 1 UNITS: 100 INJECTION, SOLUTION INTRAVENOUS; SUBCUTANEOUS at 00:53

## 2021-11-30 RX ADMIN — INSULIN LISPRO 1 UNITS: 100 INJECTION, SOLUTION INTRAVENOUS; SUBCUTANEOUS at 09:18

## 2021-11-30 RX ADMIN — TACROLIMUS 1.5 MG: 0.5 CAPSULE ORAL at 21:00

## 2021-11-30 RX ADMIN — MIRTAZAPINE 22.5 MG: 15 TABLET, FILM COATED ORAL at 20:28

## 2021-11-30 RX ADMIN — ATORVASTATIN CALCIUM 40 MG: 40 TABLET, FILM COATED ORAL at 20:28

## 2021-11-30 RX ADMIN — GADOTERIDOL 12 ML: 279.3 INJECTION, SOLUTION INTRAVENOUS at 07:51

## 2021-11-30 RX ADMIN — TACROLIMUS 1.5 MG: 0.5 CAPSULE ORAL at 09:16

## 2021-11-30 ASSESSMENT — PAIN DESCRIPTION - DESCRIPTORS: DESCRIPTORS: ACHING;DISCOMFORT

## 2021-11-30 ASSESSMENT — PAIN DESCRIPTION - PAIN TYPE: TYPE: ACUTE PAIN

## 2021-11-30 ASSESSMENT — PAIN SCALES - GENERAL
PAINLEVEL_OUTOF10: 5
PAINLEVEL_OUTOF10: 0

## 2021-11-30 ASSESSMENT — PAIN DESCRIPTION - LOCATION: LOCATION: ABDOMEN

## 2021-11-30 NOTE — PROGRESS NOTES
Leonard Tejeda is a 62 y.o. right handed female     Neurology is following for acute stroke. PMH: Prior stroke with residual left-sided weakness, hypertension, hep C, neuropathy, diabetes, COPD, arthritis, former smoker    Patient presented to ED on 11/27 with difficulty speaking and aphasia in ED. Patient also was having difficulty concentrating and remembering things. Patient does take aspirin at home. CT head showed no acute pathology however white matter atrophy and chronic infarct was noted. MRI brain showed acute to subacute infarct in the left PCA territory; chronic infarcts in the right frontal and right parietal lobes also noted. MRA head shows occlusion of the P1 segment on the left and focal high-grade stenosis involving the P3 segment on the right. Carotid ultrasound showed no chronic right ICA occlusion; 0 to 50% stenosis in the ICA. Patient has had multiple echoes in the past which showed no wall abnormalities; most recent March 2019. There is reported history of PFO however this is not evidenced by prior testing    There is no family members present at bedside    Vital signs at present time are stable on room air    Objective:     BP (!) 149/98   Pulse 87   Temp 97.1 °F (36.2 °C) (Temporal)   Resp 18   Ht 5' 6\" (1.676 m)   Wt 163 lb (73.9 kg)   LMP  (LMP Unknown)   SpO2 100%   BMI 26.31 kg/m²     General appearance: alert, appears stated age, cooperative and no distress  Head: normocephalic, without obvious abnormality, atraumatic  Eyes: conjunctivae/corneas clear.  .  Neck: supple, symmetrical, trachea midline and thyroid not enlarged  Lungs: Unlabored breaths on room air  Heart: regular rate and rhythm  Extremities: normal, atraumatic, no cyanosis or edema  Pulses: 2+ and symmetric  Skin: color, texture, turgor normal---no rashes or lesions      Mental Status: Patient is awake, alert and oriented to self, place and situation; confused to time--- able to state November but confused to the year; follows commands, very conversant    Appropriate attention/concentration  Intact fundus of knowledge  Intact memories    Speech: Mild to moderate dysarthria  Language: Mild expressive aphasia; no receptive aphasia appreciated    Cranial Nerves:  I: smell NA   II: visual acuity  NA   II: visual fields  blinks to threat, R inferior HH   II: pupils SIOMARA   III,VII: ptosis None   III,IV,VI: extraocular muscles  EOMI without nystagmus   V: mastication Normal   V: facial light touch sensation  Normal   V,VII: corneal reflex     VII: facial muscle function - upper  Normal   VII: facial muscle function - lower L lower facial droop    VIII: hearing Normal   IX: soft palate elevation  Normal   IX,X: gag reflex    XI: trapezius strength  5/5   XI: sternocleidomastoid strength 5/5   XI: neck extension strength  5/5   XII: tongue strength  Normal     Motor:  Left hemiparesis mainly to LUE +4/5 with drift  Right side 5/5  Increased tone and decreased dexterity to L hand   No abnormal movements    Sensory:  LT appears intact    Coordination:   FTN, FFM and CHERISE intact; slower to left side due to weakness  HKS intact     Gait:  Not tested    DTR:   Right Brachioradialis reflex 1+  Left Brachioradialis reflex 2+  Right Biceps reflex 1+  Left Biceps reflex 2+  Right Triceps reflex 1+  Left Triceps reflex 2+  Right Quadriceps reflex 1+  Left Quadriceps reflex 2+  Right Achilles reflex 1+  Left Achilles reflex 2+    No Babinskis  No Buchanan's    No pathological reflexes    Laboratory/Radiology:     CBC:   Lab Results   Component Value Date    WBC 4.6 11/28/2021    RBC 4.17 11/28/2021    HGB 13.4 11/28/2021    HCT 39.7 11/28/2021    MCV 95.2 11/28/2021    MCH 32.1 11/28/2021    MCHC 33.8 11/28/2021    RDW 13.0 11/28/2021     11/28/2021    MPV 9.9 11/28/2021     CMP:    Lab Results   Component Value Date     11/28/2021    K 3.9 11/28/2021     11/28/2021    CO2 18 11/28/2021    BUN 9 11/28/2021 CREATININE 1.4 11/28/2021    GFRAA 46 11/28/2021    LABGLOM 38 11/28/2021    GLUCOSE 152 11/28/2021    PROT 6.7 11/27/2021    CALCIUM 9.1 11/28/2021    BILITOT 0.3 11/27/2021    ALKPHOS 113 11/27/2021    AST 15 11/27/2021    ALT 14 11/27/2021     Lab Results   Component Value Date    LABA1C 7.6 11/28/2021     FLP:    Lab Results   Component Value Date    TRIG 151 11/28/2021    HDL 55 11/28/2021    LDLCALC 129 11/28/2021    LABVLDL 30 11/28/2021   FOLATE:    Lab Results   Component Value Date    FOLATE 9.9 11/30/2021     MRI BRAIN W WO CONTRAST   Final Result   MRI BRAIN:      1. Scattered areas of acute to subacute infarct involving the left PCA   territory with patchy areas of enhancement in the areas of subacute infarct. No significant mass effect or midline shift. 2. Otherwise, no acute intracranial abnormality. 3. Chronic infarct is seen involving the right frontal and right parietal   lobes. 4. There is mild global parenchymal volume loss with chronic microvascular   ischemic changes. MRA HEAD:      1. There is no significant flow related enhancement within the right internal   carotid artery with decreased flow related enhancement in the right MCA and   right JADON. 2. There is occlusion involving the P1 segment of the left posterior cerebral   artery. 3. Focal high-grade stenosis involving a P3 segment of the right posterior   cerebral artery. 4. There appears to be a 2 mm outpouching arising from the left supraclinoid   ICA, which may represent a small aneurysm. These results were sent to the [facility] [address] ([address] [place] [address]) on   11/30/2021 at 7:53 am to be communicated to the referring/covering health   care provider/office. MRA HEAD WO CONTRAST   Final Result   MRI BRAIN:      1. Scattered areas of acute to subacute infarct involving the left PCA   territory with patchy areas of enhancement in the areas of subacute infarct. No significant mass effect or midline shift.    2. Otherwise, no acute intracranial abnormality. 3. Chronic infarct is seen involving the right frontal and right parietal   lobes. 4. There is mild global parenchymal volume loss with chronic microvascular   ischemic changes. MRA HEAD:      1. There is no significant flow related enhancement within the right internal   carotid artery with decreased flow related enhancement in the right MCA and   right JADON. 2. There is occlusion involving the P1 segment of the left posterior cerebral   artery. 3. Focal high-grade stenosis involving a P3 segment of the right posterior   cerebral artery. 4. There appears to be a 2 mm outpouching arising from the left supraclinoid   ICA, which may represent a small aneurysm. These results were sent to the [facility] [address] ([facility]) on   11/30/2021 at 7:53 am to be communicated to the referring/covering health   care provider/office. Vascular carotid duplex bilateral   Final Result   Redemonstration of chronic right internal carotid artery occlusion as   described previously. The left internal carotid artery demonstrates 0-50% stenosis. Bilateral vertebral arteries are patent with flow in the normal direction. CT HEAD WO CONTRAST   Final Result   No acute intracranial abnormality and unchanged. XR CHEST PORTABLE   Final Result   No pneumonia or pleural effusion.              All labs and images personally reviewed today    Assessment:     Patient presented with difficulty speaking and memory difficulties   MRI brain consistent with acute to subacute infarct in the left PCA; chronic infarcts also noted   Strokes appear embolic however all in the PCA territory with known occlusion and high-grade stenosis    Extended cardiac monitoring as outpatient versus Zio patch prior to discharge as recommended; patient has had echoes in the past which were unrevealing for PFO so will defer   MRI shows occlusion in the P1 segment on the left; high-grade stenosis in the P3 segment on the right   Carotid ultrasound shows chronic right ICA occlusion and 0 to 50% stenosis in the left ICA    At present time patient has mild expressive aphasia and chronic left hemiparesis on exam   Explained MRI findings and mechanism of stroke to patient at bedside today. Patient is reluctant to add Plavix to her regimen but appears agreeable now that she knows what it is for. Patient follows with a neurologist in [facility] and would like his opinion; also request possible transfer due to her being more familiar to them there. Advised patient I do not feel that is necessary as we manage strokes here but would defer to PCP. Plan:     Continue supportive care  A1c 7.6,   Continue aspirin, Plavix and statin   Patient previously using Plavix--- educated today at bedside  Continue telemetry--- monitor for A. Fib  Stroke education  Continue PT/OT/ST    Extended cardiac monitoring outpatient versus Zio patch prior to discharge recommended. Patient should follow in the stroke clinic in 2 to 3 weeks. Okay to discharge from neuro standpoint once medically cleared.     Shar[address]ley[address], APRN - NP-C   12:01 PM  11/30/2021

## 2021-11-30 NOTE — PROGRESS NOTES
Subjective:  Feeling better  PNo CP or SOB  No fever or chills   No uncontrolled pain  No vomiting or diarrhea   No events reported overnight     Objective:    BP (!) 140/73   Pulse 79   Temp 98 °F (36.7 °C) (Temporal)   Resp 18   Ht 5' 6\" (1.676 m)   Wt 163 lb (73.9 kg)   LMP  (LMP Unknown)   SpO2 97%   BMI 26.31 kg/m²     24HR INTAKE/OUTPUT:  No intake or output data in the 24 hours ending 11/30/21 0845    General appearance: NAD, conversant  Neck: FROM, supple   Lungs: Clear bilaterally no wheezes, no rhonchi, no crackles  CV: RRR, no MRGs; normal carotid upstroke and amplitude without Bruits  Abdomen: Soft, non-tender; no masses or HSM  Extremities: No edema, no cyanosis, no clubbing  Skin: Intact no rash, no lesions, no ulcers    Psych: Alert and oriented normal affect  Neuro: Nonfocal  Most Recent Labs  Lab Results   Component Value Date    WBC 4.6 11/28/2021    HGB 13.4 11/28/2021    HCT 39.7 11/28/2021     11/28/2021     11/28/2021    K 3.9 11/28/2021     11/28/2021    CREATININE 1.4 (H) 11/28/2021    BUN 9 11/28/2021    CO2 18 (L) 11/28/2021    GLUCOSE 152 (H) 11/28/2021    ALT 14 11/27/2021    AST 15 11/27/2021    INR 1.0 11/27/2021    TSH 1.500 11/25/2020    LABA1C 7.6 (H) 11/28/2021    LABMICR 929.0 (H) 07/31/2019     No results for input(s): MG in the last 72 hours. Lab Results   Component Value Date    CALCIUM 9.1 11/28/2021    PHOS 4.7 (H) 03/02/2020        MRI BRAIN W WO CONTRAST   Final Result   MRI BRAIN:      1. Scattered areas of acute to subacute infarct involving the left PCA   territory with patchy areas of enhancement in the areas of subacute infarct. No significant mass effect or midline shift. 2. Otherwise, no acute intracranial abnormality. 3. Chronic infarct is seen involving the right frontal and right parietal   lobes. 4. There is mild global parenchymal volume loss with chronic microvascular   ischemic changes. MRA HEAD:      1.  There is no The left internal carotid artery demonstrates 0-50% stenosis. Bilateral vertebral arteries are patent with flow in the normal direction. CT HEAD WO CONTRAST   Final Result   No acute intracranial abnormality and unchanged. XR CHEST PORTABLE   Final Result   No pneumonia or pleural effusion. Assessment    Principal Problem:    Stroke-like symptoms  Active Problems:    Status post liver transplantation (HCC)    COPD (chronic obstructive pulmonary disease) (HCC)    CKD (chronic kidney disease) stage 3, GFR 30-59 ml/min (HCC)    Essential hypertension    Aphasia    Metabolic encephalopathy  Resolved Problems:    * No resolved hospital problems. *      Plan:  64-year-old female with a past medical history of DM, COPD, on 81 mg of aspirin prior to admission, admitted to telemetry unit with     Acute left PCA area ischemic stroke  [facility] CT unremarkable  Observation tele  asa lifelong, Plavix 3 weeks  MRI/MRA--acute left PCA ischemic stroke, stenosis of right ICA and right PCA  EEG  carotid US demonstrating right internal carotid artery occlusionchronic  No PFO  on echocardiogram reviewed from 2012, 2016, 2019  Neurology consult appreciateddiscussed case    AMS secondary to stroke  Assess for infection  Urine culture  Chest x-ray without infiltrate  No likely causative agents on med rec  Check urine drug screen  Check ammonia, TSH, B12, folate, RPR-- NML  Avoid sedating or anticholinergic medications  avoid circadian disruption    HyperlipidemiaStatin--    DM2 uncSSI, MBS, A1c 7.6    Hypertensionpermissive hypertension  Slowly reintroduce blood pressure medications  Monitor    Status post liver transplant   -continue immunosuppressive medications  LFTs NML  INR 1.0  Monitor    PT/OT  D/C planning     CODE STATUS full code    Transfer initiated with [facility] transfer [facility] 11/29. Follow-up phone calls with [facility] transfer center today.   Spoke with Dr. Kevyn Hernandez with the stroke team.  Patient has been accepted pending bed. Again notified of tight bed status with anticipated long delay in transfer.     Electronically signed by Cecelia Morelos MD on 11/30/2021 at 8:45 AM

## 2021-11-30 NOTE — CARE COORDINATION
Care Coordination:  SW met with patient and spoke briefly to daughter Autumn Martinez on phone to assess for discharge planning. Daughter was at work and could only talk for a minute so interview incomplete. Patient with new CVA increased left weakness and aphasia resolving. Has history of previous CVA, cirrhosis, DM. Patient lives alone in a handicapped accessible apartment in [place]. Per both patient and daughter she has been weaker lately and has relied on a wheelchair for mobility. She has a  through [facility] and was receiving home therapy. Neither the daughter or patient can recall name of the agency or provide the name of the . Patient has a wheelchair, walker, cane and an equipped bathroom. Patient has had rehab at acute level at [facility], has been in [place] [address] at [facility] in the past.  According to daughter, the [facility]  has been trying to get additional support services to patient or to convince her to move to [facility] or [place]. Before ending the phone call, daughter stated that she believe the discharge plan is for patient to transfer to the [facility]. Per MD patient has been accepted at [facility] pending bed availability. SW discussed discharge  options with patient. She is adamant that she will not consider placement acute or sub acute. She feels she does better in her home environment. If not going to [facility] she wants to go home. PT OT  15-17/24 . SW will continue to follow with patient and call daughter again in attempt  to identify the home care service provider and care manager.

## 2021-11-30 NOTE — PROGRESS NOTES
Occupational Therapy  OT BEDSIDE TREATMENT NOTE   [facility] [address]  [address] [place] [address]        YMJJ:  Patient Name: Tamra Hendricks  MRN: [id number]  : 1960  Room: [address] [place] [address]     Per OT Eval:    Evaluating OT: Kimberly Sims, OTR/L, [id number]     Referring Provider: MIKEL Drew CNP  Specific Provider Orders/Date: OT eval and treat     Diagnosis: Aphasia [R47.01]  Expressive aphasia [R47.01]   Surgery: NA     Pertinent Medical History:   Past Medical History        Past Medical History:   Diagnosis Date    Arthritis       right knee    Blood circulation, collateral      Chronic fatigue 2016    Cirrhosis of liver ([address]       end stage    COPD exacerbation ([address] 2015     pt denies having    Diabetes mellitus ([address]      Essential hypertension 2/15/2016    Gall stones       did have stent placed    GERD (gastroesophageal reflux disease)      Hep C w/o coma, chronic ([address]       Sucessfully treated with Harvoni-no longer has viral load    History of blood transfusion      Neuropathy      PFO (patent foramen ovale)      Pneumonia      Type 2 diabetes mellitus with stage 3 chronic kidney disease, with long-term current use of insulin ([address]      Unspecified cerebral artery occlusion with cerebral infarction      Varices              Past Surgical History         Past Surgical History:   Procedure Laterality Date    BRAIN SURGERY        CARDIAC CATHETERIZATION         cerebral angiogrem to check cavernous malformation in head - negative    CARDIAC SURGERY   2014     heart cath     SECTION         x 2     SECTION    and     COLONOSCOPY        ECHO COMPL W DOP COLOR FLOW   2012          ENDOSCOPY, COLON, DIAGNOSTIC        ESOPHAGEAL VARICE LIGATION        banding    LIVER TRANSPLANT        OTHER SURGICAL HISTORY Right 16     Right Knee Arthroscopy with Debridement partial lateral menisectomy, chondroplasty, synovectomy         Precautions:  Fall Risk, L side weakness     Assessment of current deficits    [x]? Functional mobility          [x]? ADLs           [x]? Strength                  []?Cognition    [x]? Functional transfers        [x]? IADLs         []? Safety Awareness   [x]? Endurance    [x]? Fine Coordination                        [x]? Balance      [x]? Vision/perception   [x]? Sensation      [x]? Gross Motor Coordination            [x]? ROM           []? Delirium                   [x]?  Motor Control      OT PLAN OF CARE   OT POC based on physician orders, patient diagnosis and results of clinical assessment     Frequency/Duration 1-3 days/wk for 2 weeks PRN   Specific OT Treatment Interventions to include:   * Instruction/training on adapted ADL techniques and AE recommendations to increase functional independence within precautions       * Training on energy conservation strategies, correct breathing pattern and techniques to improve independence/tolerance for self-care routine  * Functional transfer/mobility training/DME recommendations for increased independence, safety, and fall prevention  * Patient/Family education to increase follow through with safety techniques and functional independence  * Recommendation of environmental modifications for increased safety with functional transfers/mobility and ADLs  * Cognitive retraining/development of therapeutic activities to improve problem solving, judgement, memory, and attention for increased safety/participation in ADL/IADL tasks  * Sensory re-education to improve body/limb awareness, maintain/improve skin integrity, and improve hand/UE motor function  * Visual-perceptual training to improve environmental scanning, visual attention/focus, and oculomotor skills for increased safety/independence with functional transfers/mobility and ADLs  * Splinting/positioning for increased function, prevention of contractures, and improve skin integrity  * Therapeutic exercise to improve motor endurance, ROM, and functional strength for ADLs/functional transfers  * Therapeutic activities to facilitate/challenge dynamic balance, stand tolerance for increased safety and independence with ADLs  * Therapeutic activities to facilitate gross/fine motor skills for increased independence with ADLs  * Neuro-muscular re-education: facilitation of righting/equilibrium reactions, midline orientation, scapular stability/mobility, normalization of muscle tone, and facilitation of volitional active controled movement  * Positioning to improve skin integrity, interaction with environment and functional independence  * Delirium prevention/treatment     Modified Jenkins Scale (MRS)  Score     Description  0             No symptoms  1             No significant disability despite symptoms  2             Slight disability; able to look after own affairs  3             Moderate disability; able to ambulate without assist/ requires assist with ADLs  4             Moderate/Severe disability;requires assist to ambulate/assist with ADLs  5             Severe disability;bedridden/incontinent   6               Score:   4     Recommended Adaptive Equipment:  TBD     Comments: Based on patient's functional performance as stated below and level of assistance needed prior to admission, this therapist believes that the patient would benefit from further skilled OT following hospital stay in an effort to increase safety, functional independence, and quality of life.     Home Living: Pt lives alone in a 1st floor apartment with no step(s) to enter and no rail(s); bed/bath on unit. Laundry on 3rd floor with elevator access.   Bathroom setup: tub shower, no safety bars, standard toilet  Equipment owned: ww, w/c, shower chair     Prior Level of Function: modified independent/independent with ADLs and some assistance with IADLs; using w/c or ww for ambulation.    Driving: no  Occupation: retired      Pain Level: reports \"a little\" pain in B shoulders/back, addressed with repositioning  Cognition: A&O: 4/4; Follows 2-3 step directions, min cues needed for redirection              Memory: F              Sequencing: F              Problem solving: F              Judgement/safety: F                Functional Assessment: AM-PAC Daily Activity Raw Score: 15/24    Initial Eval Status  Date: 11/28/21 Treatment Status  Date: 11/30/21 STGs = LTGs  Time frame: 10-14 days   Feeding Min A DNT  Nicolasa per previous level  Set up A   Grooming Min A  Seated, simulated  SBA  Pt washed face, applied deodorant, combed hair seated upright in chair SBA while seated/standing at sink    UB Dressing Min A  simulated  Nicolasa  Magi/doff hospital gown seated EOB SBA   LB Dressing Min A overall  To doff/magi socks at bedside chair  modA  Pt able to doff socks using cross over technique, with assistance to magi    DNT pants this date SBA   Bathing Max A  simulated  modA  Pt completed sponge bathing task seated/standing, with pt able to wash of UB, bending to wash of LE's, with assistance to stand and wash of buttocks/roberto area Min A   Toileting Min A  Simulated toilet tfr at chair with bedpan, completed self hygiene  modA  Pt completed toileting task seated on bed pan at EOB, with pt requiring assistance to stand and to assist with hygiene  SBA   Bed Mobility  Rolling: NT  Supine to sit: NT  Sit to supine: SBA  DNT  Pt seated EOB upon arrival and at end of session supervision   Functional Transfers Sit to stand: Min A ww  Stand to sit: min A ww  Cues for body alignment/hand placement  Nicolasa  Sit to Stand  Stand to Sit    Stand Pivot Transfer EOB<>Chair with hand held assist    Cueing for RUE hand placement SBA   Functional Mobility Mod A ww  In-room distance (fwd/bwd) near EOB, L foot drop Nicolasa  Pt took of short steps during SPT with hand held assist          SBA ww for in-home distance   Balance Sitting:     Static:  SBA    Dynamic: SBA  Standing: Min A ww  Sitting EOB:  SBA    Standing:  Nicolasa     Endurance/Activity Tolerance fair  Fair- good   Visual/  Perceptual Glasses: reading                    Hand Dominance R    AROM (PROM) Strength Additional Info:    RUE  WFL 3+/5 good  and wfl FMC/dexterity noted during ADL tasks      LUE WFL AROM proximally, WFL AAROM distally, digits in flexion contracture at rest 2-/5 impaired  and impaired FMC/dexterity noted during ADL tasks      Hearing: [facility]  Sensation: reports B arms \"mildly N/T  Tone: flexor tone LUE  Edema: unremarkable        Education:  Pt was educated on role of OT, goals to be reached, importance of OOB activity, safety and hand placement with transfers, safety/balance with short steps during SPT, and andrea dressing techniques to assist with UB/LB dressing tasks. Comments: Upon arrival pt seated EOB, requesting to use of restroom, agreeable to therapy, speaking with nursing okaying pt to be seen this session. Pt compelted of toileting task on use of bed pan seated EOB, transfers, functional mobility of short steps and ADL tasks this session. At end of session, pt seated upright in chair, all lines and tubes intact, call light within reach, nursing aware. · Pt has made fair progress towards set goals.    · Continue with current plan of care focusing on increasing of independency with transfers and ADL tasks      Treatment Time In: 2:55pm           Treatment Time Out: 3:20pm               Treatment Charges: Mins Units   Ther Ex  51880     Manual Therapy 00786     Thera Activities 17406     ADL/Home Mgt [id number] 25 2   Neuro Re-ed 25026     Group Therapy      Orthotic manage/training  97169     Non-Billable Time     Total Timed Treatment 25 2        Shaye Singleton ALTMAN/L [id number]

## 2021-12-01 PROBLEM — I63.9 ACUTE ISCHEMIC STROKE (HCC): Status: ACTIVE | Noted: 2019-03-13

## 2021-12-01 PROBLEM — G93.41 METABOLIC ENCEPHALOPATHY: Status: RESOLVED | Noted: 2021-11-29 | Resolved: 2021-12-01

## 2021-12-01 LAB
METER GLUCOSE: 140 MG/DL (ref 74–99)
METER GLUCOSE: 141 MG/DL (ref 74–99)
METER GLUCOSE: 173 MG/DL (ref 74–99)

## 2021-12-01 PROCEDURE — 6370000000 HC RX 637 (ALT 250 FOR IP): Performed by: FAMILY MEDICINE

## 2021-12-01 PROCEDURE — U0005 INFEC AGEN DETEC AMPLI PROBE: HCPCS

## 2021-12-01 PROCEDURE — 97530 THERAPEUTIC ACTIVITIES: CPT

## 2021-12-01 PROCEDURE — 6370000000 HC RX 637 (ALT 250 FOR IP): Performed by: PSYCHIATRY & NEUROLOGY

## 2021-12-01 PROCEDURE — 1200000000 HC SEMI PRIVATE

## 2021-12-01 PROCEDURE — 82962 GLUCOSE BLOOD TEST: CPT

## 2021-12-01 PROCEDURE — 6360000002 HC RX W HCPCS: Performed by: FAMILY MEDICINE

## 2021-12-01 PROCEDURE — U0003 INFECTIOUS AGENT DETECTION BY NUCLEIC ACID (DNA OR RNA); SEVERE ACUTE RESPIRATORY SYNDROME CORONAVIRUS 2 (SARS-COV-2) (CORONAVIRUS DISEASE [COVID-19]), AMPLIFIED PROBE TECHNIQUE, MAKING USE OF HIGH THROUGHPUT TECHNOLOGIES AS DESCRIBED BY CMS-2020-01-R: HCPCS

## 2021-12-01 RX ORDER — ATORVASTATIN CALCIUM 40 MG/1
40 TABLET, FILM COATED ORAL NIGHTLY
Qty: 30 TABLET | Refills: 3 | Status: SHIPPED | OUTPATIENT
Start: 2021-12-01 | End: 2022-05-23 | Stop reason: SDUPTHER

## 2021-12-01 RX ORDER — ASPIRIN 81 MG/1
81 TABLET ORAL DAILY
Qty: 30 TABLET | Refills: 3 | Status: SHIPPED | OUTPATIENT
Start: 2021-12-02 | End: 2022-05-23 | Stop reason: SDUPTHER

## 2021-12-01 RX ORDER — CLOPIDOGREL BISULFATE 75 MG/1
75 TABLET ORAL DAILY
Qty: 21 TABLET | Refills: 0 | Status: SHIPPED | OUTPATIENT
Start: 2021-12-02 | End: 2022-05-23 | Stop reason: SDUPTHER

## 2021-12-01 RX ADMIN — MIRTAZAPINE 22.5 MG: 15 TABLET, FILM COATED ORAL at 20:11

## 2021-12-01 RX ADMIN — TACROLIMUS 1.5 MG: 0.5 CAPSULE ORAL at 08:56

## 2021-12-01 RX ADMIN — CLOPIDOGREL BISULFATE 75 MG: 75 TABLET ORAL at 08:53

## 2021-12-01 RX ADMIN — LOSARTAN POTASSIUM 50 MG: 50 TABLET, FILM COATED ORAL at 09:02

## 2021-12-01 RX ADMIN — ATORVASTATIN CALCIUM 40 MG: 40 TABLET, FILM COATED ORAL at 20:10

## 2021-12-01 RX ADMIN — ASPIRIN 81 MG: 81 TABLET, COATED ORAL at 08:53

## 2021-12-01 RX ADMIN — METFORMIN HYDROCHLORIDE 500 MG: 500 TABLET, EXTENDED RELEASE ORAL at 08:56

## 2021-12-01 RX ADMIN — TACROLIMUS 1.5 MG: 0.5 CAPSULE ORAL at 21:00

## 2021-12-01 ASSESSMENT — PAIN SCALES - GENERAL
PAINLEVEL_OUTOF10: 0

## 2021-12-01 NOTE — PROGRESS NOTES
Subjective:  Feeling better wants to go home  No CP or SOB  No fever or chills   No uncontrolled pain  No vomiting or diarrhea   No events reported overnight     Objective:    BP (!) 143/83   Pulse 68   Temp 97.9 °F (36.6 °C) (Temporal)   Resp 16   Ht 5' 6\" (1.676 m)   Wt 163 lb (73.9 kg)   LMP  (LMP Unknown)   SpO2 98%   BMI 26.31 kg/m²     24HR INTAKE/OUTPUT:  No intake or output data in the 24 hours ending 12/01/21 0646    General appearance: NAD, conversant  Neck: FROM, supple   Lungs: Clear bilaterally no wheezes, no rhonchi, no crackles  CV: RRR, no MRGs; normal carotid upstroke and amplitude without Bruits  Abdomen: Soft, non-tender; no masses or HSM  Extremities: No edema, no cyanosis, no clubbing  Skin: Intact no rash, no lesions, no ulcers    Psych: Alert and oriented normal affect  Neuro: Nonfocal  Most Recent Labs  Lab Results   Component Value Date    WBC 4.6 11/28/2021    HGB 13.4 11/28/2021    HCT 39.7 11/28/2021     11/28/2021     11/28/2021    K 3.9 11/28/2021     11/28/2021    CREATININE 1.4 (H) 11/28/2021    BUN 9 11/28/2021    CO2 18 (L) 11/28/2021    GLUCOSE 152 (H) 11/28/2021    ALT 14 11/27/2021    AST 15 11/27/2021    INR 1.0 11/27/2021    TSH 1.500 11/25/2020    LABA1C 7.6 (H) 11/28/2021    LABMICR 929.0 (H) 07/31/2019     No results for input(s): MG in the last 72 hours. Lab Results   Component Value Date    CALCIUM 9.1 11/28/2021    PHOS 4.7 (H) 03/02/2020        MRI BRAIN W WO CONTRAST   Final Result   MRI BRAIN:      1. Scattered areas of acute to subacute infarct involving the left PCA   territory with patchy areas of enhancement in the areas of subacute infarct. No significant mass effect or midline shift. 2. Otherwise, no acute intracranial abnormality. 3. Chronic infarct is seen involving the right frontal and right parietal   lobes. 4. There is mild global parenchymal volume loss with chronic microvascular   ischemic changes. MRA HEAD:      1. There is no significant flow related enhancement within the right internal   carotid artery with decreased flow related enhancement in the right MCA and   right JADON. 2. There is occlusion involving the P1 segment of the left posterior cerebral   artery. 3. Focal high-grade stenosis involving a P3 segment of the right posterior   cerebral artery. 4. There appears to be a 2 mm outpouching arising from the left supraclinoid   ICA, which may represent a small aneurysm. These results were sent to the Results [address] ([address] [employer] [address] [employer]) on   11/30/2021 at 7:53 am to be communicated to the referring/covering health   care provider/office. MRA HEAD WO CONTRAST   Final Result   MRI BRAIN:      1. Scattered areas of acute to subacute infarct involving the left PCA   territory with patchy areas of enhancement in the areas of subacute infarct. No significant mass effect or midline shift. 2. Otherwise, no acute intracranial abnormality. 3. Chronic infarct is seen involving the right frontal and right parietal   lobes. 4. There is mild global parenchymal volume loss with chronic microvascular   ischemic changes. MRA HEAD:      1. There is no significant flow related enhancement within the right internal   carotid artery with decreased flow related enhancement in the right MCA and   right JADON. 2. There is occlusion involving the P1 segment of the left posterior cerebral   artery. 3. Focal high-grade stenosis involving a P3 segment of the right posterior   cerebral artery. 4. There appears to be a 2 mm outpouching arising from the left supraclinoid   ICA, which may represent a small aneurysm. These results were sent to the Results [address] ([address] [employer]) on   11/30/2021 at 7:53 am to be communicated to the referring/covering health   care provider/office.          Vascular carotid duplex bilateral   Final Result   Redemonstration of chronic right internal carotid artery occlusion as   described previously. The left internal carotid artery demonstrates 0-50% stenosis. Bilateral vertebral arteries are patent with flow in the normal direction. CT HEAD WO CONTRAST   Final Result   No acute intracranial abnormality and unchanged. XR CHEST PORTABLE   Final Result   No pneumonia or pleural effusion. Assessment    Principal Problem:    Stroke-like symptoms  Active Problems:    Status post liver transplantation (HCC)    COPD (chronic obstructive pulmonary disease) (HCC)    CKD (chronic kidney disease) stage 3, GFR 30-59 ml/min (HCC)    Essential hypertension    Expressive aphasia    Metabolic encephalopathy  Resolved Problems:    * No resolved hospital problems. *      Plan:  78-year-old female with a past medical history of DM, COPD, on 81 mg of aspirin prior to admission, admitted to telemetry unit with     Acute left PCA area ischemic stroke  [facility] CT unremarkable  Observation tele  asa lifelong, Plavix 3 weeks  MRI/MRA--acute left PCA ischemic stroke, stenosis of right ICA and right PCA  EEG  carotid US demonstrating right internal carotid artery occlusionchronic  No PFO  on echocardiogram reviewed from 2012, 2016, 2019  Neurology consult appreciateddiscussed case  Recommending Zio patch on discharge    AMS secondary to stroke  Assess for infection  Urine culture  Chest x-ray without infiltrate  No likely causative agents on med rec  Check urine drug screen  Check ammonia, TSH, B12, folate, RPR-- NML  Avoid sedating or anticholinergic medications  avoid circadian disruption    HyperlipidemiaStatin--    DM2 uncSSI, MBS, A1c 7.6    Hypertensionpermissive hypertension  Slowly reintroduce blood pressure medications  Monitor    Status post liver transplant   -continue immunosuppressive medications  LFTs NML  INR 1.0  Monitor    PT/OT  D/C planning discharge home     CODE STATUS full code    Transfer initiated with [facility] 11/29.   Follow-up phone calls with [facility] transfer center 11/30. Spoke with Dr. Blaire Byers with the stroke team.  Patient has been accepted pending bed. Again notified of tight bed status with anticipated long delay in transfer.     Electronically signed by Yeimi Epps MD on 12/1/2021 at 6:46 AM

## 2021-12-01 NOTE — CARE COORDINATION
Call received from [facility], [phone], re: transition of care plan. Reviewed plan and pending pre-cert. Patient is new the patient waiver program, about two months. She has seven meals a week and is approved for 28 hours of in home services a week. They have not found anyone to fill the hours at this time. Patient also previously was getting home PT and OT services but those have been stopped. Her community Case Manger is Nneka Dias, [phone]. Will continue to follow. Sissy Morton RN.

## 2021-12-01 NOTE — PROGRESS NOTES
indep  Dynamic Standing:  Bear AAD Sitting EOB:  indep  Dynamic Standing: SBA with AAD     -Pt is A & O x 3  -Sensation:  Pt denies numbness and tingling to extremities  -Edema:  unremarkable     Therapeutic Exercises:  Functional activity   · 1x10-15 ball of foot step to tape on floor with AD  · 1x8 step to and step back from tape on floor with AD    Patient education  Pt educated on safety, sequencing of transfers, and role of PT    Patient response to education:   Pt verbalized understanding Pt demonstrated skill Pt requires further education in this area   yes Yes, with assist Yes, reinforce     Comments:  Pt received supine in bed and agreeable to PT session   At initiation of session, pt pleasantly conversing with therapists with only occasional difficulty. Pt able to scoot to EOB with only supervision of therapist to ensure safety. Low-pivot to chair from bed to initiate functional activity. Pt requires only light assistance to maintain balance. Pt ambulated within room x2 utilizing andrea-walker for short distance demonstrating significant ataxia with LLE and L ankle inversion upon weight acceptance. Moderate verbal/tactile cues to improve walker placement and LLE placement. Pt limited within session d/t fatigue during activity. Pre-gait and coordination activity in standing with AD focused on stepping to tape with appropriate stance. Pt showed within session improvement with part-task, whole task practice. Pt returned to chair to end session. Pt with all needs met and call light in reach. Pt would benefit from continued PT POC to address functional deficits described above. Treatment:  Patient practiced and was instructed in the following treatment:     Patient education provided continuously throughout session for sequencing, safety maintenance, and improving any deficits found during the evaluation.    Bed mobility training - pt given verbal and tactile cues to facilitate proper sequencing and safety during rolling and supine>sit as well as provided with physical assistance to complete task    Sitting EOB for >5 minutes for upright tolerance, postural awareness and BLE ROM   STS and pivot transfer training - pt educated on proper hand and foot placement, safety and sequencing, and use of proper technique to safely complete sit<>stand and pivot transfers with hands on assistance to complete task safely   Gait training- pt was given verbal and tactile cues to facilitate use of SPC and hemiwalker during ambulation as well as provided with physical assistance.  Therapeutic exercise as listed above and in comments. PHYSICAL THERAPY PLAN OF CARE:    Referring provider/PT Order:    11/27/21 [id number]  PT evaluation and treat           Radha Huffman Jewels, APRN - CNP   Diagnosis:  Aphasia [R47.01]  Expressive aphasia [S46.86]  Metabolic encephalopathy [N08.62]      Specific instructions for next treatment:  Progress ambulation distance and practice using andrea-walker for moderate distances. Perhaps w/c follow or chair follow focus on forward ambulation in a more open environment. Time in  1400  Time out  1440    Total Treatment Time  40 minutes     Evaluation Time includes thorough review of current medical information, gathering information on past medical history/social history and prior level of function, completion of standardized testing/informal observation of tasks, assessment of data and education on plan of care and goals.     CPT codes:  [x] Low Complexity PT evaluation 68801  [] Moderate Complexity PT evaluation 63535  [] High Complexity PT evaluation 12161  [] PT Re-evaluation 01822  [] Gait training 74227 -- minutes  [] Manual therapy [id number] -- minutes  [x] Therapeutic activities 68809 40 minutes  [] Therapeutic exercises 77464 -- minutes  [] Neuromuscular reeducation 82408 -- minutes     Celina Gama, ANUJ bazzi, PT, DPT  [id number]

## 2021-12-01 NOTE — DISCHARGE SUMMARY
Physician Discharge Summary     Patient ID:  Mandie Granados  [id number]  62 y.o.  1960    Admit date: 11/27/2021    Discharge date and time:  12/1/2021    Discharge Diagnoses: Principal Problem:    Acute ischemic stroke [facility])  Active Problems:    Status post liver transplantation ([facility] [address]    COPD (chronic obstructive pulmonary disease) (HCC)    CKD (chronic kidney disease) stage 3, GFR 30-59 ml/min (HCC)    Essential hypertension    Expressive aphasia  Resolved Problems:    * No resolved hospital problems.  *      Consults: IP CONSULT TO HOSPITALIST  IP CONSULT TO NEUROLOGY    Procedures: See below    Hospital Course:  27-year-old female with a past medical history of DM, COPD, on 81 mg of aspirin prior to admission, admitted to telemetry unit with     Acute left PCA area ischemic stroke  [facility] CT unremarkable  Observation tele  asa lifelong, Plavix 3 weeks  MRI/MRA--acute left PCA ischemic stroke, stenosis of right ICA and right PCA  EEG  carotid US demonstrating right internal carotid artery occlusionchronic  No PFO  on echocardiogram reviewed from 2012, 2016, 2019  Neurology consult appreciateddiscussed case  Recommending Zio patch on discharge     AMS secondary to stroke  Assess for infection  Urine culture  Chest x-ray without infiltrate  No likely causative agents on med rec  Check urine drug screen  Check ammonia, TSH, B12, folate, RPR-- NML  Avoid sedating or anticholinergic medications  avoid circadian disruption     HyperlipidemiaStatin--     DM2 uncSSI, MBS, A1c 7.6     Hypertensionpermissive hypertension  Slowly reintroduce blood pressure medications  Monitor     Status post liver transplant   -continue immunosuppressive medications  LFTs NML  INR 1.0  Monitor     PT/OT  D/C planning discharge home    Discharge Exam:  See progress note from today    Condition:  Stable    Disposition: home    Patient Instructions:   Current Discharge Medication List      START taking these medications Details   aspirin 81 MG EC tablet Take 1 tablet by mouth daily  Qty: 30 tablet, Refills: 3      clopidogrel (PLAVIX) 75 MG tablet Take 1 tablet by mouth daily  Qty: 21 tablet, Refills: 0         CONTINUE these medications which have CHANGED    Details   atorvastatin (LIPITOR) 40 MG tablet Take 1 tablet by mouth nightly  Qty: 30 tablet, Refills: 3         CONTINUE these medications which have NOT CHANGED    Details   !! mirtazapine (REMERON) 7.5 MG tablet TAKE 1 TABLET BY MOUTH NIGHTLY  Qty: 30 tablet, Refills: 0    Associated Diagnoses: Insomnia, unspecified type      fluticasone (FLONASE) 50 MCG/ACT nasal spray INSTILL 2 SPRAYS IN EACH NOSTRIL ONCE DAILY  Qty: 16 g, Refills: 2    Associated Diagnoses: Seasonal allergic rhinitis due to pollen      !! mirtazapine (REMERON) 15 MG tablet TAKE 1 TABLET BY MOUTH NIGHTLY  Qty: 30 tablet, Refills: 0    Associated Diagnoses: Insomnia, unspecified type      ondansetron (ZOFRAN-ODT) 4 MG disintegrating tablet Take 1 tablet by mouth 3 times daily as needed for Nausea or Vomiting  Qty: 10 tablet, Refills: 0      metFORMIN (GLUCOPHAGE-XR) 500 MG extended release tablet Take 1 tablet by mouth daily (with breakfast)  Qty: 90 tablet, Refills: 0    Associated Diagnoses: Uncontrolled type 2 diabetes mellitus with hypoglycemia without coma ([facility])      nystatin (NYSTATIN) 832258 UNIT/GM powder APPLY FOUR TIMES DAILY  Qty: 60 g, Refills: 0    Associated Diagnoses: Skin yeast infection      pantoprazole (PROTONIX) 40 MG tablet TAKE 1 TABLET BY MOUTH EVERY DAY  Qty: 30 tablet, Refills: 2    Associated Diagnoses: Gastroesophageal reflux disease, unspecified whether esophagitis present      losartan (COZAAR) 50 MG tablet Take one tablet daily, call with BP > 160/100  Qty: 90 tablet, Refills: 3    Comments: **Patient requests 90 days supply**  Associated Diagnoses: HTN (hypertension), benign      amLODIPine (NORVASC) 2.5 MG tablet TAKE 1 TABLET BY MOUTH EVERY DAY  Qty: 90 tablet, Refills: 3 Associated Diagnoses: HTN (hypertension), benign      albuterol sulfate  (90 Base) MCG/ACT inhaler INHALE 2 PUFFS EVERY 6 HOURS AS NEEDED FOR WHEEZING  Qty: 1 Inhaler, Refills: 2    Associated Diagnoses: Shortness of breath      Melatonin 5 MG CHEW Take 1 tablet by mouth nightly      tacrolimus (PROGRAF) 1 MG capsule Take 1.5 mg by mouth 2 times daily       diclofenac sodium (VOLTAREN) 1 % GEL Apply 2 g topically 2 times daily  Qty: 1 Tube, Refills: 3    Associated Diagnoses: Bilateral thumb pain       !! - Potential duplicate medications found. Please discuss with provider.       STOP taking these medications       aspirin 81 MG chewable tablet Comments:   Reason for Stopping:             Activity: activity as tolerated  Diet: cardiac diet and diabetic diet    Follow-up with 1wk PCP 1mth neuro    Note that over 30 minutes was spent in preparing discharge papers, discussing discharge with patient, staff, consultants, medication review, arranging follow up, etc.    Signed:  Krystle Serrano MD  12/1/2021  9:40 AM

## 2021-12-01 NOTE — PROGRESS NOTES
Faxed stroke clinic appointment request form to stroke clinic office with request to schedule post hospital stroke clinic follow up appointment.  Stroke clinic confirmation fax received, communicated to patient that clinic will reach out to them to schedule appointment

## 2021-12-01 NOTE — CARE COORDINATION
Case reviewed in IDR rounds and with Dr Bryson Burgos. Patient medically stable to be discharged today. Bed will not available at [facility] for transfer soon per notes. Met with the patient at the bedside to discuss transition of care. Again discussed the possibility of transition of care to rehab prior to returning to home. Patient stated she did not want to go to rehab and wanted to go home. Patient stated her daughter Camilo Mayer would help her. When asked for Rossi's number, patient stated she was having problems with her numbers and couldn't remember. Patient also was unable use her phone to access her contacts at this time. Call placed to the patient's daughter Camilo Mayer to review transition of care plans. Reviewed input from neurology and internal medicine. Reviewed input from therapy. Per Camilo Moreno, the patient needs to transition to rehab prior to returning home. Discussed recommendation for ARU. Camilo Mayer would like her mother to go to [facility] and will talk to her mother about a short stay at rehab prior to returning home. Call placed [facility], liaison with [facility] ARU and referral made. Also reached out to PT for updated note for pre-cert to be started. [facility] will start precert this evening once PT notes are updated. [employer] message also sent to Neurology NP Jessica re: possible need for Zio patch at discharge. Will continue to follow. Joselyn Mora RN.  P:  [phone]    The Plan for Transition of Care is related to the following treatment goals: improve functional mobility to return home. The Patient and/or patient representative, and daughter Xu Meyer, was provided with a choice of provider and agrees with the discharge plan. [x] Yes [] No    Freedom of choice list was provided with basic dialogue that supports the patient's individualized plan of care/goals, treatment preferences and shares the quality data associated with the providers.  [x] Yes [] No

## 2021-12-02 LAB
METER GLUCOSE: 141 MG/DL (ref 74–99)
METER GLUCOSE: 164 MG/DL (ref 74–99)
METER GLUCOSE: 167 MG/DL (ref 74–99)
METER GLUCOSE: 168 MG/DL (ref 74–99)
ORGANISM: ABNORMAL
SARS-COV-2, PCR: NOT DETECTED
URINE CULTURE, ROUTINE: ABNORMAL

## 2021-12-02 PROCEDURE — 6360000002 HC RX W HCPCS: Performed by: FAMILY MEDICINE

## 2021-12-02 PROCEDURE — 99222 1ST HOSP IP/OBS MODERATE 55: CPT | Performed by: INTERNAL MEDICINE

## 2021-12-02 PROCEDURE — 6370000000 HC RX 637 (ALT 250 FOR IP): Performed by: PSYCHIATRY & NEUROLOGY

## 2021-12-02 PROCEDURE — 6370000000 HC RX 637 (ALT 250 FOR IP): Performed by: FAMILY MEDICINE

## 2021-12-02 PROCEDURE — 2580000003 HC RX 258: Performed by: INTERNAL MEDICINE

## 2021-12-02 PROCEDURE — 1200000000 HC SEMI PRIVATE

## 2021-12-02 PROCEDURE — APPSS60 APP SPLIT SHARED TIME 46-60 MINUTES: Performed by: PHYSICIAN ASSISTANT

## 2021-12-02 PROCEDURE — 6370000000 HC RX 637 (ALT 250 FOR IP): Performed by: INTERNAL MEDICINE

## 2021-12-02 PROCEDURE — 93242 EXT ECG>48HR<7D RECORDING: CPT

## 2021-12-02 PROCEDURE — 6360000002 HC RX W HCPCS: Performed by: INTERNAL MEDICINE

## 2021-12-02 PROCEDURE — 82962 GLUCOSE BLOOD TEST: CPT

## 2021-12-02 RX ADMIN — LOSARTAN POTASSIUM 50 MG: 50 TABLET, FILM COATED ORAL at 08:23

## 2021-12-02 RX ADMIN — FLUTICASONE PROPIONATE 1 SPRAY: 50 SPRAY, METERED NASAL at 08:23

## 2021-12-02 RX ADMIN — TACROLIMUS 1.5 MG: 0.5 CAPSULE ORAL at 09:00

## 2021-12-02 RX ADMIN — MIRTAZAPINE 22.5 MG: 15 TABLET, FILM COATED ORAL at 21:29

## 2021-12-02 RX ADMIN — INSULIN LISPRO 1 UNITS: 100 INJECTION, SOLUTION INTRAVENOUS; SUBCUTANEOUS at 21:28

## 2021-12-02 RX ADMIN — ONDANSETRON 4 MG: 2 INJECTION INTRAMUSCULAR; INTRAVENOUS at 10:17

## 2021-12-02 RX ADMIN — CLOPIDOGREL BISULFATE 75 MG: 75 TABLET ORAL at 08:24

## 2021-12-02 RX ADMIN — ASPIRIN 81 MG: 81 TABLET, COATED ORAL at 08:23

## 2021-12-02 RX ADMIN — TACROLIMUS 1.5 MG: 0.5 CAPSULE ORAL at 21:29

## 2021-12-02 RX ADMIN — METFORMIN HYDROCHLORIDE 500 MG: 500 TABLET, EXTENDED RELEASE ORAL at 08:23

## 2021-12-02 RX ADMIN — CEFTRIAXONE 1000 MG: 1 INJECTION, POWDER, FOR SOLUTION INTRAMUSCULAR; INTRAVENOUS at 10:33

## 2021-12-02 RX ADMIN — ATORVASTATIN CALCIUM 40 MG: 40 TABLET, FILM COATED ORAL at 21:29

## 2021-12-02 ASSESSMENT — PAIN SCALES - GENERAL
PAINLEVEL_OUTOF10: 0
PAINLEVEL_OUTOF10: 0

## 2021-12-02 NOTE — PLAN OF CARE
[employer] XT monitor for 14 days. Serial number [id number]. Instructed patient to avoid showering in the first 24 hours. Press the button on the monitor when you feel a symptom and write it in your diary. Do not submerge in water. No lotion or power near the patch. Please return the monitor in the box provided. Patient stated she will never remember instructions given. Appears confused. Yaya notified to register the device.

## 2021-12-02 NOTE — CONSULTS
Inpatient Cardiology Consultation      Reason for Consult:  Zio patch vs extended cardiac monitoring    Consulting Physician: Dr Nacho Landon     Requesting Physician:  MIKEL Royal NP    Date of Consultation: 12/2/2021    HISTORY OF PRESENT ILLNESS:   Ms Elise Mckeon is a 61 yo female who was seen by Dr Markus Ayers in 2019 but otherwise not following with [facility]. Past medical history includes normal coronaries on Cath 2013, hypertension, hyperlipidemia, type 2 diabetes mellitus, obstructive sleep apnea, remote DVT/PE 2016, multiple CVA ( R MCA strokes starting 3/13/2019 then again 4/11/2019, 4/15/2019 s/p right STA/MCA bypass 4/23/2019 ), residual left sided weakness and left homonymous hemianopsia, h/o liver transplant 10/2016, h/o alcohol abuse / hepatitis C, emphysema,     [facility]ed [facility]20[facility] with aphagia   Initial CT head negative. Neurology was consulted and recommended MRI / MRA - which revealed  subacute infarct in the left PCA territory; chronic infarcts in the right frontal and right parietal lobes also noted. MRA head shows occlusion of the P1 segment on the left and focal high-grade stenosis involving the P3 segment on the right. Carotid ultrasound showed no chronic right ICA occlusion; 0 to 50% stenosis in the ICA. EEG normal.     She was started on DAPT with ASA and Plavix. She was admitted to a telemetry monitoring floor. No arrhythmias noted thus far. Cardiology was asked to see the patient for further recommendations regarding extended cardiac monitoring as the patient's strokes appear embolic in nature. She states that she has never seen a cardiologist at [facility] and does not follow with anyone locally. She lives in an apartment, independently and completes her ADL without CP/ZURITA. She denies any palpitations, lightheadedness, dizziness falls or LOC. She denies any orthopnea, PND, peripheral edema.  She states that she used to smoke but quit 2 years ago after 15pack year history. Please note: past medical records were reviewed per electronic medical record (EMR) - see detailed reports under Past Medical/ Surgical History. Past Medical History:    1. Cardiac Cath: 02/01/2013:  Normal coronary arteries  2. Echocardiogram: 3/15/19 Zenon Bruce):  Normal left ventricle size and systolic function.  Ejection fraction is visually estimated at 65%.  No regional wall motion abnormalities seen.  Normal left ventricle wall  Thickness.  Normal diastolic function.  Interatrial septum appears intact. Agitated saline injected for shunt evaluation.  Normal right ventricular size and function. TAPSE 33 mm.   No significant valvular abnormalities.  RVSP is 40 mmHg. Top normal PA systolic pressure.  No evidence for hemodynamically significant pericardial effusion.  Compared to prior echo results of 5/22/20112, no  changes noted. 3. Lexiscan MPS 7/2019: non ischemic, EF 74%, no WMA. 4. Multiple CVA ( R MCA strokes starting 3/13/2019 then again 4/11/2019, 4/15/2019 s/p right STA/MCA bypass 4/23/2019 ), residual left sided weakness and left homonymous hemianopsia  5. Hypertension   6. Hyperlipidemia   7. Type 2 diabetes mellitus   8. Obstructive sleep apnea   9. Chronic kidney disease   10. DVT/PE 2016   11. Cirrhosis / liver failure s/p live transplant 2016   12. H/o alcohol abuse   13. H/o hepatitis C   14. Emphysema / previous tobacco abuse  15. Arthritis   16. GERD   16. H/o C section x2, right knee arthroplasty,       Medications Prior to admit:  Prior to Admission medications    Medication Sig Start Date End Date Taking?  Authorizing Provider   aspirin 81 MG EC tablet Take 1 tablet by mouth daily 12/2/21  Yes Joel Marroquin MD   atorvastatin (LIPITOR) 40 MG tablet Take 1 tablet by mouth nightly 12/1/21  Yes Joel Marroquin MD   clopidogrel (PLAVIX) 75 MG tablet Take 1 tablet by mouth daily 12/2/21  Yes Joel Marroquin MD   mirtazapine (REMERON) 7.5 MG tablet TAKE 1 TABLET BY MOUTH NIGHTLY 11/18/21   Boyd Roe, DO   fluticasone Vester Fanrock) 50 MCG/ACT nasal spray INSTILL 2 SPRAYS IN [facility] NOSTRIL ONCE DAILY 11/18/21   Boyd Roe, DO   mirtazapine (REMERON) 15 MG tablet TAKE 1 TABLET BY MOUTH NIGHTLY 11/18/21   Boyd Roe, DO   ondansetron (ZOFRAN-ODT) 4 MG disintegrating tablet Take 1 tablet by mouth 3 times daily as needed for Nausea or Vomiting 11/18/21   Lark Fam, DO   metFORMIN (GLUCOPHAGE-XR) 500 MG extended release tablet Take 1 tablet by mouth daily (with breakfast) 10/18/21   MIKEL Mcnamara CNP   nystatin (NYSTATIN) 051133 UNIT/GM powder APPLY FOUR TIMES DAILY 10/18/21   MIKEL Mcnamara CNP   pantoprazole (PROTONIX) 40 MG tablet TAKE 1 TABLET BY MOUTH EVERY DAY 9/21/21   MIKEL Mcnamara CNP   losartan (COZAAR) 50 MG tablet Take one tablet daily, call with BP > 160/100 9/15/21   MIKEL Mcnamara - CNP   amLODIPine (NORVASC) 2.5 MG tablet TAKE 1 TABLET BY MOUTH EVERY DAY 9/15/21   MIKEL Mcnamara CNP   albuterol sulfate  (90 Base) MCG/ACT inhaler INHALE 2 PUFFS EVERY 6 HOURS AS NEEDED FOR WHEEZING 12/28/20   Boyd Roe, DO   Melatonin 5 MG CHEW Take 1 tablet by mouth nightly    Historical Provider, MD   tacrolimus (PROGRAF) 1 MG capsule Take 1.5 mg by mouth 2 times daily  10/10/18   Historical Provider, MD   diclofenac sodium (VOLTAREN) 1 % GEL Apply 2 g topically 2 times daily 8/2/17   MIKEL Mcnamara CNP       Current Medications:    Current Facility-Administered Medications: cefTRIAXone (ROCEPHIN) 1,000 mg in sterile water 10 mL IV syringe, 1,000 mg, IntraVENous, Q24H  insulin lispro (HUMALOG) injection vial 0-6 Units, 0-6 Units, SubCUTAneous, TID WC  insulin lispro (HUMALOG) injection vial 0-3 Units, 0-3 Units, SubCUTAneous, Nightly  glucose (GLUTOSE) 40 % oral gel 15 g, 15 g, Oral, PRN  dextrose 50 % IV solution, 12.5 g, IntraVENous, PRN  glucagon (rDNA) injection 1 mg, 1 mg, IntraMUSCular, PRN  dextrose 5 % solution, 100 mL/hr, IntraVENous, PRN  clopidogrel (PLAVIX) tablet 75 mg, 75 mg, Oral, Daily  [Held by provider] amLODIPine (NORVASC) tablet 2.5 mg, 2.5 mg, Oral, Daily  fluticasone (FLONASE) 50 MCG/ACT nasal spray 1 spray, 1 spray, Each Nostril, Daily  losartan (COZAAR) tablet 50 mg, 50 mg, Oral, Daily  metFORMIN (GLUCOPHAGE-XR) extended release tablet 500 mg, 500 mg, Oral, Daily with breakfast  mirtazapine (REMERON) tablet 22.5 mg, 22.5 mg, Oral, Nightly  tacrolimus (proGRAF) capsule 1.5 mg, 1.5 mg, Oral, BID  ondansetron (ZOFRAN-ODT) disintegrating tablet 4 mg, 4 mg, Oral, Q8H PRN **OR** ondansetron (ZOFRAN) injection 4 mg, 4 mg, IntraVENous, Q6H PRN  polyethylene glycol (GLYCOLAX) packet 17 g, 17 g, Oral, Daily PRN  aspirin EC tablet 81 mg, 81 mg, Oral, Daily **OR** aspirin suppository 300 mg, 300 mg, Rectal, Daily  atorvastatin (LIPITOR) tablet 40 mg, 40 mg, Oral, Nightly  albuterol (PROVENTIL) nebulizer solution 2.5 mg, 2.5 mg, Nebulization, 4x daily    Allergies:  Chantix [varenicline] and Heparin    Social History:    Lives in an apartment independently. Completes ADL without CP/ZURITA. Does not require supplemental O2. Uses a cane. walker for ambulation assistance. Previous tobacco abuse: 1 ppd for 15 years. Quit 2 years ago(2019)  Previous alcohol abuse, denies current use   Denies illicit drug use     Full code     Family History:   Denies significant family history of coronary artery disease in first degree relatives. REVIEW OF SYSTEMS:     · Constitutional: Denies fevers, chills, night sweats, and fatigue  · HEENT: Denies headaches, nose bleeds, and blurred vision,oral pain, abscess or lesion. · Musculoskeletal: Denies falls, pain to BLE with ambulation and edema to BLE. · Neurological: Denies dizziness and lightheadedness, numbness and tingling  · Cardiovascular: Denies chest pain, palpitations, and feelings of heart racing.    · Respiratory: Denies orthopnea and PND, cough, ZURITA  · Gastrointestinal: Denies heartburn, nausea/vomiting, diarrhea and constipation, black/bloody, and tarry stools. · Genitourinary: Denies dysuria and hematuria  · Hematologic: Denies excessive bruising or bleeding  · Lymphatic: Denies lumps and bumps to neck, axilla, breast, and groin      PHYSICAL EXAM:   BP (!) 144/72   Pulse 78   Temp 97.9 °F (36.6 °C) (Temporal)   Resp 18   Ht 5' 6\" (1.676 m)   Wt 163 lb (73.9 kg)   LMP  (LMP Unknown)   SpO2 97%   BMI 26.31 kg/m²   CONST:  Well developed,  female who appears her stated age. Awake, alert, cooperative, no apparent distress  HEENT:   Head- Normocephalic, atraumatic   Eyes- Conjunctivae pink, anicteric  Throat- Oral mucosa pink and moist  Neck-  No stridor, trachea midline, no jugular venous distention. CHEST: Chest symmetrical and non-tender to palpation. RESPIRATORY: Lung sounds clear throughout fields bilaterally. No wheeze or rhonchi noted. CARDIOVASCULAR:     No carotid bruit noted bilaterally   Heart Ausculation- Regular rate and rhythm, no murmur. PV: No lower extremity edema. No varicosities. ABDOMEN: Soft, non-tender to light palpation. Bowel sounds present. MS: Good muscle strength and tone; left sided weakness upper and lower extremities. : Deferred   SKIN: Warm and dry no statis dermatitis or ulcers   NEURO / PSYCH: Oriented to person, place and time. Speech clear and appropriate. Follows all commands.  Pleasant affect     DATA:    ECG 11/27/2021: SR HR 62   Tele strips:  SR   Diagnostic:      Intake/Output Summary (Last 24 hours) at 12/2/2021 1202  Last data filed at 12/2/2021 0930  Gross per 24 hour   Intake 240 ml   Output 400 ml   Net -160 ml       HgA1c:   Lab Results   Component Value Date    LABA1C 7.6 (H) 11/28/2021     FASTING LIPID PANEL:  Lab Results   Component Value Date    CHOL 214 11/28/2021    HDL 55 11/28/2021    LDLCALC 129 11/28/2021    TRIG 151 11/28/2021       Assessment and Plan per Dr Vinicius Keene   Electronically signed by Mariana Mason, [id number] on 12/2/2021 at 12:02 PM      I have personally seen and evaluated the patient. I personally obtained the history and performed the physical exam.  I personally reviewed all of the above labs, history, review of systems, and data. All of the assessments and recommendations are from me. All of the above cardiac medical decisions are from me. Please see my additional contributions to the history, physical exam, assessment, and recommendations below. History of chief complaint:  She was reclining in bed. She was comfortable and in no distress. She denies any chest discomfort or shortness of breath. She was admitted on November 27, 2021 with an acute CVA. Review of systems:     Heart: as above   Lungs: as above   Eyes: denies changes in vision or discharge. Ears: denies changes in hearing or pain. Nose: denies epistaxis or masses   Throat: denies sore throat or trouble swallowing. Neuro: denies numbness, tingling, tremors. Skin: denies rashes or itching. : denies hematuria, dysuria   GI: denies vomiting, diarrhea   Psych: denies mood changed, anxiety, depression. Physical exam:  BP (!) 144/77   Pulse 90   Temp 97.4 °F (36.3 °C) (Temporal)   Resp 18   Ht 5' 6\" (1.676 m)   Wt 163 lb (73.9 kg)   LMP  (LMP Unknown)   SpO2 98%   BMI 26.31 kg/m²   Constitutional: A&O x3, communicates well, no acute distress. Eyes: extraocular muscles intact, PERRL. Normal lids & conjunctiva. No icterus. ENT: clear, no bleeding. No external masses. Lips normal formation. Neck: supple, full ROM, no JVD, no bruits, no lymphadenopathy. No masses. trachea midline. Heart: regular rate & rhythm, normal S1 & S2, I/VI (normal physiologic) systolic murmur, S4 gallop. No heave. Lungs: CTA. No accessory muscles. Abd: soft, non-tender. Normal bowel sounds. Neuro: Full ROM X 2, EOMI, no tremors. Left-sided facial droop.   EXT: No bilateral lower extremity edema  Skin: warm, dry, intact. Good turgor. Psych: A&O x 3, normal behavior, not anxious. Patient seen and examined. Chart, labs & data reviewed. A:  1. Multiple CVAs in 2019.  2. Peripheral vascular disease. TSAMCA bypass surgery in 2019. 3. Now presents with another CVA which appears embolic. 4. Residual left-sided weakness  5. Cirrhosis. Status post liver transplant. 6. Hepatitis C  7. Alcohol abuse  8. COPD      Rec:  1. Echo in 2019 shows normal LV function and no PFO. 2. 30-day monitor on discharge. Has been arranged. 3. Telemetry thus far is negative for dysrhythmias. 4. No further cardiac recommendations. Cardiology will sign off. Electronically signed by Melany Jensen DO on 12/2/2021 at 6:40 PM    Note: This report was completed using computerized voice recognition software. Every effort has been made to ensure accuracy, however; and invert and computerized transcription errors may be present.

## 2021-12-02 NOTE — PLAN OF CARE
Problem: Pain:  Goal: Pain level will decrease  Description: Pain level will decrease  12/2/2021 1408 by Chivo Camarillo RN  Outcome: Met This Shift

## 2021-12-02 NOTE — PROGRESS NOTES
Subjective:  Feeling better wants to go home  No CP or SOB  No fever or chills   No uncontrolled pain  No vomiting or diarrhea   No events reported overnight     Objective:    BP (!) 144/72   Pulse 78   Temp 97.9 °F (36.6 °C) (Temporal)   Resp 18   Ht 5' 6\" (1.676 m)   Wt 163 lb (73.9 kg)   LMP  (LMP Unknown)   SpO2 97%   BMI 26.31 kg/m²     24HR INTAKE/OUTPUT:      Intake/Output Summary (Last 24 hours) at 12/2/2021 1007  Last data filed at 12/1/2021 1631  Gross per 24 hour   Intake 300 ml   Output 400 ml   Net -100 ml       General appearance: NAD, conversant  Neck: FROM, supple   Lungs: Clear bilaterally no wheezes, no rhonchi, no crackles  CV: RRR, no MRGs; normal carotid upstroke and amplitude without Bruits  Abdomen: Soft, non-tender; no masses or HSM  Extremities: No edema, no cyanosis, no clubbing  Skin: Intact no rash, no lesions, no ulcers    Psych: Alert and oriented normal affect  Neuro: Nonfocal  Most Recent Labs  Lab Results   Component Value Date    WBC 4.6 11/28/2021    HGB 13.4 11/28/2021    HCT 39.7 11/28/2021     11/28/2021     11/28/2021    K 3.9 11/28/2021     11/28/2021    CREATININE 1.4 (H) 11/28/2021    BUN 9 11/28/2021    CO2 18 (L) 11/28/2021    GLUCOSE 152 (H) 11/28/2021    ALT 14 11/27/2021    AST 15 11/27/2021    INR 1.0 11/27/2021    TSH 1.500 11/25/2020    LABA1C 7.6 (H) 11/28/2021    LABMICR 929.0 (H) 07/31/2019     No results for input(s): MG in the last 72 hours. Lab Results   Component Value Date    CALCIUM 9.1 11/28/2021    PHOS 4.7 (H) 03/02/2020        MRI BRAIN W WO CONTRAST   Final Result   MRI BRAIN:      1. Scattered areas of acute to subacute infarct involving the left PCA   territory with patchy areas of enhancement in the areas of subacute infarct. No significant mass effect or midline shift. 2. Otherwise, no acute intracranial abnormality. 3. Chronic infarct is seen involving the right frontal and right parietal   lobes.    4. There is bilateral   Final Result   Redemonstration of chronic right internal carotid artery occlusion as   described previously. The left internal carotid artery demonstrates 0-50% stenosis. Bilateral vertebral arteries are patent with flow in the normal direction. CT HEAD WO CONTRAST   Final Result   No acute intracranial abnormality and unchanged. XR CHEST PORTABLE   Final Result   No pneumonia or pleural effusion. Assessment    Principal Problem:    Acute ischemic stroke [facility])  Active Problems:    Status post liver transplantation (HCC)    COPD (chronic obstructive pulmonary disease) ([facility])    CKD (chronic kidney disease) stage 3, GFR 30-59 ml/min (HCC)    Essential hypertension    Expressive aphasia  Resolved Problems:    * No resolved hospital problems.  *      Plan:  60-year-old female with a past medical history of DM, COPD, on 81 mg of aspirin prior to admission, admitted to telemetry unit with     Acute left PCA area ischemic stroke  Head CT unremarkable  admittele  asa lifelong, Plavix 3 weeks  MRI/MRA--acute left PCA ischemic stroke, stenosis of right ICA and right PCA  EEG  carotid US demonstrating right internal carotid artery occlusionchronic  No PFO  on echocardiogram reviewed from 2012, 2016, 2019  Neurology consult appreciateddiscussed case  Sp Zio patch for discharge    AMS secondary to stroke  Assess for infection  Urine culture  Chest x-ray without infiltrate  No likely causative agents on med rec  Check urine drug screen  Check ammonia, TSH, B12, folate, RPR-- NML  Avoid sedating or anticholinergic medications  avoid circadian disruption    HyperlipidemiaStatin--    DM2 uncSSI, MBS, A1c 7.6    Hypertensionpermissive hypertension  Slowly reintroduce blood pressure medications  Monitor    E. coli UTI  Sensitivities pending  Rocephin IV here  Cefdinir on discharge    Status post liver transplant   -continue immunosuppressive medications  LFTs NML  INR 1.0  Monitor    PT/OT  D/C planning discharge medically stable for discharge to rehab today     CODE STATUS full code    Transfer [facility] 11/29. Follow-up phone calls with [facility] transfer center 11/30. Spoke with Dr. Telma Heaton with the stroke team.  Patient has been accepted pending bed. Again notified of tight bed status with anticipated long delay in transfer.   Medically stable for discharge to rehab today    Electronically signed by Mejia Altman MD on 12/2/2021 at 10:07 AM

## 2021-12-03 VITALS
HEIGHT: 66 IN | RESPIRATION RATE: 18 BRPM | SYSTOLIC BLOOD PRESSURE: 128 MMHG | WEIGHT: 163 LBS | DIASTOLIC BLOOD PRESSURE: 32 MMHG | OXYGEN SATURATION: 98 % | TEMPERATURE: 97.6 F | HEART RATE: 81 BPM | BODY MASS INDEX: 26.2 KG/M2

## 2021-12-03 LAB
ANION GAP SERPL CALCULATED.3IONS-SCNC: 19 MMOL/L (ref 7–16)
BUN BLDV-MCNC: 38 MG/DL (ref 6–20)
CALCIUM SERPL-MCNC: 9.2 MG/DL (ref 8.6–10.2)
CHLORIDE BLD-SCNC: 113 MMOL/L (ref 98–107)
CO2: 16 MMOL/L (ref 22–29)
CREAT SERPL-MCNC: 1.5 MG/DL (ref 0.5–1)
GFR AFRICAN AMERICAN: 43
GFR NON-AFRICAN AMERICAN: 35 ML/MIN/1.73
GLUCOSE BLD-MCNC: 141 MG/DL (ref 74–99)
METER GLUCOSE: 130 MG/DL (ref 74–99)
METER GLUCOSE: 146 MG/DL (ref 74–99)
METER GLUCOSE: 147 MG/DL (ref 74–99)
POTASSIUM SERPL-SCNC: 4.2 MMOL/L (ref 3.5–5)
SODIUM BLD-SCNC: 148 MMOL/L (ref 132–146)
TACROLIMUS BLOOD: 11.5 NG/ML

## 2021-12-03 PROCEDURE — 6370000000 HC RX 637 (ALT 250 FOR IP): Performed by: FAMILY MEDICINE

## 2021-12-03 PROCEDURE — 6360000002 HC RX W HCPCS: Performed by: INTERNAL MEDICINE

## 2021-12-03 PROCEDURE — 36415 COLL VENOUS BLD VENIPUNCTURE: CPT

## 2021-12-03 PROCEDURE — 6370000000 HC RX 637 (ALT 250 FOR IP): Performed by: INTERNAL MEDICINE

## 2021-12-03 PROCEDURE — 2580000003 HC RX 258: Performed by: INTERNAL MEDICINE

## 2021-12-03 PROCEDURE — 80048 BASIC METABOLIC PNL TOTAL CA: CPT

## 2021-12-03 PROCEDURE — 6370000000 HC RX 637 (ALT 250 FOR IP): Performed by: PSYCHIATRY & NEUROLOGY

## 2021-12-03 PROCEDURE — 6360000002 HC RX W HCPCS: Performed by: FAMILY MEDICINE

## 2021-12-03 PROCEDURE — 82962 GLUCOSE BLOOD TEST: CPT

## 2021-12-03 RX ADMIN — LOSARTAN POTASSIUM 50 MG: 50 TABLET, FILM COATED ORAL at 08:48

## 2021-12-03 RX ADMIN — CLOPIDOGREL BISULFATE 75 MG: 75 TABLET ORAL at 08:49

## 2021-12-03 RX ADMIN — CEFTRIAXONE 1000 MG: 1 INJECTION, POWDER, FOR SOLUTION INTRAMUSCULAR; INTRAVENOUS at 12:46

## 2021-12-03 RX ADMIN — INSULIN LISPRO 1 UNITS: 100 INJECTION, SOLUTION INTRAVENOUS; SUBCUTANEOUS at 12:47

## 2021-12-03 RX ADMIN — TACROLIMUS 1.5 MG: 0.5 CAPSULE ORAL at 08:47

## 2021-12-03 RX ADMIN — ASPIRIN 81 MG: 81 TABLET, COATED ORAL at 08:49

## 2021-12-03 RX ADMIN — METFORMIN HYDROCHLORIDE 500 MG: 500 TABLET, EXTENDED RELEASE ORAL at 08:48

## 2021-12-03 ASSESSMENT — PAIN SCALES - GENERAL
PAINLEVEL_OUTOF10: 0

## 2021-12-03 NOTE — DISCHARGE INSTR - COC
Continuity of Care Form    Patient Name: Isabel Orr   :  1960  MRN:  [id number]    Admit date:  2021  Discharge date:  12/3/2021    Code Status Order: Full Code   Advance Directives:      Admitting Physician:  Margarita Fatima MD  PCP: Lisa Bermudez DO    Discharging Nurse: [facility] Unit/Room#: 0435/7717-Q  Discharging Unit Phone Number: [phone]    Emergency Contact:   Extended Emergency Contact Information  Primary Emergency Contact: Live Single  Address: [address] [facility], [address] [place] [address] Phone: [phone]  Mobile Phone: [phone]  Relation: Child   needed? No  Secondary Emergency Contact: Bronwyn Campbell [address] Phone: [phone]  Mobile Phone: [phone]  Relation: Child   needed?  No    Past Surgical History:  Past Surgical History:   Procedure Laterality Date    BRAIN SURGERY      CARDIAC CATHETERIZATION      cerebral angiogrem to check cavernous malformation in head - negative    CARDIAC SURGERY      heart cath     SECTION      x 2     SECTION   and     COLONOSCOPY      ECHO COMPL W DOP COLOR FLOW  2012         ENDOSCOPY, COLON, DIAGNOSTIC      ESOPHAGEAL VARICE LIGATION      banding    LIVER TRANSPLANT      OTHER SURGICAL HISTORY Right 16    Right Knee Arthroscopy with Debridement partial lateral menisectomy, chondroplasty, synovectomy       Immunization History:   Immunization History   Administered Date(s) Administered    COVID-19, Clarene Miguel A, Primary or Immunocompromised, PF, 100mcg/0.5mL 2021, 2021    Hepatitis B Adult (Engerix-B) 2012    Influenza A (R3O5-11) Vaccine PF IM 2010    Influenza, Quadv, 6-35 Months, IM (Fluzone,Afluria) 2020    Influenza, Quadv, IM, PF (6 mo and older Fluzone, Flulaval, Fluarix, and 3 yrs and older Afluria) 2017, 10/19/2018    Pneumococcal Conjugate 13-valent (Lpsurfs70) 06/24/2016    Pneumococcal Polysaccharide ([id number]) 05/12/2013, 12/03/2013       Active Problems:  Patient Active Problem List   Diagnosis Code    History of esophageal varices Z87.19    History of cardioembolic cerebrovascular accident (CVA) Z86.73    GERD (gastroesophageal reflux disease) K21.9    Status post liver transplantation ([facility] Z94.4    COPD (chronic obstructive pulmonary disease) ([facility] J44.9    Acute ischemic stroke (HCC) I63.9    Obesity (BMI 30-39. 9) E66.9    Diabetes mellitus type 2, uncontrolled ([facility] E11.65    CKD (chronic kidney disease) stage 3, GFR 30-59 ml/min ([facility]) N18.30    Essential hypertension I10    Hemiplegia affecting left nondominant side (HCC) G81.94    Mixed hyperlipidemia E78.2    Left-sided visual neglect R41.4    Hemiparesis affecting left side as late effect of cerebrovascular accident [facility]) I69.354    Generalized weakness R53.1    Immunosuppressed status ([facility] D84.9    H/O ischemic right MCA stroke Z86.73    Expressive aphasia R53.46    'light-for-dates' infant with signs of fetal malnutrition P05.00       Isolation/Infection:   Isolation            No Isolation          Patient Infection Status       Infection Onset Added Last Indicated Last Indicated By Review Planned Expiration Resolved Resolved By    None active    Resolved    COVID-19 (Rule Out) 12/01/21 12/01/21 12/01/21 COVID-19 (Ordered)   12/02/21 Rule-Out Test Resulted    COVID-19 (Rule Out) 11/27/21 11/27/21 11/27/21 COVID-19, Rapid (Ordered)   11/27/21 Rule-Out Test Resulted            Nurse Assessment:  Last Vital Signs: /64   Pulse 65   Temp 98.2 °F (36.8 °C) (Oral)   Resp 18   Ht 5' 6\" (1.676 m)   Wt 163 lb (73.9 kg)   LMP  (LMP Unknown)   SpO2 96%   BMI 26.31 kg/m²     Last documented pain score (0-10 scale): Pain Level: 0  Last Weight:   Wt Readings from Last 1 Encounters:   11/27/21 163 lb (73.9 kg)     Mental Status:  oriented and alert    IV Access:  - None    Nursing Mobility/ADLs:  Walking   Assisted  Transfer  Assisted  Bathing  Assisted  Dressing  Assisted  Toileting  Assisted  Feeding  Independent  Med Admin  Assisted  Med Delivery   whole    Wound Care Documentation and Therapy:        Elimination:  Continence: Bowel: Yes  Bladder: Yes  Urinary Catheter: None   Colostomy/Ileostomy/Ileal Conduit: No       Date of Last BM: 12/2    Intake/Output Summary (Last 24 hours) at 12/3/2021 1111  Last data filed at 12/3/2021 0730  Gross per 24 hour   Intake 480 ml   Output 200 ml   Net 280 ml     I/O last 3 completed shifts: In: 5 [P.O.:720]  Out: -     Safety Concerns: At Risk for Falls    Impairments/Disabilities:      None    Nutrition Therapy:  Current Nutrition Therapy:   - Oral Diet:  Carb Control 3 carbs/meal (1500kcals/day)    Routes of Feeding: Oral  Liquids: No Restrictions  Daily Fluid Restriction: no  Last Modified Barium Swallow with Video (Video Swallowing Test): not done    Treatments at the Time of Hospital Discharge:   Respiratory Treatments: ***  Oxygen Therapy:  is not on home oxygen therapy.   Ventilator:    - No ventilator support    Rehab Therapies: Physical Therapy and Occupational Therapy  Weight Bearing Status/Restrictions: No weight bearing restirctions  Other Medical Equipment (for information only, NOT a DME order):  walker and bedside commode  Other Treatments: ***    Patient's personal belongings (please select all that are sent with patient):  None    RN SIGNATURE:  Electronically signed by Vin Fish RN on 12/3/21 at 6:16 PM EST    CASE MANAGEMENT/SOCIAL WORK SECTION    Inpatient Status Date: ***    Readmission Risk Assessment Score:  Readmission Risk              Risk of Unplanned Readmission:  13           Discharging to Facility/ Agency   Name:   Address:  Phone:  Fax:    Dialysis Facility (if applicable)   Name:  Address:  Dialysis Schedule:  Phone:  Fax:    / signature: {Esignature:[id number]}    PHYSICIAN SECTION    Prognosis: {Prognosis:1614005924}    Condition at Discharge: Farzad8 Aurora Zambrano Patient Condition:[id number]}    Rehab Potential (if transferring to Rehab): {Prognosis:9666144490}    Recommended Labs or Other Treatments After Discharge: ***    Physician Certification: I certify the above information and transfer of Uday Dudley  is necessary for the continuing treatment of the diagnosis listed and that she requires {Admit to Appropriate Level of Care:53584} for {GREATER/LESS:167849164} 30 days.      Update Admission H&P: {CHP DME Changes in ZHXW:257354759}    PHYSICIAN SIGNATURE:  Electronically signed by Carmina Carrizales MD on 12/3/21 at 11:11 AM EST

## 2021-12-03 NOTE — PROGRESS NOTES
Comprehensive Nutrition Assessment    Type and Reason for Visit:  Initial    Nutrition Recommendations/Plan: Continue Current Diet    Nutrition Assessment:  Pt admit d/t aphasia. H/o CVA, COPD, cirrhosis s/p liver transplant, HTN, CKD & DM. Pt doing well from nutrition standpoint, avg % meals. Will continue to monitor. Malnutrition Assessment:  Malnutrition Status:  No malnutrition    Context:  Acute Illness     Findings of the 6 clinical characteristics of malnutrition:  Energy Intake:  No significant decrease in energy intake  Weight Loss:  Unable to assess (D/t lack of actual wt hx in EMR)     Body Fat Loss:  No significant body fat loss     Muscle Mass Loss:  No significant muscle mass loss    Fluid Accumulation:  No significant fluid accumulation     Strength:  Not Performed    Estimated Daily Nutrient Needs:  Energy (kcal):  2569-9722; Weight Used for Energy Requirements:  Current     Protein (g):  70-80 (1.2-1.4g/kg IBW); Weight Used for Protein Requirements:  Ideal        Fluid (ml/day):  1629-8252; Method Used for Fluid Requirements:  1 ml/kcal      Nutrition Related Findings:  A&Ox3, I&O WDL, +BS, no noted edema      Wounds:  None       Current Nutrition Therapies:    ADULT DIET; Regular; 3 carb choices (45 gm/meal)    Anthropometric Measures:  · Height: 5' 6\" (167.6 cm)  · Current Body Weight: 163 lb (73.9 kg) (11/27, no method)   · Admission Body Weight: 163 lb (73.9 kg) (11/27, no method)    · Usual Body Weight:  (Lack of actual wt hx. 3/2021: 164# no method)     · Ideal Body Weight: 130 lbs; % Ideal Body Weight 125.4 %   · BMI: 26.3  · BMI Categories: Overweight (BMI 25.0-29. 9)       Nutrition Diagnosis:   No nutrition diagnosis at this time     Nutrition Interventions:   Food and/or Nutrient Delivery:  Continue Current Diet  Nutrition Education/Counseling:  Education not indicated   Coordination of Nutrition Care:  Continue to monitor while inpatient    Goals:  Continue to consume >75% meals       Nutrition Monitoring and Evaluation:   Behavioral-Environmental Outcomes:      Food/Nutrient Intake Outcomes:  Diet Advancement/Tolerance  Physical Signs/Symptoms Outcomes:  Biochemical Data, Fluid Status or Edema, Nutrition Focused Physical Findings, Weight, Skin     Discharge Planning:     Too soon to determine     Electronically signed by Elio Felix RD, LD on 12/3/21 at 3:37 PM EST    Contact: [phone]

## 2021-12-03 NOTE — CARE COORDINATION
Authorization received for the patient to transition to [facility] at [facility] today. Call placed to [facility] to arrange transportation. Ambulance transport arranged for 8:30 pm pick-up time. Call placed to [facility], patient's daughter to notify of transition of care plan. She is in agreement with transition of care. Bedside RN Carmela and charge nurse Joann notified. Terra liaison with [facility] updated with transportation arrangements and patient's daughter's contact information.       Merary Fish RN.  [facility]  [phone]

## 2021-12-03 NOTE — PROGRESS NOTES
Subjective:  Feeling better wants to go home  No CP or SOB  No fever or chills   No uncontrolled pain  No vomiting or diarrhea   No events reported overnight     Objective:    /64   Pulse 76   Temp 97.2 °F (36.2 °C) (Oral)   Resp 17   Ht 5' 6\" (1.676 m)   Wt 163 lb (73.9 kg)   LMP  (LMP Unknown)   SpO2 98%   BMI 26.31 kg/m²     24HR INTAKE/OUTPUT:      Intake/Output Summary (Last 24 hours) at 12/3/2021 0817  Last data filed at 12/2/2021 2305  Gross per 24 hour   Intake 720 ml   Output    Net 720 ml       General appearance: NAD, conversant  Neck: FROM, supple   Lungs: Clear bilaterally no wheezes, no rhonchi, no crackles  CV: RRR, no MRGs; normal carotid upstroke and amplitude without Bruits  Abdomen: Soft, non-tender; no masses or HSM  Extremities: No edema, no cyanosis, no clubbing  Skin: Intact no rash, no lesions, no ulcers    Psych: Alert and oriented normal affect  Neuro: Nonfocal  Most Recent Labs  Lab Results   Component Value Date    WBC 4.6 11/28/2021    HGB 13.4 11/28/2021    HCT 39.7 11/28/2021     11/28/2021     11/28/2021    K 3.9 11/28/2021     11/28/2021    CREATININE 1.4 (H) 11/28/2021    BUN 9 11/28/2021    CO2 18 (L) 11/28/2021    GLUCOSE 152 (H) 11/28/2021    ALT 14 11/27/2021    AST 15 11/27/2021    INR 1.0 11/27/2021    TSH 1.500 11/25/2020    LABA1C 7.6 (H) 11/28/2021    LABMICR 929.0 (H) 07/31/2019     No results for input(s): MG in the last 72 hours. Lab Results   Component Value Date    CALCIUM 9.1 11/28/2021    PHOS 4.7 (H) 03/02/2020        MRI BRAIN W WO CONTRAST   Final Result   MRI BRAIN:      1. Scattered areas of acute to subacute infarct involving the left PCA   territory with patchy areas of enhancement in the areas of subacute infarct. No significant mass effect or midline shift. 2. Otherwise, no acute intracranial abnormality. 3. Chronic infarct is seen involving the right frontal and right parietal   lobes.    4. There is mild global parenchymal volume loss with chronic microvascular   ischemic changes. MRA HEAD:      1. There is no significant flow related enhancement within the right internal   carotid artery with decreased flow related enhancement in the right MCA and   right JADON. 2. There is occlusion involving the P1 segment of the left posterior cerebral   artery. 3. Focal high-grade stenosis involving a P3 segment of the right posterior   cerebral artery. 4. There appears to be a 2 mm outpouching arising from the left supraclinoid   ICA, which may represent a small aneurysm. These results were sent to the Results [address] ([address]) on   11/30/2021 at 7:53 am to be communicated to the referring/covering health   care provider/office. MRA HEAD WO CONTRAST   Final Result   MRI BRAIN:      1. Scattered areas of acute to subacute infarct involving the left PCA   territory with patchy areas of enhancement in the areas of subacute infarct. No significant mass effect or midline shift. 2. Otherwise, no acute intracranial abnormality. 3. Chronic infarct is seen involving the right frontal and right parietal   lobes. 4. There is mild global parenchymal volume loss with chronic microvascular   ischemic changes. MRA HEAD:      1. There is no significant flow related enhancement within the right internal   carotid artery with decreased flow related enhancement in the right MCA and   right JADON. 2. There is occlusion involving the P1 segment of the left posterior cerebral   artery. 3. Focal high-grade stenosis involving a P3 segment of the right posterior   cerebral artery. 4. There appears to be a 2 mm outpouching arising from the left supraclinoid   ICA, which may represent a small aneurysm. These results were sent to the Results [address] ([address]) on   11/30/2021 at 7:53 am to be communicated to the referring/covering health   care provider/office.          Vascular carotid duplex bilateral   Final Result   Redemonstration of chronic right internal carotid artery occlusion as   described previously. The left internal carotid artery demonstrates 0-50% stenosis. Bilateral vertebral arteries are patent with flow in the normal direction. CT HEAD WO CONTRAST   Final Result   No acute intracranial abnormality and unchanged. XR CHEST PORTABLE   Final Result   No pneumonia or pleural effusion. Assessment    Principal Problem:    Acute ischemic stroke [facility])  Active Problems:    Status post liver transplantation (HCC)    COPD (chronic obstructive pulmonary disease) ([facility])    CKD (chronic kidney disease) stage 3, GFR 30-59 ml/min (HCC)    Essential hypertension    Expressive aphasia  Resolved Problems:    * No resolved hospital problems.  *      Plan:  61-year-old female with a past medical history of DM, COPD, on 81 mg of aspirin prior to admission, admitted to telemetry unit with     Acute left PCA area ischemic stroke  Head CT unremarkable  admittele  asa lifelong, Plavix 3 weeks  MRI/MRA--acute left PCA ischemic stroke, stenosis of right ICA and right PCA  EEG  carotid US demonstrating right internal carotid artery occlusionchronic  No PFO  on echocardiogram reviewed from 2012, 2016, 2019  Neurology consult appreciateddiscussed case  Sp Zio patch for discharge    AMS secondary to stroke  Assess for infection  Urine culture  Chest x-ray without infiltrate  No likely causative agents on med rec  Check urine drug screen  Check ammonia, TSH, B12, folate, RPR-- NML  Avoid sedating or anticholinergic medications  avoid circadian disruption    HyperlipidemiaStatin--    DM2 uncSSI, MBS, A1c 7.6    Hypertensionpermissive hypertension  Slowly reintroduce blood pressure medications  Monitor    E. coli UTI  Sensitivities pending  Rocephin IV here  Cefdinir on discharge    Status post liver transplant   -continue immunosuppressive medications  LFTs NML  INR 1.0  Monitor    PT/OT  D/C planning discharge medically stable for discharge to rehab today     CODE STATUS full code    Transfer initiated with [facility] transfer center 11/29. Follow-up phone calls with [facility] transfer center 11/30. Spoke with Dr. Lana Guevara with the stroke team.  Patient has been accepted pending bed. Again notified of tight bed status with anticipated long delay in transfer.   Medically stable for discharge to rehab today    Electronically signed by Beto Garzon MD on 12/3/2021 at 8:17 AM

## 2021-12-07 ENCOUNTER — TELEPHONE (OUTPATIENT)
Dept: NEUROLOGY | Age: 61
End: 2021-12-07

## 2021-12-17 DIAGNOSIS — R06.02 SHORTNESS OF BREATH: ICD-10-CM

## 2021-12-17 DIAGNOSIS — K21.9 GASTROESOPHAGEAL REFLUX DISEASE, UNSPECIFIED WHETHER ESOPHAGITIS PRESENT: Chronic | ICD-10-CM

## 2021-12-17 DIAGNOSIS — B37.2 SKIN YEAST INFECTION: ICD-10-CM

## 2021-12-17 RX ORDER — ALBUTEROL SULFATE 90 UG/1
AEROSOL, METERED RESPIRATORY (INHALATION)
Qty: 8.5 G | Refills: 2 | Status: SHIPPED
Start: 2021-12-17 | End: 2022-09-15 | Stop reason: SDUPTHER

## 2021-12-17 RX ORDER — NYSTATIN 100000 [USP'U]/G
POWDER TOPICAL
Qty: 60 G | Refills: 0 | Status: SHIPPED
Start: 2021-12-17 | End: 2022-01-17

## 2021-12-17 RX ORDER — PANTOPRAZOLE SODIUM 40 MG/1
TABLET, DELAYED RELEASE ORAL
Qty: 30 TABLET | Refills: 2 | Status: SHIPPED
Start: 2021-12-17 | End: 2022-05-23 | Stop reason: SDUPTHER

## 2021-12-22 ENCOUNTER — APPOINTMENT (OUTPATIENT)
Dept: CT IMAGING | Age: 61
End: 2021-12-22
Payer: MEDICARE

## 2021-12-22 ENCOUNTER — HOSPITAL ENCOUNTER (EMERGENCY)
Age: 61
Discharge: HOME | End: 2021-12-23
Attending: EMERGENCY MEDICINE
Payer: MEDICARE

## 2021-12-22 DIAGNOSIS — N30.00 ACUTE CYSTITIS WITHOUT HEMATURIA: Primary | ICD-10-CM

## 2021-12-22 LAB
ALBUMIN SERPL-MCNC: 3.9 G/DL (ref 3.5–5.2)
ALP BLD-CCNC: 119 U/L (ref 35–104)
ALT SERPL-CCNC: 12 U/L (ref 0–32)
AMORPHOUS: ABNORMAL
ANION GAP SERPL CALCULATED.3IONS-SCNC: 14 MMOL/L (ref 7–16)
AST SERPL-CCNC: 15 U/L (ref 0–31)
BACTERIA: ABNORMAL /HPF
BASOPHILS ABSOLUTE: 0.05 E9/L (ref 0–0.2)
BASOPHILS RELATIVE PERCENT: 0.9 % (ref 0–2)
BILIRUB SERPL-MCNC: 0.2 MG/DL (ref 0–1.2)
BILIRUBIN URINE: NEGATIVE
BLOOD, URINE: ABNORMAL
BUN BLDV-MCNC: 29 MG/DL (ref 6–23)
CALCIUM SERPL-MCNC: 9.1 MG/DL (ref 8.6–10.2)
CHLORIDE BLD-SCNC: 109 MMOL/L (ref 98–107)
CLARITY: CLEAR
CO2: 21 MMOL/L (ref 22–29)
COLOR: YELLOW
CREAT SERPL-MCNC: 1.6 MG/DL (ref 0.5–1)
EOSINOPHILS ABSOLUTE: 0.26 E9/L (ref 0.05–0.5)
EOSINOPHILS RELATIVE PERCENT: 4.6 % (ref 0–6)
EPITHELIAL CELLS, UA: ABNORMAL /HPF
GFR AFRICAN AMERICAN: 40
GFR NON-AFRICAN AMERICAN: 33 ML/MIN/1.73
GLUCOSE BLD-MCNC: 189 MG/DL (ref 74–99)
GLUCOSE URINE: NEGATIVE MG/DL
HCT VFR BLD CALC: 35 % (ref 34–48)
HEMOGLOBIN: 12.3 G/DL (ref 11.5–15.5)
IMMATURE GRANULOCYTES #: 0.01 E9/L
IMMATURE GRANULOCYTES %: 0.2 % (ref 0–5)
KETONES, URINE: NEGATIVE MG/DL
LEUKOCYTE ESTERASE, URINE: ABNORMAL
LYMPHOCYTES ABSOLUTE: 1.77 E9/L (ref 1.5–4)
LYMPHOCYTES RELATIVE PERCENT: 31.6 % (ref 20–42)
MAGNESIUM: 1.9 MG/DL (ref 1.6–2.6)
MCH RBC QN AUTO: 31.9 PG (ref 26–35)
MCHC RBC AUTO-ENTMCNC: 35.1 % (ref 32–34.5)
MCV RBC AUTO: 90.9 FL (ref 80–99.9)
MONOCYTES ABSOLUTE: 0.45 E9/L (ref 0.1–0.95)
MONOCYTES RELATIVE PERCENT: 8 % (ref 2–12)
NEUTROPHILS ABSOLUTE: 3.06 E9/L (ref 1.8–7.3)
NEUTROPHILS RELATIVE PERCENT: 54.7 % (ref 43–80)
NITRITE, URINE: NEGATIVE
PDW BLD-RTO: 12.8 FL (ref 11.5–15)
PH UA: 5.5 (ref 5–9)
PLATELET # BLD: 199 E9/L (ref 130–450)
PMV BLD AUTO: 10.9 FL (ref 7–12)
POTASSIUM SERPL-SCNC: 4.7 MMOL/L (ref 3.5–5)
PROTEIN UA: 100 MG/DL
RBC # BLD: 3.85 E12/L (ref 3.5–5.5)
RBC UA: ABNORMAL /HPF (ref 0–2)
SARS-COV-2, NAAT: NOT DETECTED
SODIUM BLD-SCNC: 144 MMOL/L (ref 132–146)
SPECIFIC GRAVITY UA: 1.02 (ref 1–1.03)
TOTAL PROTEIN: 6.4 G/DL (ref 6.4–8.3)
TROPONIN, HIGH SENSITIVITY: 18 NG/L (ref 0–9)
UROBILINOGEN, URINE: 0.2 E.U./DL
WBC # BLD: 5.6 E9/L (ref 4.5–11.5)
WBC UA: ABNORMAL /HPF (ref 0–5)

## 2021-12-22 PROCEDURE — 70450 CT HEAD/BRAIN W/O DYE: CPT

## 2021-12-22 PROCEDURE — 87635 SARS-COV-2 COVID-19 AMP PRB: CPT

## 2021-12-22 PROCEDURE — 80053 COMPREHEN METABOLIC PANEL: CPT

## 2021-12-22 PROCEDURE — 85025 COMPLETE CBC W/AUTO DIFF WBC: CPT

## 2021-12-22 PROCEDURE — 99284 EMERGENCY DEPT VISIT MOD MDM: CPT

## 2021-12-22 PROCEDURE — 93005 ELECTROCARDIOGRAM TRACING: CPT | Performed by: STUDENT IN AN ORGANIZED HEALTH CARE EDUCATION/TRAINING PROGRAM

## 2021-12-22 PROCEDURE — 83735 ASSAY OF MAGNESIUM: CPT

## 2021-12-22 PROCEDURE — 81001 URINALYSIS AUTO W/SCOPE: CPT

## 2021-12-22 PROCEDURE — 84484 ASSAY OF TROPONIN QUANT: CPT

## 2021-12-22 PROCEDURE — 2580000003 HC RX 258: Performed by: STUDENT IN AN ORGANIZED HEALTH CARE EDUCATION/TRAINING PROGRAM

## 2021-12-22 PROCEDURE — 87088 URINE BACTERIA CULTURE: CPT

## 2021-12-22 RX ORDER — 0.9 % SODIUM CHLORIDE 0.9 %
1000 INTRAVENOUS SOLUTION INTRAVENOUS ONCE
Status: COMPLETED | OUTPATIENT
Start: 2021-12-22 | End: 2021-12-22

## 2021-12-22 RX ADMIN — SODIUM CHLORIDE 1000 ML: 9 INJECTION, SOLUTION INTRAVENOUS at 21:12

## 2021-12-23 VITALS
TEMPERATURE: 97.2 F | SYSTOLIC BLOOD PRESSURE: 158 MMHG | WEIGHT: 163 LBS | RESPIRATION RATE: 16 BRPM | HEART RATE: 76 BPM | BODY MASS INDEX: 26.2 KG/M2 | HEIGHT: 66 IN | OXYGEN SATURATION: 97 % | DIASTOLIC BLOOD PRESSURE: 87 MMHG

## 2021-12-23 LAB
EKG ATRIAL RATE: 65 BPM
EKG P AXIS: 38 DEGREES
EKG P-R INTERVAL: 146 MS
EKG Q-T INTERVAL: 416 MS
EKG QRS DURATION: 82 MS
EKG QTC CALCULATION (BAZETT): 432 MS
EKG R AXIS: 21 DEGREES
EKG T AXIS: 61 DEGREES
EKG VENTRICULAR RATE: 65 BPM
TROPONIN, HIGH SENSITIVITY: 15 NG/L (ref 0–9)

## 2021-12-23 PROCEDURE — 84484 ASSAY OF TROPONIN QUANT: CPT

## 2021-12-23 PROCEDURE — 93010 ELECTROCARDIOGRAM REPORT: CPT | Performed by: INTERNAL MEDICINE

## 2021-12-23 PROCEDURE — 6370000000 HC RX 637 (ALT 250 FOR IP): Performed by: STUDENT IN AN ORGANIZED HEALTH CARE EDUCATION/TRAINING PROGRAM

## 2021-12-23 RX ORDER — CEFDINIR 300 MG/1
300 CAPSULE ORAL ONCE
Status: COMPLETED | OUTPATIENT
Start: 2021-12-23 | End: 2021-12-23

## 2021-12-23 RX ORDER — CEFDINIR 300 MG/1
300 CAPSULE ORAL 2 TIMES DAILY
Qty: 20 CAPSULE | Refills: 0 | Status: SHIPPED | OUTPATIENT
Start: 2021-12-23 | End: 2022-01-02

## 2021-12-23 RX ADMIN — CEFDINIR 300 MG: 300 CAPSULE ORAL at 02:11

## 2021-12-23 ASSESSMENT — ENCOUNTER SYMPTOMS
DIARRHEA: 0
COUGH: 0
COLOR CHANGE: 0
ABDOMINAL PAIN: 0
BACK PAIN: 0
STRIDOR: 0
NAUSEA: 0
WHEEZING: 0
CONSTIPATION: 0
SHORTNESS OF BREATH: 0
ABDOMINAL DISTENTION: 0
VOMITING: 0

## 2021-12-23 NOTE — ED PROVIDER NOTES
[address]      Pt Name: Karmen Kellogg  MRN: [id number]  [place] 1960  Date of evaluation: 2021      CHIEF COMPLAINT       Chief Complaint   Patient presents with    Other     is at [facility] for rehab since the dec 17th, the NH states they have not noticed any change in mental status, however, the daughter states that she seems altered. HPI  Karmen Kellogg is a 62 y.o. female history of stroke and vascular dementia presents with altered mental status. Per facility the patient is not altered and she is pleasantly demented. Patient's daughter says that she does seem altered. Per patient she states that her daughter told her \"her father  yesterday. \"  States that she was unaware of this. Sensations of gait tearful and limited grief since then. No alleviating or exacerbating factors. Not taking her medications measure alleviate her symptoms. Denies any suicidal or homicidal ideation, visual or auditory hallucinations, recent fevers, chills, nausea, vomiting, chest pain, shortness of breath, abdominal pain, flank pain, new rashes or sores. Except as noted above the remainder of the review of systems was reviewed and negative. Review of Systems   Constitutional: Negative for activity change, appetite change, diaphoresis, fatigue, fever and unexpected weight change. HENT: Negative for congestion. Eyes: Negative for visual disturbance. Respiratory: Negative for cough, shortness of breath, wheezing and stridor. Cardiovascular: Negative for chest pain, palpitations and leg swelling. Gastrointestinal: Negative for abdominal distention, abdominal pain, constipation, diarrhea, nausea and vomiting. Endocrine: Negative for polyphagia and polyuria. Genitourinary: Negative for dysuria and hematuria. Musculoskeletal: Negative for back pain.    Skin: Negative for color change, pallor, rash and wound.   Neurological: Negative for dizziness and syncope. Hematological: Negative for adenopathy. Does not bruise/bleed easily. Psychiatric/Behavioral: Negative for agitation. Physical Exam  Vitals and nursing note reviewed. Constitutional:       General: She is not in acute distress. Appearance: Normal appearance. She is not ill-appearing or diaphoretic. HENT:      Head: Normocephalic and atraumatic. Nose: Nose normal.   Eyes:      Extraocular Movements: Extraocular movements intact. Pupils: Pupils are equal, round, and reactive to light. Cardiovascular:      Rate and Rhythm: Normal rate and regular rhythm. Pulses: Normal pulses. Heart sounds: Normal heart sounds. Pulmonary:      Effort: Pulmonary effort is normal.      Breath sounds: Normal breath sounds. Abdominal:      General: Abdomen is flat. Bowel sounds are normal.      Palpations: Abdomen is soft. Tenderness: There is no abdominal tenderness. There is no right CVA tenderness, left CVA tenderness or rebound. Negative signs include Hardy's sign, Rovsing's sign and McBurney's sign. Musculoskeletal:         General: Normal range of motion. Cervical back: Normal range of motion. Skin:     General: Skin is warm and dry. Capillary Refill: Capillary refill takes less than 2 seconds. Neurological:      General: No focal deficit present. Mental Status: She is alert. Mental status is at baseline. Psychiatric:         Mood and Affect: Mood normal.          Procedures     MDM  Number of Diagnoses or Management Options  Acute cystitis without hematuria  Diagnosis management comments: 61-year-old female presents with altered mental status from facility. History of vascular dementia from a stroke. Was informed that her father just  by her daughter. Patient states that she was unaware that her father . States that she has been grief since yesterday when she found out.   Vitals within normal limits. On physical exam patient is in no acute distress speaking full sentences, alert and oriented x2. This is patient's baseline. She is very pleasant. Is tearful. Normocephalic atraumatic. Lungs are clear to auscultation bilateral no wheeze rales rhonchi. Normal S1-S2. M soft nontender no rebound present. Negative CVA tenderness bilaterally. Diagnostic labs and imaging interpreted reviewed. Patient had trace leukocyte esterase in her urine. She had a urine culture that was positive for E. coli sensitive to cephalosporins. Patient given Ardath Dibbles in the department will be discharged back to her nursing facility with Ardath Dibbles for acute cystitis without hematuria. Amount and/or Complexity of Data Reviewed  Decide to obtain previous medical records or to obtain history from someone other than the patient: yes       Patient is awake alert, hemodynamic stable, afebrile and in no respiratory distress. Discussed with patient plan for close outpatient follow-up with the patient's PCP as well as return precautions and the patient understands and agrees to the plan. Patient was seen with attending. ED Course as of 12/23/21 0205   Wed Dec 22, 2021   2311 EKG read interpreted. Heart rate 65. Normal sinus rhythm. In isolation. QTc 432. No ST elevations or depressions. Stable as compared to previous EKG.  [JV]      ED Course User Index  [JV] Kendall Villagomez MD         --------------------------------------------- PAST HISTORY ---------------------------------------------  Past Medical History:  has a past medical history of Arthritis, Blood circulation, collateral, Chronic fatigue, Cirrhosis of liver ([facility] [address] COPD exacerbation ([facility] [address] Diabetes mellitus ([facility] [address] Essential hypertension, Gall stones, GERD (gastroesophageal reflux disease), Hep C w/o coma, chronic ([facility] [address] History of blood transfusion, Neuropathy, PFO (patent foramen ovale), Pneumonia, Type 2 diabetes mellitus with stage 3 chronic kidney disease, with long-term current use of insulin ([facility] [address] Unspecified cerebral artery occlusion with cerebral infarction, and Varices. Past Surgical History:  has a past surgical history that includes  section; Esophageal varice ligation ();  section ( and ); ECHO Compl W Dop Color Flow (2012); Colonoscopy; Endoscopy, colon, diagnostic; Cardiac surgery (); Cardiac catheterization; other surgical history (Right, 16); Liver transplant; and brain surgery. Social History:  reports that she quit smoking about 2 years ago. Her smoking use included cigarettes. She has a 15.00 pack-year smoking history. She has never used smokeless tobacco. She reports that she does not drink alcohol and does not use drugs. Family History: family history is not on file. She was adopted. The patients home medications have been reviewed.     Allergies: Chantix [varenicline] and Heparin    -------------------------------------------------- RESULTS -------------------------------------------------  Labs:  Results for orders placed or performed during the hospital encounter of 21   COVID-19, Rapid    Specimen: Nasopharyngeal Swab   Result Value Ref Range    SARS-CoV-2, NAAT Not Detected Not Detected   CBC Auto Differential   Result Value Ref Range    WBC 5.6 4.5 - 11.5 E9/L    RBC 3.85 3.50 - 5.50 E12/L    Hemoglobin 12.3 11.5 - 15.5 g/dL    Hematocrit 35.0 34.0 - 48.0 %    MCV 90.9 80.0 - 99.9 fL    MCH 31.9 26.0 - 35.0 pg    MCHC 35.1 (H) 32.0 - 34.5 %    RDW 12.8 11.5 - 15.0 fL    Platelets 924 876 - 148 E9/L    MPV 10.9 7.0 - 12.0 fL    Neutrophils % 54.7 43.0 - 80.0 %    Immature Granulocytes % 0.2 0.0 - 5.0 %    Lymphocytes % 31.6 20.0 - 42.0 %    Monocytes % 8.0 2.0 - 12.0 %    Eosinophils % 4.6 0.0 - 6.0 %    Basophils % 0.9 0.0 - 2.0 %    Neutrophils Absolute 3.06 1.80 - 7.30 E9/L    Immature Granulocytes # 0.01 E9/L    Lymphocytes Absolute 1.77 1.50 - 4.00 E9/L Monocytes Absolute 0.45 0.10 - 0.95 E9/L    Eosinophils Absolute 0.26 0.05 - 0.50 E9/L    Basophils Absolute 0.05 0.00 - 0.20 E9/L   Comprehensive Metabolic Panel   Result Value Ref Range    Sodium 144 132 - 146 mmol/L    Potassium 4.7 3.5 - 5.0 mmol/L    Chloride 109 (H) 98 - 107 mmol/L    CO2 21 (L) 22 - 29 mmol/L    Anion Gap 14 7 - 16 mmol/L    Glucose 189 (H) 74 - 99 mg/dL    BUN 29 (H) 6 - 23 mg/dL    CREATININE 1.6 (H) 0.5 - 1.0 mg/dL    GFR Non-African American 33 >=60 mL/min/1.73    GFR African American 40     Calcium 9.1 8.6 - 10.2 mg/dL    Total Protein 6.4 6.4 - 8.3 g/dL    Albumin 3.9 3.5 - 5.2 g/dL    Total Bilirubin 0.2 0.0 - 1.2 mg/dL    Alkaline Phosphatase 119 (H) 35 - 104 U/L    ALT 12 0 - 32 U/L    AST 15 0 - 31 U/L   Magnesium   Result Value Ref Range    Magnesium 1.9 1.6 - 2.6 mg/dL   Troponin   Result Value Ref Range    Troponin, High Sensitivity 18 (H) 0 - 9 ng/L   Urinalysis   Result Value Ref Range    Color, UA Yellow Straw/Yellow    Clarity, UA Clear Clear    Glucose, Ur Negative Negative mg/dL    Bilirubin Urine Negative Negative    Ketones, Urine Negative Negative mg/dL    Specific Gravity, UA 1.020 1.005 - 1.030    Blood, Urine TRACE-INTACT Negative    pH, UA 5.5 5.0 - 9.0    Protein,  (A) Negative mg/dL    Urobilinogen, Urine 0.2 <2.0 E.U./dL    Nitrite, Urine Negative Negative    Leukocyte Esterase, Urine TRACE (A) Negative   Troponin   Result Value Ref Range    Troponin, High Sensitivity 15 (H) 0 - 9 ng/L   Microscopic Urinalysis   Result Value Ref Range    WBC, UA 0-1 0 - 5 /HPF    RBC, UA 1-3 0 - 2 /HPF    Epithelial Cells, UA FEW /HPF    Bacteria, UA RARE (A) None Seen /HPF    Amorphous, UA FEW    EKG 12 Lead   Result Value Ref Range    Ventricular Rate 65 BPM    Atrial Rate 65 BPM    P-R Interval 146 ms    QRS Duration 82 ms    Q-T Interval 416 ms    QTc Calculation (Bazett) 432 ms    P Axis 38 degrees    R Axis 21 degrees    T Axis 61 degrees       ECG:  Please refer to workup tab for EKG read  Heart rate 65. Normal sinus rhythm. Normal axis deviation. QTc 432. No elevations or depressions. Stable as compared to previous EKG. Radiology:  CT Head WO Contrast   Final Result   No acute intracranial abnormality. MRI may be obtained if clinically   indicated      Chronic nonemergent findings as above. RECOMMENDATIONS:   Unavailable             ------------------------- NURSING NOTES AND VITALS REVIEWED ---------------------------  Date / Time Roomed:  12/22/2021  7:12 PM  ED Bed Assignment:  Stickney D/    The nursing notes within the ED encounter and vital signs as below have been reviewed. BP (!) 160/80   Pulse 76   Temp 97.2 °F (36.2 °C)   Resp 18   Ht 5' 6\" (1.676 m)   Wt 163 lb (73.9 kg)   LMP  (LMP Unknown)   SpO2 98%   BMI 26.31 kg/m²   Oxygen Saturation Interpretation: normal      ------------------------------------------ PROGRESS NOTES ------------------------------------------    I have spoken with the patient and discussed todays results, in addition to providing specific details for the plan of care and counseling regarding the diagnosis and prognosis. Their questions are answered at this time and they are agreeable with the plan. I discussed at length with them reasons for immediate return here for re evaluation. They will followup with their PCP by calling their office tomorrow. --------------------------------- ADDITIONAL PROVIDER NOTES ---------------------------------  At this time the patient is without objective evidence of an acute process requiring hospitalization or inpatient management. They have remained hemodynamically stable throughout their entire ED visit and are stable for discharge with outpatient follow-up. The plan has been discussed in detail and they are aware of the specific conditions for emergent return, as well as the importance of follow-up.       New Prescriptions    CEFDINIR (OMNICEF) 300 MG CAPSULE    Take 1 capsule by mouth 2 times daily for 10 days       Diagnosis:  1. Acute cystitis without hematuria        Disposition:  Patient's disposition: Discharge to home  Patient's condition is stable.       Nathaniel Souza MD  Resident  12/23/21 9418

## 2021-12-23 NOTE — ED NOTES
Bed: HD  Expected date:   Expected time:   Means of arrival:   Comments:  AMELIA Mccabe RN  12/22/21 1912

## 2021-12-25 LAB — URINE CULTURE, ROUTINE: NORMAL

## 2021-12-27 DIAGNOSIS — I63.9 ACUTE ISCHEMIC STROKE (HCC): Primary | ICD-10-CM

## 2021-12-27 DIAGNOSIS — G45.9 TIA (TRANSIENT ISCHEMIC ATTACK): ICD-10-CM

## 2021-12-29 ENCOUNTER — TELEPHONE (OUTPATIENT)
Dept: ADMINISTRATIVE | Age: 61
End: 2021-12-29

## 2021-12-29 NOTE — TELEPHONE ENCOUNTER
Thera Forts from [facility] called to schedule HFU for pt, family has agreed to let her see local cardio, Jose did consult on 07/2019, please advise Raji Walters at [phone], ex [phone]

## 2022-01-03 NOTE — TELEPHONE ENCOUNTER
Not sure if it would be jaundice patient or mine. But she does not have any active cardiac issues. Follow-up will be based on the monitor results that she is wearing. I can read that and then decide if she needs a follow-up.

## 2022-01-03 NOTE — TELEPHONE ENCOUNTER
Zoya Picking at [facility], [place], operated by [facility] notified of Dr. James Mistry recommendation. We will await monitor results.

## 2022-01-06 DIAGNOSIS — G45.9 TIA (TRANSIENT ISCHEMIC ATTACK): ICD-10-CM

## 2022-01-11 ENCOUNTER — TELEPHONE (OUTPATIENT)
Dept: CARDIOLOGY CLINIC | Age: 62
End: 2022-01-11

## 2022-01-11 DIAGNOSIS — G47.00 INSOMNIA, UNSPECIFIED TYPE: ICD-10-CM

## 2022-01-11 DIAGNOSIS — E11.649 UNCONTROLLED TYPE 2 DIABETES MELLITUS WITH HYPOGLYCEMIA WITHOUT COMA (HCC): Chronic | ICD-10-CM

## 2022-01-11 DIAGNOSIS — I63.9 ACUTE ISCHEMIC STROKE (HCC): Primary | ICD-10-CM

## 2022-01-11 DIAGNOSIS — I47.1 PSVT (PAROXYSMAL SUPRAVENTRICULAR TACHYCARDIA) (HCC): ICD-10-CM

## 2022-01-11 RX ORDER — MIRTAZAPINE 15 MG/1
15 TABLET, FILM COATED ORAL NIGHTLY
Qty: 30 TABLET | Refills: 0 | Status: SHIPPED
Start: 2022-01-11 | End: 2022-05-23 | Stop reason: SDUPTHER

## 2022-01-11 RX ORDER — METFORMIN HYDROCHLORIDE 500 MG/1
TABLET, EXTENDED RELEASE ORAL
Qty: 30 TABLET | Refills: 0 | Status: SHIPPED
Start: 2022-01-11 | End: 2022-02-18

## 2022-01-11 NOTE — TELEPHONE ENCOUNTER
----- Message from Saumya Bowman DO sent at 1/10/2022  5:54 PM EST -----  Says that she had runs of SVT. I only see a single 4 beat run that they printed. Please print the other runs. It also says that she had runs of junctional rhythm. They did not print any of those. I would like to see those as well.

## 2022-01-13 NOTE — TELEPHONE ENCOUNTER
Occasional runs of PSVT. No atrial fibrillation. Rare episode of junctional rhythm that may be during sleeping hours. She does have a history of sleep apnea. Recommend starting metoprolol succinate 25 mg daily. I recommend a referral to electrophysiology for possible Linq insertion to further evaluate for a cardiac source of embolus. Can follow-up with general cardiology as needed.

## 2022-01-17 DIAGNOSIS — B37.2 SKIN YEAST INFECTION: ICD-10-CM

## 2022-01-17 RX ORDER — NYSTATIN 100000 [USP'U]/G
POWDER TOPICAL
Qty: 60 G | Refills: 0 | Status: SHIPPED
Start: 2022-01-17 | End: 2022-02-14

## 2022-01-20 RX ORDER — METOPROLOL SUCCINATE 25 MG/1
25 TABLET, EXTENDED RELEASE ORAL DAILY
COMMUNITY
End: 2022-05-23 | Stop reason: SDUPTHER

## 2022-02-08 ENCOUNTER — TELEPHONE (OUTPATIENT)
Dept: FAMILY MEDICINE CLINIC | Age: 62
End: 2022-02-08

## 2022-02-08 DIAGNOSIS — I10 HTN (HYPERTENSION), BENIGN: Primary | ICD-10-CM

## 2022-02-08 RX ORDER — LOSARTAN POTASSIUM 25 MG/1
50 TABLET ORAL DAILY
Qty: 60 TABLET | Refills: 2 | Status: SHIPPED
Start: 2022-02-08 | End: 2022-05-23 | Stop reason: SDUPTHER

## 2022-02-08 NOTE — TELEPHONE ENCOUNTER
Losartan 50 mg on back order  Pharmacy [facility]) requesting alternative to be sent.

## 2022-02-14 DIAGNOSIS — B37.2 SKIN YEAST INFECTION: ICD-10-CM

## 2022-02-14 RX ORDER — NYSTATIN 100000 [USP'U]/G
POWDER TOPICAL
Qty: 60 G | Refills: 0 | Status: SHIPPED | OUTPATIENT
Start: 2022-02-14

## 2022-02-17 DIAGNOSIS — J30.1 SEASONAL ALLERGIC RHINITIS DUE TO POLLEN: ICD-10-CM

## 2022-02-17 DIAGNOSIS — E11.649 UNCONTROLLED TYPE 2 DIABETES MELLITUS WITH HYPOGLYCEMIA WITHOUT COMA (HCC): Chronic | ICD-10-CM

## 2022-02-17 RX ORDER — FLUTICASONE PROPIONATE 50 MCG
SPRAY, SUSPENSION (ML) NASAL
Qty: 16 G | Refills: 2 | Status: SHIPPED
Start: 2022-02-17 | End: 2022-06-29 | Stop reason: CLARIF

## 2022-02-18 RX ORDER — METFORMIN HYDROCHLORIDE 500 MG/1
TABLET, EXTENDED RELEASE ORAL
Qty: 30 TABLET | Refills: 0 | Status: SHIPPED
Start: 2022-02-18 | End: 2022-05-23 | Stop reason: SDUPTHER

## 2022-02-22 ENCOUNTER — TELEPHONE (OUTPATIENT)
Dept: PHARMACY | Facility: CLINIC | Age: 62
End: 2022-02-22

## 2022-02-23 DIAGNOSIS — G47.00 INSOMNIA, UNSPECIFIED TYPE: ICD-10-CM

## 2022-02-23 RX ORDER — MIRTAZAPINE 15 MG/1
15 TABLET, FILM COATED ORAL NIGHTLY
Qty: 30 TABLET | Refills: 10 | OUTPATIENT
Start: 2022-02-23

## 2022-02-23 NOTE — TELEPHONE ENCOUNTER
CLINICAL PHARMACY: ADHERENCE REVIEW    2nd Attempt Documentation:   Left detailed message on voicemail.
For Pharmacy [address] in place:  No   Recommendation Provided To: Patient/Caregiver: 1 via Telephone   Intervention Detail: Adherence Monitoring: 3   Gap Closed?: Yes    Intervention Accepted By: Patient/Caregiver: 0   Time Spent (min): 15
- 32 U/L Final     AST   Date Value Ref Range Status   01/31/2022 10 0 - 31 U/L Final     The ASCVD Risk score (Alfred Fernandez, et al., 2013) failed to calculate for the following reasons: The patient has a prior MI or stroke diagnosis     PLAN  The following are interventions that have been identified:     - Patient is current on refills. Patient identified as LIS = 3, therefore patient's co-pays are $0.00 through all phases of the benefit regardless of the days' supply dispensed       Attempting to reach patient to review; left message asking for return call. No future appointments.     Alvin Wu LPN  Population Health   [address] [facility] Clinical Pharm[facility]  Phone: toll free [phone]

## 2022-03-08 LAB
ALBUMIN SERPL-MCNC: 3.6 G/DL (ref 3.5–5.2)
ALP BLD-CCNC: 119 U/L (ref 35–104)
ALT SERPL-CCNC: 7 U/L (ref 0–32)
AMMONIA: 20 UMOL/L (ref 11–51)
ANION GAP SERPL CALCULATED.3IONS-SCNC: 12 MMOL/L (ref 7–16)
AST SERPL-CCNC: 9 U/L (ref 0–31)
BASOPHILS ABSOLUTE: 0.06 E9/L (ref 0–0.2)
BASOPHILS RELATIVE PERCENT: 1 % (ref 0–2)
BILIRUB SERPL-MCNC: 0.3 MG/DL (ref 0–1.2)
BUN BLDV-MCNC: 32 MG/DL (ref 6–23)
CALCIUM SERPL-MCNC: 8.9 MG/DL (ref 8.6–10.2)
CHLORIDE BLD-SCNC: 110 MMOL/L (ref 98–107)
CO2: 22 MMOL/L (ref 22–29)
CREAT SERPL-MCNC: 1.4 MG/DL (ref 0.5–1)
EOSINOPHILS ABSOLUTE: 0.31 E9/L (ref 0.05–0.5)
EOSINOPHILS RELATIVE PERCENT: 5 % (ref 0–6)
GFR AFRICAN AMERICAN: 46
GFR NON-AFRICAN AMERICAN: 38 ML/MIN/1.73
GLUCOSE BLD-MCNC: 165 MG/DL (ref 74–99)
HBA1C MFR BLD: 7.2 % (ref 4–5.6)
HCT VFR BLD CALC: 34 % (ref 34–48)
HEMOGLOBIN: 11.6 G/DL (ref 11.5–15.5)
IMMATURE GRANULOCYTES #: 0.02 E9/L
IMMATURE GRANULOCYTES %: 0.3 % (ref 0–5)
LYMPHOCYTES ABSOLUTE: 1.54 E9/L (ref 1.5–4)
LYMPHOCYTES RELATIVE PERCENT: 25 % (ref 20–42)
MCH RBC QN AUTO: 30.7 PG (ref 26–35)
MCHC RBC AUTO-ENTMCNC: 34.1 % (ref 32–34.5)
MCV RBC AUTO: 89.9 FL (ref 80–99.9)
MONOCYTES ABSOLUTE: 0.6 E9/L (ref 0.1–0.95)
MONOCYTES RELATIVE PERCENT: 9.7 % (ref 2–12)
NEUTROPHILS ABSOLUTE: 3.63 E9/L (ref 1.8–7.3)
NEUTROPHILS RELATIVE PERCENT: 59 % (ref 43–80)
PDW BLD-RTO: 12.2 FL (ref 11.5–15)
PLATELET # BLD: 160 E9/L (ref 130–450)
PMV BLD AUTO: 11.1 FL (ref 7–12)
POTASSIUM SERPL-SCNC: 4.3 MMOL/L (ref 3.5–5)
RBC # BLD: 3.78 E12/L (ref 3.5–5.5)
SODIUM BLD-SCNC: 144 MMOL/L (ref 132–146)
TOTAL PROTEIN: 5.9 G/DL (ref 6.4–8.3)
TSH SERPL DL<=0.05 MIU/L-ACNC: 2.49 UIU/ML (ref 0.27–4.2)
VITAMIN B-12: 491 PG/ML (ref 211–946)
WBC # BLD: 6.2 E9/L (ref 4.5–11.5)

## 2022-03-29 LAB
ALBUMIN SERPL-MCNC: 3.6 G/DL (ref 3.5–5.2)
ALP BLD-CCNC: 130 U/L (ref 35–104)
ALT SERPL-CCNC: 10 U/L (ref 0–32)
ANION GAP SERPL CALCULATED.3IONS-SCNC: 11 MMOL/L (ref 7–16)
AST SERPL-CCNC: 10 U/L (ref 0–31)
BASOPHILS ABSOLUTE: 0.03 E9/L (ref 0–0.2)
BASOPHILS RELATIVE PERCENT: 0.5 % (ref 0–2)
BILIRUB SERPL-MCNC: 0.4 MG/DL (ref 0–1.2)
BUN BLDV-MCNC: 30 MG/DL (ref 6–23)
CALCIUM SERPL-MCNC: 8.9 MG/DL (ref 8.6–10.2)
CHLORIDE BLD-SCNC: 105 MMOL/L (ref 98–107)
CHOLESTEROL, TOTAL: 144 MG/DL (ref 0–199)
CO2: 23 MMOL/L (ref 22–29)
CREAT SERPL-MCNC: 1.3 MG/DL (ref 0.5–1)
EOSINOPHILS ABSOLUTE: 0.22 E9/L (ref 0.05–0.5)
EOSINOPHILS RELATIVE PERCENT: 3.5 % (ref 0–6)
GAMMA GLUTAMYL TRANSFERASE: 13 U/L (ref 6–42)
GFR AFRICAN AMERICAN: 51
GFR NON-AFRICAN AMERICAN: 42 ML/MIN/1.73
GLUCOSE BLD-MCNC: 170 MG/DL (ref 74–99)
HCT VFR BLD CALC: 38.4 % (ref 34–48)
HDLC SERPL-MCNC: 44 MG/DL
HEMOGLOBIN: 12.8 G/DL (ref 11.5–15.5)
IMMATURE GRANULOCYTES #: 0.03 E9/L
IMMATURE GRANULOCYTES %: 0.5 % (ref 0–5)
LDL CHOLESTEROL CALCULATED: 74 MG/DL (ref 0–99)
LYMPHOCYTES ABSOLUTE: 1.48 E9/L (ref 1.5–4)
LYMPHOCYTES RELATIVE PERCENT: 23.6 % (ref 20–42)
MAGNESIUM: 1.7 MG/DL (ref 1.6–2.6)
MCH RBC QN AUTO: 30.5 PG (ref 26–35)
MCHC RBC AUTO-ENTMCNC: 33.3 % (ref 32–34.5)
MCV RBC AUTO: 91.6 FL (ref 80–99.9)
MONOCYTES ABSOLUTE: 0.52 E9/L (ref 0.1–0.95)
MONOCYTES RELATIVE PERCENT: 8.3 % (ref 2–12)
NEUTROPHILS ABSOLUTE: 3.98 E9/L (ref 1.8–7.3)
NEUTROPHILS RELATIVE PERCENT: 63.6 % (ref 43–80)
PDW BLD-RTO: 12.7 FL (ref 11.5–15)
PHOSPHORUS: 4.5 MG/DL (ref 2.5–4.5)
PLATELET # BLD: 195 E9/L (ref 130–450)
PMV BLD AUTO: 11.1 FL (ref 7–12)
POTASSIUM SERPL-SCNC: 4.4 MMOL/L (ref 3.5–5)
RBC # BLD: 4.19 E12/L (ref 3.5–5.5)
SODIUM BLD-SCNC: 139 MMOL/L (ref 132–146)
TOTAL PROTEIN: 6 G/DL (ref 6.4–8.3)
TRIGL SERPL-MCNC: 131 MG/DL (ref 0–149)
VLDLC SERPL CALC-MCNC: 26 MG/DL
WBC # BLD: 6.3 E9/L (ref 4.5–11.5)

## 2022-03-31 LAB — TACROLIMUS BLOOD: 9.3 NG/ML

## 2022-04-05 LAB
ALBUMIN SERPL-MCNC: 3.8 G/DL (ref 3.5–5.2)
ALP BLD-CCNC: 137 U/L (ref 35–104)
ALT SERPL-CCNC: 9 U/L (ref 0–32)
ANION GAP SERPL CALCULATED.3IONS-SCNC: 12 MMOL/L (ref 7–16)
AST SERPL-CCNC: 12 U/L (ref 0–31)
BASOPHILS ABSOLUTE: 0.06 E9/L (ref 0–0.2)
BASOPHILS RELATIVE PERCENT: 1 % (ref 0–2)
BILIRUB SERPL-MCNC: 0.3 MG/DL (ref 0–1.2)
BUN BLDV-MCNC: 30 MG/DL (ref 6–23)
CALCIUM SERPL-MCNC: 9.1 MG/DL (ref 8.6–10.2)
CHLORIDE BLD-SCNC: 107 MMOL/L (ref 98–107)
CO2: 22 MMOL/L (ref 22–29)
CREAT SERPL-MCNC: 1.5 MG/DL (ref 0.5–1)
EOSINOPHILS ABSOLUTE: 0.24 E9/L (ref 0.05–0.5)
EOSINOPHILS RELATIVE PERCENT: 3.9 % (ref 0–6)
GAMMA GLUTAMYL TRANSFERASE: 11 U/L (ref 6–42)
GFR AFRICAN AMERICAN: 43
GFR NON-AFRICAN AMERICAN: 35 ML/MIN/1.73
GLUCOSE BLD-MCNC: 172 MG/DL (ref 74–99)
HCT VFR BLD CALC: 36.8 % (ref 34–48)
HEMOGLOBIN: 12.2 G/DL (ref 11.5–15.5)
IMMATURE GRANULOCYTES #: 0.02 E9/L
IMMATURE GRANULOCYTES %: 0.3 % (ref 0–5)
LYMPHOCYTES ABSOLUTE: 1.27 E9/L (ref 1.5–4)
LYMPHOCYTES RELATIVE PERCENT: 20.6 % (ref 20–42)
MAGNESIUM: 2.1 MG/DL (ref 1.6–2.6)
MCH RBC QN AUTO: 30.5 PG (ref 26–35)
MCHC RBC AUTO-ENTMCNC: 33.2 % (ref 32–34.5)
MCV RBC AUTO: 92 FL (ref 80–99.9)
MONOCYTES ABSOLUTE: 0.55 E9/L (ref 0.1–0.95)
MONOCYTES RELATIVE PERCENT: 8.9 % (ref 2–12)
NEUTROPHILS ABSOLUTE: 4.02 E9/L (ref 1.8–7.3)
NEUTROPHILS RELATIVE PERCENT: 65.3 % (ref 43–80)
PDW BLD-RTO: 12.5 FL (ref 11.5–15)
PHOSPHORUS: 4.5 MG/DL (ref 2.5–4.5)
PLATELET # BLD: 175 E9/L (ref 130–450)
PMV BLD AUTO: 11.2 FL (ref 7–12)
POTASSIUM SERPL-SCNC: 4.7 MMOL/L (ref 3.5–5)
RBC # BLD: 4 E12/L (ref 3.5–5.5)
SODIUM BLD-SCNC: 141 MMOL/L (ref 132–146)
TOTAL PROTEIN: 6.5 G/DL (ref 6.4–8.3)
WBC # BLD: 6.2 E9/L (ref 4.5–11.5)

## 2022-04-07 LAB — TACROLIMUS BLOOD: 8.3 NG/ML

## 2022-04-14 LAB
ALBUMIN SERPL-MCNC: 3.7 G/DL (ref 3.5–5.2)
ALP BLD-CCNC: 145 U/L (ref 35–104)
ALT SERPL-CCNC: 8 U/L (ref 0–32)
ANION GAP SERPL CALCULATED.3IONS-SCNC: 11 MMOL/L (ref 7–16)
AST SERPL-CCNC: 9 U/L (ref 0–31)
BASOPHILS ABSOLUTE: 0.04 E9/L (ref 0–0.2)
BASOPHILS RELATIVE PERCENT: 0.8 % (ref 0–2)
BILIRUB SERPL-MCNC: 0.3 MG/DL (ref 0–1.2)
BUN BLDV-MCNC: 29 MG/DL (ref 6–23)
CALCIUM SERPL-MCNC: 9 MG/DL (ref 8.6–10.2)
CHLORIDE BLD-SCNC: 109 MMOL/L (ref 98–107)
CO2: 20 MMOL/L (ref 22–29)
CREAT SERPL-MCNC: 1.6 MG/DL (ref 0.5–1)
EOSINOPHILS ABSOLUTE: 0.35 E9/L (ref 0.05–0.5)
EOSINOPHILS RELATIVE PERCENT: 6.8 % (ref 0–6)
GAMMA GLUTAMYL TRANSFERASE: 12 U/L (ref 6–42)
GFR AFRICAN AMERICAN: 40
GFR NON-AFRICAN AMERICAN: 33 ML/MIN/1.73
GLUCOSE BLD-MCNC: 189 MG/DL (ref 74–99)
HCT VFR BLD CALC: 35.4 % (ref 34–48)
HEMOGLOBIN: 12 G/DL (ref 11.5–15.5)
IMMATURE GRANULOCYTES #: 0.02 E9/L
IMMATURE GRANULOCYTES %: 0.4 % (ref 0–5)
LYMPHOCYTES ABSOLUTE: 1.21 E9/L (ref 1.5–4)
LYMPHOCYTES RELATIVE PERCENT: 23.5 % (ref 20–42)
MAGNESIUM: 1.9 MG/DL (ref 1.6–2.6)
MCH RBC QN AUTO: 30.3 PG (ref 26–35)
MCHC RBC AUTO-ENTMCNC: 33.9 % (ref 32–34.5)
MCV RBC AUTO: 89.4 FL (ref 80–99.9)
MONOCYTES ABSOLUTE: 0.69 E9/L (ref 0.1–0.95)
MONOCYTES RELATIVE PERCENT: 13.4 % (ref 2–12)
NEUTROPHILS ABSOLUTE: 2.83 E9/L (ref 1.8–7.3)
NEUTROPHILS RELATIVE PERCENT: 55.1 % (ref 43–80)
PDW BLD-RTO: 12.7 FL (ref 11.5–15)
PHOSPHORUS: 4 MG/DL (ref 2.5–4.5)
PLATELET # BLD: 157 E9/L (ref 130–450)
PMV BLD AUTO: 11.1 FL (ref 7–12)
POTASSIUM SERPL-SCNC: 4.5 MMOL/L (ref 3.5–5)
RBC # BLD: 3.96 E12/L (ref 3.5–5.5)
SODIUM BLD-SCNC: 140 MMOL/L (ref 132–146)
TOTAL PROTEIN: 6.4 G/DL (ref 6.4–8.3)
WBC # BLD: 5.1 E9/L (ref 4.5–11.5)

## 2022-04-16 LAB — TACROLIMUS BLOOD: 5.6 NG/ML

## 2022-04-28 LAB
ALBUMIN SERPL-MCNC: 3.9 G/DL (ref 3.5–5.2)
ALP BLD-CCNC: 155 U/L (ref 35–104)
ALT SERPL-CCNC: 10 U/L (ref 0–32)
ANION GAP SERPL CALCULATED.3IONS-SCNC: 16 MMOL/L (ref 7–16)
AST SERPL-CCNC: 12 U/L (ref 0–31)
BASOPHILS ABSOLUTE: 0.04 E9/L (ref 0–0.2)
BASOPHILS RELATIVE PERCENT: 0.7 % (ref 0–2)
BILIRUB SERPL-MCNC: 0.3 MG/DL (ref 0–1.2)
BUN BLDV-MCNC: 31 MG/DL (ref 6–23)
CALCIUM SERPL-MCNC: 9.2 MG/DL (ref 8.6–10.2)
CHLORIDE BLD-SCNC: 109 MMOL/L (ref 98–107)
CO2: 19 MMOL/L (ref 22–29)
CREAT SERPL-MCNC: 1.7 MG/DL (ref 0.5–1)
EOSINOPHILS ABSOLUTE: 0.32 E9/L (ref 0.05–0.5)
EOSINOPHILS RELATIVE PERCENT: 5.5 % (ref 0–6)
GAMMA GLUTAMYL TRANSFERASE: 12 U/L (ref 6–42)
GFR AFRICAN AMERICAN: 37
GFR NON-AFRICAN AMERICAN: 31 ML/MIN/1.73
GLUCOSE BLD-MCNC: 185 MG/DL (ref 74–99)
HCT VFR BLD CALC: 39.8 % (ref 34–48)
HEMOGLOBIN: 13.1 G/DL (ref 11.5–15.5)
IMMATURE GRANULOCYTES #: 0.02 E9/L
IMMATURE GRANULOCYTES %: 0.3 % (ref 0–5)
LYMPHOCYTES ABSOLUTE: 1.31 E9/L (ref 1.5–4)
LYMPHOCYTES RELATIVE PERCENT: 22.6 % (ref 20–42)
MAGNESIUM: 1.7 MG/DL (ref 1.6–2.6)
MCH RBC QN AUTO: 30.3 PG (ref 26–35)
MCHC RBC AUTO-ENTMCNC: 32.9 % (ref 32–34.5)
MCV RBC AUTO: 92.1 FL (ref 80–99.9)
MONOCYTES ABSOLUTE: 0.5 E9/L (ref 0.1–0.95)
MONOCYTES RELATIVE PERCENT: 8.6 % (ref 2–12)
NEUTROPHILS ABSOLUTE: 3.61 E9/L (ref 1.8–7.3)
NEUTROPHILS RELATIVE PERCENT: 62.3 % (ref 43–80)
PDW BLD-RTO: 12.8 FL (ref 11.5–15)
PHOSPHORUS: 4.2 MG/DL (ref 2.5–4.5)
PLATELET # BLD: 171 E9/L (ref 130–450)
PMV BLD AUTO: 10.7 FL (ref 7–12)
POTASSIUM SERPL-SCNC: 4.8 MMOL/L (ref 3.5–5)
RBC # BLD: 4.32 E12/L (ref 3.5–5.5)
SODIUM BLD-SCNC: 144 MMOL/L (ref 132–146)
TOTAL PROTEIN: 6.8 G/DL (ref 6.4–8.3)
WBC # BLD: 5.8 E9/L (ref 4.5–11.5)

## 2022-05-01 LAB — TACROLIMUS BLOOD: 7.2 NG/ML

## 2022-05-06 LAB
ALBUMIN SERPL-MCNC: 3.5 G/DL (ref 3.5–5.2)
ALP BLD-CCNC: 142 U/L (ref 35–104)
ALT SERPL-CCNC: 8 U/L (ref 0–32)
ANION GAP SERPL CALCULATED.3IONS-SCNC: 15 MMOL/L (ref 7–16)
AST SERPL-CCNC: 12 U/L (ref 0–31)
BASOPHILS ABSOLUTE: 0.05 E9/L (ref 0–0.2)
BASOPHILS RELATIVE PERCENT: 1 % (ref 0–2)
BILIRUB SERPL-MCNC: 0.2 MG/DL (ref 0–1.2)
BUN BLDV-MCNC: 30 MG/DL (ref 6–23)
CALCIUM SERPL-MCNC: 8.8 MG/DL (ref 8.6–10.2)
CHLORIDE BLD-SCNC: 110 MMOL/L (ref 98–107)
CO2: 18 MMOL/L (ref 22–29)
CREAT SERPL-MCNC: 1.4 MG/DL (ref 0.5–1)
EOSINOPHILS ABSOLUTE: 0.31 E9/L (ref 0.05–0.5)
EOSINOPHILS RELATIVE PERCENT: 6.1 % (ref 0–6)
GAMMA GLUTAMYL TRANSFERASE: 12 U/L (ref 6–42)
GFR AFRICAN AMERICAN: 46
GFR NON-AFRICAN AMERICAN: 38 ML/MIN/1.73
GLUCOSE BLD-MCNC: 188 MG/DL (ref 74–99)
HCT VFR BLD CALC: 35.3 % (ref 34–48)
HEMOGLOBIN: 11.4 G/DL (ref 11.5–15.5)
IMMATURE GRANULOCYTES #: 0.01 E9/L
IMMATURE GRANULOCYTES %: 0.2 % (ref 0–5)
LYMPHOCYTES ABSOLUTE: 1.54 E9/L (ref 1.5–4)
LYMPHOCYTES RELATIVE PERCENT: 30.5 % (ref 20–42)
MAGNESIUM: 1.8 MG/DL (ref 1.6–2.6)
MCH RBC QN AUTO: 30 PG (ref 26–35)
MCHC RBC AUTO-ENTMCNC: 32.3 % (ref 32–34.5)
MCV RBC AUTO: 92.9 FL (ref 80–99.9)
MONOCYTES ABSOLUTE: 0.44 E9/L (ref 0.1–0.95)
MONOCYTES RELATIVE PERCENT: 8.7 % (ref 2–12)
NEUTROPHILS ABSOLUTE: 2.7 E9/L (ref 1.8–7.3)
NEUTROPHILS RELATIVE PERCENT: 53.5 % (ref 43–80)
PDW BLD-RTO: 13.1 FL (ref 11.5–15)
PHOSPHORUS: 4.3 MG/DL (ref 2.5–4.5)
PLATELET # BLD: 170 E9/L (ref 130–450)
PMV BLD AUTO: 10.9 FL (ref 7–12)
POTASSIUM SERPL-SCNC: 4.5 MMOL/L (ref 3.5–5)
RBC # BLD: 3.8 E12/L (ref 3.5–5.5)
SODIUM BLD-SCNC: 143 MMOL/L (ref 132–146)
TOTAL PROTEIN: 6.2 G/DL (ref 6.4–8.3)
WBC # BLD: 5.1 E9/L (ref 4.5–11.5)

## 2022-05-10 LAB — TACROLIMUS BLOOD: 6.2 NG/ML

## 2022-05-13 ENCOUNTER — TELEPHONE (OUTPATIENT)
Dept: FAMILY MEDICINE CLINIC | Age: 62
End: 2022-05-13

## 2022-05-13 LAB
ALBUMIN SERPL-MCNC: 3.4 G/DL (ref 3.5–5.2)
ALP BLD-CCNC: 106 U/L (ref 35–104)
ALT SERPL-CCNC: 11 U/L (ref 0–32)
ANION GAP SERPL CALCULATED.3IONS-SCNC: 10 MMOL/L (ref 7–16)
AST SERPL-CCNC: 13 U/L (ref 0–31)
BASOPHILS ABSOLUTE: 0.01 E9/L (ref 0–0.2)
BASOPHILS RELATIVE PERCENT: 0.2 % (ref 0–2)
BILIRUB SERPL-MCNC: <0.2 MG/DL (ref 0–1.2)
BUN BLDV-MCNC: 36 MG/DL (ref 6–23)
CALCIUM SERPL-MCNC: 10.2 MG/DL (ref 8.6–10.2)
CHLORIDE BLD-SCNC: 97 MMOL/L (ref 98–107)
CO2: 33 MMOL/L (ref 22–29)
CREAT SERPL-MCNC: 2.1 MG/DL (ref 0.5–1)
EOSINOPHILS ABSOLUTE: 0.23 E9/L (ref 0.05–0.5)
EOSINOPHILS RELATIVE PERCENT: 4 % (ref 0–6)
GAMMA GLUTAMYL TRANSFERASE: 16 U/L (ref 6–42)
GFR AFRICAN AMERICAN: 29
GFR NON-AFRICAN AMERICAN: 24 ML/MIN/1.73
GLUCOSE BLD-MCNC: 124 MG/DL (ref 74–99)
HCT VFR BLD CALC: 29.3 % (ref 34–48)
HEMOGLOBIN: 8.8 G/DL (ref 11.5–15.5)
IMMATURE GRANULOCYTES #: 0.03 E9/L
IMMATURE GRANULOCYTES %: 0.5 % (ref 0–5)
LYMPHOCYTES ABSOLUTE: 2.1 E9/L (ref 1.5–4)
LYMPHOCYTES RELATIVE PERCENT: 36.8 % (ref 20–42)
MAGNESIUM: 2.4 MG/DL (ref 1.6–2.6)
MCH RBC QN AUTO: 29.4 PG (ref 26–35)
MCHC RBC AUTO-ENTMCNC: 30 % (ref 32–34.5)
MCV RBC AUTO: 98 FL (ref 80–99.9)
MONOCYTES ABSOLUTE: 0.36 E9/L (ref 0.1–0.95)
MONOCYTES RELATIVE PERCENT: 6.3 % (ref 2–12)
NEUTROPHILS ABSOLUTE: 2.97 E9/L (ref 1.8–7.3)
NEUTROPHILS RELATIVE PERCENT: 52.2 % (ref 43–80)
PDW BLD-RTO: 15.3 FL (ref 11.5–15)
PHOSPHORUS: 4.6 MG/DL (ref 2.5–4.5)
PLATELET # BLD: 221 E9/L (ref 130–450)
PMV BLD AUTO: 10 FL (ref 7–12)
POTASSIUM SERPL-SCNC: 4.1 MMOL/L (ref 3.5–5)
RBC # BLD: 2.99 E12/L (ref 3.5–5.5)
SODIUM BLD-SCNC: 140 MMOL/L (ref 132–146)
TOTAL PROTEIN: 6.6 G/DL (ref 6.4–8.3)
WBC # BLD: 5.7 E9/L (ref 4.5–11.5)

## 2022-05-13 NOTE — TELEPHONE ENCOUNTER
----- Message from Nils Barrera sent at 5/13/2022 10:34 AM EDT -----  Subject: Message to Provider    QUESTIONS  Information for Provider? Stephane NIRANJAN Vinh Whipple is calling and says that she is   moving into an assisted living on 5/20. Wants to know if Dr. Valeriy Flor   would follow her while she is in there. Can come out for appointments. Please call Chika Martinez at [phone]  ---------------------------------------------------------------------------  --------------  Oleg Muse INFO  What is the best way for the office to contact you? OK to leave message on   voicemail  Preferred Call Back Phone Number? [phone]  ---------------------------------------------------------------------------  --------------  SCRIPT ANSWERS  Relationship to Patient? Third Party  Third Party Type? Insurance? Representative Name?  Stephane UREÑA Vinh Whipple

## 2022-05-15 LAB — TACROLIMUS BLOOD: <2 NG/ML

## 2022-05-20 DIAGNOSIS — K21.9 GASTROESOPHAGEAL REFLUX DISEASE, UNSPECIFIED WHETHER ESOPHAGITIS PRESENT: Chronic | ICD-10-CM

## 2022-05-20 DIAGNOSIS — G47.00 INSOMNIA, UNSPECIFIED TYPE: ICD-10-CM

## 2022-05-20 LAB
ALBUMIN SERPL-MCNC: 3.7 G/DL (ref 3.5–5.2)
ALP BLD-CCNC: 134 U/L (ref 35–104)
ALT SERPL-CCNC: 8 U/L (ref 0–32)
ANION GAP SERPL CALCULATED.3IONS-SCNC: 10 MMOL/L (ref 7–16)
AST SERPL-CCNC: 16 U/L (ref 0–31)
BASOPHILS ABSOLUTE: 0.04 E9/L (ref 0–0.2)
BASOPHILS RELATIVE PERCENT: 0.8 % (ref 0–2)
BILIRUB SERPL-MCNC: 0.3 MG/DL (ref 0–1.2)
BUN BLDV-MCNC: 31 MG/DL (ref 6–23)
CALCIUM SERPL-MCNC: 8.9 MG/DL (ref 8.6–10.2)
CHLORIDE BLD-SCNC: 109 MMOL/L (ref 98–107)
CO2: 24 MMOL/L (ref 22–29)
CREAT SERPL-MCNC: 1.6 MG/DL (ref 0.5–1)
EOSINOPHILS ABSOLUTE: 0.22 E9/L (ref 0.05–0.5)
EOSINOPHILS RELATIVE PERCENT: 4.4 % (ref 0–6)
GAMMA GLUTAMYL TRANSFERASE: 10 U/L (ref 6–42)
GFR AFRICAN AMERICAN: 40
GFR NON-AFRICAN AMERICAN: 33 ML/MIN/1.73
GLUCOSE BLD-MCNC: 182 MG/DL (ref 74–99)
HCT VFR BLD CALC: 35.1 % (ref 34–48)
HEMOGLOBIN: 11.8 G/DL (ref 11.5–15.5)
IMMATURE GRANULOCYTES #: 0.02 E9/L
IMMATURE GRANULOCYTES %: 0.4 % (ref 0–5)
LYMPHOCYTES ABSOLUTE: 1.31 E9/L (ref 1.5–4)
LYMPHOCYTES RELATIVE PERCENT: 26.2 % (ref 20–42)
MAGNESIUM: 1.9 MG/DL (ref 1.6–2.6)
MCH RBC QN AUTO: 30.7 PG (ref 26–35)
MCHC RBC AUTO-ENTMCNC: 33.6 % (ref 32–34.5)
MCV RBC AUTO: 91.4 FL (ref 80–99.9)
MONOCYTES ABSOLUTE: 0.43 E9/L (ref 0.1–0.95)
MONOCYTES RELATIVE PERCENT: 8.6 % (ref 2–12)
NEUTROPHILS ABSOLUTE: 2.98 E9/L (ref 1.8–7.3)
NEUTROPHILS RELATIVE PERCENT: 59.6 % (ref 43–80)
PDW BLD-RTO: 12.8 FL (ref 11.5–15)
PHOSPHORUS: 4.4 MG/DL (ref 2.5–4.5)
PLATELET # BLD: 158 E9/L (ref 130–450)
PMV BLD AUTO: 11.1 FL (ref 7–12)
POTASSIUM SERPL-SCNC: 5.1 MMOL/L (ref 3.5–5)
RBC # BLD: 3.84 E12/L (ref 3.5–5.5)
SODIUM BLD-SCNC: 143 MMOL/L (ref 132–146)
TOTAL PROTEIN: 6.1 G/DL (ref 6.4–8.3)
WBC # BLD: 5 E9/L (ref 4.5–11.5)

## 2022-05-20 RX ORDER — TACROLIMUS 1 MG/1
CAPSULE ORAL
Qty: 30 CAPSULE | Refills: 0 | OUTPATIENT
Start: 2022-05-20

## 2022-05-20 RX ORDER — AMLODIPINE BESYLATE 5 MG/1
TABLET ORAL
Qty: 1 TABLET | Refills: 10 | OUTPATIENT
Start: 2022-05-20

## 2022-05-20 RX ORDER — METOPROLOL SUCCINATE 25 MG/1
25 TABLET, EXTENDED RELEASE ORAL DAILY
Qty: 30 TABLET | Refills: 0 | OUTPATIENT
Start: 2022-05-20

## 2022-05-20 RX ORDER — PANTOPRAZOLE SODIUM 40 MG/1
TABLET, DELAYED RELEASE ORAL
Qty: 30 TABLET | Refills: 0 | OUTPATIENT
Start: 2022-05-20

## 2022-05-20 RX ORDER — LOSARTAN POTASSIUM 100 MG/1
TABLET ORAL
Qty: 30 TABLET | Refills: 0 | OUTPATIENT
Start: 2022-05-20

## 2022-05-20 RX ORDER — CLOPIDOGREL BISULFATE 75 MG/1
TABLET ORAL
Qty: 30 TABLET | Refills: 0 | OUTPATIENT
Start: 2022-05-20

## 2022-05-20 RX ORDER — TACROLIMUS 0.5 MG/1
CAPSULE ORAL
Qty: 30 CAPSULE | Refills: 0 | OUTPATIENT
Start: 2022-05-20

## 2022-05-20 RX ORDER — MIRTAZAPINE 15 MG/1
TABLET, FILM COATED ORAL
Qty: 30 TABLET | Refills: 0 | OUTPATIENT
Start: 2022-05-20

## 2022-05-20 RX ORDER — ATORVASTATIN CALCIUM 40 MG/1
TABLET, FILM COATED ORAL
Qty: 30 TABLET | Refills: 0 | OUTPATIENT
Start: 2022-05-20

## 2022-05-22 LAB
SARS-COV-2: NOT DETECTED
SOURCE: NORMAL

## 2022-05-23 ENCOUNTER — OFFICE VISIT (OUTPATIENT)
Dept: FAMILY MEDICINE CLINIC | Age: 62
End: 2022-05-23
Payer: MEDICARE

## 2022-05-23 VITALS
OXYGEN SATURATION: 98 % | SYSTOLIC BLOOD PRESSURE: 118 MMHG | BODY MASS INDEX: 26.31 KG/M2 | TEMPERATURE: 96.6 F | HEART RATE: 61 BPM | DIASTOLIC BLOOD PRESSURE: 70 MMHG | HEIGHT: 66 IN | RESPIRATION RATE: 16 BRPM

## 2022-05-23 DIAGNOSIS — K59.00 OBSTIPATION: ICD-10-CM

## 2022-05-23 DIAGNOSIS — I10 HTN (HYPERTENSION), BENIGN: Chronic | ICD-10-CM

## 2022-05-23 DIAGNOSIS — J42 CHRONIC BRONCHITIS, UNSPECIFIED CHRONIC BRONCHITIS TYPE (HCC): ICD-10-CM

## 2022-05-23 DIAGNOSIS — N18.31 STAGE 3A CHRONIC KIDNEY DISEASE (HCC): ICD-10-CM

## 2022-05-23 DIAGNOSIS — E11.649 UNCONTROLLED TYPE 2 DIABETES MELLITUS WITH HYPOGLYCEMIA WITHOUT COMA (HCC): Chronic | ICD-10-CM

## 2022-05-23 DIAGNOSIS — G81.94 LEFT HEMIPLEGIA (HCC): ICD-10-CM

## 2022-05-23 DIAGNOSIS — Z94.4 STATUS POST LIVER TRANSPLANTATION (HCC): ICD-10-CM

## 2022-05-23 DIAGNOSIS — K21.9 GASTROESOPHAGEAL REFLUX DISEASE, UNSPECIFIED WHETHER ESOPHAGITIS PRESENT: Chronic | ICD-10-CM

## 2022-05-23 DIAGNOSIS — I63.9 ACUTE ISCHEMIC STROKE (HCC): ICD-10-CM

## 2022-05-23 DIAGNOSIS — D84.9 IMMUNOSUPPRESSED STATUS (HCC): ICD-10-CM

## 2022-05-23 DIAGNOSIS — F51.01 PRIMARY INSOMNIA: ICD-10-CM

## 2022-05-23 DIAGNOSIS — G47.00 INSOMNIA, UNSPECIFIED TYPE: ICD-10-CM

## 2022-05-23 DIAGNOSIS — I63.20 CEREBROVASCULAR ACCIDENT (CVA) DUE TO STENOSIS OF PRECEREBRAL ARTERY (HCC): Primary | ICD-10-CM

## 2022-05-23 PROBLEM — N18.30 CHRONIC RENAL DISEASE, STAGE III (HCC): Status: ACTIVE | Noted: 2022-05-23

## 2022-05-23 LAB — TACROLIMUS BLOOD: 7 NG/ML

## 2022-05-23 PROCEDURE — 3017F COLORECTAL CA SCREEN DOC REV: CPT | Performed by: FAMILY MEDICINE

## 2022-05-23 PROCEDURE — 99214 OFFICE O/P EST MOD 30 MIN: CPT | Performed by: FAMILY MEDICINE

## 2022-05-23 PROCEDURE — 3023F SPIROM DOC REV: CPT | Performed by: FAMILY MEDICINE

## 2022-05-23 PROCEDURE — 1036F TOBACCO NON-USER: CPT | Performed by: FAMILY MEDICINE

## 2022-05-23 PROCEDURE — G8417 CALC BMI ABV UP PARAM F/U: HCPCS | Performed by: FAMILY MEDICINE

## 2022-05-23 PROCEDURE — 2022F DILAT RTA XM EVC RTNOPTHY: CPT | Performed by: FAMILY MEDICINE

## 2022-05-23 PROCEDURE — 3051F HG A1C>EQUAL 7.0%<8.0%: CPT | Performed by: FAMILY MEDICINE

## 2022-05-23 PROCEDURE — G8427 DOCREV CUR MEDS BY ELIG CLIN: HCPCS | Performed by: FAMILY MEDICINE

## 2022-05-23 RX ORDER — DOCUSATE SODIUM 100 MG/1
CAPSULE, LIQUID FILLED ORAL
Qty: 1 CAPSULE | Refills: 0 | OUTPATIENT
Start: 2022-05-23

## 2022-05-23 RX ORDER — PANTOPRAZOLE SODIUM 40 MG/1
TABLET, DELAYED RELEASE ORAL
Qty: 30 TABLET | Refills: 2 | Status: SHIPPED
Start: 2022-05-23 | End: 2022-09-15 | Stop reason: SDUPTHER

## 2022-05-23 RX ORDER — AMLODIPINE BESYLATE 2.5 MG/1
TABLET ORAL
Qty: 90 TABLET | Refills: 3 | Status: SHIPPED
Start: 2022-05-23 | End: 2022-06-29 | Stop reason: SDUPTHER

## 2022-05-23 RX ORDER — CLOPIDOGREL BISULFATE 75 MG/1
75 TABLET ORAL DAILY
Qty: 60 TABLET | Refills: 3 | Status: SHIPPED
Start: 2022-05-23 | End: 2022-06-29 | Stop reason: SDUPTHER

## 2022-05-23 RX ORDER — CHOLECALCIFEROL TAB 125 MCG (5000 UNIT) 125 MCG (5000 UT)
TAB
Qty: 1 TABLET | Refills: 10 | OUTPATIENT
Start: 2022-05-23

## 2022-05-23 RX ORDER — ATORVASTATIN CALCIUM 40 MG/1
40 TABLET, FILM COATED ORAL NIGHTLY
Qty: 30 TABLET | Refills: 3 | Status: SHIPPED
Start: 2022-05-23 | End: 2022-06-29 | Stop reason: SDUPTHER

## 2022-05-23 RX ORDER — ASPIRIN 81 MG/1
81 TABLET ORAL DAILY
Qty: 30 TABLET | Refills: 3 | Status: SHIPPED
Start: 2022-05-23 | End: 2022-06-29 | Stop reason: SDUPTHER

## 2022-05-23 RX ORDER — SENNA PLUS 8.6 MG/1
1 TABLET ORAL DAILY
Qty: 28 TABLET | Refills: 0 | Status: SHIPPED
Start: 2022-05-23 | End: 2022-06-29

## 2022-05-23 RX ORDER — MEMANTINE HYDROCHLORIDE 10 MG/1
10 TABLET ORAL 2 TIMES DAILY
COMMUNITY

## 2022-05-23 RX ORDER — MICONAZOLE NITRATE 20.6 MG/G
POWDER TOPICAL
Qty: 1 G | Refills: 10 | OUTPATIENT
Start: 2022-05-23

## 2022-05-23 RX ORDER — ZINC SULFATE 50(220)MG
220 CAPSULE ORAL DAILY
Qty: 30 CAPSULE | Refills: 3 | Status: SHIPPED
Start: 2022-05-23 | End: 2022-06-06 | Stop reason: SDUPTHER

## 2022-05-23 RX ORDER — MEMANTINE HYDROCHLORIDE 10 MG/1
10 TABLET ORAL 2 TIMES DAILY
Qty: 60 TABLET | Refills: 3 | Status: CANCELLED | OUTPATIENT
Start: 2022-05-23

## 2022-05-23 RX ORDER — METOPROLOL SUCCINATE 25 MG/1
25 TABLET, EXTENDED RELEASE ORAL DAILY
Qty: 30 TABLET | Refills: 3 | Status: SHIPPED
Start: 2022-05-23 | End: 2022-06-29 | Stop reason: SDUPTHER

## 2022-05-23 RX ORDER — ASPIRIN 81 MG
TABLET,CHEWABLE ORAL
Qty: 1 TABLET | Refills: 10 | OUTPATIENT
Start: 2022-05-23

## 2022-05-23 RX ORDER — MELATONIN 5 MG
1 TABLET,CHEWABLE ORAL NIGHTLY
Qty: 30 TABLET | Refills: 3 | Status: SHIPPED
Start: 2022-05-23 | End: 2022-06-29 | Stop reason: SDUPTHER

## 2022-05-23 RX ORDER — ACETAMINOPHEN 325 MG/1
TABLET ORAL
Qty: 1 TABLET | Refills: 10 | OUTPATIENT
Start: 2022-05-23

## 2022-05-23 RX ORDER — SENNA PLUS 8.6 MG/1
1 TABLET ORAL DAILY
COMMUNITY
End: 2022-05-23 | Stop reason: SDUPTHER

## 2022-05-23 RX ORDER — CHOLECALCIFEROL (VITAMIN D3) 125 MCG
CAPSULE ORAL
Qty: 1 TABLET | Refills: 10 | OUTPATIENT
Start: 2022-05-23

## 2022-05-23 RX ORDER — MIRTAZAPINE 15 MG/1
15 TABLET, FILM COATED ORAL NIGHTLY
Qty: 30 TABLET | Refills: 0 | Status: SHIPPED
Start: 2022-05-23 | End: 2022-06-09

## 2022-05-23 RX ORDER — LOSARTAN POTASSIUM 25 MG/1
50 TABLET ORAL DAILY
Qty: 60 TABLET | Refills: 2 | Status: SHIPPED
Start: 2022-05-23 | End: 2022-06-29

## 2022-05-23 RX ORDER — MIRTAZAPINE 15 MG/1
15 TABLET, FILM COATED ORAL NIGHTLY
COMMUNITY
End: 2022-06-09

## 2022-05-23 RX ORDER — METFORMIN HYDROCHLORIDE 500 MG/1
TABLET, EXTENDED RELEASE ORAL
Qty: 30 TABLET | Refills: 2 | Status: SHIPPED
Start: 2022-05-23 | End: 2022-06-29 | Stop reason: SDUPTHER

## 2022-05-23 SDOH — ECONOMIC STABILITY: FOOD INSECURITY: WITHIN THE PAST 12 MONTHS, YOU WORRIED THAT YOUR FOOD WOULD RUN OUT BEFORE YOU GOT MONEY TO BUY MORE.: NEVER TRUE

## 2022-05-23 SDOH — ECONOMIC STABILITY: FOOD INSECURITY: WITHIN THE PAST 12 MONTHS, THE FOOD YOU BOUGHT JUST DIDN'T LAST AND YOU DIDN'T HAVE MONEY TO GET MORE.: NEVER TRUE

## 2022-05-23 ASSESSMENT — PATIENT HEALTH QUESTIONNAIRE - PHQ9
1. LITTLE INTEREST OR PLEASURE IN DOING THINGS: 0
SUM OF ALL RESPONSES TO PHQ QUESTIONS 1-9: 0
SUM OF ALL RESPONSES TO PHQ QUESTIONS 1-9: 0
SUM OF ALL RESPONSES TO PHQ9 QUESTIONS 1 & 2: 0
SUM OF ALL RESPONSES TO PHQ QUESTIONS 1-9: 0
2. FEELING DOWN, DEPRESSED OR HOPELESS: 0
SUM OF ALL RESPONSES TO PHQ QUESTIONS 1-9: 0

## 2022-05-23 ASSESSMENT — SOCIAL DETERMINANTS OF HEALTH (SDOH): HOW HARD IS IT FOR YOU TO PAY FOR THE VERY BASICS LIKE FOOD, HOUSING, MEDICAL CARE, AND HEATING?: NOT HARD AT ALL

## 2022-05-23 ASSESSMENT — ENCOUNTER SYMPTOMS
WHEEZING: 0
ABDOMINAL PAIN: 0
NAUSEA: 0
VOMITING: 0
DIARRHEA: 0
SHORTNESS OF BREATH: 0
COUGH: 0
CONSTIPATION: 0
BLOOD IN STOOL: 0

## 2022-05-23 NOTE — PROGRESS NOTES
Nan Marcano (:  1960) is a 62 y.o. female,Established patient, here for evaluation of the following chief complaint(s):  Cerebrovascular Accident (Has been in rehab, transferred to assisted living on Friday. Showing signs of dementia. Has not seen neurology in awhile. ) and Health Maintenance (needs mammo. )         ASSESSMENT/PLAN:  1. Cerebrovascular accident (CVA) due to stenosis of precerebral artery (HCC)  -     metoprolol succinate (TOPROL XL) 25 MG extended release tablet; Take 1 tablet by mouth daily, Disp-30 tablet, R-3Normal  -     clopidogrel (PLAVIX) 75 MG tablet; Take 1 tablet by mouth daily, Disp-60 tablet, R-3Normal  2. HTN (hypertension), benign  -     amLODIPine (NORVASC) 2.5 MG tablet; TAKE 1 TABLET BY MOUTH EVERY DAY, Disp-90 tablet, R-3Normal  -     losartan (COZAAR) 25 MG tablet; Take 2 tablets by mouth daily, Disp-60 tablet, R-2Normal  3. Gastroesophageal reflux disease, unspecified whether esophagitis present  -     pantoprazole (PROTONIX) 40 MG tablet; TAKE 1 TABLET BY MOUTH ONCE DAILY, Disp-30 tablet, R-2Normal  4. Insomnia, unspecified type  -     mirtazapine (REMERON) 15 MG tablet; Take 1 tablet by mouth nightly, Disp-30 tablet, R-0Normal  5. Uncontrolled type 2 diabetes mellitus with hypoglycemia without coma (HCC)  -     metFORMIN (GLUCOPHAGE-XR) 500 MG extended release tablet; TAKE 1 TABLET BY MOUTH EVERY DAY WITH BREAKFAST, Disp-30 tablet, R-2PATIENT NEEDS APPOINTMENTNormal  6. Acute ischemic stroke (HCC)  -     zinc sulfate (ORAZINC) 220 (50 Zn) MG capsule; Take 4 capsules by mouth daily, Disp-30 capsule, R-3Normal  -     aspirin 81 MG EC tablet; Take 1 tablet by mouth daily, Disp-30 tablet, R-3Normal  -     atorvastatin (LIPITOR) 40 MG tablet; Take 1 tablet by mouth nightly, Disp-30 tablet, R-3Normal  7. Obstipation  -     senna (SENOKOT) 8.6 MG tablet; Take 1 tablet by mouth daily, Disp-28 tablet, R-0Normal  8.  Primary insomnia  -     Melatonin 5 MG CHEW; Take 1 tablet by mouth nightly, Disp-30 tablet, R-3Normal  9. Stage 3a chronic kidney disease ([facility]  10. Status post liver transplantation ([facility]  11. Immunosuppressed status ([facility]  12. Left hemipleg[facility]  13. Chronic bronchitis, unspecified chronic bronchitis type ([facility]      No follow-ups on file. Subjective   SUBJECTIVE/OBJECTIVE:  Cerebrovascular Accident (Has been in rehab, transferred to assisted living on Friday. Showing signs of dementia. Has not seen neurology in awhile. ) and Health Maintenance (needs mammo. )        Review of Systems   Constitutional: Negative for chills, diaphoresis and fever. HENT: Negative for ear discharge, ear pain, hearing loss, nosebleeds and tinnitus. Respiratory: Negative for cough, shortness of breath and wheezing. Cardiovascular: Negative for chest pain. Gastrointestinal: Negative for abdominal pain, blood in stool, constipation, diarrhea, nausea and vomiting. Genitourinary: Negative for dysuria, flank pain and hematuria. Musculoskeletal: Negative for myalgias. Skin: Negative for rash. Neurological: Negative for headaches. Hematological: Does not bruise/bleed easily. Psychiatric/Behavioral: Negative for agitation, hallucinations and suicidal ideas. Objective   /70   Pulse 61   Temp 96.6 °F (35.9 °C) (Temporal)   Resp 16   Ht 5' 6\" (1.676 m)   LMP  (LMP Unknown)   SpO2 98%   BMI 26.31 kg/m²   Lab Results   Component Value Date    LABA1C 7.2 (H) 03/08/2022    LABA1C 6.4 (H) 12/28/2021    LABA1C 7.6 (H) 11/28/2021     Physical Exam  Constitutional:       General: She is not in acute distress. Appearance: She is well-developed. Eyes:      General: No scleral icterus. Neck:      Thyroid: No thyromegaly. Vascular: No JVD. Trachea: No tracheal deviation. Cardiovascular:      Heart sounds: No gallop. Pulmonary:      Effort: No respiratory distress. Breath sounds: No wheezing.    Musculoskeletal:         General: No tenderness. Comments: LUE weakness   Skin:     Findings: No erythema. Neurological:      Deep Tendon Reflexes: Reflexes normal.      Comments: Left facial weakness  '            On this date 5/23/2022 I have spent 31 minutes reviewing previous notes, test results and face to face with the patient discussing the diagnosis and importance of compliance with the treatment plan as well as documenting on the day of the visit. An electronic signature was used to authenticate this note.     --Pam Cisneros, DO

## 2022-05-25 ENCOUNTER — TELEPHONE (OUTPATIENT)
Dept: FAMILY MEDICINE CLINIC | Age: 62
End: 2022-05-25

## 2022-05-25 DIAGNOSIS — R47.01 EXPRESSIVE APHASIA: Primary | ICD-10-CM

## 2022-05-31 ENCOUNTER — TELEPHONE (OUTPATIENT)
Dept: FAMILY MEDICINE CLINIC | Age: 62
End: 2022-05-31

## 2022-05-31 DIAGNOSIS — J44.9 CHRONIC OBSTRUCTIVE PULMONARY DISEASE, UNSPECIFIED COPD TYPE (HCC): Primary | ICD-10-CM

## 2022-05-31 DIAGNOSIS — F17.200 TOBACCO USE DISORDER: ICD-10-CM

## 2022-05-31 RX ORDER — NICOTINE 21 MG/24HR
1 PATCH, TRANSDERMAL 24 HOURS TRANSDERMAL DAILY
Qty: 14 PATCH | Refills: 5 | Status: SHIPPED
Start: 2022-05-31 | End: 2022-06-29 | Stop reason: CLARIF

## 2022-05-31 RX ORDER — RIVASTIGMINE 13.3 MG/24H
1 PATCH, EXTENDED RELEASE TRANSDERMAL DAILY
Qty: 30 PATCH | Refills: 1 | Status: SHIPPED
Start: 2022-05-31 | End: 2022-06-29

## 2022-05-31 NOTE — TELEPHONE ENCOUNTER
Pt on Colace BID for two days now. Pt reports colace gives her frequent loose BMs. [facility] requesting to change Colace order to PRN. Pt also c/o nicotine cravings, requesting nicotine patch to [facility].

## 2022-05-31 NOTE — TELEPHONE ENCOUNTER
Requesting nicotine patch to [facility]    Also pended Rivastigmine in separate encounter if you can please sign as well

## 2022-05-31 NOTE — TELEPHONE ENCOUNTER
ASSESSMENT/PLAN:    1. Chronic obstructive pulmonary disease, unspecified COPD type ([facility]  - nicotine (NICODERM CQ) 14 MG/24HR; Place 1 patch onto the skin daily for 14 days  Dispense: 14 patch; Refill: 5    2. Tobacco use disorder  - nicotine (NICODERM CQ) 14 MG/24HR; Place 1 patch onto the skin daily for 14 days  Dispense: 14 patch;  Refill: 5        FOLLOW-UP:  prn

## 2022-06-03 ENCOUNTER — TELEPHONE (OUTPATIENT)
Dept: FAMILY MEDICINE CLINIC | Age: 62
End: 2022-06-03

## 2022-06-03 DIAGNOSIS — I63.9 ACUTE ISCHEMIC STROKE (HCC): Primary | ICD-10-CM

## 2022-06-03 DIAGNOSIS — R41.0 CONFUSION: ICD-10-CM

## 2022-06-03 DIAGNOSIS — I63.9 ACUTE ISCHEMIC STROKE (HCC): ICD-10-CM

## 2022-06-03 RX ORDER — RIVASTIGMINE TARTRATE 4.5 MG/1
CAPSULE ORAL
Qty: 180 CAPSULE | OUTPATIENT
Start: 2022-06-03

## 2022-06-03 RX ORDER — RIVASTIGMINE TARTRATE 4.5 MG/1
4.5 CAPSULE ORAL 2 TIMES DAILY
Qty: 60 CAPSULE | Refills: 3 | Status: SHIPPED
Start: 2022-06-03 | End: 2022-06-03 | Stop reason: SDUPTHER

## 2022-06-03 RX ORDER — RIVASTIGMINE TARTRATE 4.5 MG/1
4.5 CAPSULE ORAL 2 TIMES DAILY
Qty: 60 CAPSULE | Refills: 3 | Status: SHIPPED
Start: 2022-06-03 | End: 2022-06-29 | Stop reason: SDUPTHER

## 2022-06-03 NOTE — TELEPHONE ENCOUNTER
Nursing home asking we can switch Exelon patch to capsules instead for insurance reasons. They also keep requesting Vitamin D3 5000 units, not on list. Do you want to prescribe?

## 2022-06-03 NOTE — TELEPHONE ENCOUNTER
ASSESSMENT/PLAN:    1. Acute ischemic stroke (HCC)  - rivastigmine (EXELON) 4.5 MG capsule; Take 1 capsule by mouth 2 times daily  Dispense: 60 capsule; Refill: 3    2. Confusion  - rivastigmine (EXELON) 4.5 MG capsule; Take 1 capsule by mouth 2 times daily  Dispense: 60 capsule; Refill: 3        FOLLOW-UP:  Vitamin D 2,000IU should be enough.  It is OTC,,,,,,,,djf

## 2022-06-06 DIAGNOSIS — I63.9 ACUTE ISCHEMIC STROKE (HCC): ICD-10-CM

## 2022-06-07 RX ORDER — ZINC SULFATE 50(220)MG
50 CAPSULE ORAL DAILY
Qty: 30 CAPSULE | Refills: 3 | COMMUNITY
Start: 2022-06-07 | End: 2022-06-29 | Stop reason: SDUPTHER

## 2022-06-08 LAB
SARS-COV-2: NOT DETECTED
SOURCE: NORMAL

## 2022-06-09 DIAGNOSIS — G47.00 INSOMNIA, UNSPECIFIED TYPE: ICD-10-CM

## 2022-06-09 LAB — SOURCE: NORMAL

## 2022-06-09 RX ORDER — MIRTAZAPINE 15 MG/1
15 TABLET, FILM COATED ORAL NIGHTLY
Qty: 30 TABLET | Refills: 0 | Status: SHIPPED
Start: 2022-06-09 | End: 2022-06-29 | Stop reason: SDUPTHER

## 2022-06-10 LAB — SARS-COV-2, PCR: NOT DETECTED

## 2022-06-11 LAB — SOURCE: NORMAL

## 2022-06-13 ENCOUNTER — TELEPHONE (OUTPATIENT)
Dept: FAMILY MEDICINE CLINIC | Age: 62
End: 2022-06-13

## 2022-06-13 DIAGNOSIS — F17.200 TOBACCO USE DISORDER: Primary | ICD-10-CM

## 2022-06-13 RX ORDER — NICOTINE 21 MG/24HR
1 PATCH, TRANSDERMAL 24 HOURS TRANSDERMAL DAILY
Qty: 30 PATCH | Refills: 0 | Status: SHIPPED
Start: 2022-06-13 | End: 2022-06-29 | Stop reason: SDUPTHER

## 2022-06-14 LAB
SARS-COV-2, PCR: NOT DETECTED
SOURCE: NORMAL

## 2022-06-15 ENCOUNTER — HOSPITAL ENCOUNTER (EMERGENCY)
Age: 62
Discharge: HOME | End: 2022-06-16
Attending: STUDENT IN AN ORGANIZED HEALTH CARE EDUCATION/TRAINING PROGRAM
Payer: MEDICARE

## 2022-06-15 ENCOUNTER — TELEPHONE (OUTPATIENT)
Dept: FAMILY MEDICINE CLINIC | Age: 62
End: 2022-06-15

## 2022-06-15 DIAGNOSIS — R19.7 NAUSEA VOMITING AND DIARRHEA: ICD-10-CM

## 2022-06-15 DIAGNOSIS — T50.Z95A ADVERSE EFFECT OF VACCINE, INITIAL ENCOUNTER: ICD-10-CM

## 2022-06-15 DIAGNOSIS — R11.2 NAUSEA VOMITING AND DIARRHEA: ICD-10-CM

## 2022-06-15 DIAGNOSIS — E86.0 DEHYDRATION: Primary | ICD-10-CM

## 2022-06-15 DIAGNOSIS — R35.0 FREQUENT URINATION: Primary | ICD-10-CM

## 2022-06-15 LAB
ALBUMIN SERPL-MCNC: 4.4 G/DL (ref 3.5–5.2)
ALP BLD-CCNC: 148 U/L (ref 35–104)
ALT SERPL-CCNC: 8 U/L (ref 0–32)
ANION GAP SERPL CALCULATED.3IONS-SCNC: 15 MMOL/L (ref 7–16)
AST SERPL-CCNC: 11 U/L (ref 0–31)
BASOPHILS ABSOLUTE: 0.06 E9/L (ref 0–0.2)
BASOPHILS RELATIVE PERCENT: 0.9 % (ref 0–2)
BILIRUB SERPL-MCNC: 0.3 MG/DL (ref 0–1.2)
BUN BLDV-MCNC: 29 MG/DL (ref 6–23)
CALCIUM SERPL-MCNC: 8.9 MG/DL (ref 8.6–10.2)
CHLORIDE BLD-SCNC: 107 MMOL/L (ref 98–107)
CO2: 23 MMOL/L (ref 22–29)
CREAT SERPL-MCNC: 1.7 MG/DL (ref 0.5–1)
EOSINOPHILS ABSOLUTE: 0.28 E9/L (ref 0.05–0.5)
EOSINOPHILS RELATIVE PERCENT: 4.2 % (ref 0–6)
GFR AFRICAN AMERICAN: 37
GFR NON-AFRICAN AMERICAN: 31 ML/MIN/1.73
GLUCOSE BLD-MCNC: 176 MG/DL (ref 74–99)
HCT VFR BLD CALC: 38.6 % (ref 34–48)
HEMOGLOBIN: 12.6 G/DL (ref 11.5–15.5)
IMMATURE GRANULOCYTES #: 0.01 E9/L
IMMATURE GRANULOCYTES %: 0.1 % (ref 0–5)
INFLUENZA A: NOT DETECTED
INFLUENZA B: NOT DETECTED
LIPASE: 22 U/L (ref 13–60)
LYMPHOCYTES ABSOLUTE: 1.64 E9/L (ref 1.5–4)
LYMPHOCYTES RELATIVE PERCENT: 24.6 % (ref 20–42)
MCH RBC QN AUTO: 30.1 PG (ref 26–35)
MCHC RBC AUTO-ENTMCNC: 32.6 % (ref 32–34.5)
MCV RBC AUTO: 92.1 FL (ref 80–99.9)
MONOCYTES ABSOLUTE: 0.63 E9/L (ref 0.1–0.95)
MONOCYTES RELATIVE PERCENT: 9.4 % (ref 2–12)
NEUTROPHILS ABSOLUTE: 4.05 E9/L (ref 1.8–7.3)
NEUTROPHILS RELATIVE PERCENT: 60.8 % (ref 43–80)
PDW BLD-RTO: 12.4 FL (ref 11.5–15)
PLATELET # BLD: 225 E9/L (ref 130–450)
PMV BLD AUTO: 10.7 FL (ref 7–12)
POTASSIUM SERPL-SCNC: 5.3 MMOL/L (ref 3.5–5)
RBC # BLD: 4.19 E12/L (ref 3.5–5.5)
SARS-COV-2 RNA, RT PCR: NOT DETECTED
SODIUM BLD-SCNC: 145 MMOL/L (ref 132–146)
TOTAL PROTEIN: 7 G/DL (ref 6.4–8.3)
TROPONIN, HIGH SENSITIVITY: 20 NG/L (ref 0–9)
TROPONIN, HIGH SENSITIVITY: 21 NG/L (ref 0–9)
WBC # BLD: 6.7 E9/L (ref 4.5–11.5)

## 2022-06-15 PROCEDURE — 2580000003 HC RX 258: Performed by: STUDENT IN AN ORGANIZED HEALTH CARE EDUCATION/TRAINING PROGRAM

## 2022-06-15 PROCEDURE — 6360000004 HC RX CONTRAST MEDICATION: Performed by: RADIOLOGY

## 2022-06-15 PROCEDURE — 83690 ASSAY OF LIPASE: CPT

## 2022-06-15 PROCEDURE — 84484 ASSAY OF TROPONIN QUANT: CPT

## 2022-06-15 PROCEDURE — 96374 THER/PROPH/DIAG INJ IV PUSH: CPT

## 2022-06-15 PROCEDURE — 85025 COMPLETE CBC W/AUTO DIFF WBC: CPT

## 2022-06-15 PROCEDURE — 2500000003 HC RX 250 WO HCPCS: Performed by: STUDENT IN AN ORGANIZED HEALTH CARE EDUCATION/TRAINING PROGRAM

## 2022-06-15 PROCEDURE — 96375 TX/PRO/DX INJ NEW DRUG ADDON: CPT

## 2022-06-15 PROCEDURE — 99285 EMERGENCY DEPT VISIT HI MDM: CPT

## 2022-06-15 PROCEDURE — 93005 ELECTROCARDIOGRAM TRACING: CPT | Performed by: STUDENT IN AN ORGANIZED HEALTH CARE EDUCATION/TRAINING PROGRAM

## 2022-06-15 PROCEDURE — 74177 CT ABD & PELVIS W/CONTRAST: CPT

## 2022-06-15 PROCEDURE — 80053 COMPREHEN METABOLIC PANEL: CPT

## 2022-06-15 PROCEDURE — 87636 SARSCOV2 & INF A&B AMP PRB: CPT

## 2022-06-15 PROCEDURE — 2580000003 HC RX 258: Performed by: RADIOLOGY

## 2022-06-15 PROCEDURE — A4216 STERILE WATER/SALINE, 10 ML: HCPCS | Performed by: STUDENT IN AN ORGANIZED HEALTH CARE EDUCATION/TRAINING PROGRAM

## 2022-06-15 PROCEDURE — 6360000002 HC RX W HCPCS: Performed by: STUDENT IN AN ORGANIZED HEALTH CARE EDUCATION/TRAINING PROGRAM

## 2022-06-15 PROCEDURE — 6370000000 HC RX 637 (ALT 250 FOR IP): Performed by: STUDENT IN AN ORGANIZED HEALTH CARE EDUCATION/TRAINING PROGRAM

## 2022-06-15 RX ORDER — 0.9 % SODIUM CHLORIDE 0.9 %
1000 INTRAVENOUS SOLUTION INTRAVENOUS ONCE
Status: COMPLETED | OUTPATIENT
Start: 2022-06-15 | End: 2022-06-16

## 2022-06-15 RX ORDER — ONDANSETRON 4 MG/1
4 TABLET, ORALLY DISINTEGRATING ORAL 3 TIMES DAILY PRN
Qty: 12 TABLET | Refills: 0 | Status: SHIPPED | OUTPATIENT
Start: 2022-06-15 | End: 2022-06-29

## 2022-06-15 RX ORDER — ACETAMINOPHEN 500 MG
1000 TABLET ORAL ONCE
Status: COMPLETED | OUTPATIENT
Start: 2022-06-15 | End: 2022-06-15

## 2022-06-15 RX ORDER — SODIUM CHLORIDE 0.9 % (FLUSH) 0.9 %
10 SYRINGE (ML) INJECTION PRN
Status: COMPLETED | OUTPATIENT
Start: 2022-06-15 | End: 2022-06-15

## 2022-06-15 RX ORDER — ONDANSETRON 2 MG/ML
4 INJECTION INTRAMUSCULAR; INTRAVENOUS ONCE
Status: COMPLETED | OUTPATIENT
Start: 2022-06-15 | End: 2022-06-15

## 2022-06-15 RX ORDER — FAMOTIDINE 20 MG/1
20 TABLET, FILM COATED ORAL 2 TIMES DAILY
Qty: 30 TABLET | Refills: 0 | Status: SHIPPED | OUTPATIENT
Start: 2022-06-15 | End: 2022-07-05

## 2022-06-15 RX ADMIN — FAMOTIDINE 20 MG: 10 INJECTION, SOLUTION INTRAVENOUS at 20:11

## 2022-06-15 RX ADMIN — Medication 10 ML: at 23:59

## 2022-06-15 RX ADMIN — SODIUM CHLORIDE 1000 ML: 9 INJECTION, SOLUTION INTRAVENOUS at 20:13

## 2022-06-15 RX ADMIN — ONDANSETRON 4 MG: 2 INJECTION INTRAMUSCULAR; INTRAVENOUS at 20:11

## 2022-06-15 RX ADMIN — IOPAMIDOL 90 ML: 755 INJECTION, SOLUTION INTRAVENOUS at 23:59

## 2022-06-15 RX ADMIN — ACETAMINOPHEN 1000 MG: 500 TABLET ORAL at 20:11

## 2022-06-15 ASSESSMENT — PAIN SCALES - GENERAL: PAINLEVEL_OUTOF10: 6

## 2022-06-15 ASSESSMENT — PAIN DESCRIPTION - LOCATION: LOCATION: ABDOMEN;CHEST

## 2022-06-15 ASSESSMENT — PAIN DESCRIPTION - DESCRIPTORS: DESCRIPTORS: ACHING

## 2022-06-15 ASSESSMENT — PAIN - FUNCTIONAL ASSESSMENT: PAIN_FUNCTIONAL_ASSESSMENT: NONE - DENIES PAIN

## 2022-06-15 NOTE — ED PROVIDER NOTES
Department of Emergency Medicine   ED  Provider Note  Admit Date/RoomTime: 6/15/2022  7:27 PM  ED Room: [facility]      History of Present Illness:  6/15/22, Time: 7:58 PM EDT  Chief Complaint   Patient presents with    Nausea     got the covid booster shot and now she has been nauseated and vomiting     Emesis     Leonard Tejeda is a 62 y.o. female presenting to the ED for Abdominal pain, nausea, vomiting. This been present for the past 3 to 5 days. Nothing is better or worse is constant. The patient feels is related to having received her COVID booster shot. She states she had a bad reaction of her booster back shot in the past.  She endorses nonbloody nonbilious emesis with intermittent loose stools. She also has body aches. She denies any fevers chills otherwise. Symptoms are moderate in severity. Review of Systems:  Review of systems obtained and negative unless stated otherwise above in the HPI.    --------------------------------------------- PAST HISTORY ---------------------------------------------  Past Medical History:  has a past medical history of Arthritis, Blood circulation, collateral, Chronic fatigue, Cirrhosis of liver ([facility] COPD exacerbation ([facility] Diabetes mellitus ([facility] Essential hypertension, Gall stones, GERD (gastroesophageal reflux disease), Hep C w/o coma, chronic ([address] History of blood transfusion, Neuropathy, PFO (patent foramen ovale), Pneumonia, Type 2 diabetes mellitus with stage 3 chronic kidney disease, with long-term current use of insulin ([facility] Unspecified cerebral artery occlusion with cerebral infarction, and Varices. Past Surgical History:  has a past surgical history that includes  section; Esophageal varice ligation ();  section ( and ); ECHO Compl W Dop Color Flow (2012); Colonoscopy; Endoscopy, colon, diagnostic; Cardiac surgery (); Cardiac catheterization; other surgical history (Right, 16);  Liver transplant; and brain surgery. Social History:  reports that she quit smoking about 3 years ago. Her smoking use included cigarettes. She has a 15.00 pack-year smoking history. She has never used smokeless tobacco. She reports that she does not drink alcohol and does not use drugs. Family History: family history is not on file. She was adopted. . Unless otherwise noted, family history is non contributory    The patients home medications have been reviewed. Allergies: Chantix [varenicline] and Heparin    I have reviewed the past medical history, past surgical history, social history, and family history    ---------------------------------------------------PHYSICAL EXAM--------------------------------------    Constitutional: [Appears in no distress]  Head: [Normocephalic, atraumatic]   Eyes: [Non-icteric slcera, no conjunctival injection]  ENT: [Moist mucous membranes,]  Neck: [Trachea midline, no JVD]  Respiratory: [Nonlabored respirations. Lungs clear to auscultation bilaterally, no wheezes, rales, or rhonchi.]   Cardiovascular:  [Regular rate. Regular rhythm. No murmurs, no gallops, no rubs.]   Gastrointestinal:  [Abdomen Soft, Non tender, Non distended. No rebound tenderness, guarding, or rigidity.]  Extremities: [No lower extremity edema]  Genitourinary: [No CVA tenderness, no suprapubic tenderness]  Musculoskeletal: [Moves all extremities, no deformity]  Skin: [Pink, warm, dry without rash.]  Neurologic: [Alert, symmetric facies, no aphasia]    -------------------------------------------------- RESULTS -------------------------------------------------  I have personally reviewed all laboratory and imaging results for this patient. Results are listed below.      LABS: (Lab results interpreted by me)  Results for orders placed or performed during the hospital encounter of 06/15/22   COVID-19 & Influenza Combo    Specimen: Nasopharyngeal Swab   Result Value Ref Range    SARS-CoV-2 RNA, RT PCR NOT DETECTED NOT DETECTED    INFLUENZA A NOT DETECTED NOT DETECTED    INFLUENZA B NOT DETECTED NOT DETECTED   CBC with Auto Differential   Result Value Ref Range    WBC 6.7 4.5 - 11.5 E9/L    RBC 4.19 3.50 - 5.50 E12/L    Hemoglobin 12.6 11.5 - 15.5 g/dL    Hematocrit 38.6 34.0 - 48.0 %    MCV 92.1 80.0 - 99.9 fL    MCH 30.1 26.0 - 35.0 pg    MCHC 32.6 32.0 - 34.5 %    RDW 12.4 11.5 - 15.0 fL    Platelets 748 565 - 923 E9/L    MPV 10.7 7.0 - 12.0 fL    Neutrophils % 60.8 43.0 - 80.0 %    Immature Granulocytes % 0.1 0.0 - 5.0 %    Lymphocytes % 24.6 20.0 - 42.0 %    Monocytes % 9.4 2.0 - 12.0 %    Eosinophils % 4.2 0.0 - 6.0 %    Basophils % 0.9 0.0 - 2.0 %    Neutrophils Absolute 4.05 1.80 - 7.30 E9/L    Immature Granulocytes # 0.01 E9/L    Lymphocytes Absolute 1.64 1.50 - 4.00 E9/L    Monocytes Absolute 0.63 0.10 - 0.95 E9/L    Eosinophils Absolute 0.28 0.05 - 0.50 E9/L    Basophils Absolute 0.06 0.00 - 0.20 E9/L   Comprehensive Metabolic Panel   Result Value Ref Range    Sodium 145 132 - 146 mmol/L    Potassium 5.3 (H) 3.5 - 5.0 mmol/L    Chloride 107 98 - 107 mmol/L    CO2 23 22 - 29 mmol/L    Anion Gap 15 7 - 16 mmol/L    Glucose 176 (H) 74 - 99 mg/dL    BUN 29 (H) 6 - 23 mg/dL    CREATININE 1.7 (H) 0.5 - 1.0 mg/dL    GFR Non-African American 31 >=60 mL/min/1.73    GFR African American 37     Calcium 8.9 8.6 - 10.2 mg/dL    Total Protein 7.0 6.4 - 8.3 g/dL    Albumin 4.4 3.5 - 5.2 g/dL    Total Bilirubin 0.3 0.0 - 1.2 mg/dL    Alkaline Phosphatase 148 (H) 35 - 104 U/L    ALT 8 0 - 32 U/L    AST 11 0 - 31 U/L   Lipase   Result Value Ref Range    Lipase 22 13 - 60 U/L   Troponin   Result Value Ref Range    Troponin, High Sensitivity 21 (H) 0 - 9 ng/L   Troponin   Result Value Ref Range    Troponin, High Sensitivity 20 (H) 0 - 9 ng/L   EKG 12 Lead   Result Value Ref Range    Ventricular Rate 63 BPM    Atrial Rate 63 BPM    P-R Interval 152 ms    QRS Duration 76 ms    Q-T Interval 418 ms    QTc Calculation (Bazett) 427 ms    P Axis 17 degrees    R Axis 50 degrees    T Axis -2 degrees   ,       RADIOLOGY:  Interpreted by Radiologist unless otherwise specified  CT ABDOMEN PELVIS W IV CONTRAST Additional Contrast? None   Final Result   Colonic fecal retention involving the ascending and transverse colon. Otherwise no definitive findings to explain the patient's left upper quadrant   pain.               ------------------------- NURSING NOTES AND VITALS REVIEWED ---------------------------   The nursing notes within the ED encounter and vital signs as below have been reviewed by myself  /75   Pulse 59   Temp 98.2 °F (36.8 °C)   Resp 18   Wt 163 lb (73.9 kg)   LMP  (LMP Unknown)   SpO2 95%   BMI 26.31 kg/m²     Oxygen Saturation Interpretation: [Normal]    The patients available past medical records and past encounters were reviewed. ------------------------------ ED COURSE/MEDICAL DECISION MAKING----------------------  Medications   0.9 % sodium chloride bolus (1,000 mLs IntraVENous New Bag 6/15/22 2013)   ondansetron (ZOFRAN) injection 4 mg (4 mg IntraVENous Given 6/15/22 2011)   famotidine (PEPCID) 20 mg in sodium chloride (PF) 10 mL injection (20 mg IntraVENous Given 6/15/22 2011)   acetaminophen (TYLENOL) tablet 1,000 mg (1,000 mg Oral Given 6/15/22 2011)   iopamidol (ISOVUE-370) 76 % injection 90 mL (90 mLs IntraVENous Given 6/15/22 3734)   sodium chloride flush 0.9 % injection 10 mL (10 mLs IntraVENous Given 6/15/22 2559)        Re-Evaluations: This patient's ED course included: [a personal history and physicial examination and re-evaluation prior to disposition]    This patient has [remained hemodynamically stable and improved] during their ED course. Consultations:  [None]    Medical Decision Making:   Patient presents emerged department with abdominal pain.     Vital signs interpreted by myself as normal.    History and physical examination findings consistent with multiple differential diagnoses considered including acute infection, intra-abdominal process, could be related more likely to a vaccine side effect. Work-up was ensued for this. She is given antiemetics as well as fluids. EKG:  EKG is interpreted myself as a ventricular rate of 63 bpm there is a P wave before every cures complex restrictions normal to QT/QTc is normal.  No ST segment elevation depression. There is a T wave inversion noted in lead III. This is new from prior. Prior EKG was on November 18, 2021. EKG is interpreted by myself as sinus rhythm there is 1 PAC on the tracing. There is no infarct evident there is a new T wave inversion in lead III. Labs and imaging reviewed. Patient has mild elevation of BUN and creatinine at 29 and 1.7 respectively but this is her normal baseline. Troponin is 20 1 repeat troponin is 20. This is not significantly elevated. This is normal baseline. Repeat evaluation patient's nausea significantly proved. She tolerated oral challenge. CT of the abdomen pelvis is unremarkable for any acute concerning abnormality. Patient be discharged with short course of Zofran and follow-up as an outpatient    Counseling: The emergency provider has spoken with the patient and discussed todays results, in addition to providing specific details for the plan of care and counseling regarding the diagnosis and prognosis. Questions are answered at this time and they are agreeable with the plan.       --------------------------------- IMPRESSION AND DISPOSITION ---------------------------------    IMPRESSION  1. Dehydration    2. Nausea vomiting and diarrhea    3. Adverse effect of vaccine, initial encounter        DISPOSITION  Disposition: [Discharge to home]  Patient condition is [stable]    IDr. Marvin, am the primary provider of record    Marvin Merchant DO  Emergency Medicine    NOTE: This report was transcribed using voice recognition software.  Every effort was made to ensure accuracy; however, inadvertent computerized transcription errors may be present         Danny Wilhelm DO  06/16/22 5787

## 2022-06-16 ENCOUNTER — APPOINTMENT (OUTPATIENT)
Dept: CT IMAGING | Age: 62
End: 2022-06-16
Payer: MEDICARE

## 2022-06-16 ENCOUNTER — TELEPHONE (OUTPATIENT)
Dept: FAMILY MEDICINE CLINIC | Age: 62
End: 2022-06-16

## 2022-06-16 VITALS
SYSTOLIC BLOOD PRESSURE: 129 MMHG | HEART RATE: 62 BPM | RESPIRATION RATE: 16 BRPM | WEIGHT: 163 LBS | BODY MASS INDEX: 26.31 KG/M2 | TEMPERATURE: 98.2 F | OXYGEN SATURATION: 96 % | DIASTOLIC BLOOD PRESSURE: 83 MMHG

## 2022-06-16 LAB
BACTERIA: ABNORMAL /HPF
BILIRUBIN URINE: NEGATIVE
BLOOD, URINE: ABNORMAL
CLARITY: CLEAR
COLOR: YELLOW
EKG ATRIAL RATE: 63 BPM
EKG P AXIS: 17 DEGREES
EKG P-R INTERVAL: 152 MS
EKG Q-T INTERVAL: 418 MS
EKG QRS DURATION: 76 MS
EKG QTC CALCULATION (BAZETT): 427 MS
EKG R AXIS: 50 DEGREES
EKG T AXIS: -2 DEGREES
EKG VENTRICULAR RATE: 63 BPM
EPITHELIAL CELLS, UA: ABNORMAL /HPF
GLUCOSE URINE: NEGATIVE MG/DL
KETONES, URINE: NEGATIVE MG/DL
LEUKOCYTE ESTERASE, URINE: ABNORMAL
NITRITE, URINE: NEGATIVE
PH UA: 5.5 (ref 5–9)
PROTEIN UA: ABNORMAL MG/DL
RBC UA: ABNORMAL /HPF (ref 0–2)
SPECIFIC GRAVITY UA: 1.01 (ref 1–1.03)
UROBILINOGEN, URINE: 0.2 E.U./DL
WBC UA: ABNORMAL /HPF (ref 0–5)

## 2022-06-16 PROCEDURE — 74177 CT ABD & PELVIS W/CONTRAST: CPT

## 2022-06-16 NOTE — TELEPHONE ENCOUNTER
Nidia Harrell was advised this would be sent to [facility] on 6/17/22. Tanesha Schaferer daughter, called this morning because Melanie Gordillo went by ambulance 6/15/22 to [facility]. They sent her home, Nidia Harrell thinks she had a stroke and they didn't do a CT head. We sent an order 6/15/22 to [facility] for a UA and are waiting for the results from whichever lab [facility] uses. Nidia Harrell will try to get those results. We then got a fax of multiple pages on 6/16/22 with the AVS from [address] [place] [address] and a few other notes from [facility].    Please advise.   (Notes left on Clare's desk and scanned into the patient's media)

## 2022-06-16 NOTE — ED NOTES
[facility] called for [facility] van transport ETA 3-4 hours      Jahaira Wilkinson RN  06/16/22 0110

## 2022-06-16 NOTE — ED NOTES
Report called to [facility] RN        [facility]  06/16/22 5907

## 2022-06-16 NOTE — ED NOTES
Radiology Procedure Waiver   Name: Tamra Hendricks  : 1960  MRN: [id number]    Date:  6/15/22    Time: 10:32 PM EDT    Benefits of immediately proceeding with Radiology exam(s) without pre-testing outweigh the risks or are not indicated as specified below and therefore the following is/are being waived:    [] Pregnancy test   [] Patients LMP on-time and regular.   [] Patient had Tubal Ligation or has other Contraception Device. [] Patient  is Menopausal or Premenarcheal.    [] Patient had Full or Partial Hysterectomy. [] Protocol for Iodine allergy    [] MRI Questionnaire     [x] BUN/Creatinine   [] Patient age w/no hx of renal dysfunction. [] Patient on Dialysis. [] Recent Normal Labs.   Electronically signed by Jerrica Young DO on 6/15/22 at 10:32 PM EDT               Mary Pérez DO  06/15/22 5684

## 2022-06-17 ENCOUNTER — TELEPHONE (OUTPATIENT)
Dept: FAMILY MEDICINE CLINIC | Age: 62
End: 2022-06-17

## 2022-06-17 LAB
SARS-COV-2, PCR: NOT DETECTED
SOURCE: NORMAL

## 2022-06-17 NOTE — TELEPHONE ENCOUNTER
Spoke to Marie Gunn. She's going to call the Facility and check with them about the staff physician or she'll get her back to ED.

## 2022-06-17 NOTE — TELEPHONE ENCOUNTER
Zofran and pepcid prescribed in ED 6/15/22, they are asking if you feel they should be continued indefinitely? Or d/c when completed.

## 2022-06-18 LAB — URINE CULTURE, ROUTINE: NORMAL

## 2022-06-21 LAB — SARS-COV-2, PCR: NOT DETECTED

## 2022-06-22 RX ORDER — AMLODIPINE BESYLATE 5 MG/1
TABLET ORAL
Qty: 14 TABLET | Refills: 10 | OUTPATIENT
Start: 2022-06-22

## 2022-06-22 RX ORDER — AMLODIPINE BESYLATE 5 MG/1
TABLET ORAL
Qty: 14 TABLET | Refills: 1 | OUTPATIENT
Start: 2022-06-22

## 2022-06-22 NOTE — TELEPHONE ENCOUNTER
6/16/22 UA reviewed and there is no infection seen.
UA + culture recently completed in ED. Family had requested UA from us due to concern from confusion. Can you review and comment?
Yes

## 2022-06-23 RX ORDER — TACROLIMUS 1 MG/1
CAPSULE ORAL
Qty: 1 CAPSULE | Refills: 11 | OUTPATIENT
Start: 2022-06-23

## 2022-06-23 RX ORDER — TACROLIMUS 0.5 MG/1
CAPSULE ORAL
Qty: 1 CAPSULE | Refills: 11 | OUTPATIENT
Start: 2022-06-23

## 2022-06-23 RX ORDER — LOSARTAN POTASSIUM 100 MG/1
TABLET ORAL
Qty: 14 TABLET | Refills: 11 | OUTPATIENT
Start: 2022-06-23

## 2022-06-24 LAB — SOURCE: NORMAL

## 2022-06-25 LAB
SARS-COV-2: NOT DETECTED
SOURCE: NORMAL

## 2022-06-29 ENCOUNTER — TELEPHONE (OUTPATIENT)
Dept: FAMILY MEDICINE CLINIC | Age: 62
End: 2022-06-29

## 2022-06-29 ENCOUNTER — OFFICE VISIT (OUTPATIENT)
Dept: FAMILY MEDICINE CLINIC | Age: 62
End: 2022-06-29
Payer: MEDICARE

## 2022-06-29 VITALS
HEIGHT: 66 IN | SYSTOLIC BLOOD PRESSURE: 146 MMHG | RESPIRATION RATE: 18 BRPM | HEART RATE: 62 BPM | WEIGHT: 163 LBS | BODY MASS INDEX: 26.2 KG/M2 | DIASTOLIC BLOOD PRESSURE: 84 MMHG | OXYGEN SATURATION: 93 % | TEMPERATURE: 98.2 F

## 2022-06-29 DIAGNOSIS — G47.00 INSOMNIA, UNSPECIFIED TYPE: ICD-10-CM

## 2022-06-29 DIAGNOSIS — J42 CHRONIC BRONCHITIS, UNSPECIFIED CHRONIC BRONCHITIS TYPE (HCC): ICD-10-CM

## 2022-06-29 DIAGNOSIS — E11.649 UNCONTROLLED TYPE 2 DIABETES MELLITUS WITH HYPOGLYCEMIA WITHOUT COMA (HCC): Chronic | ICD-10-CM

## 2022-06-29 DIAGNOSIS — K59.00 OBSTIPATION: ICD-10-CM

## 2022-06-29 DIAGNOSIS — I10 HTN (HYPERTENSION), BENIGN: Chronic | ICD-10-CM

## 2022-06-29 DIAGNOSIS — Z86.73 HISTORY OF CVA (CEREBROVASCULAR ACCIDENT): ICD-10-CM

## 2022-06-29 DIAGNOSIS — F17.200 TOBACCO USE DISORDER: ICD-10-CM

## 2022-06-29 DIAGNOSIS — K21.9 GASTROESOPHAGEAL REFLUX DISEASE, UNSPECIFIED WHETHER ESOPHAGITIS PRESENT: Chronic | ICD-10-CM

## 2022-06-29 DIAGNOSIS — F51.01 PRIMARY INSOMNIA: ICD-10-CM

## 2022-06-29 DIAGNOSIS — I63.20 CEREBROVASCULAR ACCIDENT (CVA) DUE TO STENOSIS OF PRECEREBRAL ARTERY (HCC): ICD-10-CM

## 2022-06-29 DIAGNOSIS — R41.0 CONFUSION: ICD-10-CM

## 2022-06-29 DIAGNOSIS — J30.1 SEASONAL ALLERGIC RHINITIS DUE TO POLLEN: ICD-10-CM

## 2022-06-29 LAB
HBA1C MFR BLD: 8 %
SARS-COV-2: NOT DETECTED
SOURCE: NORMAL

## 2022-06-29 PROCEDURE — G8427 DOCREV CUR MEDS BY ELIG CLIN: HCPCS | Performed by: NURSE PRACTITIONER

## 2022-06-29 PROCEDURE — 2022F DILAT RTA XM EVC RTNOPTHY: CPT | Performed by: NURSE PRACTITIONER

## 2022-06-29 PROCEDURE — 3023F SPIROM DOC REV: CPT | Performed by: NURSE PRACTITIONER

## 2022-06-29 PROCEDURE — G8417 CALC BMI ABV UP PARAM F/U: HCPCS | Performed by: NURSE PRACTITIONER

## 2022-06-29 PROCEDURE — 99214 OFFICE O/P EST MOD 30 MIN: CPT | Performed by: NURSE PRACTITIONER

## 2022-06-29 PROCEDURE — 3017F COLORECTAL CA SCREEN DOC REV: CPT | Performed by: NURSE PRACTITIONER

## 2022-06-29 PROCEDURE — 83036 HEMOGLOBIN GLYCOSYLATED A1C: CPT | Performed by: NURSE PRACTITIONER

## 2022-06-29 PROCEDURE — 1036F TOBACCO NON-USER: CPT | Performed by: NURSE PRACTITIONER

## 2022-06-29 PROCEDURE — 3052F HG A1C>EQUAL 8.0%<EQUAL 9.0%: CPT | Performed by: NURSE PRACTITIONER

## 2022-06-29 RX ORDER — METOPROLOL SUCCINATE 25 MG/1
25 TABLET, EXTENDED RELEASE ORAL DAILY
Qty: 30 TABLET | Refills: 3 | Status: SHIPPED
Start: 2022-06-29 | End: 2022-09-15 | Stop reason: SDUPTHER

## 2022-06-29 RX ORDER — PANTOPRAZOLE SODIUM 40 MG/1
TABLET, DELAYED RELEASE ORAL
Qty: 30 TABLET | Refills: 2 | Status: CANCELLED | OUTPATIENT
Start: 2022-06-29

## 2022-06-29 RX ORDER — AMLODIPINE BESYLATE 5 MG/1
TABLET ORAL
COMMUNITY
Start: 2022-06-04 | End: 2022-07-08

## 2022-06-29 RX ORDER — FLUTICASONE PROPIONATE 50 MCG
SPRAY, SUSPENSION (ML) NASAL
Qty: 16 G | Refills: 2 | Status: SHIPPED
Start: 2022-06-29 | End: 2022-09-15 | Stop reason: SDUPTHER

## 2022-06-29 RX ORDER — AMLODIPINE BESYLATE 2.5 MG/1
TABLET ORAL
Qty: 90 TABLET | Refills: 3 | Status: SHIPPED
Start: 2022-06-29 | End: 2022-09-15 | Stop reason: SDUPTHER

## 2022-06-29 RX ORDER — ASPIRIN 81 MG/1
81 TABLET ORAL DAILY
Qty: 30 TABLET | Refills: 3 | Status: SHIPPED
Start: 2022-06-29 | End: 2022-09-15 | Stop reason: SDUPTHER

## 2022-06-29 RX ORDER — MIRTAZAPINE 15 MG/1
15 TABLET, FILM COATED ORAL NIGHTLY
Qty: 30 TABLET | Refills: 0 | Status: SHIPPED
Start: 2022-06-29 | End: 2022-08-16 | Stop reason: SDUPTHER

## 2022-06-29 RX ORDER — DOCUSATE SODIUM 100 MG/1
100 CAPSULE, LIQUID FILLED ORAL 2 TIMES DAILY PRN
COMMUNITY

## 2022-06-29 RX ORDER — LOSARTAN POTASSIUM 100 MG/1
100 TABLET ORAL DAILY
Qty: 30 TABLET | Refills: 3 | Status: SHIPPED
Start: 2022-06-29 | End: 2022-09-15 | Stop reason: SDUPTHER

## 2022-06-29 RX ORDER — MIRTAZAPINE 15 MG/1
15 TABLET, FILM COATED ORAL NIGHTLY
Qty: 30 TABLET | Refills: 0 | Status: CANCELLED | OUTPATIENT
Start: 2022-06-29

## 2022-06-29 RX ORDER — ZINC SULFATE 50(220)MG
50 CAPSULE ORAL DAILY
Qty: 30 CAPSULE | Refills: 3 | COMMUNITY
Start: 2022-06-29 | End: 2022-07-05

## 2022-06-29 RX ORDER — METFORMIN HYDROCHLORIDE 500 MG/1
TABLET, EXTENDED RELEASE ORAL
Qty: 30 TABLET | Refills: 2 | Status: SHIPPED
Start: 2022-06-29 | End: 2022-08-15 | Stop reason: DRUGHIGH

## 2022-06-29 RX ORDER — METFORMIN HYDROCHLORIDE 500 MG/1
TABLET, EXTENDED RELEASE ORAL
Qty: 30 TABLET | Refills: 2 | Status: CANCELLED | OUTPATIENT
Start: 2022-06-29

## 2022-06-29 RX ORDER — SENNA PLUS 8.6 MG/1
1 TABLET ORAL DAILY
Qty: 28 TABLET | Refills: 0 | Status: CANCELLED | OUTPATIENT
Start: 2022-06-29

## 2022-06-29 RX ORDER — LOSARTAN POTASSIUM 25 MG/1
50 TABLET ORAL DAILY
Qty: 60 TABLET | Refills: 2 | Status: CANCELLED | OUTPATIENT
Start: 2022-06-29

## 2022-06-29 RX ORDER — NICOTINE 21 MG/24HR
1 PATCH, TRANSDERMAL 24 HOURS TRANSDERMAL DAILY
Qty: 30 PATCH | Refills: 0 | Status: SHIPPED
Start: 2022-06-29 | End: 2022-08-05 | Stop reason: SDUPTHER

## 2022-06-29 RX ORDER — RIVASTIGMINE TARTRATE 4.5 MG/1
4.5 CAPSULE ORAL 2 TIMES DAILY
Qty: 60 CAPSULE | Refills: 3 | Status: SHIPPED
Start: 2022-06-29 | End: 2022-09-15 | Stop reason: SDUPTHER

## 2022-06-29 RX ORDER — CLOPIDOGREL BISULFATE 75 MG/1
75 TABLET ORAL DAILY
Qty: 60 TABLET | Refills: 3 | Status: SHIPPED
Start: 2022-06-29 | End: 2022-09-15 | Stop reason: SDUPTHER

## 2022-06-29 RX ORDER — ATORVASTATIN CALCIUM 40 MG/1
40 TABLET, FILM COATED ORAL NIGHTLY
Qty: 30 TABLET | Refills: 3 | Status: SHIPPED
Start: 2022-06-29 | End: 2022-09-15 | Stop reason: SDUPTHER

## 2022-06-29 RX ORDER — MELATONIN 5 MG
1 TABLET,CHEWABLE ORAL NIGHTLY
Qty: 30 TABLET | Refills: 3 | Status: SHIPPED
Start: 2022-06-29 | End: 2022-09-15 | Stop reason: SDUPTHER

## 2022-06-29 RX ORDER — LOPERAMIDE HYDROCHLORIDE 2 MG/1
2 CAPSULE ORAL 4 TIMES DAILY PRN
COMMUNITY

## 2022-06-29 RX ORDER — ACETAMINOPHEN 160 MG
TABLET,DISINTEGRATING ORAL DAILY
COMMUNITY
End: 2022-07-05

## 2022-06-29 ASSESSMENT — ENCOUNTER SYMPTOMS
COUGH: 0
CONSTIPATION: 0
NAUSEA: 0
SHORTNESS OF BREATH: 0
WHEEZING: 0
VOMITING: 0
DIARRHEA: 0

## 2022-06-29 ASSESSMENT — PATIENT HEALTH QUESTIONNAIRE - PHQ9
SUM OF ALL RESPONSES TO PHQ9 QUESTIONS 1 & 2: 0
SUM OF ALL RESPONSES TO PHQ QUESTIONS 1-9: 0
SUM OF ALL RESPONSES TO PHQ QUESTIONS 1-9: 0
2. FEELING DOWN, DEPRESSED OR HOPELESS: 0
1. LITTLE INTEREST OR PLEASURE IN DOING THINGS: 0
SUM OF ALL RESPONSES TO PHQ QUESTIONS 1-9: 0
SUM OF ALL RESPONSES TO PHQ QUESTIONS 1-9: 0

## 2022-06-29 NOTE — TELEPHONE ENCOUNTER
This is the only medication on the list that wasn't sent to the pharmacy at today's visit.         Prograf    [facility]      Next appt is 8/24/2022  Last appt was 6/29/2022

## 2022-06-29 NOTE — PROGRESS NOTES
Mauriliol Service (:  1960) is a 62 y.o. female,Established patient, here for evaluation of the following chief complaint(s):  Diabetes, Hypertension, and Insomnia         ASSESSMENT/PLAN:  1. Uncontrolled type 2 diabetes mellitus with hypoglycemia without coma (HCC)  -     metFORMIN (GLUCOPHAGE-XR) 500 MG extended release tablet; TAKE 1 TABLET BY MOUTH EVERY DAY WITH BREAKFAST, Disp-30 tablet, R-2PATIENT NEEDS APPOINTMENTNormal  -     POCT glycosylated hemoglobin (Hb A1C)  Worsening, ordered fasting FBS through assisted living  Forward readings weekly    2. Primary insomnia  -     Melatonin 5 MG CHEW; Take 1 tablet by mouth nightly, Disp-30 tablet, R-3Normal  The current medical regimen is effective;  continue present plan and medications. 3. History of CVA (cerebrovascular accident)  -     atorvastatin (LIPITOR) 40 MG tablet; Take 1 tablet by mouth nightly, Disp-30 tablet, R-3Normal  -     aspirin 81 MG EC tablet; Take 1 tablet by mouth daily, Disp-30 tablet, R-3Normal  -     rivastigmine (EXELON) 4.5 MG capsule; Take 1 capsule by mouth 2 times daily, Disp-60 capsule, R-3Normal  -     zinc sulfate (ORAZINC) 220 (50 Zn) MG capsule; Take 1 capsule by mouth daily, Disp-30 capsule, R-3OTC  PT at assisted living    4. Cerebrovascular accident (CVA) due to stenosis of precerebral artery (HCC)  -     clopidogrel (PLAVIX) 75 MG tablet; Take 1 tablet by mouth daily, Disp-60 tablet, R-3Normal  -     metoprolol succinate (TOPROL XL) 25 MG extended release tablet; Take 1 tablet by mouth daily, Disp-30 tablet, R-3Normal  The current medical regimen is effective;  continue present plan and medications. PT    5. HTN (hypertension), benign  -     amLODIPine (NORVASC) 2.5 MG tablet; TAKE 1 TABLET BY MOUTH EVERY DAY, Disp-90 tablet, R-3Normal  -     losartan (COZAAR) 100 MG tablet; Take 1 tablet by mouth daily, Disp-30 tablet, R-3Normal  Monitor BP at Assisted living    6.  Confusion  -     rivastigmine (EXELON) 4.5 MG capsule; Take 1 capsule by mouth 2 times daily, Disp-60 capsule, R-3Normal  The current medical regimen is effective;  continue present plan and medications. 7. Insomnia, unspecified type  -     mirtazapine (REMERON) 15 MG tablet; Take 1 tablet by mouth nightly, Disp-30 tablet, R-0Normal  The current medical regimen is effective;  continue present plan and medications. 8. Tobacco use disorder  -     nicotine (NICODERM CQ) 14 MG/24HR; Place 1 patch onto the skin daily, Disp-30 patch, R-0Normal    9. Seasonal allergic rhinitis due to pollen  -     fluticasone (FLONASE) 50 MCG/ACT nasal spray; INSTILL TWO (2) SPRAYS IN EACH NOSTRIL ONCE DAILY, Disp-16 g, R-2Normal    10. Chronic bronchitis, unspecified chronic bronchitis type ([facility] [address]  Stable, The current medical regimen is effective;  continue present plan and medications. Recheck one week      Return in about 3 months (around 9/29/2022), or if symptoms worsen or fail to improve. Subjective   SUBJECTIVE/OBJECTIVE:  Diabetes Mellitus: Current symptoms/problems include none. Medication compliance:  compliant all of the time  Diabetic diet compliance:  compliant most of the time,  Weight trend: stable  Current exercise: PT at [facility]    Home blood sugar records: patient does not test  Any episodes of hypoglycemia? yes - twice since at Assisted living  Eye exam current (within one year): no   reports that she quit smoking about 3 years ago. Her smoking use included cigarettes. She has a 15.00 pack-year smoking history. She has never used smokeless tobacco.   Daily Aspirin?  Yes    Lab Results       Component                Value               Date                       LABA1C                   8.0                 06/29/2022                 LABA1C                   7.2 (H)             03/08/2022                 LABA1C                   6.4 (H)             12/28/2021            Lab Results       Component                Value               Date LABMICR                  929.0 (H)           07/31/2019                 CREATININE               1.7 (H)             06/15/2022            Lab Results       Component                Value               Date                       ALT                      8                   06/15/2022                 AST                      11                  06/15/2022            Lab Results       Component                Value               Date                       CHOL                     144                 03/29/2022                 TRIG                     131                 03/29/2022                 HDL                      44                  03/29/2022                 LDLCALC                  74                  03/29/2022              BP has been 150s/100s when upset. Not happy with current living situation. Patient is now at [facility] of insomnia. States difficulty to fall and stay asleep      Review of Systems   Constitutional: Negative for activity change, appetite change, fatigue and unexpected weight change. Respiratory: Negative for cough, shortness of breath and wheezing. Cardiovascular: Positive for leg swelling (minimal). Negative for chest pain and palpitations. Gastrointestinal: Negative for constipation, diarrhea, nausea and vomiting. Endocrine: Negative for polydipsia, polyphagia and polyuria. Neurological: Positive for weakness (left sided since CVA) and headaches (a few/week). Negative for light-headedness and numbness. Psychiatric/Behavioral: Positive for dysphoric mood and sleep disturbance. Negative for suicidal ideas. The patient is nervous/anxious. Objective   BP (!) 146/84   Pulse 62   Temp 98.2 °F (36.8 °C)   Resp 18   Ht 5' 6\" (1.676 m)   Wt 163 lb (73.9 kg)   LMP  (LMP Unknown)   SpO2 93%   BMI 26.31 kg/m²    Physical Exam  Constitutional:       General: She is not in acute distress. Appearance: Normal appearance. She is well-developed. HENT:      Head: Normocephalic and atraumatic. Neck:      Thyroid: No thyromegaly. Vascular: No carotid bruit. Trachea: No tracheal deviation. Cardiovascular:      Rate and Rhythm: Normal rate and regular rhythm. Heart sounds: Normal heart sounds. No murmur heard. Pulmonary:      Effort: Pulmonary effort is normal. No respiratory distress. Breath sounds: Normal breath sounds. No wheezing, rhonchi or rales. Chest:      Chest wall: No tenderness. Abdominal:      General: Bowel sounds are normal.      Palpations: Abdomen is soft. Tenderness: There is no abdominal tenderness. Musculoskeletal:      Right lower leg: No edema. Left lower leg: No edema. Lymphadenopathy:      Cervical: No cervical adenopathy. Skin:     General: Skin is warm and dry. Neurological:      Mental Status: She is alert and oriented to person, place, and time. Motor: Weakness (left sided weakness) present. Psychiatric:         Mood and Affect: Mood normal.         Behavior: Behavior normal.      Comments: Tearful at times            MDM moderate      An electronic signature was used to authenticate this note.     --Sneha Menjivar, MIKEL - CNP

## 2022-07-02 LAB
SARS-COV-2: NOT DETECTED
SOURCE: NORMAL

## 2022-07-03 LAB — SOURCE: NORMAL

## 2022-07-05 DIAGNOSIS — Z86.73 HISTORY OF CVA (CEREBROVASCULAR ACCIDENT): ICD-10-CM

## 2022-07-05 RX ORDER — ZINC SULFATE 50(220)MG
CAPSULE ORAL
Qty: 1 CAPSULE | Refills: 10 | Status: SHIPPED
Start: 2022-07-05 | End: 2022-09-15 | Stop reason: SDUPTHER

## 2022-07-05 RX ORDER — CHOLECALCIFEROL (VITAMIN D3) 125 MCG
CAPSULE ORAL
Qty: 1 TABLET | Refills: 10 | Status: SHIPPED
Start: 2022-07-05 | End: 2022-09-15 | Stop reason: SDUPTHER

## 2022-07-05 RX ORDER — FAMOTIDINE 20 MG/1
20 TABLET, FILM COATED ORAL 2 TIMES DAILY
Qty: 30 TABLET | Refills: 10 | Status: SHIPPED
Start: 2022-07-05 | End: 2022-09-15 | Stop reason: SDUPTHER

## 2022-07-06 LAB — SARS-COV-2, PCR: NOT DETECTED

## 2022-07-07 LAB — SOURCE: NORMAL

## 2022-07-08 LAB — SARS-COV-2, PCR: NOT DETECTED

## 2022-07-08 RX ORDER — AMLODIPINE BESYLATE 5 MG/1
TABLET ORAL
Qty: 30 TABLET | Refills: 0 | Status: SHIPPED
Start: 2022-07-08 | End: 2022-08-11

## 2022-07-12 NOTE — TELEPHONE ENCOUNTER
Requested Prescriptions     Pending Prescriptions Disp Refills    blood glucose monitor kit and supplies 1 kit 0     Sig: Dispense sufficient amount for indicated testing frequency plus additional to accommodate PRN testing needs. Dispense all needed supplies to include: monitor, strips, lancing device, lancets, control solutions, alcohol swabs.        Next appt is 8/24/2022  Last appt was 6/29/2022

## 2022-07-14 LAB
SARS-COV-2: NORMAL
SOURCE: NORMAL

## 2022-07-15 LAB — SOURCE: NORMAL

## 2022-07-16 LAB
SARS-COV-2: NOT DETECTED
SOURCE: NORMAL

## 2022-07-19 LAB — SARS-COV-2, PCR: NOT DETECTED

## 2022-07-20 ENCOUNTER — TELEPHONE (OUTPATIENT)
Dept: FAMILY MEDICINE CLINIC | Age: 62
End: 2022-07-20

## 2022-07-20 DIAGNOSIS — Z86.73 H/O ISCHEMIC RIGHT MCA STROKE: ICD-10-CM

## 2022-07-20 DIAGNOSIS — I69.354 HEMIPARESIS AFFECTING LEFT SIDE AS LATE EFFECT OF CEREBROVASCULAR ACCIDENT (HCC): Primary | ICD-10-CM

## 2022-07-21 NOTE — TELEPHONE ENCOUNTER
ASSESSMENT/PLAN:    1. Hemiparesis affecting left side as late effect of cerebrovascular accident [facility])  - DME Order for Dimitri Cuevas as OP    2.  H/O ischemic right MCA stroke  - DME Order for Walker as OP        FOLLOW-UP:  prn

## 2022-07-22 LAB
SARS-COV-2: NOT DETECTED
SOURCE: NORMAL
SOURCE: NORMAL

## 2022-07-25 ENCOUNTER — TELEPHONE (OUTPATIENT)
Dept: FAMILY MEDICINE CLINIC | Age: 62
End: 2022-07-25

## 2022-07-25 NOTE — TELEPHONE ENCOUNTER
Glucose results scanned in:  0600 Sunday 121, Monday 183, Tues 158, Wed 291, Thurs 171, Fri 180, Sat 176

## 2022-07-26 LAB — SARS-COV-2, PCR: NOT DETECTED

## 2022-07-27 LAB — SOURCE: NORMAL

## 2022-07-29 LAB
SARS-COV-2, PCR: NOT DETECTED
SOURCE: NORMAL

## 2022-08-01 ENCOUNTER — TELEPHONE (OUTPATIENT)
Dept: FAMILY MEDICINE CLINIC | Age: 62
End: 2022-08-01

## 2022-08-01 NOTE — TELEPHONE ENCOUNTER
[facility] Please let us know if she needs to change anything. Scanned into chart.

## 2022-08-02 LAB — SARS-COV-2, PCR: NOT DETECTED

## 2022-08-02 RX ORDER — AMLODIPINE BESYLATE 5 MG/1
TABLET ORAL
Qty: 28 TABLET | Refills: 2 | OUTPATIENT
Start: 2022-08-02

## 2022-08-03 LAB — SOURCE: NORMAL

## 2022-08-04 LAB
SARS-COV-2: NOT DETECTED
SOURCE: NORMAL

## 2022-08-05 DIAGNOSIS — F17.200 TOBACCO USE DISORDER: ICD-10-CM

## 2022-08-05 RX ORDER — NICOTINE 21 MG/24HR
1 PATCH, TRANSDERMAL 24 HOURS TRANSDERMAL DAILY
Qty: 30 PATCH | Refills: 0 | Status: SHIPPED
Start: 2022-08-05 | End: 2022-09-15 | Stop reason: SDUPTHER

## 2022-08-10 LAB
SARS-COV-2: NOT DETECTED
SOURCE: NORMAL

## 2022-08-11 RX ORDER — AMLODIPINE BESYLATE 5 MG/1
TABLET ORAL
Qty: 28 TABLET | Refills: 10 | Status: SHIPPED
Start: 2022-08-11 | End: 2022-09-15 | Stop reason: SDUPTHER

## 2022-08-13 LAB
SARS-COV-2: NOT DETECTED
SOURCE: NORMAL

## 2022-08-15 ENCOUNTER — TELEPHONE (OUTPATIENT)
Dept: FAMILY MEDICINE CLINIC | Age: 62
End: 2022-08-15

## 2022-08-15 DIAGNOSIS — E11.649 UNCONTROLLED TYPE 2 DIABETES MELLITUS WITH HYPOGLYCEMIA WITHOUT COMA (HCC): Chronic | ICD-10-CM

## 2022-08-15 RX ORDER — METFORMIN HYDROCHLORIDE 500 MG/1
TABLET, EXTENDED RELEASE ORAL
Qty: 60 TABLET | Refills: 2 | Status: SHIPPED
Start: 2022-08-15 | End: 2022-09-15 | Stop reason: SDUPTHER

## 2022-08-15 RX ORDER — METFORMIN HYDROCHLORIDE 500 MG/1
TABLET, EXTENDED RELEASE ORAL
Qty: 60 TABLET | Refills: 2 | Status: SHIPPED
Start: 2022-08-15 | End: 2022-08-15

## 2022-08-15 NOTE — TELEPHONE ENCOUNTER
Received Fasting blood sugars QAM from [facility]. 8/8   176  8/9   156  8/10  216  8/11  180  8/12  186  8/13  186  8/14  186    Any changes to be made?

## 2022-08-16 DIAGNOSIS — G47.00 INSOMNIA, UNSPECIFIED TYPE: ICD-10-CM

## 2022-08-16 RX ORDER — MIRTAZAPINE 15 MG/1
15 TABLET, FILM COATED ORAL NIGHTLY
Qty: 30 TABLET | Refills: 0 | Status: SHIPPED
Start: 2022-08-16 | End: 2022-09-15 | Stop reason: SDUPTHER

## 2022-08-16 NOTE — TELEPHONE ENCOUNTER
Requested Prescriptions     Pending Prescriptions Disp Refills    mirtazapine (REMERON) 15 MG tablet 30 tablet 0     Sig: Take 1 tablet by mouth nightly       Next appt is 8/24/2022  Last appt was 6/29/2022

## 2022-08-17 LAB
SARS-COV-2: NOT DETECTED
SOURCE: NORMAL

## 2022-08-19 DIAGNOSIS — G47.00 INSOMNIA, UNSPECIFIED TYPE: ICD-10-CM

## 2022-08-19 LAB — SOURCE: NORMAL

## 2022-08-19 RX ORDER — MIRTAZAPINE 15 MG/1
TABLET, FILM COATED ORAL
Qty: 31 TABLET | Refills: 10 | OUTPATIENT
Start: 2022-08-19

## 2022-08-20 LAB
SARS-COV-2: NOT DETECTED
SOURCE: NORMAL
SOURCE: NORMAL

## 2022-08-24 ENCOUNTER — TELEPHONE (OUTPATIENT)
Dept: FAMILY MEDICINE CLINIC | Age: 62
End: 2022-08-24

## 2022-08-24 LAB
SARS-COV-2, PCR: NOT DETECTED
SOURCE: NORMAL

## 2022-08-25 LAB — SOURCE: NORMAL

## 2022-08-26 LAB — SARS-COV-2, PCR: NOT DETECTED

## 2022-08-29 ENCOUNTER — TELEPHONE (OUTPATIENT)
Dept: FAMILY MEDICINE CLINIC | Age: 62
End: 2022-08-29

## 2022-08-29 DIAGNOSIS — E11.649 UNCONTROLLED TYPE 2 DIABETES MELLITUS WITH HYPOGLYCEMIA WITHOUT COMA (HCC): Primary | ICD-10-CM

## 2022-08-29 NOTE — TELEPHONE ENCOUNTER
Received FSBS from [facility]      8/22/22 213  8/23/22 170  8/24/22 180  8/25/22 154  8/26/22 174  8/27/22 189  8/28/22 190

## 2022-08-30 LAB — SARS-COV-2, PCR: NOT DETECTED

## 2022-08-31 DIAGNOSIS — F51.01 PRIMARY INSOMNIA: ICD-10-CM

## 2022-08-31 LAB — SOURCE: NORMAL

## 2022-08-31 RX ORDER — CHOLECALCIFEROL (VITAMIN D3) 125 MCG
CAPSULE ORAL
Qty: 27 TABLET | Refills: 3 | OUTPATIENT
Start: 2022-08-31

## 2022-09-02 LAB — SARS-COV-2, PCR: DETECTED

## 2022-09-08 ENCOUNTER — TELEPHONE (OUTPATIENT)
Dept: FAMILY MEDICINE CLINIC | Age: 62
End: 2022-09-08

## 2022-09-08 NOTE — TELEPHONE ENCOUNTER
Received blood sugars from [facility].     8/28/22 190  8/29/22 197  8/30/22 176  8/31/22 152  9/1/22  166  9/2/22  158  9/3/22  175

## 2022-09-15 ENCOUNTER — OFFICE VISIT (OUTPATIENT)
Dept: FAMILY MEDICINE CLINIC | Age: 62
End: 2022-09-15
Payer: MEDICARE

## 2022-09-15 VITALS
WEIGHT: 160 LBS | HEART RATE: 84 BPM | RESPIRATION RATE: 14 BRPM | TEMPERATURE: 97.1 F | DIASTOLIC BLOOD PRESSURE: 80 MMHG | SYSTOLIC BLOOD PRESSURE: 126 MMHG | BODY MASS INDEX: 25.71 KG/M2 | HEIGHT: 66 IN | OXYGEN SATURATION: 95 %

## 2022-09-15 DIAGNOSIS — J30.1 SEASONAL ALLERGIC RHINITIS DUE TO POLLEN: ICD-10-CM

## 2022-09-15 DIAGNOSIS — R41.0 CONFUSION: ICD-10-CM

## 2022-09-15 DIAGNOSIS — E11.649 UNCONTROLLED TYPE 2 DIABETES MELLITUS WITH HYPOGLYCEMIA WITHOUT COMA (HCC): Chronic | ICD-10-CM

## 2022-09-15 DIAGNOSIS — F51.01 PRIMARY INSOMNIA: ICD-10-CM

## 2022-09-15 DIAGNOSIS — I10 HTN (HYPERTENSION), BENIGN: Chronic | ICD-10-CM

## 2022-09-15 DIAGNOSIS — K21.9 GASTROESOPHAGEAL REFLUX DISEASE, UNSPECIFIED WHETHER ESOPHAGITIS PRESENT: Chronic | ICD-10-CM

## 2022-09-15 DIAGNOSIS — R06.02 SHORTNESS OF BREATH: ICD-10-CM

## 2022-09-15 DIAGNOSIS — G47.00 INSOMNIA, UNSPECIFIED TYPE: ICD-10-CM

## 2022-09-15 DIAGNOSIS — Z86.73 HISTORY OF CVA (CEREBROVASCULAR ACCIDENT): ICD-10-CM

## 2022-09-15 DIAGNOSIS — I63.20 CEREBROVASCULAR ACCIDENT (CVA) DUE TO STENOSIS OF PRECEREBRAL ARTERY (HCC): ICD-10-CM

## 2022-09-15 DIAGNOSIS — K20.90 ESOPHAGITIS: Primary | ICD-10-CM

## 2022-09-15 DIAGNOSIS — F17.200 TOBACCO USE DISORDER: ICD-10-CM

## 2022-09-15 DIAGNOSIS — G30.9 ALZHEIMER'S DISEASE, UNSPECIFIED (CODE) (HCC): ICD-10-CM

## 2022-09-15 PROCEDURE — 1036F TOBACCO NON-USER: CPT | Performed by: FAMILY MEDICINE

## 2022-09-15 PROCEDURE — G8427 DOCREV CUR MEDS BY ELIG CLIN: HCPCS | Performed by: FAMILY MEDICINE

## 2022-09-15 PROCEDURE — 3017F COLORECTAL CA SCREEN DOC REV: CPT | Performed by: FAMILY MEDICINE

## 2022-09-15 PROCEDURE — 99214 OFFICE O/P EST MOD 30 MIN: CPT | Performed by: FAMILY MEDICINE

## 2022-09-15 PROCEDURE — G8417 CALC BMI ABV UP PARAM F/U: HCPCS | Performed by: FAMILY MEDICINE

## 2022-09-15 PROCEDURE — 3052F HG A1C>EQUAL 8.0%<EQUAL 9.0%: CPT | Performed by: FAMILY MEDICINE

## 2022-09-15 PROCEDURE — 2022F DILAT RTA XM EVC RTNOPTHY: CPT | Performed by: FAMILY MEDICINE

## 2022-09-15 RX ORDER — AMLODIPINE BESYLATE 5 MG/1
TABLET ORAL
Qty: 28 TABLET | Refills: 10 | Status: SHIPPED | OUTPATIENT
Start: 2022-09-15

## 2022-09-15 RX ORDER — BLOOD-GLUCOSE METER
EACH MISCELLANEOUS
COMMUNITY
Start: 2022-07-12

## 2022-09-15 RX ORDER — PANTOPRAZOLE SODIUM 40 MG/1
TABLET, DELAYED RELEASE ORAL
Qty: 30 TABLET | Refills: 2 | Status: SHIPPED | OUTPATIENT
Start: 2022-09-15

## 2022-09-15 RX ORDER — METFORMIN HYDROCHLORIDE 500 MG/1
TABLET, EXTENDED RELEASE ORAL
Qty: 60 TABLET | Refills: 2 | Status: SHIPPED | OUTPATIENT
Start: 2022-09-15

## 2022-09-15 RX ORDER — CHOLECALCIFEROL (VITAMIN D3) 125 MCG
CAPSULE ORAL
Qty: 1 TABLET | Refills: 10 | Status: SHIPPED | OUTPATIENT
Start: 2022-09-15

## 2022-09-15 RX ORDER — BLOOD SUGAR DIAGNOSTIC
STRIP MISCELLANEOUS
COMMUNITY
Start: 2022-08-19

## 2022-09-15 RX ORDER — ATORVASTATIN CALCIUM 40 MG/1
40 TABLET, FILM COATED ORAL NIGHTLY
Qty: 30 TABLET | Refills: 3 | Status: SHIPPED | OUTPATIENT
Start: 2022-09-15

## 2022-09-15 RX ORDER — RIVASTIGMINE TARTRATE 4.5 MG/1
4.5 CAPSULE ORAL 2 TIMES DAILY
Qty: 60 CAPSULE | Refills: 3 | Status: SHIPPED | OUTPATIENT
Start: 2022-09-15

## 2022-09-15 RX ORDER — CLOPIDOGREL BISULFATE 75 MG/1
75 TABLET ORAL DAILY
Qty: 60 TABLET | Refills: 3 | Status: SHIPPED | OUTPATIENT
Start: 2022-09-15

## 2022-09-15 RX ORDER — BLOOD-GLUCOSE CONTROL, NORMAL
EACH MISCELLANEOUS
COMMUNITY
Start: 2022-08-19

## 2022-09-15 RX ORDER — CHOLECALCIFEROL TAB 125 MCG (5000 UNIT) 125 MCG (5000 UT)
TAB
COMMUNITY
Start: 2022-09-05

## 2022-09-15 RX ORDER — ASPIRIN 81 MG/1
81 TABLET ORAL DAILY
Qty: 30 TABLET | Refills: 3 | Status: SHIPPED | OUTPATIENT
Start: 2022-09-15

## 2022-09-15 RX ORDER — UBIQUINOL 100 MG
CAPSULE ORAL
COMMUNITY
Start: 2022-07-12

## 2022-09-15 RX ORDER — LOSARTAN POTASSIUM 100 MG/1
100 TABLET ORAL DAILY
Qty: 30 TABLET | Refills: 3 | Status: SHIPPED | OUTPATIENT
Start: 2022-09-15

## 2022-09-15 RX ORDER — FLUTICASONE PROPIONATE 50 MCG
SPRAY, SUSPENSION (ML) NASAL
Qty: 16 G | Refills: 2 | Status: SHIPPED | OUTPATIENT
Start: 2022-09-15

## 2022-09-15 RX ORDER — FAMOTIDINE 20 MG/1
20 TABLET, FILM COATED ORAL 2 TIMES DAILY
Qty: 30 TABLET | Refills: 10 | Status: SHIPPED | OUTPATIENT
Start: 2022-09-15

## 2022-09-15 RX ORDER — ALBUTEROL SULFATE 90 UG/1
AEROSOL, METERED RESPIRATORY (INHALATION)
Qty: 8.5 G | Refills: 2 | Status: SHIPPED | OUTPATIENT
Start: 2022-09-15

## 2022-09-15 RX ORDER — TACROLIMUS 0.5 MG/1
CAPSULE ORAL
COMMUNITY
Start: 2022-09-05

## 2022-09-15 RX ORDER — MELATONIN 5 MG
1 TABLET,CHEWABLE ORAL NIGHTLY
Qty: 30 TABLET | Refills: 3 | Status: SHIPPED | OUTPATIENT
Start: 2022-09-15

## 2022-09-15 RX ORDER — METOPROLOL SUCCINATE 25 MG/1
25 TABLET, EXTENDED RELEASE ORAL DAILY
Qty: 30 TABLET | Refills: 3 | Status: SHIPPED | OUTPATIENT
Start: 2022-09-15

## 2022-09-15 RX ORDER — MEMANTINE HYDROCHLORIDE 5 MG-10 MG
KIT ORAL
COMMUNITY
Start: 2022-08-09

## 2022-09-15 RX ORDER — ZINC SULFATE 50(220)MG
CAPSULE ORAL
Qty: 1 CAPSULE | Refills: 10 | Status: SHIPPED | OUTPATIENT
Start: 2022-09-15

## 2022-09-15 RX ORDER — NICOTINE 21 MG/24HR
1 PATCH, TRANSDERMAL 24 HOURS TRANSDERMAL DAILY
Qty: 30 PATCH | Refills: 0 | Status: SHIPPED
Start: 2022-09-15 | End: 2022-10-11 | Stop reason: SDUPTHER

## 2022-09-15 RX ORDER — AMLODIPINE BESYLATE 2.5 MG/1
TABLET ORAL
Qty: 90 TABLET | Refills: 3 | Status: SHIPPED | OUTPATIENT
Start: 2022-09-15

## 2022-09-15 RX ORDER — MIRTAZAPINE 15 MG/1
15 TABLET, FILM COATED ORAL NIGHTLY
Qty: 30 TABLET | Refills: 0 | Status: SHIPPED | OUTPATIENT
Start: 2022-09-15

## 2022-09-15 ASSESSMENT — PATIENT HEALTH QUESTIONNAIRE - PHQ9
1. LITTLE INTEREST OR PLEASURE IN DOING THINGS: 0
SUM OF ALL RESPONSES TO PHQ QUESTIONS 1-9: 0
2. FEELING DOWN, DEPRESSED OR HOPELESS: 0
SUM OF ALL RESPONSES TO PHQ QUESTIONS 1-9: 0
SUM OF ALL RESPONSES TO PHQ9 QUESTIONS 1 & 2: 0

## 2022-09-15 ASSESSMENT — ENCOUNTER SYMPTOMS
WHEEZING: 0
VOMITING: 0
CONSTIPATION: 0
DIARRHEA: 0
ABDOMINAL PAIN: 0
BLOOD IN STOOL: 0
NAUSEA: 0
COUGH: 0
SHORTNESS OF BREATH: 0

## 2022-09-15 NOTE — PROGRESS NOTES
Marielle Daniel (:  1960) is a 62 y.o. female,Established patient, here for evaluation of the following chief complaint(s):  Diabetes (Too early for A1c but can have blood drawn at community she is living at; last A1c was on 2022 8.0%.)         ASSESSMENT/PLAN:  1. Esophagitis  -     famotidine (PEPCID) 20 MG tablet; Take 1 tablet by mouth 2 times daily, Disp-30 tablet, R-10Normal  2. Shortness of breath  -     albuterol sulfate HFA (PROVENTIL;VENTOLIN;PROAIR) 108 (90 Base) MCG/ACT inhaler; INHALE TWO(2) PUFFS INTO LUNGS EVERY SIX(6) HOURS AS NEEDED FOR WHEEZING, Disp-8.5 g, R-2Normal  3. HTN (hypertension), benign  -     amLODIPine (NORVASC) 2.5 MG tablet; TAKE 1 TABLET BY MOUTH EVERY DAY, Disp-90 tablet, R-3Normal  -     losartan (COZAAR) 100 MG tablet; Take 1 tablet by mouth daily, Disp-30 tablet, R-3Normal  4. History of CVA (cerebrovascular accident)  -     aspirin 81 MG EC tablet; Take 1 tablet by mouth daily, Disp-30 tablet, R-3Normal  -     atorvastatin (LIPITOR) 40 MG tablet; Take 1 tablet by mouth nightly, Disp-30 tablet, R-3Normal  -     rivastigmine (EXELON) 4.5 MG capsule; Take 1 capsule by mouth 2 times daily, Disp-60 capsule, R-3Normal  -     Zinc 220 (50 Zn) MG CAPS; TAKE 1 CAPSULE BY MOUTH IN THE MORNING, Disp-1 capsule, R-10Normal  5. Cerebrovascular accident (CVA) due to stenosis of precerebral artery (HCC)  -     clopidogrel (PLAVIX) 75 MG tablet; Take 1 tablet by mouth daily, Disp-60 tablet, R-3Normal  -     metoprolol succinate (TOPROL XL) 25 MG extended release tablet; Take 1 tablet by mouth daily, Disp-30 tablet, R-3Normal  6. Seasonal allergic rhinitis due to pollen  -     fluticasone (FLONASE) 50 MCG/ACT nasal spray; INSTILL TWO (2) SPRAYS IN EACH NOSTRIL ONCE DAILY, Disp-16 g, R-2Normal  7. Primary insomnia  -     Melatonin 5 MG CHEW; Take 1 tablet by mouth nightly, Disp-30 tablet, R-3Normal  8.  Uncontrolled type [facility] diabetes mellitus with [facility]cemia [facility]  - metFORMIN (GLUCOPHAGE-XR) 500 MG extended release tablet; TAKE 2 TABLETS BY MOUTH DAILY **DO NOT CRUSH**, Disp-60 tablet, R-2Normal  9. Insomnia, unspecified type  -     mirtazapine (REMERON) 15 MG tablet; Take 1 tablet by mouth nightly, Disp-30 tablet, R-0Normal  10. Tobacco use disorder  -     nicotine (NICODERM CQ) 14 MG/24HR; Place 1 patch onto the skin daily, Disp-30 patch, R-0Normal  11. Gastroesophageal reflux disease, unspecified whether esophagitis present  -     pantoprazole (PROTONIX) 40 MG tablet; TAKE 1 TABLET BY MOUTH ONCE DAILY, Disp-30 tablet, R-2Normal  12. Confusion  -     rivastigmine (EXELON) 4.5 MG capsule; Take 1 capsule by mouth 2 times daily, Disp-60 capsule, R-3Normal  13. Alzheimer's disease, unspecified      No follow-ups on file. Subjective   SUBJECTIVE/OBJECTIVE:  Diabetes (Too early for A1c but can have blood drawn at community she is living at; last A1c was on 6/29/2022 8.0%.)        Review of Systems   Constitutional:  Negative for chills, diaphoresis and fever. HENT:  Negative for ear discharge, ear pain, hearing loss, nosebleeds and tinnitus. Respiratory:  Negative for cough, shortness of breath and wheezing. Cardiovascular:  Negative for chest pain. Gastrointestinal:  Negative for abdominal pain, blood in stool, constipation, diarrhea, nausea and vomiting. Genitourinary:  Negative for dysuria, flank pain and hematuria. Musculoskeletal:  Negative for myalgias. Skin:  Negative for rash. Neurological:  Negative for headaches. Hematological:  Does not bruise/bleed easily. Psychiatric/Behavioral:  Negative for hallucinations and suicidal ideas.          Objective   /80   Pulse 84   Temp 97.1 °F (36.2 °C) (Temporal)   Resp 14   Ht 5' 6\" (1.676 m)   Wt 160 lb (72.6 kg)   LMP  (LMP Unknown)   SpO2 95%   BMI 25.82 kg/m²   Lab Results   Component Value Date    LABA1C 8.0 06/29/2022    LABA1C 7.2 (H) 03/08/2022    LABA1C 6.4 (H) 12/28/2021

## 2022-09-16 DIAGNOSIS — F17.200 TOBACCO USE DISORDER: ICD-10-CM

## 2022-09-16 RX ORDER — NICOTINE 21 MG/24HR
PATCH, TRANSDERMAL 24 HOURS TRANSDERMAL
Qty: 30 PATCH | Refills: 10 | OUTPATIENT
Start: 2022-09-16

## 2022-10-04 DIAGNOSIS — M54.42 CHRONIC MIDLINE LOW BACK PAIN WITH LEFT-SIDED SCIATICA: Primary | ICD-10-CM

## 2022-10-04 DIAGNOSIS — G89.29 CHRONIC MIDLINE LOW BACK PAIN WITH LEFT-SIDED SCIATICA: Primary | ICD-10-CM

## 2022-10-11 DIAGNOSIS — F17.200 TOBACCO USE DISORDER: ICD-10-CM

## 2022-10-11 RX ORDER — NICOTINE 21 MG/24HR
1 PATCH, TRANSDERMAL 24 HOURS TRANSDERMAL DAILY
Qty: 30 PATCH | Refills: 0 | Status: SHIPPED | OUTPATIENT
Start: 2022-10-11 | End: 2022-11-10

## 2022-10-12 ENCOUNTER — TELEPHONE (OUTPATIENT)
Dept: FAMILY MEDICINE CLINIC | Age: 62
End: 2022-10-12

## 2022-10-12 DIAGNOSIS — B35.9 TINEA: Primary | ICD-10-CM

## 2022-10-12 RX ORDER — KETOCONAZOLE 20 MG/G
CREAM TOPICAL
Qty: 60 G | Refills: 2 | Status: SHIPPED | OUTPATIENT
Start: 2022-10-12

## 2022-10-12 NOTE — TELEPHONE ENCOUNTER
ASSESSMENT/PLAN:    1. Tinea  - ketoconazole (NIZORAL) 2 % cream; Apply topically daily.   Dispense: 60 g; Refill: 2        FOLLOW-UP:  prn

## 2022-10-21 DIAGNOSIS — Z23 NEED FOR INFLUENZA VACCINATION: Primary | ICD-10-CM

## 2022-10-21 PROCEDURE — G0008 ADMIN INFLUENZA VIRUS VAC: HCPCS | Performed by: PHYSICIAN ASSISTANT

## 2022-10-21 PROCEDURE — 90674 CCIIV4 VAC NO PRSV 0.5 ML IM: CPT | Performed by: PHYSICIAN ASSISTANT

## 2022-10-28 ENCOUNTER — TELEPHONE (OUTPATIENT)
Dept: FAMILY MEDICINE CLINIC | Age: 62
End: 2022-10-28

## 2022-10-28 DIAGNOSIS — R11.2 NAUSEA AND VOMITING, UNSPECIFIED VOMITING TYPE: Primary | ICD-10-CM

## 2022-10-28 RX ORDER — ONDANSETRON 4 MG/1
4 TABLET, FILM COATED ORAL EVERY 8 HOURS PRN
Qty: 10 TABLET | Refills: 0 | Status: SHIPPED | OUTPATIENT
Start: 2022-10-28

## 2022-10-28 NOTE — TELEPHONE ENCOUNTER
Spoke with nurse Iraida Cerna at [address] [place] [address] to let her know zofran was sent to pharm. Also let her know if she is not better in 24 hours, then she needs to go to the ER. Iraida Cerna verbalized understanding.

## 2022-10-28 NOTE — TELEPHONE ENCOUNTER
[facility]  If no improvement within 24 hours or worsening ER due to multiple comorbidities [place] --- [email or web address] Website --- www.quitnet.com

## 2022-10-28 NOTE — TELEPHONE ENCOUNTER
Travis Hu from [address] [place] [address] called stating pt has nausea and vomiting. Travis Hu is wondering if she can have an order for zofran sent over. She has 4 mg tabs on hand.

## 2022-10-30 ENCOUNTER — APPOINTMENT (OUTPATIENT)
Dept: GENERAL RADIOLOGY | Age: 62
End: 2022-10-30
Payer: MEDICARE

## 2022-10-30 ENCOUNTER — HOSPITAL ENCOUNTER (EMERGENCY)
Age: 62
Discharge: HOME | End: 2022-10-31
Attending: EMERGENCY MEDICINE
Payer: MEDICARE

## 2022-10-30 VITALS
RESPIRATION RATE: 18 BRPM | BODY MASS INDEX: 24.11 KG/M2 | HEIGHT: 66 IN | SYSTOLIC BLOOD PRESSURE: 122 MMHG | DIASTOLIC BLOOD PRESSURE: 62 MMHG | WEIGHT: 150 LBS | TEMPERATURE: 97.9 F | HEART RATE: 65 BPM | OXYGEN SATURATION: 95 %

## 2022-10-30 DIAGNOSIS — R11.2 NAUSEA AND VOMITING, UNSPECIFIED VOMITING TYPE: Primary | ICD-10-CM

## 2022-10-30 LAB
ALBUMIN SERPL-MCNC: 4.3 G/DL (ref 3.5–5.2)
ALP BLD-CCNC: 169 U/L (ref 35–104)
ALT SERPL-CCNC: 8 U/L (ref 0–32)
ANION GAP SERPL CALCULATED.3IONS-SCNC: 11 MMOL/L (ref 7–16)
AST SERPL-CCNC: 10 U/L (ref 0–31)
BACTERIA: ABNORMAL /HPF
BASOPHILS ABSOLUTE: 0.05 E9/L (ref 0–0.2)
BASOPHILS RELATIVE PERCENT: 0.5 % (ref 0–2)
BILIRUB SERPL-MCNC: 0.6 MG/DL (ref 0–1.2)
BILIRUBIN URINE: NEGATIVE
BLOOD, URINE: ABNORMAL
BUN BLDV-MCNC: 22 MG/DL (ref 6–23)
CALCIUM SERPL-MCNC: 9.2 MG/DL (ref 8.6–10.2)
CHLORIDE BLD-SCNC: 108 MMOL/L (ref 98–107)
CHP ED QC CHECK: NORMAL
CLARITY: CLEAR
CO2: 24 MMOL/L (ref 22–29)
COLOR: YELLOW
CREAT SERPL-MCNC: 1.5 MG/DL (ref 0.5–1)
EOSINOPHILS ABSOLUTE: 0.15 E9/L (ref 0.05–0.5)
EOSINOPHILS RELATIVE PERCENT: 1.5 % (ref 0–6)
EPITHELIAL CELLS, UA: ABNORMAL /HPF
GFR SERPL CREATININE-BSD FRML MDRD: 39 ML/MIN/1.73
GLUCOSE BLD-MCNC: 162 MG/DL
GLUCOSE BLD-MCNC: 166 MG/DL (ref 74–99)
GLUCOSE URINE: NEGATIVE MG/DL
HCT VFR BLD CALC: 38.9 % (ref 34–48)
HEMOGLOBIN: 12.8 G/DL (ref 11.5–15.5)
IMMATURE GRANULOCYTES #: 0.05 E9/L
IMMATURE GRANULOCYTES %: 0.5 % (ref 0–5)
INFLUENZA A BY PCR: NOT DETECTED
INFLUENZA B BY PCR: NOT DETECTED
KETONES, URINE: NEGATIVE MG/DL
LACTIC ACID, SEPSIS: 1.1 MMOL/L (ref 0.5–1.9)
LEUKOCYTE ESTERASE, URINE: ABNORMAL
LIPASE: 76 U/L (ref 13–60)
LYMPHOCYTES ABSOLUTE: 1.2 E9/L (ref 1.5–4)
LYMPHOCYTES RELATIVE PERCENT: 12.2 % (ref 20–42)
MCH RBC QN AUTO: 30.2 PG (ref 26–35)
MCHC RBC AUTO-ENTMCNC: 32.9 % (ref 32–34.5)
MCV RBC AUTO: 91.7 FL (ref 80–99.9)
METER GLUCOSE: 162 MG/DL (ref 74–99)
MONOCYTES ABSOLUTE: 0.75 E9/L (ref 0.1–0.95)
MONOCYTES RELATIVE PERCENT: 7.7 % (ref 2–12)
NEUTROPHILS ABSOLUTE: 7.6 E9/L (ref 1.8–7.3)
NEUTROPHILS RELATIVE PERCENT: 77.6 % (ref 43–80)
NITRITE, URINE: NEGATIVE
PDW BLD-RTO: 12.9 FL (ref 11.5–15)
PH UA: 6 (ref 5–9)
PLATELET # BLD: 162 E9/L (ref 130–450)
PMV BLD AUTO: 11.2 FL (ref 7–12)
POTASSIUM REFLEX MAGNESIUM: 4.5 MMOL/L (ref 3.5–5)
PROTEIN UA: ABNORMAL MG/DL
RBC # BLD: 4.24 E12/L (ref 3.5–5.5)
RBC UA: ABNORMAL /HPF (ref 0–2)
SARS-COV-2, NAAT: NOT DETECTED
SODIUM BLD-SCNC: 143 MMOL/L (ref 132–146)
SPECIFIC GRAVITY UA: 1.01 (ref 1–1.03)
TOTAL PROTEIN: 6.9 G/DL (ref 6.4–8.3)
UROBILINOGEN, URINE: 0.2 E.U./DL
WBC # BLD: 9.8 E9/L (ref 4.5–11.5)
WBC UA: ABNORMAL /HPF (ref 0–5)

## 2022-10-30 PROCEDURE — 85025 COMPLETE CBC W/AUTO DIFF WBC: CPT

## 2022-10-30 PROCEDURE — 81001 URINALYSIS AUTO W/SCOPE: CPT

## 2022-10-30 PROCEDURE — 87502 INFLUENZA DNA AMP PROBE: CPT

## 2022-10-30 PROCEDURE — 80053 COMPREHEN METABOLIC PANEL: CPT

## 2022-10-30 PROCEDURE — 87635 SARS-COV-2 COVID-19 AMP PRB: CPT

## 2022-10-30 PROCEDURE — 83690 ASSAY OF LIPASE: CPT

## 2022-10-30 PROCEDURE — 99284 EMERGENCY DEPT VISIT MOD MDM: CPT

## 2022-10-30 PROCEDURE — 87088 URINE BACTERIA CULTURE: CPT

## 2022-10-30 PROCEDURE — 71045 X-RAY EXAM CHEST 1 VIEW: CPT

## 2022-10-30 PROCEDURE — 83605 ASSAY OF LACTIC ACID: CPT

## 2022-10-30 PROCEDURE — 82962 GLUCOSE BLOOD TEST: CPT

## 2022-10-30 RX ORDER — ONDANSETRON 4 MG/1
4 TABLET, ORALLY DISINTEGRATING ORAL EVERY 8 HOURS PRN
Qty: 20 TABLET | Refills: 0 | Status: SHIPPED | OUTPATIENT
Start: 2022-10-30

## 2022-10-30 SDOH — ECONOMIC STABILITY: FOOD INSECURITY: WITHIN THE PAST 12 MONTHS, THE FOOD YOU BOUGHT JUST DIDN'T LAST AND YOU DIDN'T HAVE MONEY TO GET MORE.: NEVER TRUE

## 2022-10-30 ASSESSMENT — ENCOUNTER SYMPTOMS
SHORTNESS OF BREATH: 0
EYE PAIN: 0
DIARRHEA: 1
ABDOMINAL PAIN: 1
CONSTIPATION: 0
COUGH: 0
VOMITING: 1
BACK PAIN: 0
SINUS PAIN: 0
NAUSEA: 1

## 2022-10-30 ASSESSMENT — LIFESTYLE VARIABLES: HOW OFTEN DO YOU HAVE A DRINK CONTAINING ALCOHOL: NEVER

## 2022-10-30 ASSESSMENT — PAIN DESCRIPTION - DESCRIPTORS: DESCRIPTORS: CRAMPING

## 2022-10-30 ASSESSMENT — PAIN DESCRIPTION - PAIN TYPE: TYPE: ACUTE PAIN

## 2022-10-30 ASSESSMENT — PAIN DESCRIPTION - LOCATION: LOCATION: ABDOMEN

## 2022-10-30 ASSESSMENT — PAIN - FUNCTIONAL ASSESSMENT: PAIN_FUNCTIONAL_ASSESSMENT: 0-10

## 2022-10-30 ASSESSMENT — PAIN SCALES - GENERAL: PAINLEVEL_OUTOF10: 2

## 2022-10-30 NOTE — ED PROVIDER NOTES
HPI     Patient is a 62 y.o. female presents with a chief complaint of N/V, abd pain   This has been occurring for 2 days. Patient states that it gets better with nothing. Patient states that it gets worse with nothing. Patient states that it is moderate in severity. Patient states it was gradual in onset. Pt arrived after experiencing some suprapubic abdominal pain for 2 days. Associated symptoms include N/V and diarrhea along with a mild cough with clear sputum. She has vomited multiple times today so her NH sent her in. She states she was around a sick contact the past couple days with similar symptoms. She arrived stating her pain has subsided but she was still mildy nauseous. She has a PMH of DM, cirrhosis, Hep C, GERD, kidney disease. She had no other concerns. Review of Systems   Constitutional:  Negative for chills and fever. HENT:  Negative for ear pain and sinus pain. Eyes:  Negative for pain. Respiratory:  Negative for cough and shortness of breath. Cardiovascular:  Negative for chest pain. Gastrointestinal:  Positive for abdominal pain (subsided prior to arrival), diarrhea, nausea and vomiting. Negative for constipation. Genitourinary:  Negative for difficulty urinating, dysuria and flank pain. Musculoskeletal:  Negative for back pain. Skin:  Negative for rash. Neurological:  Negative for dizziness, weakness, light-headedness and headaches. Psychiatric/Behavioral:  Negative for confusion. Physical Exam  Constitutional:       General: She is not in acute distress. Appearance: Normal appearance. She is not ill-appearing. HENT:      Head: Normocephalic. Right Ear: External ear normal.      Left Ear: External ear normal.      Nose: Nose normal. No congestion or rhinorrhea. Mouth/Throat:      Mouth: Mucous membranes are moist.   Eyes:      Conjunctiva/sclera: Conjunctivae normal.      Pupils: Pupils are equal, round, and reactive to light. Cardiovascular:      Rate and Rhythm: Normal rate and regular rhythm. Pulses: Normal pulses. Pulmonary:      Effort: Pulmonary effort is normal. No respiratory distress. Breath sounds: Normal breath sounds. No stridor. No wheezing or rales. Abdominal:      General: Abdomen is flat. Bowel sounds are normal. There is no distension. Palpations: Abdomen is soft. Tenderness: There is no abdominal tenderness. There is no guarding. Musculoskeletal:         General: No tenderness. Normal range of motion. Cervical back: Normal range of motion and neck supple. Skin:     General: Skin is warm. Findings: No erythema, lesion or rash. Neurological:      General: No focal deficit present. Mental Status: She is oriented to person, place, and time. Sensory: No sensory deficit. Motor: No weakness. Psychiatric:         Behavior: Behavior normal.        Procedures     MDM           Patient with PMH of diabetes and Hep C is a 62 y.o. female presenting with N/V, suprapubic abd pain and a mild cough for the past 2 days. On arrival her abd pain had subsided. She admitted to a sick contact the last couple days with similar symptoms. On exam she had no abd tenderness, and lung sounds were equal and within NL. Labs and imaging were ordered which revealed a negative CXR and other than a chronically elevated LFTs, negative labs including her urine. She was also negative for flu and COVID. Her nausea never worsened throughout her stay, she did not vomit, and she did not have any new pain. I informed the patient of her results and she felt comfortable going back home. She was sent back to her NH with a script for anti nausea medication. I advised her to follow up with your primary doctor if symptoms worsen or do not subside.      --------------------------------------------- PAST HISTORY ---------------------------------------------  Past Medical History:  has a past medical history of Arthritis, Blood circulation, collateral, Chronic fatigue, Cirrhosis of liver ([facility] [address] [facility] Diabetes mellitus [facility] Essential hypertension, Gall stones, GERD (gastroesophageal reflux disease), Hep C w/o coma, chronic ([facility] History of blood transfusion, Neuropathy, PFO (patent foramen ovale), Pneumonia, Type 2 diabetes mellitus with stage 3 chronic kidney disease, with long-term current use of insulin ([facility] Unspecified cerebral artery occlusion with cerebral infarction, and Varices. Past Surgical History:  has a past surgical history that includes  section; Esophageal varice ligation ();  section ( and ); ECHO Compl W Dop Color Flow (2012); Colonoscopy; Endoscopy, colon, diagnostic; Cardiac surgery (); Cardiac catheterization; other surgical history (Right, 17); Liver transplant; and brain surgery. Social History:  reports that she quit smoking about 3 years ago. Her smoking use included cigarettes. She has a 15.00 pack-year smoking history. She has never used smokeless tobacco. She reports that she does not drink alcohol and does not use drugs. Family History: family history is not on file. She was adopted. The patients home medications have been reviewed.     Allergies: Chantix [varenicline] and Heparin    -------------------------------------------------- RESULTS -------------------------------------------------  Labs:  Results for orders placed or performed during the hospital encounter of 10/30/22   Rapid influenza A/B antigens    Specimen: Nasopharyngeal   Result Value Ref Range    Influenza A by PCR Not Detected Not Detected    Influenza B by PCR Not Detected Not Detected   COVID-19, Rapid    Specimen: Nasopharyngeal Swab   Result Value Ref Range    SARS-CoV-2, NAAT Not Detected Not Detected   CBC with Auto Differential   Result Value Ref Range    WBC 9.8 4.5 - 11.5 E9/L    RBC 4.24 3.50 - 5.50 E12/L    Hemoglobin 12.8 11.5 - 15.5 Bacteria, UA FEW (A) None Seen /HPF   Lactate, Sepsis   Result Value Ref Range    Lactic Acid, Sepsis 1.1 0.5 - 1.9 mmol/L   POCT glucose   Result Value Ref Range    Glucose 162 mg/dL    QC OK? ok    POCT Glucose   Result Value Ref Range    Meter Glucose 162 (H) 74 - 99 mg/dL       Radiology:  XR CHEST PORTABLE   Final Result   No acute process. ------------------------- NURSING NOTES AND VITALS REVIEWED ---------------------------  Date / Time Roomed:  10/30/2022 11:00 AM  ED Bed Assignment:  12/04    The nursing notes within the ED encounter and vital signs as below have been reviewed. /67   Pulse 61   Temp 97.9 °F (36.6 °C)   Resp 18   Ht 5' 6\" (1.676 m)   Wt 150 lb (68 kg)   LMP  (LMP Unknown)   SpO2 95%   BMI 24.21 kg/m²   Oxygen Saturation Interpretation: Normal      ------------------------------------------ PROGRESS NOTES ------------------------------------------  6:03 PM EDT  I have spoken with the patient and discussed todays results, in addition to providing specific details for the plan of care and counseling regarding the diagnosis and prognosis. Their questions are answered at this time and they are agreeable with the plan. I discussed at length with them reasons for immediate return here for re evaluation. They will followup with their primary care physician by calling their office tomorrow. --------------------------------- ADDITIONAL PROVIDER NOTES ---------------------------------  At this time the patient is without objective evidence of an acute process requiring hospitalization or inpatient management. They have remained hemodynamically stable throughout their entire ED visit and are stable for discharge with outpatient follow-up. The plan has been discussed in detail and they are aware of the specific conditions for emergent return, as well as the importance of follow-up.       New Prescriptions    ONDANSETRON (ZOFRAN ODT) 4 MG DISINTEGRATING TABLET Take 1 tablet by mouth every 8 hours as needed for Nausea or Vomiting       Diagnosis:  1. Nausea and vomiting, unspecified vomiting type        Disposition:  Patient's disposition: Discharge to home  Patient's condition is stable. Patient was given return precautions. Labs were interpreted by me. Patient will follow up with their primary care provider. Patient is agreeable to this plan. Patient has remained stable throughout their stay in the ED. Patient was seen and evaluated by myself and my attending Mike Herrera MD. Assessment and Plan discussed with attending provider, please see attestation for final plan of care. This note was done using dictation software and there may be some grammatical errors associated with this.     DO Fabiana Oviedo,   Resident  10/30/22 [id number]

## 2022-11-01 LAB — URINE CULTURE, ROUTINE: NORMAL

## 2022-11-03 ENCOUNTER — TELEPHONE (OUTPATIENT)
Dept: FAMILY MEDICINE CLINIC | Age: 62
End: 2022-11-03

## 2022-11-03 NOTE — TELEPHONE ENCOUNTER
RX for current order of Flonase sent to [facility] [address]; I called and spoke to Nurse Rabia Falcon and let her know it was on its way.

## 2022-11-03 NOTE — TELEPHONE ENCOUNTER
Fax received from [facility], [place] LPN, Pt c/o yellow drainage from nares; would you like to order any medications?

## 2022-11-07 LAB
SARS-COV-2: NOT DETECTED
SOURCE: NORMAL

## 2022-11-09 ENCOUNTER — OFFICE VISIT (OUTPATIENT)
Dept: FAMILY MEDICINE CLINIC | Age: 62
End: 2022-11-09
Payer: MEDICARE

## 2022-11-09 VITALS
HEIGHT: 66 IN | BODY MASS INDEX: 32.33 KG/M2 | RESPIRATION RATE: 16 BRPM | TEMPERATURE: 97.5 F | DIASTOLIC BLOOD PRESSURE: 70 MMHG | HEART RATE: 62 BPM | WEIGHT: 201.2 LBS | SYSTOLIC BLOOD PRESSURE: 120 MMHG | OXYGEN SATURATION: 95 %

## 2022-11-09 DIAGNOSIS — N18.31 STAGE 3A CHRONIC KIDNEY DISEASE (HCC): ICD-10-CM

## 2022-11-09 DIAGNOSIS — I63.9 ACUTE ISCHEMIC STROKE (HCC): ICD-10-CM

## 2022-11-09 DIAGNOSIS — G81.94 HEMIPLEGIA AFFECTING LEFT NONDOMINANT SIDE, UNSPECIFIED ETIOLOGY, UNSPECIFIED HEMIPLEGIA TYPE (HCC): ICD-10-CM

## 2022-11-09 DIAGNOSIS — E11.69 TYPE 2 DIABETES MELLITUS WITH OTHER SPECIFIED COMPLICATION, WITHOUT LONG-TERM CURRENT USE OF INSULIN (HCC): ICD-10-CM

## 2022-11-09 DIAGNOSIS — Z00.00 MEDICARE ANNUAL WELLNESS VISIT, SUBSEQUENT: Primary | ICD-10-CM

## 2022-11-09 PROBLEM — E11.9 TYPE 2 DIABETES MELLITUS (HCC): Status: ACTIVE | Noted: 2022-11-09

## 2022-11-09 LAB — SOURCE: NORMAL

## 2022-11-09 PROCEDURE — 3078F DIAST BP <80 MM HG: CPT | Performed by: FAMILY MEDICINE

## 2022-11-09 PROCEDURE — G8482 FLU IMMUNIZE ORDER/ADMIN: HCPCS | Performed by: FAMILY MEDICINE

## 2022-11-09 PROCEDURE — G0439 PPPS, SUBSEQ VISIT: HCPCS | Performed by: FAMILY MEDICINE

## 2022-11-09 PROCEDURE — 3052F HG A1C>EQUAL 8.0%<EQUAL 9.0%: CPT | Performed by: FAMILY MEDICINE

## 2022-11-09 PROCEDURE — 3017F COLORECTAL CA SCREEN DOC REV: CPT | Performed by: FAMILY MEDICINE

## 2022-11-09 PROCEDURE — 3074F SYST BP LT 130 MM HG: CPT | Performed by: FAMILY MEDICINE

## 2022-11-09 RX ORDER — RIVASTIGMINE TARTRATE 6 MG/1
6 CAPSULE ORAL 2 TIMES DAILY
COMMUNITY

## 2022-11-09 ASSESSMENT — PATIENT HEALTH QUESTIONNAIRE - PHQ9
SUM OF ALL RESPONSES TO PHQ QUESTIONS 1-9: 2
SUM OF ALL RESPONSES TO PHQ QUESTIONS 1-9: 2
2. FEELING DOWN, DEPRESSED OR HOPELESS: 0
SUM OF ALL RESPONSES TO PHQ QUESTIONS 1-9: 2
SUM OF ALL RESPONSES TO PHQ QUESTIONS 1-9: 2
SUM OF ALL RESPONSES TO PHQ9 QUESTIONS 1 & 2: 2
1. LITTLE INTEREST OR PLEASURE IN DOING THINGS: 2

## 2022-11-09 ASSESSMENT — LIFESTYLE VARIABLES
HOW MANY STANDARD DRINKS CONTAINING ALCOHOL DO YOU HAVE ON A TYPICAL DAY: PATIENT DOES NOT DRINK
HOW OFTEN DO YOU HAVE A DRINK CONTAINING ALCOHOL: NEVER

## 2022-11-09 NOTE — PROGRESS NOTES
Medicare Annual Wellness Visit    Wallace Bill is here for Medicare AWV, Health Maintenance (Mammogram, foot exam), and Congestion (Symptoms started over 1 wk ago: Nasal congestion, headache, sneezing, cough. Had covid test 4 days ago and it was negative)    Assessment & Plan   Hemiplegia affecting left nondominant side, unspecified etiology, unspecified hemiplegia type ([facility] Herlene Simmonds on PT re evaluation every 3 months  Type 2 diabetes mellitus with other specified complication, without long-term current use of insulin (HCC) Constant shronic management  Acute ischemic stroke ([facility]  Stage 3a chronic kidney disease ([facility]    Recommendations for Preventive Services Due: see orders and patient instructions/AVS.  Recommended screening schedule for the next 5-10 years is provided to the patient in written form: see Patient Instructions/AVS.     No follow-ups on file. Subjective   The following acute and/or chronic problems were also addressed today:  Hemiplegia and DM management. Patient's complete Health Risk Assessment and screening values have been reviewed and are found in Flowsheets. The following problems were reviewed today and where indicated follow up appointments were made and/or referrals ordered. Positive Risk Factor Screenings with Interventions:     Cognitive:   Words recalled: 0 Words Recalled  Clock Drawing Test (CDT): Normal  Total Score Interpretation: Abnormal Mini-Cog  Did the patient refuse to take the cognition test?: No  Cognitive Impairment Interventions:  Psych and neuro evaluations are all arranged     Tobacco Use:  Tobacco Use: Medium Risk    Smoking Tobacco Use: Former    Smokeless Tobacco Use: Never    Passive Exposure: Not on file     E-cigarette/Vaping       Questions Responses    E-cigarette/Vaping Use Current Some Day User    Start Date     Passive Exposure     Quit Date     Counseling Given     Comments Wilbert          Substance Use - Tobacco Interventions:  Does not smoke General Health and ACP:  General  In general, how would you say your health is?: Fair  In the past 7 days, have you experienced any of the following: New or Increased Pain, New or Increased Fatigue, Loneliness, Social Isolation, Stress or Anger?: (!) Yes  Select all that apply: (!) Anger  Do you get the social and emotional support that you need?: (!) No  Do you have a Living Will?: Yes    Advance Directives       Power of  Living Will ACP-Advance Directive ACP-Power of     Not on File [place] on 02/15/17 Filed Not on File        General Health Risk Interventions:  Poor self-assessment of health status: wants to start smoking again and I argued against that. Health Habits/Nutrition:  Physical Activity: Insufficiently Active    Days of Exercise per Week: 2 days    Minutes of Exercise per Session: 30 min     Have you lost any weight without trying in the past 3 months?: No  Body mass index: (!) 32.47  Have you seen the dentist within the past year?: Yes  Health Habits/Nutrition Interventions:  Inadequate physical activity:  due to hemiplegia, will agree to physical therapy.       ADLs:  In the past 7 days, did you need help from others to perform any of the following everyday activities: Eating, dressing, grooming, bathing, toileting, or walking/balance?: (!) Yes  Select all that apply: (!) Walking/Balance  In the past 7 days, did you need help from others to take care of any of the following: Laundry, housekeeping, banking/finances, shopping, telephone use, food preparation, transportation, or taking medications?: (!) Yes  Select all that apply: [employer], Housekeeping, Banking/Finances, Shopping, Food Preparation, Transportation, Taking Medications, Telephone Use  ADL Interventions:  none          Objective   Vitals:    11/09/22 1443   BP: 120/70   Pulse: 62   Resp: 16   Temp: 97.5 °F (36.4 °C)   SpO2: 95%   Weight: 201 lb 3.2 oz (91.3 kg)   Height: 5' 6\" (1.676 m)      Body mass index is 32.47 kg/m². General Appearance: alert and oriented to person, place and time, well developed and well- nourished, in no acute distress  Skin: warm and dry, no rash or erythema  Head: normocephalic and atraumatic  Eyes: pupils equal, round, and reactive to light, extraocular eye movements intact, conjunctivae normal  ENT: tympanic membrane, external ear and ear canal normal bilaterally, nose without deformity, nasal mucosa and turbinates normal without polyps  Neck: supple and non-tender without mass, no thyromegaly or thyroid nodules, no cervical lymphadenopathy  Pulmonary/Chest: clear to auscultation bilaterally- no wheezes, rales or rhonchi, normal air movement, no respiratory distress  Cardiovascular: normal rate, regular rhythm, normal S1 and S2, no murmurs, rubs, clicks, or gallops, distal pulses intact, no carotid bruits  Abdomen: soft, non-tender, non-distended, normal bowel sounds, no masses or organomegaly  Extremities: no cyanosis, clubbing or edema  Musculoskeletal: normal range of motion, no joint swelling, deformity or tenderness  Neurologic: Decreased Left hand functiionreflexes normal and symmetric, no cranial nerve deficit, gait, coordination and speech normal       Allergies   Allergen Reactions    Chantix [Varenicline] Swelling     Tongue swelling Split in 1/2    Heparin      Platelets drop     Prior to Visit Medications    Medication Sig Taking? Authorizing Provider   rivastigmine (EXELON) 6 MG capsule Take 6 mg by mouth 2 times daily Yes Historical Provider, MD   ondansetron (ZOFRAN ODT) 4 MG disintegrating tablet Take 1 tablet by mouth every 8 hours as needed for Nausea or Vomiting Yes Haja Goodson, DO   ketoconazole (NIZORAL) 2 % cream Apply topically daily.  Yes Lacy Foil, DO   nicotine (NICODERM CQ) 14 MG/24HR Place 1 patch onto the skin daily Yes Lacy Foil, DO   Blood Glucose Monitoring Suppl ([address]) w/Device KIT  Yes Historical Provider, MD ONETOUCH VERIO strip CHECK BLOOD SUGAR DAILY Yes Historical Provider, MD   NATURAL VITAMIN D-3 125 MCG (5000 UT) TABS tablet  Yes Historical Provider, MD   Blood Glucose Calibration (ONETOUCH VERIO) SOLN USE AS DIRECTED Yes Historical Provider, MD   Alcohol Swabs (ALCOHOL PREP) 70 % PADS  Yes Historical Provider, MD   albuterol sulfate HFA (PROVENTIL;VENTOLIN;PROAIR) 108 (90 Base) MCG/ACT inhaler INHALE TWO(2) PUFFS INTO LUNGS EVERY SIX(6) HOURS AS NEEDED FOR WHEEZING Yes Raymond Scherer DO   amLODIPine (NORVASC) 2.5 MG tablet TAKE 1 TABLET BY MOUTH EVERY DAY Yes Raymond Scherer DO   amLODIPine (NORVASC) 5 MG tablet TAKE ONE TABLET BY MOUTH ONCE DAILY AT BEDTIME.  Yes Raymond Scherer DO   aspirin 81 MG EC tablet Take 1 tablet by mouth daily Yes Raymond Scherer DO   atorvastatin (LIPITOR) 40 MG tablet Take 1 tablet by mouth nightly Yes Raymond Scherer DO   Cholecalciferol (VITAMIN D3) 50 MCG (2000 UT) TABS TAKE ONE TAB BY MOUTH DAILY IN THE MORNING Yes Raymond Scherer DO   clopidogrel (PLAVIX) 75 MG tablet Take 1 tablet by mouth daily Yes Raymond Scherer DO   famotidine (PEPCID) 20 MG tablet Take 1 tablet by mouth 2 times daily Yes Raymond Scherer DO   fluticasone (FLONASE) 50 MCG/ACT nasal spray INSTILL TWO (2) SPRAYS IN EACH NOSTRIL ONCE DAILY Yes Raymond Scherer DO   losartan (COZAAR) 100 MG tablet Take 1 tablet by mouth daily Yes Raymond Scherer DO   Melatonin 5 MG CHEW Take 1 tablet by mouth nightly Yes Raymond Scherer DO   metFORMIN (GLUCOPHAGE-XR) 500 MG extended release tablet TAKE 2 TABLETS BY MOUTH DAILY **DO NOT CRUSH** Yes Raymond Scherer DO   metoprolol succinate (TOPROL XL) 25 MG extended release tablet Take 1 tablet by mouth daily Yes Raymond Scherer DO   Zinc 220 (50 Zn) MG CAPS TAKE 1 CAPSULE BY MOUTH IN THE MORNING Yes Raymond Scherer DO   blood glucose monitor kit and supplies Dispense sufficient amount for indicated testing frequency plus additional to accommodate PRN testing needs. Dispense all needed supplies to include: monitor, strips, lancing device, lancets, control solutions, alcohol swabs. Yes Pamela Shaw,    docusate sodium (COLACE) 100 MG capsule Take 100 mg by mouth 2 times daily as needed for Constipation Yes Historical Provider, MD   loperamide (IMODIUM) 2 MG capsule Take 2 mg by mouth 4 times daily as needed for Diarrhea Yes Historical Provider, MD   miconazole (MICOTIN) 2 % powder Apply topically 2 times daily Apply topically 2 times daily. Yes Historical Provider, MD   memantine (NAMENDA) 10 MG tablet Take 10 mg by mouth 2 times daily Yes Historical Provider, MD   nystatin ([address]) 215592 UNIT/GM powder APPLY TOPICALLY TO AFFECTED AREA(S) FOUR TIMES A DAY Yes MIKEL Silva CNP   tacrolimus (PROGRAF) 1 MG capsule Take 1.5 mg by mouth 2 times daily  Yes Historical Provider, MD   ondansetron (ZOFRAN) 4 MG tablet Take 1 tablet by mouth every 8 hours as needed for Nausea or Vomiting  Patient not taking: Reported on 11/9/2022  MIKEL Silva CNP   tacrolimus (PROGRAF) 0.5 MG capsule TAKE 1 CAPSULE BY MOUTH TWICE DAILY (ALONG WITH 1.0MG)  Patient not taking: Reported on 11/9/2022  Historical Provider, MD   memantine (NAMENDA TITRATION PACK) 28 x 5 MG & 21 x 10 MG tablet pack TAKE 1TAB(5MG) QD X 1WEEK, THEN [id number](5MG) BID X 1WEEK, THEN TAKE 1TAB(10MG)IN AM AND 1TAB (5MG) IN PM X 1 WEEK, THEN [id number](10MG) BID X 1 WEEK.   Patient not taking: Reported on 11/9/2022  Historical Provider, MD   mirtazapine (REMERON) 15 MG tablet Take 1 tablet by mouth nightly  Patient not taking: Reported on 11/9/2022  Pamela Shaw DO   pantoprazole (PROTONIX) 40 MG tablet TAKE 1 TABLET BY MOUTH ONCE DAILY  Patient not taking: Reported on 11/9/2022  Pamela Shaw DO   rivastigmine (EXELON) 4.5 MG capsule Take 1 capsule by mouth 2 times daily  Patient not taking: Reported on 11/9/2022 Michael Nelson DO   RIVASTIGMINE TD Place onto the skin  Patient not taking: Reported on 11/9/2022  Historical Provider, MD [place] (Including outside providers/suppliers regularly involved in providing care):   Patient Care Team:  Michael Nelson DO as PCP - . Raina Abreu DO as PCP - [facility]  Jovanni Craven MD (Internal Medicine)  Candis Alamo MD (Neurosurgery)  Adelina Salinas MD (Cardiology)  Tiffanie Collazo (Psychiatry)     Reviewed and updated this visit:  Tobacco  Allergies  Meds  Med Hx  Surg Hx  Soc Hx  Fam Hx

## 2022-11-09 NOTE — PATIENT INSTRUCTIONS
Personalized Preventive Plan for Carin Valenzuela - 11/9/2022  Medicare offers a range of preventive health benefits. Some of the tests and screenings are paid in full while other may be subject to a deductible, co-insurance, and/or copay. Some of these benefits include a comprehensive review of your medical history including lifestyle, illnesses that may run in your family, and various assessments and screenings as appropriate. After reviewing your medical record and screening and assessments performed today your provider may have ordered immunizations, labs, imaging, and/or referrals for you. A list of these orders (if applicable) as well as your Preventive Care list are included within your After Visit Summary for your review. Other Preventive Recommendations:    A preventive eye exam performed by an eye specialist is recommended every 1-2 years to screen for glaucoma; cataracts, macular degeneration, and other eye disorders. A preventive dental visit is recommended every 6 months. Try to get at least 150 minutes of exercise per week or 10,000 steps per day on a pedometer . Order or download the FREE \"Exercise & Physical Activity: Your Everyday Guide\" from [employer]. Call 3-664.940.5942 or search [employer] online. You need 4755-8850 mg of calcium and 0507-2728 IU of vitamin D per day. It is possible to meet your calcium requirement with diet alone, but a vitamin D supplement is usually necessary to meet this goal.  When exposed to the sun, use a sunscreen that protects against both UVA and UVB radiation with an SPF of 30 or greater. Reapply every 2 to 3 hours or after sweating, drying off with a towel, or swimming. Always wear a seat belt when traveling in a car. Always wear a helmet when riding a bicycle or motorcycle.

## 2022-11-11 LAB
SARS-COV-2: NOT DETECTED
SOURCE: NORMAL

## 2022-11-15 DIAGNOSIS — I63.20 CEREBROVASCULAR ACCIDENT (CVA) DUE TO STENOSIS OF PRECEREBRAL ARTERY (HCC): ICD-10-CM

## 2022-11-16 LAB
SARS-COV-2: NOT DETECTED
SOURCE: NORMAL

## 2022-11-16 RX ORDER — CLOPIDOGREL BISULFATE 75 MG/1
75 TABLET ORAL DAILY
Qty: 30 TABLET | Refills: 2 | Status: SHIPPED | OUTPATIENT
Start: 2022-11-16

## 2022-11-17 ENCOUNTER — TELEPHONE (OUTPATIENT)
Dept: FAMILY MEDICINE CLINIC | Age: 62
End: 2022-11-17

## 2022-11-17 DIAGNOSIS — R05.1 ACUTE COUGH: Primary | ICD-10-CM

## 2022-11-17 RX ORDER — GUAIFENESIN AND DEXTROMETHORPHAN HBR 20; 400 MG/1; MG/1
1 TABLET ORAL EVERY 4 HOURS PRN
Qty: 42 TABLET | Refills: 0 | Status: SHIPPED | OUTPATIENT
Start: 2022-11-17

## 2022-11-18 LAB
SARS-COV-2: NOT DETECTED
SOURCE: NORMAL

## 2022-11-21 LAB
SARS-COV-2: NOT DETECTED
SOURCE: NORMAL

## 2022-11-23 LAB — SOURCE: NORMAL

## 2022-11-26 LAB
SARS-COV-2: NOT DETECTED
SOURCE: NORMAL

## 2022-11-29 DIAGNOSIS — F17.200 TOBACCO USE DISORDER: ICD-10-CM

## 2022-11-29 RX ORDER — NICOTINE 21 MG/24HR
1 PATCH, TRANSDERMAL 24 HOURS TRANSDERMAL DAILY
Qty: 30 PATCH | Refills: 0 | Status: SHIPPED | OUTPATIENT
Start: 2022-11-29 | End: 2022-12-29

## 2022-11-29 NOTE — TELEPHONE ENCOUNTER
Requested Prescriptions     Pending Prescriptions Disp Refills    nicotine (NICODERM CQ) 14 MG/24HR 30 patch 0     Sig: Place 1 patch onto the skin daily       Next appt is Visit date not found  Last appt was 11/9/2022

## 2022-12-01 LAB — SOURCE: NORMAL

## 2022-12-02 LAB
SARS-COV-2: NOT DETECTED
SOURCE: NORMAL

## 2022-12-14 DIAGNOSIS — E11.649 UNCONTROLLED TYPE 2 DIABETES MELLITUS WITH HYPOGLYCEMIA WITHOUT COMA (HCC): Chronic | ICD-10-CM

## 2022-12-14 DIAGNOSIS — I10 HTN (HYPERTENSION), BENIGN: Chronic | ICD-10-CM

## 2022-12-14 DIAGNOSIS — F17.200 TOBACCO USE DISORDER: ICD-10-CM

## 2022-12-14 RX ORDER — NICOTINE 21 MG/24HR
1 PATCH, TRANSDERMAL 24 HOURS TRANSDERMAL DAILY
Qty: 30 PATCH | Refills: 0 | Status: SHIPPED | OUTPATIENT
Start: 2022-12-14 | End: 2023-01-13

## 2022-12-14 RX ORDER — LOSARTAN POTASSIUM 100 MG/1
TABLET ORAL
Qty: 31 TABLET | Refills: 3 | Status: SHIPPED | OUTPATIENT
Start: 2022-12-14

## 2022-12-14 RX ORDER — METFORMIN HYDROCHLORIDE 500 MG/1
TABLET, EXTENDED RELEASE ORAL
Qty: 62 TABLET | Refills: 10 | Status: SHIPPED | OUTPATIENT
Start: 2022-12-14

## 2022-12-15 LAB — SOURCE: NORMAL

## 2022-12-16 LAB
SARS-COV-2: NOT DETECTED
SOURCE: NORMAL

## 2022-12-22 ENCOUNTER — COMMUNITY OUTREACH (OUTPATIENT)
Dept: FAMILY MEDICINE CLINIC | Age: 62
End: 2022-12-22

## 2022-12-22 NOTE — PROGRESS NOTES
Patient's HM shows they are overdue for Mammogram   CareEverywhere and  files searched without success.

## 2022-12-23 DIAGNOSIS — E11.649 UNCONTROLLED TYPE 2 DIABETES MELLITUS WITH HYPOGLYCEMIA WITHOUT COMA (HCC): Chronic | ICD-10-CM

## 2022-12-23 LAB
SARS-COV-2: NOT DETECTED
SOURCE: NORMAL
SOURCE: NORMAL

## 2022-12-27 RX ORDER — METFORMIN HYDROCHLORIDE 500 MG/1
TABLET, EXTENDED RELEASE ORAL
Qty: 62 TABLET | Refills: 10 | OUTPATIENT
Start: 2022-12-27

## 2022-12-29 LAB
SARS-COV-2: NOT DETECTED
SOURCE: NORMAL
SOURCE: NORMAL

## 2023-01-06 ENCOUNTER — OFFICE VISIT (OUTPATIENT)
Dept: FAMILY MEDICINE CLINIC | Age: 63
End: 2023-01-06

## 2023-01-06 VITALS
HEIGHT: 60 IN | SYSTOLIC BLOOD PRESSURE: 138 MMHG | OXYGEN SATURATION: 94 % | DIASTOLIC BLOOD PRESSURE: 70 MMHG | RESPIRATION RATE: 16 BRPM | HEART RATE: 57 BPM | TEMPERATURE: 97.7 F | BODY MASS INDEX: 39.29 KG/M2

## 2023-01-06 DIAGNOSIS — M25.511 ACUTE PAIN OF RIGHT SHOULDER: ICD-10-CM

## 2023-01-06 DIAGNOSIS — Z94.4 STATUS POST LIVER TRANSPLANTATION (HCC): Chronic | ICD-10-CM

## 2023-01-06 DIAGNOSIS — J44.9 CHRONIC OBSTRUCTIVE PULMONARY DISEASE, UNSPECIFIED COPD TYPE (HCC): Chronic | ICD-10-CM

## 2023-01-06 DIAGNOSIS — D84.9 IMMUNOSUPPRESSED STATUS (HCC): Chronic | ICD-10-CM

## 2023-01-06 DIAGNOSIS — G81.94 HEMIPLEGIA AFFECTING LEFT NONDOMINANT SIDE, UNSPECIFIED ETIOLOGY, UNSPECIFIED HEMIPLEGIA TYPE (HCC): ICD-10-CM

## 2023-01-06 DIAGNOSIS — I69.354 HEMIPARESIS AFFECTING LEFT SIDE AS LATE EFFECT OF CEREBROVASCULAR ACCIDENT (HCC): Chronic | ICD-10-CM

## 2023-01-06 DIAGNOSIS — E66.01 SEVERE OBESITY (BMI 35.0-39.9) WITH COMORBIDITY (HCC): ICD-10-CM

## 2023-01-06 DIAGNOSIS — N18.32 STAGE 3B CHRONIC KIDNEY DISEASE (HCC): ICD-10-CM

## 2023-01-06 DIAGNOSIS — E11.69 TYPE 2 DIABETES MELLITUS WITH OTHER SPECIFIED COMPLICATION, WITHOUT LONG-TERM CURRENT USE OF INSULIN (HCC): Primary | ICD-10-CM

## 2023-01-06 LAB — HBA1C MFR BLD: 8 %

## 2023-01-06 ASSESSMENT — PATIENT HEALTH QUESTIONNAIRE - PHQ9
2. FEELING DOWN, DEPRESSED OR HOPELESS: 0
SUM OF ALL RESPONSES TO PHQ9 QUESTIONS 1 & 2: 1
1. LITTLE INTEREST OR PLEASURE IN DOING THINGS: 1
SUM OF ALL RESPONSES TO PHQ QUESTIONS 1-9: 1

## 2023-01-06 NOTE — PROGRESS NOTES
Mackenzie Gill (:  1960) is a 65 y.o. female,Established patient, here for evaluation of the following chief complaint(s):  Shoulder Pain (R shoulder pain that radiates down to elbow. )         ASSESSMENT/PLAN:  1. Type 2 diabetes mellitus with other specified complication, without long-term current use of insulin (HCC)  -     POCT glycosylated hemoglobin (Hb A1C)  - The current medical regimen is effective;  continue present plan and medications. 2. Acute pain of right shoulder  -  continue tylenol  -  order given for [facility] to add PT for right shoulder  -  add heat and ice for pain    3. Severe obesity (BMI 35.0-39. 9) with comorbidity ([facility]  -  The patient is asked to make an attempt to improve diet and exercise patterns to aid in medical management of this problem. 4. Status post liver transplantation ([facility]  -  Reviewed side effects of medication and patient verbalizes understanding.   -  continue to follow with [facility]    5. Immunosuppressed status ([address]  -  monitor for signs and symptoms of illness    6. Hemiplegia affecting left nondominant side, unspecified etiology, unspecified hemiplegia type ([address]  -  stable  -  patient has some permanent deficits s/t CVA    7. Hemiparesis affecting left side as late effect of cerebrovascular accident ([address]  -  stable    8. Chronic obstructive pulmonary disease, unspecified COPD type ([address]  -  stable, no current medications    9. Stage 3b chronic kidney disease (HCC)  -  stable  -  no NSAID use      Return if symptoms worsen or fail to improve. Subjective   SUBJECTIVE/OBJECTIVE:  HPI  Here today for pain in right shoulder down to the elbow. At first thought it was due to PT at [facility] where she has been living since her parents are not able to care for her anymore. Denies any falls. ROM limited due to pain. Reports that her strength has decreased some but not too much. Tylenol is not effective, pain has been going on for about 2 weeks.     /70   Pulse 57   Temp 97.7 °F (36.5 °C)   Resp 16   Ht 5' (1.524 m)   LMP  (LMP Unknown)   SpO2 94%   BMI 39.29 kg/m²     Review of Systems   Constitutional:  Positive for activity change (currently in wheel chair). Negative for appetite change, chills, diaphoresis, fever and unexpected weight change.   Respiratory:  Negative for cough, shortness of breath and wheezing.    Cardiovascular:  Negative for chest pain and palpitations.   Gastrointestinal:  Negative for constipation, diarrhea, nausea and vomiting.   Genitourinary:  Negative for difficulty urinating.   Musculoskeletal:  Positive for arthralgias (right shoulder pain) and back pain. Negative for neck pain.   Neurological:  Positive for weakness (generalized). Negative for dizziness and headaches.        Objective   Physical Exam  Constitutional:       Appearance: She is well-developed.   HENT:      Head: Normocephalic and atraumatic.   Neck:      Trachea: No tracheal deviation.   Cardiovascular:      Rate and Rhythm: Normal rate and regular rhythm.      Heart sounds: No murmur heard.  Pulmonary:      Effort: Pulmonary effort is normal. No respiratory distress.      Breath sounds: Normal breath sounds.   Abdominal:      General: Bowel sounds are normal.      Palpations: Abdomen is soft.      Tenderness: There is no abdominal tenderness.   Musculoskeletal:         General: Tenderness present. Normal range of motion.      Comments: Diffuse tenderness to right shoulder, ROM limited with abduction, flexion and extension due to pain.    Skin:     General: Skin is warm and dry.   Neurological:      Mental Status: She is alert and oriented to person, place, and time.   Psychiatric:         Behavior: Behavior normal.          [facility]      An electronic signature was used to authenticate this note.    --Nereida Weinstein, APRN - CNP

## 2023-01-09 ENCOUNTER — TELEPHONE (OUTPATIENT)
Dept: FAMILY MEDICINE CLINIC | Age: 63
End: 2023-01-09

## 2023-01-09 DIAGNOSIS — M75.42 IMPINGEMENT SYNDROME OF LEFT SHOULDER: Primary | ICD-10-CM

## 2023-01-09 RX ORDER — METHYLPREDNISOLONE 4 MG/1
4 TABLET ORAL SEE ADMIN INSTRUCTIONS
Qty: 1 KIT | Refills: 0 | Status: SHIPPED | OUTPATIENT
Start: 2023-01-09

## 2023-01-09 RX ORDER — METHYLPREDNISOLONE 4 MG/1
4 TABLET ORAL SEE ADMIN INSTRUCTIONS
COMMUNITY
End: 2023-01-09 | Stop reason: SDUPTHER

## 2023-01-09 NOTE — TELEPHONE ENCOUNTER
From Jessica Hopkins 86:    Pt c/o severe shoulder pain continues with no relief after ice/heat; Staff requesting stronger pain medication and/or Medrol Dose Pack    Julia Mullen NP and she stated she did not prescribe any medications Friday 6/2022 just recommended PT and ice/heat be applied. I spoke with her on the phone today and stated she would be OK with Medrol Dose pack if glucose is checked every am while on it.

## 2023-01-10 DIAGNOSIS — E11.69 TYPE 2 DIABETES MELLITUS WITH OTHER SPECIFIED COMPLICATION, WITHOUT LONG-TERM CURRENT USE OF INSULIN (HCC): Primary | ICD-10-CM

## 2023-01-10 PROBLEM — I63.9 ACUTE ISCHEMIC STROKE (HCC): Status: RESOLVED | Noted: 2019-03-13 | Resolved: 2023-01-10

## 2023-01-10 RX ORDER — ACETAMINOPHEN 325 MG/1
650 TABLET ORAL EVERY 4 HOURS
COMMUNITY

## 2023-01-10 RX ORDER — INSULIN GLARGINE 100 [IU]/ML
10 INJECTION, SOLUTION SUBCUTANEOUS NIGHTLY
Qty: 5 ADJUSTABLE DOSE PRE-FILLED PEN SYRINGE | Refills: 0 | Status: SHIPPED | OUTPATIENT
Start: 2023-01-10

## 2023-01-10 ASSESSMENT — ENCOUNTER SYMPTOMS
BACK PAIN: 1
CONSTIPATION: 0
VOMITING: 0
WHEEZING: 0
NAUSEA: 0
COUGH: 0
SHORTNESS OF BREATH: 0
DIARRHEA: 0

## 2023-01-19 DIAGNOSIS — E11.649 UNCONTROLLED TYPE 2 DIABETES MELLITUS WITH HYPOGLYCEMIA WITHOUT COMA (HCC): Chronic | ICD-10-CM

## 2023-01-19 RX ORDER — METFORMIN HYDROCHLORIDE 500 MG/1
TABLET, EXTENDED RELEASE ORAL
Qty: 62 TABLET | Refills: 2 | Status: SHIPPED | OUTPATIENT
Start: 2023-01-19

## 2023-01-19 NOTE — TELEPHONE ENCOUNTER
Requested Prescriptions     Pending Prescriptions Disp Refills    metFORMIN (GLUCOPHAGE-XR) 500 MG extended release tablet [Pharmacy Med Name: METFORMIN HCL  MG  Tablet] 62 tablet 2     Sig: TAKE 2 TABLETS BY MOUTH EVERY DAY WITH BREAKFAST       Next appt is 1/30/2023  Last appt was 1/6/2023        Needs resent after being clarified as above

## 2023-01-24 ENCOUNTER — TELEPHONE (OUTPATIENT)
Dept: FAMILY MEDICINE CLINIC | Age: 63
End: 2023-01-24

## 2023-01-28 DIAGNOSIS — F17.200 TOBACCO USE DISORDER: ICD-10-CM

## 2023-01-30 ENCOUNTER — OFFICE VISIT (OUTPATIENT)
Dept: FAMILY MEDICINE CLINIC | Age: 63
End: 2023-01-30
Payer: MEDICARE

## 2023-01-30 VITALS
SYSTOLIC BLOOD PRESSURE: 122 MMHG | DIASTOLIC BLOOD PRESSURE: 68 MMHG | RESPIRATION RATE: 16 BRPM | HEART RATE: 59 BPM | BODY MASS INDEX: 39.29 KG/M2 | OXYGEN SATURATION: 98 % | HEIGHT: 60 IN | TEMPERATURE: 97.3 F

## 2023-01-30 DIAGNOSIS — G30.9 ALZHEIMER'S DISEASE, UNSPECIFIED (CODE) (HCC): ICD-10-CM

## 2023-01-30 DIAGNOSIS — N18.31 STAGE 3A CHRONIC KIDNEY DISEASE (HCC): Primary | ICD-10-CM

## 2023-01-30 DIAGNOSIS — E11.49 TYPE 2 DIABETES MELLITUS WITH OTHER NEUROLOGIC COMPLICATION, WITHOUT LONG-TERM CURRENT USE OF INSULIN (HCC): ICD-10-CM

## 2023-01-30 DIAGNOSIS — F17.200 TOBACCO USE DISORDER: ICD-10-CM

## 2023-01-30 DIAGNOSIS — G81.94 HEMIPLEGIA AFFECTING LEFT NONDOMINANT SIDE, UNSPECIFIED ETIOLOGY, UNSPECIFIED HEMIPLEGIA TYPE (HCC): ICD-10-CM

## 2023-01-30 PROCEDURE — 3052F HG A1C>EQUAL 8.0%<EQUAL 9.0%: CPT | Performed by: FAMILY MEDICINE

## 2023-01-30 PROCEDURE — G8417 CALC BMI ABV UP PARAM F/U: HCPCS | Performed by: FAMILY MEDICINE

## 2023-01-30 PROCEDURE — 3078F DIAST BP <80 MM HG: CPT | Performed by: FAMILY MEDICINE

## 2023-01-30 PROCEDURE — 3074F SYST BP LT 130 MM HG: CPT | Performed by: FAMILY MEDICINE

## 2023-01-30 PROCEDURE — 2022F DILAT RTA XM EVC RTNOPTHY: CPT | Performed by: FAMILY MEDICINE

## 2023-01-30 PROCEDURE — G8427 DOCREV CUR MEDS BY ELIG CLIN: HCPCS | Performed by: FAMILY MEDICINE

## 2023-01-30 PROCEDURE — 3017F COLORECTAL CA SCREEN DOC REV: CPT | Performed by: FAMILY MEDICINE

## 2023-01-30 PROCEDURE — G8482 FLU IMMUNIZE ORDER/ADMIN: HCPCS | Performed by: FAMILY MEDICINE

## 2023-01-30 PROCEDURE — 99214 OFFICE O/P EST MOD 30 MIN: CPT | Performed by: FAMILY MEDICINE

## 2023-01-30 PROCEDURE — 1036F TOBACCO NON-USER: CPT | Performed by: FAMILY MEDICINE

## 2023-01-30 RX ORDER — NICOTINE 21 MG/24HR
PATCH, TRANSDERMAL 24 HOURS TRANSDERMAL
Qty: 2 PATCH | Refills: 0 | Status: SHIPPED
Start: 2023-01-30 | End: 2023-01-30 | Stop reason: SDUPTHER

## 2023-01-30 RX ORDER — NICOTINE 21 MG/24HR
PATCH, TRANSDERMAL 24 HOURS TRANSDERMAL
Qty: 28 PATCH | Refills: 0 | Status: SHIPPED | OUTPATIENT
Start: 2023-01-30

## 2023-01-30 ASSESSMENT — PATIENT HEALTH QUESTIONNAIRE - PHQ9
2. FEELING DOWN, DEPRESSED OR HOPELESS: 0
SUM OF ALL RESPONSES TO PHQ QUESTIONS 1-9: 0
1. LITTLE INTEREST OR PLEASURE IN DOING THINGS: 0
SUM OF ALL RESPONSES TO PHQ QUESTIONS 1-9: 0
SUM OF ALL RESPONSES TO PHQ9 QUESTIONS 1 & 2: 0
SUM OF ALL RESPONSES TO PHQ QUESTIONS 1-9: 0
SUM OF ALL RESPONSES TO PHQ QUESTIONS 1-9: 0

## 2023-01-30 ASSESSMENT — ENCOUNTER SYMPTOMS
PHOTOPHOBIA: 0
COUGH: 0

## 2023-01-30 NOTE — PROGRESS NOTES
Jade Guzman (:  1960) is a 65 y.o. female,Established patient, here for evaluation of the following chief complaint(s):  Discuss Medications (Would like to know why pt taking certain medications and if needing to continue ) and Diabetes         ASSESSMENT/PLAN:  1. Stage 3a chronic kidney disease ([facility] Medication changes  2. Alzheimer's disease, unspecified (CODE) ([facility] Some dysfunction ADL's  3. Hemiplegia affecting left nondominant side, unspecified etiology, unspecified hemiplegia type (HCC) mild Left sided residual weakness Stable deficit. 4. Type 2 diabetes mellitus with other neurologic complication, without long-term current use of insulin (HCC) SDTop Lantus as pt refusing and only on 10 U. No follow-ups on file. Subjective   SUBJECTIVE/OBJECTIVE:  Discuss Medications (Would like to know why pt taking certain medications and if needing to continue ) and Diabetes        Review of Systems   Constitutional:  Negative for diaphoresis. HENT:  Negative for hearing loss and tinnitus. Eyes:  Negative for photophobia. Respiratory:  Negative for cough. Cardiovascular:  Negative for chest pain and leg swelling. Genitourinary:  Negative for urgency. Musculoskeletal:  Negative for myalgias. Skin:  Negative for rash. Neurological:  Negative for dizziness, weakness and headaches. Hematological:  Does not bruise/bleed easily. Objective   /68   Pulse 59   Temp 97.3 °F (36.3 °C)   Resp 16   Ht 5' (1.524 m)   LMP  (LMP Unknown)   SpO2 98%   BMI 39.29 kg/m²   Lab Results   Component Value Date    LABA1C 8.0 2023    LABA1C 8.0 2022    LABA1C 7.2 (H) 2022     Physical Exam  Constitutional:       General: She is not in acute distress. Appearance: She is well-developed. HENT:      Nose: Nose normal.      Mouth/Throat:      Comments: Edentulous uppers in place  Eyes:      General: No scleral icterus. Neck:      Thyroid: No thyromegaly. Vascular: No JVD. Trachea: No tracheal deviation. Cardiovascular:      Heart sounds:     No gallop. Pulmonary:      Effort: Pulmonary effort is normal. No respiratory distress. Breath sounds: No wheezing. Abdominal:      General: Abdomen is flat. Musculoskeletal:         General: Swelling (lekgs minimally) present. No tenderness. Skin:     Capillary Refill: Capillary refill takes less than 2 seconds. Findings: No erythema. Neurological:      Deep Tendon Reflexes: Reflexes normal.      Comments: Left hemiparesis          On this date 1/30/2023 I have spent 37 minutes reviewing previous notes, test results and face to face with the patient discussing the diagnosis and importance of compliance with the treatment plan as well as documenting on the day of the visit. An electronic signature was used to authenticate this note.     --Christina Last, DO

## 2023-02-07 DIAGNOSIS — I63.20 CEREBROVASCULAR ACCIDENT (CVA) DUE TO STENOSIS OF PRECEREBRAL ARTERY (HCC): ICD-10-CM

## 2023-02-07 DIAGNOSIS — I10 HTN (HYPERTENSION), BENIGN: Chronic | ICD-10-CM

## 2023-02-08 RX ORDER — CHOLECALCIFEROL TAB 125 MCG (5000 UNIT) 125 MCG (5000 UT)
TAB
Qty: 28 TABLET | Refills: 2 | Status: SHIPPED | OUTPATIENT
Start: 2023-02-08

## 2023-02-08 RX ORDER — CLOPIDOGREL BISULFATE 75 MG/1
75 TABLET ORAL DAILY
Qty: 28 TABLET | Refills: 3 | Status: SHIPPED | OUTPATIENT
Start: 2023-02-08

## 2023-02-08 RX ORDER — AMLODIPINE BESYLATE 2.5 MG/1
TABLET ORAL
Qty: 28 TABLET | Refills: 3 | Status: SHIPPED | OUTPATIENT
Start: 2023-02-08

## 2023-02-08 NOTE — TELEPHONE ENCOUNTER
Requested Prescriptions     Pending Prescriptions Disp Refills    amLODIPine (NORVASC) 2.5 MG tablet [Pharmacy Med Name: AMLODIPINE BESYLATE 2.5 MG 2.5 Tablet] 28 tablet 3     Sig: TAKE 1 TABLET BY MOUTH EVERY DAY IN THE MORNING    NATURAL VITAMIN D-3 125 MCG (5000 UT) TABS tablet [Pharmacy Med Name: VITAMIN D3 5,000U(125MCG) MCG Tablet] 28 tablet 2     Sig: TAKE ONE(1) TABLET BY MOUTH ONCE DAILY    clopidogrel (PLAVIX) 75 MG tablet [Pharmacy Med Name: CLOPIDOGREL 75 MG TABLET 75 Tablet] 28 tablet 3     Sig: TAKE 1 TABLET BY MOUTH DAILY       Next appt is 2/8/2023  Last appt was 1/30/2023

## 2023-02-15 LAB
SARS-COV-2: NOT DETECTED
SOURCE: NORMAL

## 2023-02-17 LAB — SOURCE: NORMAL

## 2023-02-18 LAB
SARS-COV-2: NOT DETECTED
SOURCE: NORMAL

## 2023-02-19 ENCOUNTER — APPOINTMENT (OUTPATIENT)
Dept: GENERAL RADIOLOGY | Age: 63
End: 2023-02-19
Payer: MEDICARE

## 2023-02-19 ENCOUNTER — HOSPITAL ENCOUNTER (EMERGENCY)
Age: 63
Discharge: NURSING, REHAB OR LONG-TERM CARE | End: 2023-02-20
Attending: EMERGENCY MEDICINE
Payer: MEDICARE

## 2023-02-19 ENCOUNTER — APPOINTMENT (OUTPATIENT)
Dept: ULTRASOUND IMAGING | Age: 63
End: 2023-02-19
Payer: MEDICARE

## 2023-02-19 DIAGNOSIS — M79.605 LEFT LEG PAIN: Primary | ICD-10-CM

## 2023-02-19 LAB
ALBUMIN SERPL-MCNC: 4.4 G/DL (ref 3.5–5.2)
ALP BLD-CCNC: 169 U/L (ref 35–104)
ALT SERPL-CCNC: 7 U/L (ref 0–32)
ANION GAP SERPL CALCULATED.3IONS-SCNC: 11 MMOL/L (ref 7–16)
AST SERPL-CCNC: 9 U/L (ref 0–31)
BASOPHILS ABSOLUTE: 0.06 E9/L (ref 0–0.2)
BASOPHILS RELATIVE PERCENT: 1 % (ref 0–2)
BILIRUB SERPL-MCNC: 0.3 MG/DL (ref 0–1.2)
BUN BLDV-MCNC: 27 MG/DL (ref 6–23)
CALCIUM SERPL-MCNC: 9.3 MG/DL (ref 8.6–10.2)
CHLORIDE BLD-SCNC: 107 MMOL/L (ref 98–107)
CO2: 24 MMOL/L (ref 22–29)
CREAT SERPL-MCNC: 1.5 MG/DL (ref 0.5–1)
EOSINOPHILS ABSOLUTE: 0.18 E9/L (ref 0.05–0.5)
EOSINOPHILS RELATIVE PERCENT: 2.9 % (ref 0–6)
GFR SERPL CREATININE-BSD FRML MDRD: 39 ML/MIN/1.73
GLUCOSE BLD-MCNC: 163 MG/DL (ref 74–99)
HCT VFR BLD CALC: 37.3 % (ref 34–48)
HEMOGLOBIN: 12.6 G/DL (ref 11.5–15.5)
IMMATURE GRANULOCYTES #: 0.02 E9/L
IMMATURE GRANULOCYTES %: 0.3 % (ref 0–5)
LYMPHOCYTES ABSOLUTE: 1.43 E9/L (ref 1.5–4)
LYMPHOCYTES RELATIVE PERCENT: 23.1 % (ref 20–42)
MCH RBC QN AUTO: 29.8 PG (ref 26–35)
MCHC RBC AUTO-ENTMCNC: 33.8 % (ref 32–34.5)
MCV RBC AUTO: 88.2 FL (ref 80–99.9)
MONOCYTES ABSOLUTE: 0.54 E9/L (ref 0.1–0.95)
MONOCYTES RELATIVE PERCENT: 8.7 % (ref 2–12)
NEUTROPHILS ABSOLUTE: 3.95 E9/L (ref 1.8–7.3)
NEUTROPHILS RELATIVE PERCENT: 64 % (ref 43–80)
PDW BLD-RTO: 13.2 FL (ref 11.5–15)
PLATELET # BLD: 203 E9/L (ref 130–450)
PMV BLD AUTO: 10.3 FL (ref 7–12)
POTASSIUM SERPL-SCNC: 4.5 MMOL/L (ref 3.5–5)
PRO-BNP: 330 PG/ML (ref 0–125)
RBC # BLD: 4.23 E12/L (ref 3.5–5.5)
SODIUM BLD-SCNC: 142 MMOL/L (ref 132–146)
TOTAL PROTEIN: 6.9 G/DL (ref 6.4–8.3)
TROPONIN, HIGH SENSITIVITY: 16 NG/L (ref 0–9)
TROPONIN, HIGH SENSITIVITY: 20 NG/L (ref 0–9)
WBC # BLD: 6.2 E9/L (ref 4.5–11.5)

## 2023-02-19 PROCEDURE — 85025 COMPLETE CBC W/AUTO DIFF WBC: CPT

## 2023-02-19 PROCEDURE — 73590 X-RAY EXAM OF LOWER LEG: CPT

## 2023-02-19 PROCEDURE — 73610 X-RAY EXAM OF ANKLE: CPT

## 2023-02-19 PROCEDURE — 84484 ASSAY OF TROPONIN QUANT: CPT

## 2023-02-19 PROCEDURE — 93005 ELECTROCARDIOGRAM TRACING: CPT

## 2023-02-19 PROCEDURE — 93971 EXTREMITY STUDY: CPT

## 2023-02-19 PROCEDURE — 83880 ASSAY OF NATRIURETIC PEPTIDE: CPT

## 2023-02-19 PROCEDURE — 99285 EMERGENCY DEPT VISIT HI MDM: CPT

## 2023-02-19 PROCEDURE — 80053 COMPREHEN METABOLIC PANEL: CPT

## 2023-02-19 ASSESSMENT — LIFESTYLE VARIABLES: HOW OFTEN DO YOU HAVE A DRINK CONTAINING ALCOHOL: NEVER

## 2023-02-19 NOTE — ED NOTES
Patient arrives per EMS from [facility]. Per EMS patient has had bilat leg swelling for a week and a half. Patient states pain is worse in the left and so is the swelling. Patient states that she uses a wheelchair to get around. Facility MD wanted her checked out.      Beata Mittal RN  02/19/23 [address]  02/19/23 [id number]

## 2023-02-19 NOTE — ED PROVIDER NOTES
[address] ENCOUNTER        Pt Name: Mackenzie Gill  MRN: [id number]  [place] 1960  Date of evaluation: 2/19/2023  Provider: Doris Mullins MD  PCP: Roberta Skiff, DO  Note Started: 5:31 PM EST 2/19/23    CHIEF COMPLAINT       Chief Complaint   Patient presents with    Leg Pain     C/O left lower leg pain and ankle pain for the last 2 weeks. HISTORY OF PRESENT ILLNESS: 1 or more Elements   History From: Patient    Limitations to history : None    Mackenzie Gill is a 65 y.o. female who presents left leg pain, endorses swelling and tenderness of her left leg. Patient is from a nursing facility and is not ambulatory. The patient states that the pain started suddenly denies any trauma. States that the swelling initiated in her ankle and progressively worsened towards her calf. Symptoms have been progressively worsening for the past 2 weeks. Describes symptoms moderate in severity with no alleviating or exacerbating factors. Denies any fever, chills, n/v, headache, dizziness, vision changes, neck tenderness or stiffness, weakness, cp, palpitations, , sob, cough, abd pain, dysuria, hematuria, diarrhea, constipation, bloody stools.     Nursing Notes were all reviewed and agreed with or any disagreements were addressed in the HPI.    ROS:   Pertinent positives and negatives are stated within HPI, all other systems reviewed and are negative.    --------------------------------------------- PAST HISTORY ---------------------------------------------  Past Medical History:  has a past medical history of Arthritis, Blood circulation, collateral, Chronic fatigue, Cirrhosis of liver ([facility] [address] COPD exacerbation ([facility] [address] [facility] Essential hypertension, Gall stones, GERD (gastroesophageal reflux disease), Hep C w/o coma, chronic ([facility] [address] History of blood transfusion, Neuropathy, PFO (patent foramen ovale), Pneumonia, Type 2 diabetes mellitus with stage 3 chronic kidney disease, with long-term current use of insulin ([facility] [address] Unspecified cerebral artery occlusion with cerebral infarction, and Varices. Past Surgical History:  has a past surgical history that includes  section; Esophageal varice ligation ();  section ( and ); ECHO Compl W Dop Color Flow (2012); Colonoscopy; Endoscopy, colon, diagnostic; Cardiac surgery (); Cardiac catheterization; other surgical history (Right, 16); Liver transplant; and brain surgery. Social History:  reports that she quit smoking about 3 years ago. Her smoking use included cigarettes. She has a 15.00 pack-year smoking history. She has never used smokeless tobacco. She reports that she does not drink alcohol and does not use drugs. Family History: family history is not on file. She was adopted. The patients home medications have been reviewed. Allergies: Chantix [varenicline] and Heparin    ---------------------------------------------------PHYSICAL EXAM--------------------------------------        Constitutional/General: Alert and oriented x3, well appearing, non toxic in NAD  Head: Normocephalic and atraumatic  Mouth: Oropharynx clear, handling secretions, no trismus  Neck: Supple, full ROM,  Pulmonary: Lungs clear to auscultation bilaterally, no wheezes, rales, or rhonchi. Not in respiratory distress  Cardiovascular:  Regular rate. Regular rhythm. No murmurs  Chest: no chest wall tenderness  Abdomen: Soft. Non tender. Non distended. No rebound, guarding, or rigidity. No pulsatile masses appreciated. Musculoskeletal: Left lower extremity swelling with tenderness. Generalized weakness on all 4 limbs, worse on the L side. Neurovascularly intact. Warm and well perfused, no clubbing, cyanosis, or edema. Compartments are soft and compressible. Capillary refill <3 seconds. Skin: warm and dry. No rashes.    Neurologic: GCS 15, no gross focal neurologic deficits  Psych: Normal Affect    -------------------------------------------------- RESULTS -------------------------------------------------  I have personally reviewed all laboratory and imaging results for this patient. Results are listed below.      LABS:  Results for orders placed or performed during the hospital encounter of 02/19/23   CBC with Auto Differential   Result Value Ref Range    WBC 6.2 4.5 - 11.5 E9/L    RBC 4.23 3.50 - 5.50 E12/L    Hemoglobin 12.6 11.5 - 15.5 g/dL    Hematocrit 37.3 34.0 - 48.0 %    MCV 88.2 80.0 - 99.9 fL    MCH 29.8 26.0 - 35.0 pg    MCHC 33.8 32.0 - 34.5 %    RDW 13.2 11.5 - 15.0 fL    Platelets 555 548 - 000 E9/L    MPV 10.3 7.0 - 12.0 fL    Neutrophils % 64.0 43.0 - 80.0 %    Immature Granulocytes % 0.3 0.0 - 5.0 %    Lymphocytes % 23.1 20.0 - 42.0 %    Monocytes % 8.7 2.0 - 12.0 %    Eosinophils % 2.9 0.0 - 6.0 %    Basophils % 1.0 0.0 - 2.0 %    Neutrophils Absolute 3.95 1.80 - 7.30 E9/L    Immature Granulocytes # 0.02 E9/L    Lymphocytes Absolute 1.43 (L) 1.50 - 4.00 E9/L    Monocytes Absolute 0.54 0.10 - 0.95 E9/L    Eosinophils Absolute 0.18 0.05 - 0.50 E9/L    Basophils Absolute 0.06 0.00 - 0.20 E9/L   CMP   Result Value Ref Range    Sodium 142 132 - 146 mmol/L    Potassium 4.5 3.5 - 5.0 mmol/L    Chloride 107 98 - 107 mmol/L    CO2 24 22 - 29 mmol/L    Anion Gap 11 7 - 16 mmol/L    Glucose 163 (H) 74 - 99 mg/dL    BUN 27 (H) 6 - 23 mg/dL    Creatinine 1.5 (H) 0.5 - 1.0 mg/dL    Est, Glom Filt Rate 39 >=60 mL/min/1.73    Calcium 9.3 8.6 - 10.2 mg/dL    Total Protein 6.9 6.4 - 8.3 g/dL    Albumin 4.4 3.5 - 5.2 g/dL    Total Bilirubin 0.3 0.0 - 1.2 mg/dL    Alkaline Phosphatase 169 (H) 35 - 104 U/L    ALT 7 0 - 32 U/L    AST 9 0 - 31 U/L   BNP   Result Value Ref Range    Pro- (H) 0 - 125 pg/mL   Troponin   Result Value Ref Range    Troponin, High Sensitivity 20 (H) 0 - 9 ng/L   Troponin   Result Value Ref Range    Troponin, High Sensitivity 16 (H) 0 - 9 ng/L   EKG 12 Lead   Result Value Ref Range    Ventricular Rate 59 BPM    Atrial Rate 59 BPM    P-R Interval 158 ms    QRS Duration 86 ms    Q-T Interval 426 ms    QTc Calculation (Bazett) 421 ms    P Axis 48 degrees    R Axis 42 degrees    T Axis 67 degrees       RADIOLOGY:   Non-plain film images such as CT, Ultrasound and MRI are read by the radiologist. Plain radiographic images are visualized and preliminarily interpreted by the ED Provider with the below findings:    No fracture or any obvious abnormalities of L XR ankle and tib/fib    Interpretation per the Radiologist below, if available at the time of this note:    US DUP LOWER EXTREMITY LEFT MAGDY   Final Result   No evidence of DVT in the left lower extremity in the visualized veins with   nonvisualization of the peroneal vein, precluding assessment in that location. RECOMMENDATIONS:   Unavailable         XR ANKLE LEFT (MIN 3 VIEWS)   Final Result   No acute abnormality of the ankle. XR TIBIA FIBULA LEFT (2 VIEWS)   Final Result   Unremarkable left tibia and fibula. XR TIBIA FIBULA LEFT (2 VIEWS)    Result Date: 2/19/2023  EXAMINATION: XRAY VIEWS OF THE LEFT TIBIA AND FIBULA 2/19/2023 12:05 pm COMPARISON: None. HISTORY: ORDERING SYSTEM PROVIDED HISTORY: pain TECHNOLOGIST PROVIDED HISTORY: Reason for exam:->pain What reading provider will be dictating this exam?->CRC FINDINGS: There is no evidence of acute fracture. There is normal alignment. No acute joint abnormality. No focal osseous lesion. No focal soft tissue abnormality. Unremarkable left tibia and fibula. XR ANKLE LEFT (MIN 3 VIEWS)    Result Date: 2/19/2023  EXAMINATION: THREE XRAY VIEWS OF THE LEFT ANKLE 2/19/2023 12:05 pm COMPARISON: None.  HISTORY: ORDERING SYSTEM PROVIDED HISTORY: ankle swelling TECHNOLOGIST PROVIDED HISTORY: Reason for exam:->ankle swelling What reading provider will be dictating this exam?->CRC FINDINGS: No evidence of acute fracture or dislocation. Normal alignment of the ankle mortise. No focal osseous lesion. No evidence of joint effusion. No focal soft tissue abnormality. No acute abnormality of the ankle. US DUP LOWER EXTREMITY LEFT MAGDY    Result Date: 2/19/2023  EXAMINATION: DUPLEX VENOUS ULTRASOUND OF THE LEFT LOWER EXTREMITY 2/19/2023 9:41 am TECHNIQUE: Duplex ultrasound using B-mode/gray scaled imaging and Doppler spectral analysis and color flow was obtained of the deep venous structures of the left lower extremity. COMPARISON: None. HISTORY: ORDERING SYSTEM PROVIDED HISTORY: l swelling TECHNOLOGIST PROVIDED HISTORY: Reason for exam:->l swelling What reading provider will be dictating this exam?->CRC FINDINGS: The visualized veins of the left lower extremity are patent and free of echogenic thrombus. All deep veins in the thigh were able to be visualized. Within the calf the tibioperoneal trunk, anterior tibial, and posterior tibial veins were visualized. The peroneal vein was not able to be visualized due to poor sonographic window. The visualized veins demonstrate good compressibility with normal color flow study and spectral analysis. No evidence of DVT in the left lower extremity in the visualized veins with nonvisualization of the peroneal vein, precluding assessment in that location. RECOMMENDATIONS: Unavailable       No results found. Procedures:      ------------------------- NURSING NOTES AND VITALS REVIEWED ---------------------------   The nursing notes within the ED encounter and vital signs as below have been reviewed by myself and ED attending.   BP (!) 138/99   Pulse 76   Temp 98.6 °F (37 °C) (Oral)   Resp 21   Ht 5' (1.524 m)   Wt 200 lb (90.7 kg)   LMP  (LMP Unknown)   SpO2 95%   BMI 39.06 kg/m²   Oxygen Saturation Interpretation: Normal    The patients available past medical records and past encounters were reviewed by myself and ED attending        ------------------------------ ED COURSE/MEDICAL DECISION MAKING----------------------    Medical Decision Making/Differential Diagnosis:    CC/HPI Summary, Pertinent Physical Exam Findings, Social Determinants of health, Records Reviewed, DDx, testing done/not done, ED Course, Reassessment, disposition considerations/shared decision making with patient, consults, disposition:      Medical Decision Making/ED COURSE:    Chronic Conditions affecting care:    has a past medical history of Arthritis, Blood circulation, collateral, Chronic fatigue (4/11/2016), Cirrhosis of liver ([facility] [address] COPD exacerbation ([facility] [address] (4/29/2015), Diabetes mellitus ([facility] [address] Essential hypertension (2/15/2016), Gall stones, GERD (gastroesophageal reflux disease), Hep C w/o coma, chronic ([facility] [address] History of blood transfusion, Neuropathy, PFO (patent foramen ovale), Pneumonia, Type 2 diabetes mellitus with stage 3 chronic kidney disease, with long-term current use of insulin ([facility] [address] Unspecified cerebral artery occlusion with cerebral infarction, and Varices. 65 y.o. female who presents left leg pain, endorses swelling and tenderness of her left leg. Myself and ED attending interpreted findings during patient's stay. Vital signs BP (!) 138/99   Pulse 76   Temp 98.6 °F (37 °C) (Oral)   Resp 21   Ht 5' (1.524 m)   Wt 200 lb (90.7 kg)   LMP  (LMP Unknown)   SpO2 95%   BMI 39.06 kg/m²  Patient was placed on the cardiac monitor. Rhythm - Sinus. While in the ED patient was hypertensive but otherwise hemodynamically stable, afebrile, nontoxic-appearing, in no respiratory distress. Physical exam remarkable for L leg pain and swelling. Working diagnoses include but not limited to CHF, cellulitis, DVT, dependent edema, lymphedema, venous stasis dermatitis or traumatic injury. Labs interpreted by me CBC with no leukocytosis or significant anemia, CMP with stable renal and liver function, no electrolyte derangement.  Elevated trop with delta trending down, elevated BNP. EKG interpreted by me sinus mio with no sign of acute ischemia. L Xray tib fib and ankle unremarkable. US LLE negative for DVT. On reevaluation patient was relieved of findings. She will return to nursing facility. Pt will be d/c and will follow up with her PCP. She is educated on signs and symptoms that require emergent evaluation. Pt is advised to return to the ED if her symptoms change or worsen. If her pain persists, pt may need further evaluation. Pt is agreeable to plan and all questions have been answered at this time. Counseling: The emergency provider has spoken with the patient and discussed todays results, in addition to providing specific details for the plan of care and counseling regarding the diagnosis and prognosis. Questions are answered at this time and they are agreeable with the plan. ED Course as of 02/21/23 1839   [facility]   1732 EKG: This EKG is signed by emergency department physician. Rate: 59  Qtc: 421ms  Rhythm: Sinus  Interpretation: sinus bradycardia  Comparison: stable as compared to patient's most recent EKG        [TC]      ED Course User Index  [TC] Alex Tello MD       Medications - No data to display    New Prescriptions    No medications on file         Discussion with Other Profesionals : None    Social Determinants : None    Records Reviewed : Source Chart reviewed, patient regularly sees PCP, hx includes CKD, DM, Stroke with L hemiplegia, AD. Last visit in Jan 2023 was documented patient had leg swelling      CONSULTS: none       --------------------------------- IMPRESSION AND DISPOSITION ---------------------------------    IMPRESSION  1. Left leg pain        DISPOSITION  Disposition: Discharge to nursing home  Patient condition is stable        NOTE: This report was transcribed using voice recognition software.  Every effort was made to ensure accuracy; however, inadvertent computerized transcription errors may be present         Ky Miranda MD  Resident  02/21/23 [id number]

## 2023-02-19 NOTE — ED NOTES
Attempted to get patient undressed and place IV. Patient refused and stating everything is bullshit and wants to talk to the doctor. MD notified.      Becca Corbett RN  02/19/23 0537

## 2023-02-19 NOTE — ED NOTES
Patient using bedpan without difficulty. Denies complaints at this time. Awaiting PAS for discharge. Dinner tray offered but patient refused.      Cuate Loo, RN  02/19/23 3726 [facility], RN  02/19/23 1659

## 2023-02-19 NOTE — ED NOTES
Patient resting comfortably, using bedpan without difficulty, attempting to find transport back to NH.      Symone Lujan RN  02/19/23 [id number]

## 2023-02-19 NOTE — ED NOTES
Report given to [facility] [place], [address] [facility]  02/19/23 2020[facility]

## 2023-02-20 VITALS
HEART RATE: 59 BPM | HEIGHT: 60 IN | SYSTOLIC BLOOD PRESSURE: 165 MMHG | WEIGHT: 200 LBS | RESPIRATION RATE: 19 BRPM | TEMPERATURE: 98.6 F | DIASTOLIC BLOOD PRESSURE: 77 MMHG | OXYGEN SATURATION: 95 % | BODY MASS INDEX: 39.27 KG/M2

## 2023-02-20 LAB
EKG ATRIAL RATE: 59 BPM
EKG P AXIS: 48 DEGREES
EKG P-R INTERVAL: 158 MS
EKG Q-T INTERVAL: 426 MS
EKG QRS DURATION: 86 MS
EKG QTC CALCULATION (BAZETT): 421 MS
EKG R AXIS: 42 DEGREES
EKG T AXIS: 67 DEGREES
EKG VENTRICULAR RATE: 59 BPM

## 2023-02-20 PROCEDURE — 93010 ELECTROCARDIOGRAM REPORT: CPT | Performed by: INTERNAL MEDICINE

## 2023-02-21 ENCOUNTER — TELEPHONE (OUTPATIENT)
Dept: FAMILY MEDICINE CLINIC | Age: 63
End: 2023-02-21

## 2023-02-21 LAB — SOURCE: NORMAL

## 2023-02-21 NOTE — TELEPHONE ENCOUNTER
Titus Herbert called stating Alice Burciaga recently went to ER for poss DVT; Test results were negative. Left leg remains, red, warm and swollen. Very Painful.  Pt coming in Friday but staff was wondering if theres medication or something we can rx until the appt to help with the pain; Tylenol ineffective

## 2023-02-23 ENCOUNTER — OFFICE VISIT (OUTPATIENT)
Dept: FAMILY MEDICINE CLINIC | Age: 63
End: 2023-02-23
Payer: MEDICARE

## 2023-02-23 VITALS
HEART RATE: 68 BPM | TEMPERATURE: 97.2 F | OXYGEN SATURATION: 96 % | SYSTOLIC BLOOD PRESSURE: 122 MMHG | DIASTOLIC BLOOD PRESSURE: 68 MMHG | RESPIRATION RATE: 16 BRPM

## 2023-02-23 DIAGNOSIS — I87.2 VENOUS STASIS DERMATITIS OF LEFT LOWER EXTREMITY: Primary | ICD-10-CM

## 2023-02-23 LAB — SARS-COV-2, PCR: NOT DETECTED

## 2023-02-23 PROCEDURE — 1036F TOBACCO NON-USER: CPT | Performed by: NURSE PRACTITIONER

## 2023-02-23 PROCEDURE — G8482 FLU IMMUNIZE ORDER/ADMIN: HCPCS | Performed by: NURSE PRACTITIONER

## 2023-02-23 PROCEDURE — 3017F COLORECTAL CA SCREEN DOC REV: CPT | Performed by: NURSE PRACTITIONER

## 2023-02-23 PROCEDURE — G8417 CALC BMI ABV UP PARAM F/U: HCPCS | Performed by: NURSE PRACTITIONER

## 2023-02-23 PROCEDURE — 3078F DIAST BP <80 MM HG: CPT | Performed by: NURSE PRACTITIONER

## 2023-02-23 PROCEDURE — 99213 OFFICE O/P EST LOW 20 MIN: CPT | Performed by: NURSE PRACTITIONER

## 2023-02-23 PROCEDURE — 3074F SYST BP LT 130 MM HG: CPT | Performed by: NURSE PRACTITIONER

## 2023-02-23 PROCEDURE — G8427 DOCREV CUR MEDS BY ELIG CLIN: HCPCS | Performed by: NURSE PRACTITIONER

## 2023-02-23 RX ORDER — TRAMADOL HYDROCHLORIDE 50 MG/1
25 TABLET ORAL 2 TIMES DAILY
Qty: 7 TABLET | Refills: 0 | Status: SHIPPED | OUTPATIENT
Start: 2023-02-23 | End: 2023-03-02

## 2023-02-23 SDOH — ECONOMIC STABILITY: INCOME INSECURITY: HOW HARD IS IT FOR YOU TO PAY FOR THE VERY BASICS LIKE FOOD, HOUSING, MEDICAL CARE, AND HEATING?: NOT HARD AT ALL

## 2023-02-23 SDOH — ECONOMIC STABILITY: FOOD INSECURITY: WITHIN THE PAST 12 MONTHS, THE FOOD YOU BOUGHT JUST DIDN'T LAST AND YOU DIDN'T HAVE MONEY TO GET MORE.: NEVER TRUE

## 2023-02-23 SDOH — ECONOMIC STABILITY: FOOD INSECURITY: WITHIN THE PAST 12 MONTHS, YOU WORRIED THAT YOUR FOOD WOULD RUN OUT BEFORE YOU GOT MONEY TO BUY MORE.: NEVER TRUE

## 2023-02-23 SDOH — ECONOMIC STABILITY: HOUSING INSECURITY
IN THE LAST 12 MONTHS, WAS THERE A TIME WHEN YOU DID NOT HAVE A STEADY PLACE TO SLEEP OR SLEPT IN A SHELTER (INCLUDING NOW)?: NO

## 2023-02-23 ASSESSMENT — PATIENT HEALTH QUESTIONNAIRE - PHQ9
SUM OF ALL RESPONSES TO PHQ QUESTIONS 1-9: 0
2. FEELING DOWN, DEPRESSED OR HOPELESS: 0
SUM OF ALL RESPONSES TO PHQ QUESTIONS 1-9: 0
SUM OF ALL RESPONSES TO PHQ QUESTIONS 1-9: 0
SUM OF ALL RESPONSES TO PHQ9 QUESTIONS 1 & 2: 0
1. LITTLE INTEREST OR PLEASURE IN DOING THINGS: 0
SUM OF ALL RESPONSES TO PHQ QUESTIONS 1-9: 0

## 2023-02-23 NOTE — PROGRESS NOTES
Alvin Mosley (:  1960) is a 65 y.o. female,Established patient, here for evaluation of the following chief complaint(s):  Leg Pain (Left leg pain from knee to ankle- went to ED over weekend /Pt has paperwork with her in envelope)         ASSESSMENT/PLAN:  1. Venous stasis dermatitis of left lower extremity  -     traMADol (ULTRAM) 50 MG tablet; Take 0.5 tablets by mouth in the morning and at bedtime for 7 days. Intended supply: 7 days. Take lowest dose possible to manage pain Max Daily Amount: 50 mg, Disp-7 tablet, R-0Normal  -  Reviewed side effects of medication and patient verbalizes understanding.   -  Advised to call back directly if there are further questions, or if these symptoms fail to improve as anticipated or worsen. -  elevated legs as much as possible  -  use pain medication sparingly    Discussed case with Dr. Michael Riggs     Controlled Substance Monitoring:    Acute and Chronic Pain Monitoring:   RX Monitoring 2023   Attestation -   Periodic Controlled Substance Monitoring No signs of potential drug abuse or diversion identified.;Obtaining appropriate analgesic effect of treatment. No follow-ups on file. Subjective   SUBJECTIVE/OBJECTIVE:  HPI  Here today for continued swelling of left lower extremity. Seen in ED on . US negative for DVT. XR of tib/fib and ankle negative for fractures. Patient reports she does elevate leg some. It is very painful to the touch even with clothing touching it. She denies and sweat, shills, fevers. Denies N/V.    /68   Pulse 68   Temp 97.2 °F (36.2 °C)   Resp 16   LMP  (LMP Unknown)   SpO2 96%     Review of Systems   Constitutional:  Positive for activity change (due to pain). Negative for appetite change, chills, diaphoresis, fatigue, fever and unexpected weight change. Wheelchair bound   Respiratory:  Negative for cough, shortness of breath and wheezing.     Cardiovascular:  Positive for leg swelling (bilateral legs, left worse). Negative for chest pain and palpitations. Gastrointestinal:  Positive for diarrhea. Negative for constipation, nausea and vomiting. Genitourinary:  Negative for difficulty urinating. Musculoskeletal:  Negative for arthralgias, back pain and neck pain. Neurological:  Negative for dizziness, weakness and headaches. Psychiatric/Behavioral:  Negative for dysphoric mood and sleep disturbance. The patient is not nervous/anxious. Objective   Physical Exam  Constitutional:       Appearance: She is well-developed. She is obese. HENT:      Head: Normocephalic and atraumatic. Neck:      Trachea: No tracheal deviation. Cardiovascular:      Rate and Rhythm: Normal rate and regular rhythm. Heart sounds: No murmur heard. Pulmonary:      Effort: Pulmonary effort is normal. No respiratory distress. Breath sounds: Normal breath sounds. Abdominal:      General: Bowel sounds are normal.      Palpations: Abdomen is soft. Tenderness: There is no abdominal tenderness. Musculoskeletal:         General: Normal range of motion. Right lower leg: Edema present. Left lower leg: Edema (2+ pitting) present. Skin:     General: Skin is warm and dry. Findings: No erythema. Neurological:      Mental Status: She is alert and oriented to person, place, and time. Psychiatric:         Behavior: Behavior normal.            [facility]      An electronic signature was used to authenticate this note.     --Michel Welsh, MIKEL - CNP

## 2023-02-24 ASSESSMENT — ENCOUNTER SYMPTOMS
WHEEZING: 0
COUGH: 0
DIARRHEA: 1
NAUSEA: 0
CONSTIPATION: 0
VOMITING: 0
BACK PAIN: 0
SHORTNESS OF BREATH: 0

## 2023-03-01 DIAGNOSIS — F17.200 TOBACCO USE DISORDER: ICD-10-CM

## 2023-03-01 LAB — SOURCE: NORMAL

## 2023-03-01 RX ORDER — NICOTINE 21 MG/24HR
PATCH, TRANSDERMAL 24 HOURS TRANSDERMAL
Qty: 14 PATCH | Refills: 2 | Status: SHIPPED | OUTPATIENT
Start: 2023-03-01

## 2023-03-02 ENCOUNTER — TELEPHONE (OUTPATIENT)
Dept: FAMILY MEDICINE CLINIC | Age: 63
End: 2023-03-02

## 2023-03-02 LAB — SARS-COV-2, PCR: NOT DETECTED

## 2023-03-02 NOTE — TELEPHONE ENCOUNTER
Spoke with the Nurse Ellen at [facility]; pt takes Ultram with morning meds at Breakfast but has been refusing stating it makes her loopy and nauseated; Pt told the nurse she wants Norco.. its the only thing that ever helped in the past. I explain to the nurse we want to limit her Tylenol use due to Chronic Kidney Disease. Nurse stated the pts leg is much better but the pt continues to yell out in pain; refused the nurse applying pain gel or lotion.

## 2023-03-02 NOTE — TELEPHONE ENCOUNTER
Please call and let the facility know that she has impaired kidneys and therefore not a candidate for NSAIDs. She is a liver transplant and therefore we are trying to limit her Tylenol. Ultram is the best option for her and not long term especially since the leg is better. She is a very demanding patient and wish we could do more to help you manage her symptoms. She is going to have to live with some pain.

## 2023-03-04 LAB — SOURCE: NORMAL

## 2023-03-07 ENCOUNTER — OFFICE VISIT (OUTPATIENT)
Dept: FAMILY MEDICINE CLINIC | Age: 63
End: 2023-03-07

## 2023-03-07 VITALS
DIASTOLIC BLOOD PRESSURE: 80 MMHG | OXYGEN SATURATION: 95 % | TEMPERATURE: 97.4 F | RESPIRATION RATE: 16 BRPM | SYSTOLIC BLOOD PRESSURE: 128 MMHG | HEART RATE: 61 BPM

## 2023-03-07 DIAGNOSIS — E66.9 OBESITY (BMI 30-39.9): Chronic | ICD-10-CM

## 2023-03-07 DIAGNOSIS — Z86.73 HISTORY OF CARDIOEMBOLIC CEREBROVASCULAR ACCIDENT (CVA): Chronic | ICD-10-CM

## 2023-03-07 DIAGNOSIS — R60.9 DEPENDENT EDEMA: Primary | ICD-10-CM

## 2023-03-07 DIAGNOSIS — I87.2 VENOUS STASIS DERMATITIS OF LEFT LOWER EXTREMITY: ICD-10-CM

## 2023-03-07 DIAGNOSIS — Z94.4 STATUS POST LIVER TRANSPLANTATION (HCC): Chronic | ICD-10-CM

## 2023-03-07 DIAGNOSIS — I69.354 HEMIPARESIS AFFECTING LEFT SIDE AS LATE EFFECT OF CEREBROVASCULAR ACCIDENT (HCC): Chronic | ICD-10-CM

## 2023-03-07 ASSESSMENT — ENCOUNTER SYMPTOMS
VOMITING: 0
CONSTIPATION: 0
NAUSEA: 0
WHEEZING: 0
COUGH: 0
BACK PAIN: 1
DIARRHEA: 0
SHORTNESS OF BREATH: 0

## 2023-03-07 ASSESSMENT — PATIENT HEALTH QUESTIONNAIRE - PHQ9
SUM OF ALL RESPONSES TO PHQ9 QUESTIONS 1 & 2: 0
SUM OF ALL RESPONSES TO PHQ QUESTIONS 1-9: 0
2. FEELING DOWN, DEPRESSED OR HOPELESS: 0
SUM OF ALL RESPONSES TO PHQ QUESTIONS 1-9: 0
SUM OF ALL RESPONSES TO PHQ QUESTIONS 1-9: 0
1. LITTLE INTEREST OR PLEASURE IN DOING THINGS: 0
SUM OF ALL RESPONSES TO PHQ QUESTIONS 1-9: 0

## 2023-03-07 NOTE — PROGRESS NOTES
Herrera Yancey (:  1960) is a 65 y.o. female,Established patient, here for evaluation of the following chief complaint(s):  Leg Swelling (Left leg swelling since last visit, no improvement since last visit. Was not able to take prescription given last visit due to nausea) and Health Maintenance (Pt aware of overdue health maintenance. Sending pt home home urine sample kit to bring back at next appt for microalbumin )         ASSESSMENT/PLAN:  1. Dependent edema  -  elevate left leg during the day while in wheelchair  2. Venous stasis dermatitis of left lower extremity  - much improved since ED visit on   3. Status post liver transplantation ([facility] [address]  -  stable  4. Obesity (BMI 30-39.9)  -  The patient is asked to make an attempt to improve diet and exercise patterns to aid in medical management of this problem. 5. History of cardioembolic cerebrovascular accident (CVA)  6. Hemiparesis affecting left side as late effect of cerebrovascular accident [facility])  -  affected left side      Return if symptoms worsen or fail to improve. Subjective   SUBJECTIVE/OBJECTIVE:  HPI  Leg swelling same since last visit. Reports that she is not elevating her leg throughout the day, only at night. Tramadol made her vomit so it was placed on her allergy list. Reports that she hates the place that she is currently living and plans on moving soon. She reports that she does participate in activities like cards and bingo and therefore, cannot go to her room to elevate leg. /80   Pulse 61   Temp 97.4 °F (36.3 °C)   Resp 16   LMP  (LMP Unknown)   SpO2 95%     Review of Systems   Constitutional:  Negative for activity change (unchanged, scooting in chair and reports she is walking without a walker), appetite change, chills, diaphoresis, fever and unexpected weight change. Respiratory:  Negative for cough, shortness of breath and wheezing. Cardiovascular:  Positive for leg swelling (left leg).  Negative for chest pain and palpitations. Gastrointestinal:  Negative for constipation, diarrhea, nausea and vomiting. Genitourinary:  Negative for difficulty urinating. Musculoskeletal:  Positive for arthralgias and back pain. Negative for neck pain. Neurological:  Negative for dizziness, weakness and headaches. Psychiatric/Behavioral:  Positive for sleep disturbance. Negative for dysphoric mood. The patient is not nervous/anxious. Objective   Physical Exam  Constitutional:       Appearance: She is well-developed. She is obese. HENT:      Head: Normocephalic and atraumatic. Neck:      Trachea: No tracheal deviation. Cardiovascular:      Rate and Rhythm: Normal rate and regular rhythm. Heart sounds: No murmur heard. Pulmonary:      Effort: Pulmonary effort is normal. No respiratory distress. Breath sounds: Normal breath sounds. Abdominal:      General: Bowel sounds are normal.      Palpations: Abdomen is soft. Tenderness: There is no abdominal tenderness. Musculoskeletal:         General: Swelling and tenderness (LLE) present. Normal range of motion. Right lower le+ Edema present. Left lower le+ Pitting Edema present. Skin:     General: Skin is warm and dry. Neurological:      Mental Status: She is alert and oriented to person, place, and time. Psychiatric:         Behavior: Behavior normal.          [facility]      An electronic signature was used to authenticate this note.     --Deborah Riggs, MIKEL - CNP

## 2023-03-08 DIAGNOSIS — I63.20 CEREBROVASCULAR ACCIDENT (CVA) DUE TO STENOSIS OF PRECEREBRAL ARTERY (HCC): ICD-10-CM

## 2023-03-08 DIAGNOSIS — Z86.73 HISTORY OF CVA (CEREBROVASCULAR ACCIDENT): ICD-10-CM

## 2023-03-08 DIAGNOSIS — I10 HTN (HYPERTENSION), BENIGN: Chronic | ICD-10-CM

## 2023-03-08 LAB
SARS-COV-2, PCR: DETECTED
SOURCE: NORMAL

## 2023-03-08 RX ORDER — TACROLIMUS 0.5 MG/1
CAPSULE ORAL
Qty: 62 CAPSULE | Refills: 3 | OUTPATIENT
Start: 2023-03-08

## 2023-03-08 RX ORDER — METOPROLOL SUCCINATE 25 MG/1
25 TABLET, EXTENDED RELEASE ORAL DAILY
Qty: 31 TABLET | Refills: 4 | Status: SHIPPED | OUTPATIENT
Start: 2023-03-08

## 2023-03-08 RX ORDER — AMLODIPINE BESYLATE 2.5 MG/1
TABLET ORAL
Qty: 31 TABLET | Refills: 3 | Status: SHIPPED | OUTPATIENT
Start: 2023-03-08

## 2023-03-08 RX ORDER — TACROLIMUS 1 MG/1
CAPSULE ORAL
Qty: 62 CAPSULE | Refills: 3 | OUTPATIENT
Start: 2023-03-08

## 2023-03-08 RX ORDER — ASPIRIN 81 MG/1
TABLET, COATED ORAL
Qty: 31 TABLET | Refills: 4 | Status: SHIPPED | OUTPATIENT
Start: 2023-03-08

## 2023-03-08 RX ORDER — ATORVASTATIN CALCIUM 40 MG/1
40 TABLET, FILM COATED ORAL NIGHTLY
Qty: 31 TABLET | Refills: 4 | Status: SHIPPED | OUTPATIENT
Start: 2023-03-08

## 2023-03-10 LAB — SARS-COV-2, PCR: DETECTED

## 2023-03-28 ENCOUNTER — OFFICE VISIT (OUTPATIENT)
Dept: FAMILY MEDICINE CLINIC | Age: 63
End: 2023-03-28
Payer: MEDICARE

## 2023-03-28 VITALS
SYSTOLIC BLOOD PRESSURE: 140 MMHG | TEMPERATURE: 97.5 F | OXYGEN SATURATION: 94 % | DIASTOLIC BLOOD PRESSURE: 80 MMHG | HEART RATE: 53 BPM | WEIGHT: 199 LBS | BODY MASS INDEX: 39.07 KG/M2 | RESPIRATION RATE: 16 BRPM | HEIGHT: 60 IN

## 2023-03-28 DIAGNOSIS — E11.49 TYPE 2 DIABETES MELLITUS WITH OTHER NEUROLOGIC COMPLICATION, WITHOUT LONG-TERM CURRENT USE OF INSULIN (HCC): Primary | ICD-10-CM

## 2023-03-28 DIAGNOSIS — Z94.4 STATUS POST LIVER TRANSPLANTATION (HCC): ICD-10-CM

## 2023-03-28 DIAGNOSIS — G81.94 HEMIPLEGIA AFFECTING LEFT NONDOMINANT SIDE, UNSPECIFIED ETIOLOGY, UNSPECIFIED HEMIPLEGIA TYPE (HCC): ICD-10-CM

## 2023-03-28 DIAGNOSIS — N18.31 STAGE 3A CHRONIC KIDNEY DISEASE (HCC): Chronic | ICD-10-CM

## 2023-03-28 PROCEDURE — 3017F COLORECTAL CA SCREEN DOC REV: CPT | Performed by: FAMILY MEDICINE

## 2023-03-28 PROCEDURE — 3052F HG A1C>EQUAL 8.0%<EQUAL 9.0%: CPT | Performed by: FAMILY MEDICINE

## 2023-03-28 PROCEDURE — G8417 CALC BMI ABV UP PARAM F/U: HCPCS | Performed by: FAMILY MEDICINE

## 2023-03-28 PROCEDURE — 3079F DIAST BP 80-89 MM HG: CPT | Performed by: FAMILY MEDICINE

## 2023-03-28 PROCEDURE — G8427 DOCREV CUR MEDS BY ELIG CLIN: HCPCS | Performed by: FAMILY MEDICINE

## 2023-03-28 PROCEDURE — 3077F SYST BP >= 140 MM HG: CPT | Performed by: FAMILY MEDICINE

## 2023-03-28 PROCEDURE — G8482 FLU IMMUNIZE ORDER/ADMIN: HCPCS | Performed by: FAMILY MEDICINE

## 2023-03-28 PROCEDURE — 2022F DILAT RTA XM EVC RTNOPTHY: CPT | Performed by: FAMILY MEDICINE

## 2023-03-28 PROCEDURE — 99214 OFFICE O/P EST MOD 30 MIN: CPT | Performed by: FAMILY MEDICINE

## 2023-03-28 PROCEDURE — 1036F TOBACCO NON-USER: CPT | Performed by: FAMILY MEDICINE

## 2023-03-28 RX ORDER — MICONAZOLE NITRATE
POWDER (GRAM) MISCELLANEOUS
COMMUNITY

## 2023-03-28 ASSESSMENT — ENCOUNTER SYMPTOMS
COUGH: 0
PHOTOPHOBIA: 0
BACK PAIN: 0

## 2023-03-28 NOTE — PROGRESS NOTES
Yobany Stearns (:  1960) is a 65 y.o. female,Established patient, here for evaluation of the following chief complaint(s):  Follow-up (Felling better since covid and leg is doing better as well)         ASSESSMENT/PLAN:  1. Type 2 diabetes mellitus with other neurologic complication, without long-term current use of insulin ([facility]  2. Hemiplegia affecting left nondominant side, unspecified etiology, unspecified hemiplegia type ([facility]  3. Status post liver transplantation ([facility]  4. Stage 3a chronic kidney disease ([facility]    No follow-ups on file. Subjective   SUBJECTIVE/OBJECTIVE:  Follow-up (Felling better since covid and leg is doing better as well)  Not complaining of back pain at this time. Thinks that her daughter is POA, it was her dad but he passed away. She wants to move out of facility and go to a different place. Review of Systems   Constitutional:  Negative for diaphoresis. HENT:  Negative for hearing loss and tinnitus. Eyes:  Negative for photophobia. Respiratory:  Negative for cough. Cardiovascular:  Negative for leg swelling. Genitourinary:  Negative for urgency. Musculoskeletal:  Positive for arthralgias. Negative for back pain and myalgias. Skin:  Negative for rash. Neurological:  Negative for dizziness, weakness (LUE especially distally.) and headaches. Hematological:  Does not bruise/bleed easily. Psychiatric/Behavioral:  The patient is nervous/anxious. Objective   BP (!) 140/80   Pulse 53   Temp 97.5 °F (36.4 °C)   Resp 16   Ht 5' (1.524 m)   Wt 199 lb (90.3 kg)   LMP  (LMP Unknown)   SpO2 94%   BMI 38.86 kg/m²   Lab Results   Component Value Date    LABA1C 8.0 2023    LABA1C 8.0 2022    LABA1C 7.2 (H) 2022     Physical Exam  Constitutional:       General: She is not in acute distress. Appearance: She is well-developed. HENT:      Nose: Nose normal.   Eyes:      General: No scleral icterus.   Neck:      Thyroid: No

## 2023-04-03 ENCOUNTER — TELEPHONE (OUTPATIENT)
Dept: FAMILY MEDICINE CLINIC | Age: 63
End: 2023-04-03

## 2023-04-03 LAB
ALBUMIN SERPL-MCNC: 3.9 G/DL (ref 3.5–5.2)
ALP SERPL-CCNC: 150 U/L (ref 35–104)
ALT SERPL-CCNC: 12 U/L (ref 0–32)
AMMONIA PLAS-SCNC: 18 UMOL/L (ref 11–51)
ANION GAP SERPL CALCULATED.3IONS-SCNC: 15 MMOL/L (ref 7–16)
AST SERPL-CCNC: 16 U/L (ref 0–31)
BACTERIA URNS QL MICRO: ABNORMAL /HPF
BASOPHILS # BLD: 0.04 E9/L (ref 0–0.2)
BASOPHILS NFR BLD: 0.8 % (ref 0–2)
BILIRUB SERPL-MCNC: 0.3 MG/DL (ref 0–1.2)
BILIRUB UR QL STRIP: NEGATIVE
BUN SERPL-MCNC: 20 MG/DL (ref 6–23)
CALCIUM SERPL-MCNC: 8.9 MG/DL (ref 8.6–10.2)
CHLORIDE SERPL-SCNC: 109 MMOL/L (ref 98–107)
CLARITY UR: ABNORMAL
CO2 SERPL-SCNC: 19 MMOL/L (ref 22–29)
COLOR UR: YELLOW
CREAT SERPL-MCNC: 1.3 MG/DL (ref 0.5–1)
EOSINOPHIL # BLD: 0.24 E9/L (ref 0.05–0.5)
EOSINOPHIL NFR BLD: 4.9 % (ref 0–6)
EPI CELLS #/AREA URNS HPF: ABNORMAL /HPF
ERYTHROCYTE [DISTWIDTH] IN BLOOD BY AUTOMATED COUNT: 13 FL (ref 11.5–15)
GLUCOSE SERPL-MCNC: 137 MG/DL (ref 74–99)
GLUCOSE UR STRIP-MCNC: NEGATIVE MG/DL
HCT VFR BLD AUTO: 37.4 % (ref 34–48)
HGB BLD-MCNC: 12.2 G/DL (ref 11.5–15.5)
HGB UR QL STRIP: ABNORMAL
IMM GRANULOCYTES # BLD: 0.02 E9/L
IMM GRANULOCYTES NFR BLD: 0.4 % (ref 0–5)
KETONES UR STRIP-MCNC: NEGATIVE MG/DL
LEUKOCYTE ESTERASE UR QL STRIP: ABNORMAL
LYMPHOCYTES # BLD: 1.15 E9/L (ref 1.5–4)
LYMPHOCYTES NFR BLD: 23.4 % (ref 20–42)
MCH RBC QN AUTO: 30 PG (ref 26–35)
MCHC RBC AUTO-ENTMCNC: 32.6 % (ref 32–34.5)
MCV RBC AUTO: 92.1 FL (ref 80–99.9)
MONOCYTES # BLD: 0.52 E9/L (ref 0.1–0.95)
MONOCYTES NFR BLD: 10.6 % (ref 2–12)
NEUTROPHILS # BLD: 2.94 E9/L (ref 1.8–7.3)
NEUTS SEG NFR BLD: 59.9 % (ref 43–80)
NITRITE UR QL STRIP: POSITIVE
PH UR STRIP: 5 [PH] (ref 5–9)
PLATELET # BLD AUTO: 151 E9/L (ref 130–450)
PMV BLD AUTO: 11.3 FL (ref 7–12)
POTASSIUM SERPL-SCNC: 4.5 MMOL/L (ref 3.5–5)
PROT SERPL-MCNC: 6.4 G/DL (ref 6.4–8.3)
PROT UR STRIP-MCNC: 100 MG/DL
RBC # BLD AUTO: 4.06 E12/L (ref 3.5–5.5)
RBC #/AREA URNS HPF: ABNORMAL /HPF (ref 0–2)
SODIUM SERPL-SCNC: 143 MMOL/L (ref 132–146)
SP GR UR STRIP: 1.02 (ref 1–1.03)
UROBILINOGEN UR STRIP-ACNC: 0.2 E.U./DL
WBC # BLD: 4.9 E9/L (ref 4.5–11.5)
WBC #/AREA URNS HPF: >20 /HPF (ref 0–5)

## 2023-04-04 NOTE — TELEPHONE ENCOUNTER
Note received today, reviewed with Dr. Franko White. Returned fax to [facility] that patient will need an appointment. None of her meds were discontinued at her last visit. This will need to be discussed in office.

## 2023-04-05 LAB
BACTERIA UR CULT: ABNORMAL
ORGANISM: ABNORMAL

## 2023-04-06 ENCOUNTER — TELEPHONE (OUTPATIENT)
Dept: FAMILY MEDICINE CLINIC | Age: 63
End: 2023-04-06

## 2023-04-06 DIAGNOSIS — N30.00 ACUTE CYSTITIS WITHOUT HEMATURIA: Primary | ICD-10-CM

## 2023-04-06 RX ORDER — NITROFURANTOIN MACROCRYSTALS 100 MG/1
CAPSULE ORAL
Qty: 24 CAPSULE | Refills: 0 | Status: SHIPPED | OUTPATIENT
Start: 2023-04-06

## 2023-04-10 ENCOUNTER — OFFICE VISIT (OUTPATIENT)
Dept: FAMILY MEDICINE CLINIC | Age: 63
End: 2023-04-10
Payer: MEDICARE

## 2023-04-10 VITALS
TEMPERATURE: 97.8 F | RESPIRATION RATE: 16 BRPM | BODY MASS INDEX: 38.86 KG/M2 | DIASTOLIC BLOOD PRESSURE: 80 MMHG | OXYGEN SATURATION: 96 % | HEIGHT: 60 IN | HEART RATE: 68 BPM | SYSTOLIC BLOOD PRESSURE: 118 MMHG

## 2023-04-10 DIAGNOSIS — E11.649 UNCONTROLLED TYPE 2 DIABETES MELLITUS WITH HYPOGLYCEMIA WITHOUT COMA (HCC): Chronic | ICD-10-CM

## 2023-04-10 DIAGNOSIS — Z94.4 STATUS POST LIVER TRANSPLANTATION (HCC): Chronic | ICD-10-CM

## 2023-04-10 DIAGNOSIS — Z86.73 HISTORY OF CARDIOEMBOLIC CEREBROVASCULAR ACCIDENT (CVA): Chronic | ICD-10-CM

## 2023-04-10 DIAGNOSIS — N18.32 STAGE 3B CHRONIC KIDNEY DISEASE (HCC): Primary | ICD-10-CM

## 2023-04-10 PROCEDURE — G8417 CALC BMI ABV UP PARAM F/U: HCPCS | Performed by: FAMILY MEDICINE

## 2023-04-10 PROCEDURE — 3074F SYST BP LT 130 MM HG: CPT | Performed by: FAMILY MEDICINE

## 2023-04-10 PROCEDURE — 3017F COLORECTAL CA SCREEN DOC REV: CPT | Performed by: FAMILY MEDICINE

## 2023-04-10 PROCEDURE — G8427 DOCREV CUR MEDS BY ELIG CLIN: HCPCS | Performed by: FAMILY MEDICINE

## 2023-04-10 PROCEDURE — 99214 OFFICE O/P EST MOD 30 MIN: CPT | Performed by: FAMILY MEDICINE

## 2023-04-10 PROCEDURE — 1036F TOBACCO NON-USER: CPT | Performed by: FAMILY MEDICINE

## 2023-04-10 PROCEDURE — 3078F DIAST BP <80 MM HG: CPT | Performed by: FAMILY MEDICINE

## 2023-04-10 PROCEDURE — 2022F DILAT RTA XM EVC RTNOPTHY: CPT | Performed by: FAMILY MEDICINE

## 2023-04-10 PROCEDURE — 3052F HG A1C>EQUAL 8.0%<EQUAL 9.0%: CPT | Performed by: FAMILY MEDICINE

## 2023-04-10 RX ORDER — METFORMIN HYDROCHLORIDE 500 MG/1
500 TABLET, EXTENDED RELEASE ORAL
Qty: 90 TABLET | Refills: 2 | Status: SHIPPED | OUTPATIENT
Start: 2023-04-10

## 2023-04-10 ASSESSMENT — ENCOUNTER SYMPTOMS
COUGH: 0
PHOTOPHOBIA: 0

## 2023-04-20 ENCOUNTER — TELEPHONE (OUTPATIENT)
Dept: FAMILY MEDICINE CLINIC | Age: 63
End: 2023-04-20

## 2023-05-03 ENCOUNTER — OFFICE VISIT (OUTPATIENT)
Dept: FAMILY MEDICINE CLINIC | Age: 63
End: 2023-05-03
Payer: MEDICARE

## 2023-05-03 VITALS
RESPIRATION RATE: 16 BRPM | TEMPERATURE: 97.3 F | SYSTOLIC BLOOD PRESSURE: 116 MMHG | WEIGHT: 196.4 LBS | HEIGHT: 66 IN | DIASTOLIC BLOOD PRESSURE: 72 MMHG | BODY MASS INDEX: 31.57 KG/M2 | HEART RATE: 60 BPM | OXYGEN SATURATION: 93 %

## 2023-05-03 DIAGNOSIS — J44.9 CHRONIC OBSTRUCTIVE PULMONARY DISEASE, UNSPECIFIED COPD TYPE (HCC): Chronic | ICD-10-CM

## 2023-05-03 DIAGNOSIS — N18.31 STAGE 3A CHRONIC KIDNEY DISEASE (HCC): Chronic | ICD-10-CM

## 2023-05-03 DIAGNOSIS — E11.49 TYPE 2 DIABETES MELLITUS WITH OTHER NEUROLOGIC COMPLICATION, WITHOUT LONG-TERM CURRENT USE OF INSULIN (HCC): Primary | ICD-10-CM

## 2023-05-03 LAB — HBA1C MFR BLD: 6.5 %

## 2023-05-03 PROCEDURE — 3078F DIAST BP <80 MM HG: CPT | Performed by: FAMILY MEDICINE

## 2023-05-03 PROCEDURE — 3074F SYST BP LT 130 MM HG: CPT | Performed by: FAMILY MEDICINE

## 2023-05-03 PROCEDURE — G8417 CALC BMI ABV UP PARAM F/U: HCPCS | Performed by: FAMILY MEDICINE

## 2023-05-03 PROCEDURE — 3023F SPIROM DOC REV: CPT | Performed by: FAMILY MEDICINE

## 2023-05-03 PROCEDURE — 83036 HEMOGLOBIN GLYCOSYLATED A1C: CPT | Performed by: FAMILY MEDICINE

## 2023-05-03 PROCEDURE — 3017F COLORECTAL CA SCREEN DOC REV: CPT | Performed by: FAMILY MEDICINE

## 2023-05-03 PROCEDURE — G8427 DOCREV CUR MEDS BY ELIG CLIN: HCPCS | Performed by: FAMILY MEDICINE

## 2023-05-03 PROCEDURE — 3044F HG A1C LEVEL LT 7.0%: CPT | Performed by: FAMILY MEDICINE

## 2023-05-03 PROCEDURE — 1036F TOBACCO NON-USER: CPT | Performed by: FAMILY MEDICINE

## 2023-05-03 PROCEDURE — 2022F DILAT RTA XM EVC RTNOPTHY: CPT | Performed by: FAMILY MEDICINE

## 2023-05-03 PROCEDURE — 99214 OFFICE O/P EST MOD 30 MIN: CPT | Performed by: FAMILY MEDICINE

## 2023-05-03 ASSESSMENT — ENCOUNTER SYMPTOMS
CONSTIPATION: 0
ABDOMINAL PAIN: 0
VOMITING: 0
BLOOD IN STOOL: 0
COUGH: 0
WHEEZING: 0
SHORTNESS OF BREATH: 0
DIARRHEA: 0
NAUSEA: 0

## 2023-05-03 NOTE — PROGRESS NOTES
Michaela Torres (:  1960) is a 65 y.o. female,Established patient, here for evaluation of the following chief complaint(s):  Diabetes, Hypertension, and Medication Check (Per [facility], pt refusing all medications except her Metformin and Tacrolimus)         ASSESSMENT/PLAN:  1. Type 2 diabetes mellitus with other neurologic complication, without long-term current use of insulin (HCC)  -     POCT glycosylated hemoglobin (Hb A1C)  2. Stage 3a chronic kidney disease ([facility]  3. Chronic obstructive pulmonary disease, unspecified COPD type ([facility]      No follow-ups on file. Subjective   SUBJECTIVE/OBJECTIVE:  Diabetes, Hypertension, and Medication Check (Per [facility], pt refusing all medications except her Metformin and Tacrolimus)        Review of Systems   Constitutional:  Negative for chills, diaphoresis and fever. HENT:  Negative for ear discharge, ear pain, hearing loss, nosebleeds and tinnitus. Respiratory:  Negative for cough, shortness of breath and wheezing. Cardiovascular:  Negative for chest pain. Gastrointestinal:  Negative for abdominal pain, blood in stool, constipation, diarrhea, nausea and vomiting. Genitourinary:  Negative for dysuria, flank pain and hematuria. Musculoskeletal:  Negative for myalgias. Skin:  Negative for rash. Neurological:  Negative for headaches. Hematological:  Does not bruise/bleed easily. Psychiatric/Behavioral:  Negative for hallucinations and suicidal ideas. Objective   /72   Pulse 60   Temp 97.3 °F (36.3 °C) (Temporal)   Resp 16   Ht 5' 6\" (1.676 m)   Wt 196 lb 6.4 oz (89.1 kg)   LMP  (LMP Unknown)   SpO2 93%   BMI 31.70 kg/m²   Lab Results   Component Value Date    LABA1C 6.5 2023    LABA1C 8.0 2023    LABA1C 8.0 2022     Physical Exam  Constitutional:       General: She is not in acute distress. Appearance: She is well-developed. Eyes:      General: No scleral icterus.   Neck:      Thyroid: No

## 2023-05-04 ENCOUNTER — TELEPHONE (OUTPATIENT)
Dept: FAMILY MEDICINE CLINIC | Age: 63
End: 2023-05-04

## 2023-05-04 NOTE — TELEPHONE ENCOUNTER
Saran Hamm from [place] would like to know if we are discontinuing any of Janets meds or if they should continue marking as denied.        If we are discontinuing meds can we send an updated list?

## 2023-05-04 NOTE — TELEPHONE ENCOUNTER
Continue marking as denied,,,,,,,,,,,,,,,,,,,,,,,,,,,,,,,,,,,,,,,,,,,,,,,,,,,,,,,,,,,,,,,,,,,,,,,,,,,,,,,,,,,,,,,,,,,,,,,,,,,,,,,,,,,,,,,,,,,,,,,,,,,,,,,,,,,,,,,,,,,,,,,,,,,,,,,,,,,,,,,,,,,,,,,,,,,,,,,,,,,,,,,,,,,,,,,,,,,,,,,,,,,,,,,,,,,,,,,,,,,,,,,,,,,,,,,,,,,,,,,,,,,,,,,,,,,,,,,,,,,,,,,,,,,,,,,,,,,,,,,,,,,,,,,,,,,,,,,,,,,,,,,,,djf

## 2023-05-12 DIAGNOSIS — Z86.73 HISTORY OF CVA (CEREBROVASCULAR ACCIDENT): ICD-10-CM

## 2023-05-12 DIAGNOSIS — I63.20 CEREBROVASCULAR ACCIDENT (CVA) DUE TO STENOSIS OF PRECEREBRAL ARTERY (HCC): ICD-10-CM

## 2023-05-12 RX ORDER — ZINC SULFATE 50(220)MG
CAPSULE ORAL
Qty: 1 CAPSULE | Refills: 2 | Status: SHIPPED | OUTPATIENT
Start: 2023-05-12

## 2023-05-12 RX ORDER — TACROLIMUS 1 MG/1
CAPSULE ORAL
Qty: 62 CAPSULE | Refills: 10 | OUTPATIENT
Start: 2023-05-12

## 2023-05-12 RX ORDER — TACROLIMUS 0.5 MG/1
CAPSULE ORAL
Qty: 62 CAPSULE | Refills: 10 | OUTPATIENT
Start: 2023-05-12

## 2023-05-12 RX ORDER — CLOPIDOGREL BISULFATE 75 MG/1
75 TABLET ORAL DAILY
Qty: 31 TABLET | Refills: 2 | Status: SHIPPED | OUTPATIENT
Start: 2023-05-12

## 2023-05-13 DIAGNOSIS — J30.1 SEASONAL ALLERGIC RHINITIS DUE TO POLLEN: ICD-10-CM

## 2023-05-15 RX ORDER — FLUTICASONE PROPIONATE 50 MCG
SPRAY, SUSPENSION (ML) NASAL
Qty: 16 G | Refills: 2 | Status: SHIPPED | OUTPATIENT
Start: 2023-05-15

## 2023-05-19 DIAGNOSIS — Z86.73 HISTORY OF CVA (CEREBROVASCULAR ACCIDENT): ICD-10-CM

## 2023-05-19 DIAGNOSIS — I63.20 CEREBROVASCULAR ACCIDENT (CVA) DUE TO STENOSIS OF PRECEREBRAL ARTERY (HCC): ICD-10-CM

## 2023-05-19 RX ORDER — TACROLIMUS 1 MG/1
CAPSULE ORAL
Qty: 62 CAPSULE | Refills: 11 | OUTPATIENT
Start: 2023-05-19

## 2023-05-19 RX ORDER — ZINC SULFATE 50(220)MG
CAPSULE ORAL
Qty: 31 CAPSULE | Refills: 11 | OUTPATIENT
Start: 2023-05-19

## 2023-05-19 RX ORDER — TACROLIMUS 0.5 MG/1
CAPSULE ORAL
Qty: 62 CAPSULE | Refills: 11 | OUTPATIENT
Start: 2023-05-19

## 2023-05-19 RX ORDER — CLOPIDOGREL BISULFATE 75 MG/1
75 TABLET ORAL DAILY
Qty: 31 TABLET | Refills: 11 | OUTPATIENT
Start: 2023-05-19

## 2023-05-30 RX ORDER — TACROLIMUS 0.5 MG/1
CAPSULE ORAL
Qty: 62 CAPSULE | Refills: 10 | OUTPATIENT
Start: 2023-05-30

## 2023-05-30 RX ORDER — TACROLIMUS 1 MG/1
CAPSULE ORAL
Qty: 62 CAPSULE | Refills: 10 | OUTPATIENT
Start: 2023-05-30

## 2023-06-07 ENCOUNTER — OFFICE VISIT (OUTPATIENT)
Dept: FAMILY MEDICINE CLINIC | Age: 63
End: 2023-06-07
Payer: MEDICARE

## 2023-06-07 VITALS — OXYGEN SATURATION: 93 % | HEART RATE: 65 BPM | SYSTOLIC BLOOD PRESSURE: 130 MMHG | DIASTOLIC BLOOD PRESSURE: 82 MMHG

## 2023-06-07 DIAGNOSIS — N18.32 STAGE 3B CHRONIC KIDNEY DISEASE (HCC): ICD-10-CM

## 2023-06-07 DIAGNOSIS — I69.354 HEMIPARESIS AFFECTING LEFT SIDE AS LATE EFFECT OF CEREBROVASCULAR ACCIDENT (HCC): Chronic | ICD-10-CM

## 2023-06-07 DIAGNOSIS — N18.31 STAGE 3A CHRONIC KIDNEY DISEASE (HCC): Primary | Chronic | ICD-10-CM

## 2023-06-07 DIAGNOSIS — E11.49 TYPE 2 DIABETES MELLITUS WITH OTHER NEUROLOGIC COMPLICATION, WITHOUT LONG-TERM CURRENT USE OF INSULIN (HCC): ICD-10-CM

## 2023-06-07 DIAGNOSIS — G30.9 ALZHEIMER'S DISEASE, UNSPECIFIED (CODE) (HCC): ICD-10-CM

## 2023-06-07 PROCEDURE — 3044F HG A1C LEVEL LT 7.0%: CPT | Performed by: FAMILY MEDICINE

## 2023-06-07 PROCEDURE — 99214 OFFICE O/P EST MOD 30 MIN: CPT | Performed by: FAMILY MEDICINE

## 2023-06-07 PROCEDURE — 1036F TOBACCO NON-USER: CPT | Performed by: FAMILY MEDICINE

## 2023-06-07 PROCEDURE — 3017F COLORECTAL CA SCREEN DOC REV: CPT | Performed by: FAMILY MEDICINE

## 2023-06-07 PROCEDURE — 2022F DILAT RTA XM EVC RTNOPTHY: CPT | Performed by: FAMILY MEDICINE

## 2023-06-07 PROCEDURE — G8417 CALC BMI ABV UP PARAM F/U: HCPCS | Performed by: FAMILY MEDICINE

## 2023-06-07 PROCEDURE — 3075F SYST BP GE 130 - 139MM HG: CPT | Performed by: FAMILY MEDICINE

## 2023-06-07 PROCEDURE — 3079F DIAST BP 80-89 MM HG: CPT | Performed by: FAMILY MEDICINE

## 2023-06-07 PROCEDURE — G8427 DOCREV CUR MEDS BY ELIG CLIN: HCPCS | Performed by: FAMILY MEDICINE

## 2023-06-07 ASSESSMENT — ENCOUNTER SYMPTOMS
BACK PAIN: 1
BLOOD IN STOOL: 0
WHEEZING: 0
DIARRHEA: 0
VOMITING: 0
CONSTIPATION: 0
COUGH: 0
SHORTNESS OF BREATH: 0
ABDOMINAL PAIN: 0
NAUSEA: 0

## 2023-06-07 NOTE — PROGRESS NOTES
Normal rate and regular rhythm. Heart sounds:     No gallop. Pulmonary:      Effort: No respiratory distress. Breath sounds: No wheezing. Abdominal:      Palpations: Abdomen is soft. Musculoskeletal:         General: No tenderness. Normal range of motion. Skin:     Findings: No erythema. Neurological:      Deep Tendon Reflexes: Reflexes normal.      Comments: Left hemiparesis          On this date 6/7/2023 I have spent 31 minutes reviewing previous notes, test results and face to face with the patient discussing the diagnosis and importance of compliance with the treatment plan as well as documenting on the day of the visit. An electronic signature was used to authenticate this note.     --Michael Nelson, DO

## 2023-06-19 RX ORDER — TACROLIMUS 0.5 MG/1
CAPSULE ORAL
Qty: 40 CAPSULE | Refills: 11 | OUTPATIENT
Start: 2023-06-19

## 2023-06-19 RX ORDER — TACROLIMUS 1 MG/1
CAPSULE ORAL
Qty: 40 CAPSULE | Refills: 11 | OUTPATIENT
Start: 2023-06-19

## 2023-06-22 RX ORDER — TACROLIMUS 0.5 MG/1
CAPSULE ORAL
Qty: 40 CAPSULE | Refills: 3 | OUTPATIENT
Start: 2023-06-22

## 2023-06-22 RX ORDER — TACROLIMUS 1 MG/1
CAPSULE ORAL
Qty: 40 CAPSULE | Refills: 3 | OUTPATIENT
Start: 2023-06-22

## 2023-06-23 RX ORDER — TACROLIMUS 1 MG/1
CAPSULE ORAL
Qty: 40 CAPSULE | Refills: 10 | OUTPATIENT
Start: 2023-06-23

## 2023-06-23 RX ORDER — TACROLIMUS 0.5 MG/1
CAPSULE ORAL
Qty: 40 CAPSULE | Refills: 10 | OUTPATIENT
Start: 2023-06-23

## 2023-06-26 RX ORDER — TACROLIMUS 0.5 MG/1
CAPSULE ORAL
Qty: 40 CAPSULE | Refills: 3 | OUTPATIENT
Start: 2023-06-26

## 2023-06-26 RX ORDER — TACROLIMUS 1 MG/1
CAPSULE ORAL
Qty: 40 CAPSULE | Refills: 10 | OUTPATIENT
Start: 2023-06-26

## 2023-06-27 ENCOUNTER — TELEPHONE (OUTPATIENT)
Dept: FAMILY MEDICINE CLINIC | Age: 63
End: 2023-06-27

## 2023-06-27 RX ORDER — TACROLIMUS 0.5 MG/1
CAPSULE ORAL
Qty: 40 CAPSULE | Refills: 3 | OUTPATIENT
Start: 2023-06-27

## 2023-06-27 RX ORDER — TACROLIMUS 1 MG/1
CAPSULE ORAL
Qty: 40 CAPSULE | Refills: 3 | OUTPATIENT
Start: 2023-06-27

## 2023-06-30 RX ORDER — AMLODIPINE BESYLATE 5 MG/1
TABLET ORAL
Qty: 30 TABLET | Refills: 10 | Status: SHIPPED | OUTPATIENT
Start: 2023-06-30

## 2023-07-10 DIAGNOSIS — Z86.73 HISTORY OF CVA (CEREBROVASCULAR ACCIDENT): ICD-10-CM

## 2023-07-10 RX ORDER — ZINC SULFATE 50(220)MG
CAPSULE ORAL
Qty: 31 CAPSULE | Refills: 2 | Status: SHIPPED | OUTPATIENT
Start: 2023-07-10

## 2023-07-10 RX ORDER — CHOLECALCIFEROL TAB 125 MCG (5000 UNIT) 125 MCG (5000 UT)
TAB
Qty: 31 TABLET | Refills: 3 | Status: SHIPPED | OUTPATIENT
Start: 2023-07-10

## 2023-07-20 ENCOUNTER — TELEPHONE (OUTPATIENT)
Dept: FAMILY MEDICINE CLINIC | Age: 63
End: 2023-07-20

## 2023-07-20 NOTE — TELEPHONE ENCOUNTER
Received fax from [facility]    \" \"R\" cont to refuse Lipitor, Losartan Potassium tab, Pepcid, Melatonin-Metoprolol-Norvasc-Plavix-Vit D- States she doesn't need any vitamins or Zinc- She also doesn't want to keep paying for meds she doesn't take can you D/C any of the above meds? \"    Please advise if you would like to discontinue any medications for patient.

## 2023-07-25 ENCOUNTER — TELEPHONE (OUTPATIENT)
Dept: FAMILY MEDICINE CLINIC | Age: 63
End: 2023-07-25

## 2023-07-25 NOTE — TELEPHONE ENCOUNTER
resident continues to refuse Lipitor, Losartan Potassium tab, Pepcid,  Melatonin, Metoprolol, Norvasc, Plavix, Vit D,     States she doesn't need any vitamins or Zinc- She also doesn't want to keep paying for meds she doesn't take     Can you D/C any of the above meds?

## 2023-08-11 DIAGNOSIS — I63.20 CEREBROVASCULAR ACCIDENT (CVA) DUE TO STENOSIS OF PRECEREBRAL ARTERY (HCC): ICD-10-CM

## 2023-08-11 DIAGNOSIS — Z86.73 HISTORY OF CVA (CEREBROVASCULAR ACCIDENT): ICD-10-CM

## 2023-08-11 RX ORDER — ASPIRIN 81 MG/1
TABLET, COATED ORAL
Qty: 31 TABLET | Refills: 2 | OUTPATIENT
Start: 2023-08-11

## 2023-08-11 RX ORDER — CLOPIDOGREL BISULFATE 75 MG/1
75 TABLET ORAL DAILY
Qty: 31 TABLET | Refills: 0 | OUTPATIENT
Start: 2023-08-11

## 2023-08-11 RX ORDER — METOPROLOL SUCCINATE 25 MG/1
25 TABLET, EXTENDED RELEASE ORAL DAILY
Qty: 31 TABLET | Refills: 2 | OUTPATIENT
Start: 2023-08-11

## 2023-08-11 NOTE — TELEPHONE ENCOUNTER
Requested Prescriptions     Pending Prescriptions Disp Refills    ASPIRIN LOW DOSE 81 MG EC tablet [Pharmacy Med Name: ASPIRIN EC 81 MG TABLET 81 Tablet] 31 tablet 2     Sig: TAKE 1 TABLET BY MOUTH DAILY    metoprolol succinate (TOPROL XL) 25 MG extended release tablet [Pharmacy Med Name: METOPROLOL SUCC XL 25MG TAB 25 MG Tablet] 31 tablet 2     Sig: TAKE 1 TABLET BY MOUTH DAILY       Next appt is Visit date not found  Last appt was 6/7/2023

## 2023-08-14 NOTE — TELEPHONE ENCOUNTER
Quyen sent to [facility] [facility], pt using Medirx now. Removing jaci from chart. Can you re-send? 15

## 2023-09-02 DIAGNOSIS — I63.20 CEREBROVASCULAR ACCIDENT (CVA) DUE TO STENOSIS OF PRECEREBRAL ARTERY (HCC): ICD-10-CM

## 2023-09-02 DIAGNOSIS — Z86.73 HISTORY OF CVA (CEREBROVASCULAR ACCIDENT): ICD-10-CM

## 2023-09-05 DIAGNOSIS — I63.20 CEREBROVASCULAR ACCIDENT (CVA) DUE TO STENOSIS OF PRECEREBRAL ARTERY (HCC): ICD-10-CM

## 2023-09-05 RX ORDER — CLOPIDOGREL BISULFATE 75 MG/1
75 TABLET ORAL DAILY
Qty: 31 TABLET | Refills: 0 | Status: SHIPPED | OUTPATIENT
Start: 2023-09-05

## 2023-09-05 RX ORDER — METOPROLOL SUCCINATE 25 MG/1
25 TABLET, EXTENDED RELEASE ORAL DAILY
Qty: 31 TABLET | Refills: 0 | Status: SHIPPED | OUTPATIENT
Start: 2023-09-05

## 2023-09-05 RX ORDER — ASPIRIN 81 MG/1
TABLET, COATED ORAL
Qty: 31 TABLET | Refills: 0 | Status: SHIPPED | OUTPATIENT
Start: 2023-09-05

## 2023-10-03 DIAGNOSIS — Z86.73 HISTORY OF CVA (CEREBROVASCULAR ACCIDENT): ICD-10-CM

## 2023-10-03 RX ORDER — ASPIRIN 81 MG/1
TABLET, COATED ORAL
Qty: 30 TABLET | Refills: 1 | Status: SHIPPED | OUTPATIENT
Start: 2023-10-03

## 2023-10-03 NOTE — TELEPHONE ENCOUNTER
Requested Prescriptions     Pending Prescriptions Disp Refills    ASPIRIN LOW DOSE 81 MG EC tablet [Pharmacy Med Name: ASPIRIN EC 81 MG TABLET 81 Tablet] 30 tablet 1     Sig: TAKE 1 TABLET BY MOUTH DAILY ** DO NOT CRUSH **       Next appt is Visit date not found  Last appt was 6/7/2023

## 2023-10-11 DIAGNOSIS — Z86.73 HISTORY OF CVA (CEREBROVASCULAR ACCIDENT): ICD-10-CM

## 2023-10-12 RX ORDER — ASPIRIN 81 MG/1
TABLET, COATED ORAL
Qty: 31 TABLET | Refills: 10 | OUTPATIENT
Start: 2023-10-12

## 2023-10-25 DIAGNOSIS — I63.20 CEREBROVASCULAR ACCIDENT (CVA) DUE TO STENOSIS OF PRECEREBRAL ARTERY (HCC): ICD-10-CM

## 2023-10-25 RX ORDER — CLOPIDOGREL BISULFATE 75 MG/1
75 TABLET ORAL DAILY
Qty: 30 TABLET | Refills: 0 | Status: SHIPPED | OUTPATIENT
Start: 2023-10-25

## 2023-10-30 DIAGNOSIS — Z86.73 HISTORY OF CVA (CEREBROVASCULAR ACCIDENT): ICD-10-CM

## 2023-10-30 RX ORDER — ZINC SULFATE 50(220)MG
CAPSULE ORAL
Qty: 31 CAPSULE | Refills: 10 | Status: SHIPPED | OUTPATIENT
Start: 2023-10-30

## 2023-10-30 NOTE — TELEPHONE ENCOUNTER
Requested Prescriptions     Pending Prescriptions Disp Refills    Zinc 220 (50 Zn) MG CAPS [Pharmacy Med Name: ZINC 220 (50 ZN) MG CAPS 220 (50 ZN) Capsule] 31 capsule 10     Sig: TAKE 1 CAPSULE BY MOUTH IN THE MORNING       Next appt is Visit date not found  Last appt was 6/7/2023

## 2023-11-08 NOTE — TELEPHONE ENCOUNTER
Arterial Line    Performed by: Anselmo Middleton M.D.  Authorized by: Anselmo Middleton M.D.    Start Time:  11/8/2023 8:02 AM  End Time:  11/8/2023 8:05 AM  Localization: ultrasound guidance and surface landmarks    Patient Location:  OR  Indication: continuous blood pressure monitoring        Catheter Size:  20 G  Seldinger Technique?: Yes    Laterality:  Right  Site:  Radial artery  Line Secured:  Antimicrobial disc, tape and transparent dressing  Events: patient tolerated procedure well with no complications         Fax from [facility], Fitoly Kauffman is using OTC Zeasorb AF Powder BID under breast and abdominal fold. Ineffective for abdominal fold. Red excoriated rash with foul odor. Any suggestions ? Please advise.

## 2023-11-15 DIAGNOSIS — Z86.73 HISTORY OF CVA (CEREBROVASCULAR ACCIDENT): ICD-10-CM

## 2023-11-15 RX ORDER — ASPIRIN 81 MG/1
TABLET, COATED ORAL
Qty: 30 TABLET | Refills: 0 | Status: SHIPPED | OUTPATIENT
Start: 2023-11-15

## 2023-11-15 RX ORDER — CHOLECALCIFEROL TAB 125 MCG (5000 UNIT) 125 MCG (5000 UT)
TAB
Qty: 30 TABLET | Refills: 0 | Status: SHIPPED | OUTPATIENT
Start: 2023-11-15

## 2023-11-20 DIAGNOSIS — K20.90 ESOPHAGITIS: ICD-10-CM

## 2023-11-20 RX ORDER — FAMOTIDINE 20 MG/1
20 TABLET, FILM COATED ORAL 2 TIMES DAILY
Qty: 32 TABLET | Refills: 0 | Status: SHIPPED | OUTPATIENT
Start: 2023-11-20

## 2023-11-30 ENCOUNTER — OFFICE VISIT (OUTPATIENT)
Dept: FAMILY MEDICINE CLINIC | Age: 63
End: 2023-11-30
Payer: MEDICARE

## 2023-11-30 VITALS
BODY MASS INDEX: 31.72 KG/M2 | SYSTOLIC BLOOD PRESSURE: 120 MMHG | OXYGEN SATURATION: 94 % | TEMPERATURE: 97.5 F | HEIGHT: 66 IN | HEART RATE: 80 BPM | RESPIRATION RATE: 16 BRPM | DIASTOLIC BLOOD PRESSURE: 72 MMHG

## 2023-11-30 DIAGNOSIS — L97.511 DIABETIC ULCER OF TOE OF RIGHT FOOT ASSOCIATED WITH TYPE 2 DIABETES MELLITUS, LIMITED TO BREAKDOWN OF SKIN (HCC): ICD-10-CM

## 2023-11-30 DIAGNOSIS — E11.49 TYPE 2 DIABETES MELLITUS WITH OTHER NEUROLOGIC COMPLICATION, WITHOUT LONG-TERM CURRENT USE OF INSULIN (HCC): Primary | ICD-10-CM

## 2023-11-30 DIAGNOSIS — R53.1 GENERALIZED WEAKNESS: ICD-10-CM

## 2023-11-30 DIAGNOSIS — E11.621 DIABETIC ULCER OF TOE OF RIGHT FOOT ASSOCIATED WITH TYPE 2 DIABETES MELLITUS, LIMITED TO BREAKDOWN OF SKIN (HCC): ICD-10-CM

## 2023-11-30 DIAGNOSIS — G30.9 ALZHEIMER'S DISEASE, UNSPECIFIED (CODE) (HCC): ICD-10-CM

## 2023-11-30 PROCEDURE — 3017F COLORECTAL CA SCREEN DOC REV: CPT | Performed by: FAMILY MEDICINE

## 2023-11-30 PROCEDURE — 3074F SYST BP LT 130 MM HG: CPT | Performed by: FAMILY MEDICINE

## 2023-11-30 PROCEDURE — G8427 DOCREV CUR MEDS BY ELIG CLIN: HCPCS | Performed by: FAMILY MEDICINE

## 2023-11-30 PROCEDURE — 99214 OFFICE O/P EST MOD 30 MIN: CPT | Performed by: FAMILY MEDICINE

## 2023-11-30 PROCEDURE — 1036F TOBACCO NON-USER: CPT | Performed by: FAMILY MEDICINE

## 2023-11-30 PROCEDURE — G8484 FLU IMMUNIZE NO ADMIN: HCPCS | Performed by: FAMILY MEDICINE

## 2023-11-30 PROCEDURE — 3044F HG A1C LEVEL LT 7.0%: CPT | Performed by: FAMILY MEDICINE

## 2023-11-30 PROCEDURE — G8417 CALC BMI ABV UP PARAM F/U: HCPCS | Performed by: FAMILY MEDICINE

## 2023-11-30 PROCEDURE — 3078F DIAST BP <80 MM HG: CPT | Performed by: FAMILY MEDICINE

## 2023-11-30 PROCEDURE — 2022F DILAT RTA XM EVC RTNOPTHY: CPT | Performed by: FAMILY MEDICINE

## 2023-11-30 ASSESSMENT — ENCOUNTER SYMPTOMS
COUGH: 0
CONSTIPATION: 0
BLOOD IN STOOL: 0
VOMITING: 0
SHORTNESS OF BREATH: 0
NAUSEA: 0
DIARRHEA: 0
ABDOMINAL PAIN: 0
WHEEZING: 0

## 2023-12-02 DIAGNOSIS — I63.20 CEREBROVASCULAR ACCIDENT (CVA) DUE TO STENOSIS OF PRECEREBRAL ARTERY (HCC): ICD-10-CM

## 2023-12-02 DIAGNOSIS — K20.90 ESOPHAGITIS: ICD-10-CM

## 2023-12-04 RX ORDER — CLOPIDOGREL BISULFATE 75 MG/1
75 TABLET ORAL DAILY
Qty: 30 TABLET | Refills: 0 | Status: SHIPPED | OUTPATIENT
Start: 2023-12-04

## 2023-12-04 RX ORDER — FAMOTIDINE 20 MG/1
20 TABLET, FILM COATED ORAL 2 TIMES DAILY
Qty: 30 TABLET | Refills: 0 | Status: SHIPPED | OUTPATIENT
Start: 2023-12-04

## 2023-12-26 DIAGNOSIS — K20.90 ESOPHAGITIS: ICD-10-CM

## 2023-12-26 RX ORDER — FAMOTIDINE 20 MG/1
20 TABLET, FILM COATED ORAL 2 TIMES DAILY
Qty: 30 TABLET | Refills: 2 | Status: SHIPPED | OUTPATIENT
Start: 2023-12-26

## 2023-12-26 NOTE — TELEPHONE ENCOUNTER
Requested Prescriptions     Pending Prescriptions Disp Refills    famotidine (PEPCID) 20 MG tablet 30 tablet 2     Sig: Take 1 tablet by mouth 2 times daily       Next appt is Visit date not found  Last appt was 11/30/2023

## 2024-01-09 DIAGNOSIS — I63.20 CEREBROVASCULAR ACCIDENT (CVA) DUE TO STENOSIS OF PRECEREBRAL ARTERY (HCC): ICD-10-CM

## 2024-01-09 RX ORDER — CLOPIDOGREL BISULFATE 75 MG/1
75 TABLET ORAL DAILY
Qty: 90 TABLET | Refills: 0 | Status: SHIPPED | OUTPATIENT
Start: 2024-01-09

## 2024-01-09 NOTE — TELEPHONE ENCOUNTER
Titus Herbert called for refill request for plavix, vitamin D3 and aspirin.  Last appointment patient reported only taking plavix.        Requested Prescriptions     Pending Prescriptions Disp Refills    clopidogrel (PLAVIX) 75 MG tablet 30 tablet 0     Sig: Take 1 tablet by mouth daily       Next appt is Visit date not found  Last appt was 11/30/2023

## 2024-02-01 DIAGNOSIS — Z86.73 HISTORY OF CVA (CEREBROVASCULAR ACCIDENT): ICD-10-CM

## 2024-02-01 RX ORDER — ASPIRIN 81 MG/1
TABLET ORAL
Qty: 90 TABLET | Refills: 0 | Status: SHIPPED | OUTPATIENT
Start: 2024-02-01

## 2024-02-01 NOTE — TELEPHONE ENCOUNTER
Requested Prescriptions     Pending Prescriptions Disp Refills    aspirin (ASPIRIN LOW DOSE) 81 MG EC tablet 90 tablet 0     Sig: TAKE ONE(1) TABLET BY MOUTH ONCE DAILY **DO NOT CRUSH**       Next appt is Visit date not found  Last appt was 11/30/2023

## 2024-03-06 ENCOUNTER — OFFICE VISIT (OUTPATIENT)
Dept: FAMILY MEDICINE CLINIC | Age: 64
End: 2024-03-06
Payer: MEDICARE

## 2024-03-06 VITALS
TEMPERATURE: 97.3 F | OXYGEN SATURATION: 98 % | RESPIRATION RATE: 16 BRPM | SYSTOLIC BLOOD PRESSURE: 140 MMHG | DIASTOLIC BLOOD PRESSURE: 82 MMHG | HEART RATE: 73 BPM | HEIGHT: 66 IN | WEIGHT: 202.2 LBS | BODY MASS INDEX: 32.5 KG/M2

## 2024-03-06 DIAGNOSIS — Z94.4 STATUS POST LIVER TRANSPLANTATION (HCC): ICD-10-CM

## 2024-03-06 DIAGNOSIS — G30.9 ALZHEIMER'S DISEASE, UNSPECIFIED (CODE) (HCC): ICD-10-CM

## 2024-03-06 DIAGNOSIS — I69.354 HEMIPARESIS AFFECTING LEFT SIDE AS LATE EFFECT OF CEREBROVASCULAR ACCIDENT (HCC): ICD-10-CM

## 2024-03-06 DIAGNOSIS — E11.49 TYPE 2 DIABETES MELLITUS WITH OTHER NEUROLOGIC COMPLICATION, WITHOUT LONG-TERM CURRENT USE OF INSULIN (HCC): ICD-10-CM

## 2024-03-06 DIAGNOSIS — Z00.00 MEDICARE ANNUAL WELLNESS VISIT, SUBSEQUENT: Primary | ICD-10-CM

## 2024-03-06 DIAGNOSIS — J44.9 CHRONIC OBSTRUCTIVE PULMONARY DISEASE, UNSPECIFIED COPD TYPE (HCC): ICD-10-CM

## 2024-03-06 DIAGNOSIS — D84.9 IMMUNOSUPPRESSED STATUS (HCC): ICD-10-CM

## 2024-03-06 DIAGNOSIS — N18.31 STAGE 3A CHRONIC KIDNEY DISEASE (HCC): ICD-10-CM

## 2024-03-06 DIAGNOSIS — Z00.00 ENCOUNTER FOR ANNUAL WELLNESS VISIT (AWV) IN MEDICARE PATIENT: ICD-10-CM

## 2024-03-06 DIAGNOSIS — L97.511 DIABETIC ULCER OF TOE OF RIGHT FOOT ASSOCIATED WITH TYPE 2 DIABETES MELLITUS, LIMITED TO BREAKDOWN OF SKIN (HCC): ICD-10-CM

## 2024-03-06 DIAGNOSIS — E11.621 DIABETIC ULCER OF TOE OF RIGHT FOOT ASSOCIATED WITH TYPE 2 DIABETES MELLITUS, LIMITED TO BREAKDOWN OF SKIN (HCC): ICD-10-CM

## 2024-03-06 DIAGNOSIS — N18.32 STAGE 3B CHRONIC KIDNEY DISEASE (HCC): ICD-10-CM

## 2024-03-06 PROCEDURE — G0439 PPPS, SUBSEQ VISIT: HCPCS | Performed by: FAMILY MEDICINE

## 2024-03-06 PROCEDURE — 3017F COLORECTAL CA SCREEN DOC REV: CPT | Performed by: FAMILY MEDICINE

## 2024-03-06 PROCEDURE — 3077F SYST BP >= 140 MM HG: CPT | Performed by: FAMILY MEDICINE

## 2024-03-06 PROCEDURE — 3046F HEMOGLOBIN A1C LEVEL >9.0%: CPT | Performed by: FAMILY MEDICINE

## 2024-03-06 PROCEDURE — G8484 FLU IMMUNIZE NO ADMIN: HCPCS | Performed by: FAMILY MEDICINE

## 2024-03-06 PROCEDURE — 3079F DIAST BP 80-89 MM HG: CPT | Performed by: FAMILY MEDICINE

## 2024-03-06 SDOH — ECONOMIC STABILITY: FOOD INSECURITY: WITHIN THE PAST 12 MONTHS, THE FOOD YOU BOUGHT JUST DIDN'T LAST AND YOU DIDN'T HAVE MONEY TO GET MORE.: NEVER TRUE

## 2024-03-06 SDOH — ECONOMIC STABILITY: INCOME INSECURITY: HOW HARD IS IT FOR YOU TO PAY FOR THE VERY BASICS LIKE FOOD, HOUSING, MEDICAL CARE, AND HEATING?: NOT HARD AT ALL

## 2024-03-06 SDOH — ECONOMIC STABILITY: FOOD INSECURITY: WITHIN THE PAST 12 MONTHS, YOU WORRIED THAT YOUR FOOD WOULD RUN OUT BEFORE YOU GOT MONEY TO BUY MORE.: NEVER TRUE

## 2024-03-06 ASSESSMENT — LIFESTYLE VARIABLES
HOW OFTEN DO YOU HAVE A DRINK CONTAINING ALCOHOL: NEVER
HOW MANY STANDARD DRINKS CONTAINING ALCOHOL DO YOU HAVE ON A TYPICAL DAY: PATIENT DOES NOT DRINK

## 2024-03-06 ASSESSMENT — PATIENT HEALTH QUESTIONNAIRE - PHQ9
SUM OF ALL RESPONSES TO PHQ QUESTIONS 1-9: 1
SUM OF ALL RESPONSES TO PHQ9 QUESTIONS 1 & 2: 1
SUM OF ALL RESPONSES TO PHQ QUESTIONS 1-9: 1
2. FEELING DOWN, DEPRESSED OR HOPELESS: 0
SUM OF ALL RESPONSES TO PHQ QUESTIONS 1-9: 1
SUM OF ALL RESPONSES TO PHQ QUESTIONS 1-9: 1
1. LITTLE INTEREST OR PLEASURE IN DOING THINGS: 1

## 2024-03-06 NOTE — PATIENT INSTRUCTIONS
step you can take to protect your heart. It is never too late to quit.     Limit alcohol to 2 drinks a day for men and 1 drink a day for women. Too much alcohol can cause health problems.     Manage other health problems such as diabetes, high blood pressure, and high cholesterol. If you think you may have a problem with alcohol or drug use, talk to your doctor.   Medicines    Take your medicines exactly as prescribed. Call your doctor if you think you are having a problem with your medicine.     If your doctor recommends aspirin, take the amount directed each day. Make sure you take aspirin and not another kind of pain reliever, such as acetaminophen (Tylenol).   When should you call for help?   Call 911 if you have symptoms of a heart attack. These may include:    Chest pain or pressure, or a strange feeling in the chest.     Sweating.     Shortness of breath.     Pain, pressure, or a strange feeling in the back, neck, jaw, or upper belly or in one or both shoulders or arms.     Lightheadedness or sudden weakness.     A fast or irregular heartbeat.   After you call 911, the  may tell you to chew 1 adult-strength or 2 to 4 low-dose aspirin. Wait for an ambulance. Do not try to drive yourself.  Watch closely for changes in your health, and be sure to contact your doctor if you have any problems.  Where can you learn more?  Go to https://www.[employer].net/patientEd and enter F075 to learn more about \"A Healthy Heart: Care Instructions.\"  Current as of: June 25, 2023               Content Version: 13.9  © 2006-2023 [employer].   Care instructions adapted under license by [employer]. If you have questions about a medical condition or this instruction, always ask your healthcare professional. [employer] disclaims any warranty or liability for your use of this information.      Personalized Preventive Plan for Olena Castrejon - 3/6/2024  Medicare offers a range of preventive health

## 2024-03-06 NOTE — PROGRESS NOTES
Pablo Alatorre MD (Neurosurgery)  Vega Engel MD (Cardiology)  Shiloh Joel MD (Psychiatry)     Reviewed and updated this visit:  Tobacco  Allergies  Meds  Med Hx  Surg Hx  Soc Hx  Fam Hx

## 2024-03-11 ENCOUNTER — TELEPHONE (OUTPATIENT)
Dept: FAMILY MEDICINE CLINIC | Age: 64
End: 2024-03-11

## 2024-03-11 NOTE — TELEPHONE ENCOUNTER
Jian Gaxiola head of nursing at [facility] called on Friday to discuss concerns with our mutual patient. She mentioned how her behavior is inappropriate towards staff and patient is all around mean to staff. Jian was questioning if Dr. Napier was able to read any of the notes they have sent over. I advised Jian that I would run this over with Patient's PCP. Jian was also requesting that we place an order for psych.

## 2024-04-02 DIAGNOSIS — I63.20 CEREBROVASCULAR ACCIDENT (CVA) DUE TO STENOSIS OF PRECEREBRAL ARTERY (HCC): ICD-10-CM

## 2024-04-03 RX ORDER — CHOLECALCIFEROL TAB 125 MCG (5000 UNIT) 125 MCG (5000 UT)
1 TAB DAILY
Qty: 18 TABLET | Refills: 2 | Status: SHIPPED | OUTPATIENT
Start: 2024-04-03

## 2024-04-03 RX ORDER — CLOPIDOGREL BISULFATE 75 MG/1
75 TABLET ORAL DAILY
Qty: 31 TABLET | Refills: 2 | Status: SHIPPED | OUTPATIENT
Start: 2024-04-03

## 2024-04-08 DIAGNOSIS — K20.90 ESOPHAGITIS: ICD-10-CM

## 2024-04-08 DIAGNOSIS — Z86.73 HISTORY OF CVA (CEREBROVASCULAR ACCIDENT): ICD-10-CM

## 2024-04-09 RX ORDER — FAMOTIDINE 20 MG/1
20 TABLET, FILM COATED ORAL 2 TIMES DAILY
Qty: 60 TABLET | Refills: 2 | Status: SHIPPED | OUTPATIENT
Start: 2024-04-09

## 2024-04-09 RX ORDER — ASPIRIN 81 MG/1
TABLET ORAL
Qty: 30 TABLET | Refills: 2 | Status: SHIPPED | OUTPATIENT
Start: 2024-04-09

## 2024-04-10 RX ORDER — AMLODIPINE BESYLATE 5 MG/1
TABLET ORAL
Qty: 30 TABLET | Refills: 2 | Status: SHIPPED | OUTPATIENT
Start: 2024-04-10

## 2024-05-20 ENCOUNTER — TELEPHONE (OUTPATIENT)
Dept: FAMILY MEDICINE CLINIC | Age: 64
End: 2024-05-20

## 2024-05-20 NOTE — TELEPHONE ENCOUNTER
Unable to reach pt via phone. Left message for pt to call the office regarding scheduling an appointment for a Blood Pressure check.     Electronically signed by RADHA SMITH MA on 5/20/24 at 11:00 AM EDT

## 2024-05-21 NOTE — TELEPHONE ENCOUNTER
2nd attempt: Unable to reach pt via phone. Left message for pt to call the office regarding scheduling an appointment for a Blood Pressure check.     Electronically signed by RADHA SMITH MA on 5/21/24 at 8:57 AM EDT

## 2024-05-22 NOTE — TELEPHONE ENCOUNTER
3rd attempt: Unable to reach pt via phone. Left message for pt to call the office regarding scheduling an appointment for a Blood Pressure check.     Letter sent to pt.    Electronically signed by RAHDA SMITH MA on 5/22/24 at 9:19 AM EDT

## 2024-06-06 ENCOUNTER — TELEPHONE (OUTPATIENT)
Dept: FAMILY MEDICINE CLINIC | Age: 64
End: 2024-06-06

## 2024-06-06 NOTE — TELEPHONE ENCOUNTER
Nurse from facility patient resides at called to schedule an appointment for bilateral leg edema. Patient only requests to see Dr. Napier. Patient is scheduled for 6/10/27.

## 2024-06-10 ENCOUNTER — OFFICE VISIT (OUTPATIENT)
Dept: FAMILY MEDICINE CLINIC | Age: 64
End: 2024-06-10
Payer: MEDICARE

## 2024-06-10 VITALS
SYSTOLIC BLOOD PRESSURE: 118 MMHG | TEMPERATURE: 97.3 F | DIASTOLIC BLOOD PRESSURE: 74 MMHG | OXYGEN SATURATION: 97 % | RESPIRATION RATE: 16 BRPM | HEART RATE: 66 BPM

## 2024-06-10 DIAGNOSIS — Z94.4 STATUS POST LIVER TRANSPLANTATION (HCC): Chronic | ICD-10-CM

## 2024-06-10 DIAGNOSIS — G30.9 ALZHEIMER'S DISEASE, UNSPECIFIED (CODE) (HCC): Primary | ICD-10-CM

## 2024-06-10 DIAGNOSIS — K21.9 GASTROESOPHAGEAL REFLUX DISEASE, UNSPECIFIED WHETHER ESOPHAGITIS PRESENT: Chronic | ICD-10-CM

## 2024-06-10 DIAGNOSIS — N18.31 STAGE 3A CHRONIC KIDNEY DISEASE (HCC): Chronic | ICD-10-CM

## 2024-06-10 PROCEDURE — 3078F DIAST BP <80 MM HG: CPT | Performed by: FAMILY MEDICINE

## 2024-06-10 PROCEDURE — G8427 DOCREV CUR MEDS BY ELIG CLIN: HCPCS | Performed by: FAMILY MEDICINE

## 2024-06-10 PROCEDURE — G8417 CALC BMI ABV UP PARAM F/U: HCPCS | Performed by: FAMILY MEDICINE

## 2024-06-10 PROCEDURE — 1036F TOBACCO NON-USER: CPT | Performed by: FAMILY MEDICINE

## 2024-06-10 PROCEDURE — 3017F COLORECTAL CA SCREEN DOC REV: CPT | Performed by: FAMILY MEDICINE

## 2024-06-10 PROCEDURE — 3074F SYST BP LT 130 MM HG: CPT | Performed by: FAMILY MEDICINE

## 2024-06-10 PROCEDURE — 99214 OFFICE O/P EST MOD 30 MIN: CPT | Performed by: FAMILY MEDICINE

## 2024-06-10 ASSESSMENT — PATIENT HEALTH QUESTIONNAIRE - PHQ9
SUM OF ALL RESPONSES TO PHQ9 QUESTIONS 1 & 2: 0
1. LITTLE INTEREST OR PLEASURE IN DOING THINGS: NOT AT ALL
SUM OF ALL RESPONSES TO PHQ QUESTIONS 1-9: 0
SUM OF ALL RESPONSES TO PHQ QUESTIONS 1-9: 0
2. FEELING DOWN, DEPRESSED OR HOPELESS: NOT AT ALL
SUM OF ALL RESPONSES TO PHQ QUESTIONS 1-9: 0
SUM OF ALL RESPONSES TO PHQ QUESTIONS 1-9: 0

## 2024-06-10 ASSESSMENT — ENCOUNTER SYMPTOMS
PHOTOPHOBIA: 0
COUGH: 0

## 2024-06-10 NOTE — PROGRESS NOTES
Olena Castrejon (:  1960) is a 63 y.o. female,Established patient, here for evaluation of the following chief complaint(s):  Leg Swelling (Bilateral leg swelling x2 months/Patient requesting clearance to walk again) and Health Maintenance (Mammogram due- patient wants to wait to see what transportation she will have)      Assessment & Plan   ASSESSMENT/PLAN:  1. Alzheimer's disease, unspecified stable recheck 3 months. Mental status unchanged.  2. Gastroesophageal reflux disease, unspecified whether esophagitis present  3. Stage 3a chronic kidney disease (HCC) check Q 6 mon  4. Status post liver transplantation (HCC) Stable no rejection      No follow-ups on file.         Subjective   SUBJECTIVE/OBJECTIVE:  Leg Swelling (Bilateral leg swelling x2 months/Patient requesting clearance to walk again) and Health Maintenance (Mammogram due- patient wants to wait to see what transportation she will have)          Review of Systems   Constitutional:  Negative for diaphoresis.   HENT:  Negative for hearing loss and tinnitus.    Eyes:  Negative for photophobia.   Respiratory:  Negative for cough.    Cardiovascular:  Negative for chest pain and leg swelling.   Genitourinary:  Negative for urgency.   Musculoskeletal:  Negative for myalgias.   Skin:  Negative for rash.   Neurological:  Negative for dizziness, weakness and headaches.   Hematological:  Does not bruise/bleed easily.          Objective   /74   Pulse 66   Temp 97.3 °F (36.3 °C)   Resp 16   LMP  (LMP Unknown)   SpO2 97%   Lab Results   Component Value Date    LABA1C 6.8 (H) 2023    LABA1C 6.5 2023    LABA1C 8.0 2023     Physical Exam  Constitutional:       General: She is not in acute distress.     Appearance: She is well-developed.   HENT:      Head: Atraumatic.   Eyes:      General: No scleral icterus.  Neck:      Thyroid: No thyromegaly.      Vascular: No JVD.      Trachea: No tracheal deviation.   Cardiovascular:      Heart

## 2024-06-11 ENCOUNTER — APPOINTMENT (OUTPATIENT)
Dept: GENERAL RADIOLOGY | Age: 64
End: 2024-06-11
Payer: MEDICARE

## 2024-06-11 ENCOUNTER — TELEPHONE (OUTPATIENT)
Dept: FAMILY MEDICINE CLINIC | Age: 64
End: 2024-06-11

## 2024-06-11 ENCOUNTER — HOSPITAL ENCOUNTER (EMERGENCY)
Age: 64
Discharge: HOME | End: 2024-06-11
Attending: EMERGENCY MEDICINE
Payer: MEDICARE

## 2024-06-11 ENCOUNTER — APPOINTMENT (OUTPATIENT)
Dept: ULTRASOUND IMAGING | Age: 64
End: 2024-06-11
Payer: MEDICARE

## 2024-06-11 VITALS
DIASTOLIC BLOOD PRESSURE: 73 MMHG | HEIGHT: 65 IN | SYSTOLIC BLOOD PRESSURE: 156 MMHG | BODY MASS INDEX: 26.66 KG/M2 | WEIGHT: 160 LBS | TEMPERATURE: 97.8 F | OXYGEN SATURATION: 95 % | HEART RATE: 65 BPM | RESPIRATION RATE: 16 BRPM

## 2024-06-11 DIAGNOSIS — R60.0 LOWER EXTREMITY EDEMA: ICD-10-CM

## 2024-06-11 DIAGNOSIS — E87.5 HYPERKALEMIA: Primary | ICD-10-CM

## 2024-06-11 LAB
ALBUMIN SERPL-MCNC: 4 G/DL (ref 3.5–5.2)
ALP SERPL-CCNC: 160 U/L (ref 35–104)
ALT SERPL-CCNC: 13 U/L (ref 0–32)
ANION GAP SERPL CALCULATED.3IONS-SCNC: 11 MMOL/L (ref 7–16)
ANION GAP SERPL CALCULATED.3IONS-SCNC: 12 MMOL/L (ref 7–16)
ANION GAP SERPL CALCULATED.3IONS-SCNC: 12 MMOL/L (ref 7–16)
AST SERPL-CCNC: 26 U/L (ref 0–31)
BASOPHILS # BLD: 0.05 K/UL (ref 0–0.2)
BASOPHILS NFR BLD: 1 % (ref 0–2)
BILIRUB SERPL-MCNC: 0.2 MG/DL (ref 0–1.2)
BNP SERPL-MCNC: 338 PG/ML (ref 0–125)
BUN SERPL-MCNC: 28 MG/DL (ref 6–23)
BUN SERPL-MCNC: 29 MG/DL (ref 6–23)
BUN SERPL-MCNC: 31 MG/DL (ref 6–23)
CALCIUM SERPL-MCNC: 8.7 MG/DL (ref 8.6–10.2)
CALCIUM SERPL-MCNC: 8.8 MG/DL (ref 8.6–10.2)
CALCIUM SERPL-MCNC: 8.9 MG/DL (ref 8.6–10.2)
CHLORIDE SERPL-SCNC: 107 MMOL/L (ref 98–107)
CHLORIDE SERPL-SCNC: 108 MMOL/L (ref 98–107)
CHLORIDE SERPL-SCNC: 108 MMOL/L (ref 98–107)
CK SERPL-CCNC: 80 U/L (ref 20–180)
CO2 SERPL-SCNC: 21 MMOL/L (ref 22–29)
CO2 SERPL-SCNC: 22 MMOL/L (ref 22–29)
CO2 SERPL-SCNC: 23 MMOL/L (ref 22–29)
CREAT SERPL-MCNC: 1.4 MG/DL (ref 0.5–1)
EOSINOPHIL # BLD: 0.25 K/UL (ref 0.05–0.5)
EOSINOPHILS RELATIVE PERCENT: 4 % (ref 0–6)
ERYTHROCYTE [DISTWIDTH] IN BLOOD BY AUTOMATED COUNT: 12.8 % (ref 11.5–15)
GFR, ESTIMATED: 41 ML/MIN/1.73M2
GFR, ESTIMATED: 43 ML/MIN/1.73M2
GFR, ESTIMATED: 43 ML/MIN/1.73M2
GLUCOSE SERPL-MCNC: 196 MG/DL (ref 74–99)
GLUCOSE SERPL-MCNC: 196 MG/DL (ref 74–99)
GLUCOSE SERPL-MCNC: 217 MG/DL (ref 74–99)
HCT VFR BLD AUTO: 40.1 % (ref 34–48)
HGB BLD-MCNC: 13.4 G/DL (ref 11.5–15.5)
IMM GRANULOCYTES # BLD AUTO: <0.03 K/UL (ref 0–0.58)
IMM GRANULOCYTES NFR BLD: 0 % (ref 0–5)
LYMPHOCYTES NFR BLD: 1.2 K/UL (ref 1.5–4)
LYMPHOCYTES RELATIVE PERCENT: 20 % (ref 20–42)
MCH RBC QN AUTO: 31.2 PG (ref 26–35)
MCHC RBC AUTO-ENTMCNC: 33.4 G/DL (ref 32–34.5)
MCV RBC AUTO: 93.3 FL (ref 80–99.9)
MONOCYTES NFR BLD: 0.56 K/UL (ref 0.1–0.95)
MONOCYTES NFR BLD: 9 % (ref 2–12)
NEUTROPHILS NFR BLD: 66 % (ref 43–80)
NEUTS SEG NFR BLD: 4.06 K/UL (ref 1.8–7.3)
PLATELET # BLD AUTO: 249 K/UL (ref 130–450)
PMV BLD AUTO: 10.7 FL (ref 7–12)
POTASSIUM SERPL-SCNC: 5.1 MMOL/L (ref 3.5–5)
POTASSIUM SERPL-SCNC: 5.4 MMOL/L (ref 3.5–5)
POTASSIUM SERPL-SCNC: 5.9 MMOL/L (ref 3.5–5)
PROT SERPL-MCNC: 7.5 G/DL (ref 6.4–8.3)
RBC # BLD AUTO: 4.3 M/UL (ref 3.5–5.5)
SODIUM SERPL-SCNC: 141 MMOL/L (ref 132–146)
SODIUM SERPL-SCNC: 141 MMOL/L (ref 132–146)
SODIUM SERPL-SCNC: 142 MMOL/L (ref 132–146)
WBC OTHER # BLD: 6.1 K/UL (ref 4.5–11.5)

## 2024-06-11 PROCEDURE — 80053 COMPREHEN METABOLIC PANEL: CPT

## 2024-06-11 PROCEDURE — 82550 ASSAY OF CK (CPK): CPT

## 2024-06-11 PROCEDURE — 93005 ELECTROCARDIOGRAM TRACING: CPT | Performed by: EMERGENCY MEDICINE

## 2024-06-11 PROCEDURE — 85025 COMPLETE CBC W/AUTO DIFF WBC: CPT

## 2024-06-11 PROCEDURE — 2580000003 HC RX 258: Performed by: EMERGENCY MEDICINE

## 2024-06-11 PROCEDURE — 99285 EMERGENCY DEPT VISIT HI MDM: CPT

## 2024-06-11 PROCEDURE — 80048 BASIC METABOLIC PNL TOTAL CA: CPT

## 2024-06-11 PROCEDURE — 93970 EXTREMITY STUDY: CPT

## 2024-06-11 PROCEDURE — 83880 ASSAY OF NATRIURETIC PEPTIDE: CPT

## 2024-06-11 PROCEDURE — 71046 X-RAY EXAM CHEST 2 VIEWS: CPT

## 2024-06-11 RX ORDER — 0.9 % SODIUM CHLORIDE 0.9 %
500 INTRAVENOUS SOLUTION INTRAVENOUS ONCE
Status: COMPLETED | OUTPATIENT
Start: 2024-06-11 | End: 2024-06-11

## 2024-06-11 RX ADMIN — SODIUM CHLORIDE 500 ML: 9 INJECTION, SOLUTION INTRAVENOUS at 17:36

## 2024-06-11 ASSESSMENT — LIFESTYLE VARIABLES: HOW OFTEN DO YOU HAVE A DRINK CONTAINING ALCOHOL: NEVER

## 2024-06-11 ASSESSMENT — PAIN SCALES - GENERAL
PAINLEVEL_OUTOF10: 4
PAINLEVEL_OUTOF10: 7

## 2024-06-11 ASSESSMENT — PAIN - FUNCTIONAL ASSESSMENT
PAIN_FUNCTIONAL_ASSESSMENT: 0-10
PAIN_FUNCTIONAL_ASSESSMENT: NONE - DENIES PAIN

## 2024-06-11 ASSESSMENT — PAIN DESCRIPTION - ORIENTATION
ORIENTATION: RIGHT;LEFT
ORIENTATION: RIGHT;LEFT

## 2024-06-11 ASSESSMENT — PAIN DESCRIPTION - DESCRIPTORS
DESCRIPTORS: THROBBING
DESCRIPTORS: PRESSURE

## 2024-06-11 ASSESSMENT — PAIN DESCRIPTION - LOCATION
LOCATION: FOOT;LEG
LOCATION: LEG;FOOT

## 2024-06-11 ASSESSMENT — PAIN DESCRIPTION - PAIN TYPE
TYPE: ACUTE PAIN;CHRONIC PAIN
TYPE: ACUTE PAIN;CHRONIC PAIN

## 2024-06-11 NOTE — ED PROVIDER NOTES
[facility] EMERGENCY DEPARTMENT  EMERGENCY DEPARTMENT ENCOUNTER        Pt Name: Olena Castrejon  MRN: [id number]  Birthdate 1960  Date of evaluation: 2024  Provider: Mira Cabello DO  PCP: Rodger Napier DO  Note Started: 1:21 PM EDT 24    CHIEF COMPLAINT       Chief Complaint   Patient presents with    Leg Swelling     BLE edema.  Saw PCP yesterday w no new orders       HISTORY OF PRESENT ILLNESS: 1 or more Elements   History From: patient    Limitations to history : None    Olena Castrejon is a 63 y.o. female who presents with bilateral lower extremity edema beginning 3 months ago.   The complaint has been persistent, moderate in severity, and worsened by nothing.  She was seen by her PCP yesterday but nothing was ordered or done.  Patient denies fever/chills, sore throat, cough, congestion, chest pain, shortness of breath, headache, visual disturbances, focal paresthesias, focal weakness, abdominal pain, nausea, vomiting, diarrhea, constipation, dysuria, hematuria, trauma, neck or back pain, rash or other complaints.          Nursing Notes were all reviewed and agreed with or any disagreements were addressed in the HPI.    REVIEW OF SYSTEMS :           Positives and Pertinent negatives as per HPI.     SURGICAL HISTORY     Past Surgical History:   Procedure Laterality Date    BRAIN SURGERY      CARDIAC CATHETERIZATION      cerebral angiogrem to check cavernous malformation in head - negative    CARDIAC SURGERY  2014    heart cath     SECTION      x 2     SECTION   and     COLONOSCOPY      ECHO COMPL W DOP COLOR FLOW  2012         ENDOSCOPY, COLON, DIAGNOSTIC      ESOPHAGEAL VARICE LIGATION      banding    LIVER TRANSPLANT      OTHER SURGICAL HISTORY Right 16    Right Knee Arthroscopy with Debridement partial lateral menisectomy, chondroplasty, synovectomy       CURRENTMEDICATIONS       Previous Medications

## 2024-06-11 NOTE — TELEPHONE ENCOUNTER
[facility] called with concerns of patient having the bilateral leg swelling with both legs being warm to touch. I advised the nurse with [facility] that patient needs to be seen at the emergency room to be further evaluated. Nurse verbalized understanding.

## 2024-06-12 ENCOUNTER — HOSPITAL ENCOUNTER (EMERGENCY)
Age: 64
Discharge: HOME | End: 2024-06-12
Attending: EMERGENCY MEDICINE
Payer: MEDICARE

## 2024-06-12 VITALS
WEIGHT: 140 LBS | SYSTOLIC BLOOD PRESSURE: 128 MMHG | OXYGEN SATURATION: 95 % | HEART RATE: 78 BPM | TEMPERATURE: 98.5 F | RESPIRATION RATE: 14 BRPM | BODY MASS INDEX: 22.5 KG/M2 | HEIGHT: 66 IN | DIASTOLIC BLOOD PRESSURE: 70 MMHG

## 2024-06-12 DIAGNOSIS — I87.2 STASIS DERMATITIS OF BOTH LEGS: ICD-10-CM

## 2024-06-12 DIAGNOSIS — R60.0 BILATERAL LOWER EXTREMITY EDEMA: Primary | ICD-10-CM

## 2024-06-12 LAB
ALBUMIN SERPL-MCNC: 3.9 G/DL (ref 3.5–5.2)
ALP SERPL-CCNC: 146 U/L (ref 35–104)
ALT SERPL-CCNC: 14 U/L (ref 0–32)
ANION GAP SERPL CALCULATED.3IONS-SCNC: 10 MMOL/L (ref 7–16)
AST SERPL-CCNC: 15 U/L (ref 0–31)
BILIRUB SERPL-MCNC: 0.4 MG/DL (ref 0–1.2)
BUN SERPL-MCNC: 28 MG/DL (ref 6–23)
CALCIUM SERPL-MCNC: 8.9 MG/DL (ref 8.6–10.2)
CHLORIDE SERPL-SCNC: 108 MMOL/L (ref 98–107)
CO2 SERPL-SCNC: 23 MMOL/L (ref 22–29)
CREAT SERPL-MCNC: 1.4 MG/DL (ref 0.5–1)
EKG ATRIAL RATE: 67 BPM
EKG P AXIS: 22 DEGREES
EKG P-R INTERVAL: 150 MS
EKG Q-T INTERVAL: 396 MS
EKG QRS DURATION: 72 MS
EKG QTC CALCULATION (BAZETT): 418 MS
EKG R AXIS: 10 DEGREES
EKG T AXIS: 52 DEGREES
EKG VENTRICULAR RATE: 67 BPM
GFR, ESTIMATED: 43 ML/MIN/1.73M2
GLUCOSE SERPL-MCNC: 185 MG/DL (ref 74–99)
POTASSIUM SERPL-SCNC: 4.9 MMOL/L (ref 3.5–5)
PROT SERPL-MCNC: 6.8 G/DL (ref 6.4–8.3)
SODIUM SERPL-SCNC: 141 MMOL/L (ref 132–146)

## 2024-06-12 PROCEDURE — 93010 ELECTROCARDIOGRAM REPORT: CPT | Performed by: INTERNAL MEDICINE

## 2024-06-12 PROCEDURE — 80053 COMPREHEN METABOLIC PANEL: CPT

## 2024-06-12 PROCEDURE — 36415 COLL VENOUS BLD VENIPUNCTURE: CPT

## 2024-06-12 PROCEDURE — 99283 EMERGENCY DEPT VISIT LOW MDM: CPT

## 2024-06-12 ASSESSMENT — ENCOUNTER SYMPTOMS
SHORTNESS OF BREATH: 0
STRIDOR: 0
WHEEZING: 0

## 2024-06-12 NOTE — ED PROVIDER NOTES
[facility]  Department of Emergency Medicine   EM Physician DILIA Castrejon Olena 63 y.o. female PMHx of dementia, CVA, GERD, COPD, status post liver transplant, type 2 diabetes, chronic lymphedema presents to the ED c/o bilateral leg swelling concern by nurses that cellulitic.. Onset: Patient reports she has been ill with this for several weeks and just saw a doctor at [facility] yesterday but reports that she was never told any results about the visit yesterday. Location/Radiation: Bilateral lower legs. Duration: Persistent. Characterization: Swelling in bilateral lower legs. Aggravating Factors: None. Relieving Factors: None. Severity: Mild.  Patient denies any other associated symptoms..       She Denies: Fevers, chills, Roverto pain, palpitations, shortness of breath, history of CHF.           Review of Systems   Respiratory:  Negative for shortness of breath, wheezing and stridor.    Cardiovascular:  Positive for leg swelling. Negative for chest pain and palpitations.        Physical Exam  Constitutional:       Appearance: Normal appearance. She is obese. She is not ill-appearing.   HENT:      Head: Normocephalic.      Right Ear: External ear normal.      Left Ear: External ear normal.      Nose: Nose normal.      Mouth/Throat:      Mouth: Mucous membranes are moist.      Pharynx: Oropharynx is clear.   Eyes:      Pupils: Pupils are equal, round, and reactive to light.   Cardiovascular:      Rate and Rhythm: Normal rate and regular rhythm.      Pulses: Normal pulses.      Heart sounds: Normal heart sounds.   Pulmonary:      Effort: Pulmonary effort is normal. No respiratory distress.      Breath sounds: Normal breath sounds. No wheezing or rales.   Abdominal:      Palpations: Abdomen is soft.   Musculoskeletal:         General: Normal range of motion.      Cervical back: Normal range of motion and neck supple.   Skin:     General: Skin is

## 2024-06-13 NOTE — DISCHARGE INSTR - COC
22 Infection                        Nurse Assessment:  Last Vital Signs: /70   Pulse 78   Temp 98.5 °F (36.9 °C) (Oral)   Resp 14   Ht 1.676 m (5' 6\")   Wt 63.5 kg (140 lb)   LMP  (LMP Unknown)   SpO2 95%   BMI 22.60 kg/m²     Last documented pain score (0-10 scale):    Last Weight:   Wt Readings from Last 1 Encounters:   24 63.5 kg (140 lb)     Mental Status:  {IP PT MENTAL STATUS:}    IV Access:  { CARLIN IV ACCESS:932326583}    Nursing Mobility/ADLs:  Walking   {CHP DME ADLs:283241388}  Transfer  {CHP DME ADLs:643229868}  Bathing  {CHP DME ADLs:817256508}  Dressing  {CHP DME ADLs:684068564}  Toileting  {CHP DME ADLs:765595991}  Feeding  {CHP DME ADLs:121540110}  Med Admin  {P DME ADLs:988821357}  Med Delivery   { CARLIN MED Delivery:464881739}    Wound Care Documentation and Therapy:        Elimination:  Continence:   Bowel: {YES / NO:}  Bladder: {YES / NO:}  Urinary Catheter: {Urinary Catheter:122653105}   Colostomy/Ileostomy/Ileal Conduit: {YES / NO:}       Date of Last BM: ***  No intake or output data in the 24 hours ending 24  No intake/output data recorded.    Safety Concerns:     { CARLIN Safety Concerns:764426182}    Impairments/Disabilities:      {[facility] [place]airments/Disabilities:136768824}    Nutrition Therapy:  Current Nutrition Therapy:   { CARLIN Diet List:229592042}    Routes of Feeding: {CHP DME Other Feedings:959139273}  Liquids: {Slp liquid thickness:64211}  Daily Fluid Restriction: {CHP DME Yes amt example:300873679}  Last Modified Barium Swallow with Video (Video Swallowing Test): {Done Not Done Date:}    Treatments at the Time of Hospital Discharge:   Respiratory Treatments: ***  Oxygen Therapy:  {Therapy; copd oxygen:26594}  Ventilator:    { CC Vent List:213577195}    Rehab Therapies: {THERAPEUTIC INTERVENTION:7837294623}  Weight Bearing Status/Restrictions: {[facility] Weight Bearin}  Other Medical Equipment (for

## 2024-06-26 ENCOUNTER — TELEPHONE (OUTPATIENT)
Dept: FAMILY MEDICINE CLINIC | Age: 64
End: 2024-06-26

## 2024-06-26 NOTE — TELEPHONE ENCOUNTER
This MA attempted to reach pt. No answer. This MA left message for pt requesting return call to office regarding scheduling her mammogram.      Electronically signed by RADHA SMITH MA on 6/26/24 at 1:34 PM EDT

## 2024-06-27 DIAGNOSIS — I63.20 CEREBROVASCULAR ACCIDENT (CVA) DUE TO STENOSIS OF PRECEREBRAL ARTERY (HCC): ICD-10-CM

## 2024-06-27 RX ORDER — CLOPIDOGREL BISULFATE 75 MG/1
75 TABLET ORAL DAILY
Qty: 30 TABLET | Refills: 10 | Status: SHIPPED | OUTPATIENT
Start: 2024-06-27

## 2024-06-27 RX ORDER — CHOLECALCIFEROL TAB 125 MCG (5000 UNIT) 125 MCG (5000 UT)
1 TAB DAILY
Qty: 30 TABLET | Refills: 10 | Status: SHIPPED | OUTPATIENT
Start: 2024-06-27

## 2024-06-27 NOTE — TELEPHONE ENCOUNTER
Last seen 6/10/2024  Next appt Visit date not found    Requested Prescriptions     Pending Prescriptions Disp Refills    NATURAL VITAMIN D-3 125 MCG (5000 UT) TABS tablet [Pharmacy Med Name: VITAMIN D3 5,000U(125MCG) MCG Tablet] 30 tablet 10     Sig: TAKE 1 TABLET BY MOUTH DAILY    clopidogrel (PLAVIX) 75 MG tablet [Pharmacy Med Name: CLOPIDOGREL 75 MG TABLET 75 Tablet] 30 tablet 10     Sig: TAKE 1 TABLET BY MOUTH DAILY

## 2024-06-27 NOTE — TELEPHONE ENCOUNTER
Pt number has been incorrect letter send.    Electronically signed by RADHA SMITH MA on 6/27/24 at 2:49 PM EDT

## 2024-07-10 ENCOUNTER — TELEPHONE (OUTPATIENT)
Dept: FAMILY MEDICINE CLINIC | Age: 64
End: 2024-07-10

## 2024-07-10 DIAGNOSIS — R41.82 ALTERED MENTAL STATUS, UNSPECIFIED ALTERED MENTAL STATUS TYPE: Primary | ICD-10-CM

## 2024-07-10 NOTE — TELEPHONE ENCOUNTER
Patient refusing javon hose BLE continues, offered tubi  instead and patient refused - continues to refuse care and ADL's at intervals when angry. Requesting a UA culture and sensitivity due to altered mental status.    Please advise.

## 2024-07-17 RX ORDER — AMLODIPINE BESYLATE 5 MG/1
TABLET ORAL
Qty: 31 TABLET | Refills: 10 | Status: SHIPPED | OUTPATIENT
Start: 2024-07-17

## 2024-08-02 DIAGNOSIS — Z86.73 HISTORY OF CVA (CEREBROVASCULAR ACCIDENT): ICD-10-CM

## 2024-08-02 RX ORDER — ASPIRIN 81 MG/1
TABLET ORAL
Qty: 31 TABLET | Refills: 3 | Status: SHIPPED | OUTPATIENT
Start: 2024-08-02

## 2024-10-03 DIAGNOSIS — I10 HTN (HYPERTENSION), BENIGN: Chronic | ICD-10-CM

## 2024-10-03 NOTE — TELEPHONE ENCOUNTER
Requested Prescriptions     Pending Prescriptions Disp Refills    losartan (COZAAR) 100 MG tablet [Pharmacy Med Name: LOSARTAN 100 MG TABLET 100 Tablet] 30 tablet 2     Sig: TAKE ONE TAB BY MOUTH DAILY IN THE MORNING       Next appt is Visit date not found  Last appt was 6/10/2024

## 2024-10-07 RX ORDER — LOSARTAN POTASSIUM 100 MG/1
TABLET ORAL
Qty: 30 TABLET | Refills: 2 | Status: SHIPPED | OUTPATIENT
Start: 2024-10-07

## 2024-10-10 DIAGNOSIS — J30.1 SEASONAL ALLERGIC RHINITIS DUE TO POLLEN: ICD-10-CM

## 2024-10-11 RX ORDER — FLUTICASONE PROPIONATE 50 MCG
SPRAY, SUSPENSION (ML) NASAL
Qty: 16 G | Refills: 2 | Status: SHIPPED | OUTPATIENT
Start: 2024-10-11

## 2024-10-11 NOTE — TELEPHONE ENCOUNTER
Name of Medication(s) Requested:  Requested Prescriptions     Pending Prescriptions Disp Refills    fluticasone (FLONASE) 50 MCG/ACT nasal spray [Pharmacy Med Name: FLUTICASONE 0.05% NASAL SPR 50 MCG SPRY] 16 g 2     Sig: INSTILL TWO (2) SPRAYS IN EACH NOSTRIL ONCE DAILY **RE-ORDER ACCORDINGLY**       Medication is on current medication list Yes    Dosage and directions were verified? Yes    Quantity verified: 30 day supply     Pharmacy Verified?  Yes    Last Appointment:  6/10/2024    Future appts:  Future Appointments   Date Time Provider Department Center   10/14/2024  2:45 PM Rodger Napier DO [facility] DEP        (If no appt send self scheduling link. .REFILLAPPT)  Scheduling request sent?     [] Yes  [x] No    Does patient need updated?  [] Yes  [x] No

## 2024-10-18 ENCOUNTER — OFFICE VISIT (OUTPATIENT)
Dept: FAMILY MEDICINE CLINIC | Age: 64
End: 2024-10-18
Payer: MEDICARE

## 2024-10-18 VITALS
BODY MASS INDEX: 22.61 KG/M2 | TEMPERATURE: 98.1 F | HEIGHT: 66 IN | RESPIRATION RATE: 16 BRPM | HEART RATE: 68 BPM | OXYGEN SATURATION: 98 % | DIASTOLIC BLOOD PRESSURE: 94 MMHG | SYSTOLIC BLOOD PRESSURE: 166 MMHG

## 2024-10-18 DIAGNOSIS — K20.90 ESOPHAGITIS: ICD-10-CM

## 2024-10-18 DIAGNOSIS — Z86.73 HISTORY OF CVA (CEREBROVASCULAR ACCIDENT): ICD-10-CM

## 2024-10-18 DIAGNOSIS — E11.49 TYPE 2 DIABETES MELLITUS WITH OTHER NEUROLOGIC COMPLICATION, WITHOUT LONG-TERM CURRENT USE OF INSULIN (HCC): Primary | ICD-10-CM

## 2024-10-18 PROCEDURE — 2022F DILAT RTA XM EVC RTNOPTHY: CPT | Performed by: FAMILY MEDICINE

## 2024-10-18 PROCEDURE — G8427 DOCREV CUR MEDS BY ELIG CLIN: HCPCS | Performed by: FAMILY MEDICINE

## 2024-10-18 PROCEDURE — 99214 OFFICE O/P EST MOD 30 MIN: CPT | Performed by: FAMILY MEDICINE

## 2024-10-18 PROCEDURE — G8484 FLU IMMUNIZE NO ADMIN: HCPCS | Performed by: FAMILY MEDICINE

## 2024-10-18 PROCEDURE — G8420 CALC BMI NORM PARAMETERS: HCPCS | Performed by: FAMILY MEDICINE

## 2024-10-18 PROCEDURE — 3017F COLORECTAL CA SCREEN DOC REV: CPT | Performed by: FAMILY MEDICINE

## 2024-10-18 PROCEDURE — 3077F SYST BP >= 140 MM HG: CPT | Performed by: FAMILY MEDICINE

## 2024-10-18 PROCEDURE — 3080F DIAST BP >= 90 MM HG: CPT | Performed by: FAMILY MEDICINE

## 2024-10-18 PROCEDURE — 3046F HEMOGLOBIN A1C LEVEL >9.0%: CPT | Performed by: FAMILY MEDICINE

## 2024-10-18 PROCEDURE — 1036F TOBACCO NON-USER: CPT | Performed by: FAMILY MEDICINE

## 2024-10-18 RX ORDER — ASPIRIN 81 MG/1
TABLET ORAL
Qty: 31 TABLET | Refills: 3 | Status: SHIPPED | OUTPATIENT
Start: 2024-10-18

## 2024-10-18 RX ORDER — MINERAL OIL/HYDROPHIL PETROLAT
OINTMENT (GRAM) TOPICAL PRN
COMMUNITY

## 2024-10-18 RX ORDER — FAMOTIDINE 20 MG/1
20 TABLET, FILM COATED ORAL 2 TIMES DAILY
Qty: 60 TABLET | Refills: 2 | Status: SHIPPED | OUTPATIENT
Start: 2024-10-18

## 2024-10-18 RX ORDER — HYDRALAZINE HYDROCHLORIDE 25 MG/1
25 TABLET, FILM COATED ORAL 3 TIMES DAILY
COMMUNITY

## 2024-10-18 ASSESSMENT — PATIENT HEALTH QUESTIONNAIRE - PHQ9
SUM OF ALL RESPONSES TO PHQ9 QUESTIONS 1 & 2: 0
SUM OF ALL RESPONSES TO PHQ QUESTIONS 1-9: 0
SUM OF ALL RESPONSES TO PHQ QUESTIONS 1-9: 0
1. LITTLE INTEREST OR PLEASURE IN DOING THINGS: NOT AT ALL
SUM OF ALL RESPONSES TO PHQ QUESTIONS 1-9: 0
SUM OF ALL RESPONSES TO PHQ QUESTIONS 1-9: 0
2. FEELING DOWN, DEPRESSED OR HOPELESS: NOT AT ALL

## 2024-10-18 ASSESSMENT — ENCOUNTER SYMPTOMS
CONSTIPATION: 0
BLOOD IN STOOL: 0
WHEEZING: 0
COUGH: 0
SHORTNESS OF BREATH: 0
VOMITING: 0
NAUSEA: 0
DIARRHEA: 0
ABDOMINAL PAIN: 0

## 2024-10-18 NOTE — PROGRESS NOTES
Olena Castrejon (:  1960) is a 63 y.o. female,Established patient, here for evaluation of the following chief complaint(s):  Other (Patient wanting cleared to move out of nursing home, she states she \"hates it\" there) and Leg Swelling (Bilateral leg swelling with red rash x6-8 months /Causing irritation/itching )      Assessment & Plan   ASSESSMENT/PLAN:  Assessment & Plan  History of CVA (cerebrovascular accident)   Chronic, not at goal (unstable), changes made today: still has MM weakness and upper ext plegia, and claw hand, needs rehab    Orders:    aspirin (ASPIRIN LOW DOSE) 81 MG EC tablet; TAKE ONE(1) TABLET BY MOUTH ONCE DAILY **DO NOT CRUSH**    Esophagitis   Chronic, at goal (stable), has improved    Orders:    famotidine (PEPCID) 20 MG tablet; Take 1 tablet by mouth 2 times daily    Type 2 diabetes mellitus with other neurologic complication, without long-term current use of insulin (HCC)   Chronic, at goal (stable), last A1C was 6.8 %  in 2024              No follow-ups on file.         Subjective   SUBJECTIVE/OBJECTIVE:  Other (Patient wanting cleared to move out of nursing home, she states she \"hates it\" there) and Leg Swelling (Bilateral leg swelling with red rash x6-8 months /Causing irritation/itching )          Review of Systems   Constitutional:  Negative for chills, diaphoresis and fever.   HENT:  Negative for ear discharge, ear pain, hearing loss, nosebleeds and tinnitus.    Respiratory:  Negative for cough, shortness of breath and wheezing.    Cardiovascular:  Negative for chest pain.   Gastrointestinal:  Negative for abdominal pain, blood in stool, constipation, diarrhea, nausea and vomiting.   Genitourinary:  Negative for dysuria, flank pain and hematuria.   Musculoskeletal:  Positive for arthralgias and myalgias.   Skin:  Negative for rash.   Neurological:  Positive for dizziness and weakness. Negative for headaches.   Hematological:  Does not bruise/bleed easily.

## 2024-11-26 ENCOUNTER — APPOINTMENT (OUTPATIENT)
Dept: CT IMAGING | Age: 64
End: 2024-11-26
Payer: MEDICARE

## 2024-11-26 ENCOUNTER — APPOINTMENT (OUTPATIENT)
Dept: GENERAL RADIOLOGY | Age: 64
End: 2024-11-26
Payer: MEDICARE

## 2024-11-26 ENCOUNTER — HOSPITAL ENCOUNTER (EMERGENCY)
Age: 64
Discharge: NURSING, REHAB OR LONG-TERM CARE | End: 2024-11-27
Attending: EMERGENCY MEDICINE
Payer: MEDICARE

## 2024-11-26 VITALS
RESPIRATION RATE: 15 BRPM | TEMPERATURE: 98.5 F | DIASTOLIC BLOOD PRESSURE: 84 MMHG | BODY MASS INDEX: 22.61 KG/M2 | SYSTOLIC BLOOD PRESSURE: 139 MMHG | HEART RATE: 68 BPM | OXYGEN SATURATION: 95 % | WEIGHT: 140 LBS

## 2024-11-26 DIAGNOSIS — N30.00 ACUTE CYSTITIS WITHOUT HEMATURIA: ICD-10-CM

## 2024-11-26 DIAGNOSIS — K57.92 ACUTE DIVERTICULITIS: Primary | ICD-10-CM

## 2024-11-26 LAB
ALBUMIN SERPL-MCNC: 3.9 G/DL (ref 3.5–5.2)
ALP SERPL-CCNC: 167 U/L (ref 35–104)
ALT SERPL-CCNC: 12 U/L (ref 0–32)
ANION GAP SERPL CALCULATED.3IONS-SCNC: 14 MMOL/L (ref 7–16)
AST SERPL-CCNC: 8 U/L (ref 0–31)
B PARAP IS1001 DNA NPH QL NAA+NON-PROBE: NOT DETECTED
B PERT DNA SPEC QL NAA+PROBE: NOT DETECTED
BACTERIA URNS QL MICRO: ABNORMAL
BASOPHILS # BLD: 0.03 K/UL (ref 0–0.2)
BASOPHILS NFR BLD: 0 % (ref 0–2)
BILIRUB SERPL-MCNC: 0.3 MG/DL (ref 0–1.2)
BILIRUB UR QL STRIP: NEGATIVE
BNP SERPL-MCNC: 348 PG/ML (ref 0–125)
BUN SERPL-MCNC: 28 MG/DL (ref 6–23)
C PNEUM DNA NPH QL NAA+NON-PROBE: NOT DETECTED
CALCIUM SERPL-MCNC: 9.6 MG/DL (ref 8.6–10.2)
CHLORIDE SERPL-SCNC: 109 MMOL/L (ref 98–107)
CLARITY UR: CLEAR
CO2 SERPL-SCNC: 20 MMOL/L (ref 22–29)
COLOR UR: YELLOW
CREAT SERPL-MCNC: 1.5 MG/DL (ref 0.5–1)
EKG ATRIAL RATE: 71 BPM
EKG P AXIS: 44 DEGREES
EKG P-R INTERVAL: 160 MS
EKG Q-T INTERVAL: 396 MS
EKG QRS DURATION: 80 MS
EKG QTC CALCULATION (BAZETT): 430 MS
EKG R AXIS: 9 DEGREES
EKG T AXIS: 71 DEGREES
EKG VENTRICULAR RATE: 71 BPM
EOSINOPHIL # BLD: 0.28 K/UL (ref 0.05–0.5)
EOSINOPHILS RELATIVE PERCENT: 4 % (ref 0–6)
ERYTHROCYTE [DISTWIDTH] IN BLOOD BY AUTOMATED COUNT: 13.2 % (ref 11.5–15)
FLUAV RNA NPH QL NAA+NON-PROBE: NOT DETECTED
FLUBV RNA NPH QL NAA+NON-PROBE: NOT DETECTED
GFR, ESTIMATED: 38 ML/MIN/1.73M2
GLUCOSE SERPL-MCNC: 232 MG/DL (ref 74–99)
GLUCOSE UR STRIP-MCNC: NEGATIVE MG/DL
HADV DNA NPH QL NAA+NON-PROBE: NOT DETECTED
HCOV 229E RNA NPH QL NAA+NON-PROBE: NOT DETECTED
HCOV HKU1 RNA NPH QL NAA+NON-PROBE: NOT DETECTED
HCOV NL63 RNA NPH QL NAA+NON-PROBE: NOT DETECTED
HCOV OC43 RNA NPH QL NAA+NON-PROBE: NOT DETECTED
HCT VFR BLD AUTO: 37.1 % (ref 34–48)
HGB BLD-MCNC: 11.8 G/DL (ref 11.5–15.5)
HGB UR QL STRIP.AUTO: ABNORMAL
HMPV RNA NPH QL NAA+NON-PROBE: NOT DETECTED
HPIV1 RNA NPH QL NAA+NON-PROBE: NOT DETECTED
HPIV2 RNA NPH QL NAA+NON-PROBE: NOT DETECTED
HPIV3 RNA NPH QL NAA+NON-PROBE: NOT DETECTED
HPIV4 RNA NPH QL NAA+NON-PROBE: NOT DETECTED
IMM GRANULOCYTES # BLD AUTO: 0.04 K/UL (ref 0–0.58)
IMM GRANULOCYTES NFR BLD: 1 % (ref 0–5)
KETONES UR STRIP-MCNC: NEGATIVE MG/DL
LACTATE BLDV-SCNC: 1.2 MMOL/L (ref 0.5–2.2)
LEUKOCYTE ESTERASE UR QL STRIP: ABNORMAL
LYMPHOCYTES NFR BLD: 1.18 K/UL (ref 1.5–4)
LYMPHOCYTES RELATIVE PERCENT: 15 % (ref 20–42)
M PNEUMO DNA NPH QL NAA+NON-PROBE: NOT DETECTED
MAGNESIUM SERPL-MCNC: 1.6 MG/DL (ref 1.6–2.6)
MCH RBC QN AUTO: 29.5 PG (ref 26–35)
MCHC RBC AUTO-ENTMCNC: 31.8 G/DL (ref 32–34.5)
MCV RBC AUTO: 92.8 FL (ref 80–99.9)
MONOCYTES NFR BLD: 0.58 K/UL (ref 0.1–0.95)
MONOCYTES NFR BLD: 7 % (ref 2–12)
NEUTROPHILS NFR BLD: 74 % (ref 43–80)
NEUTS SEG NFR BLD: 5.95 K/UL (ref 1.8–7.3)
NITRITE UR QL STRIP: NEGATIVE
PH UR STRIP: 6 [PH] (ref 5–9)
PLATELET # BLD AUTO: 246 K/UL (ref 130–450)
PMV BLD AUTO: 10.2 FL (ref 7–12)
POTASSIUM SERPL-SCNC: 4.6 MMOL/L (ref 3.5–5)
PROCALCITONIN SERPL-MCNC: 0.07 NG/ML (ref 0–0.08)
PROT SERPL-MCNC: 7.1 G/DL (ref 6.4–8.3)
PROT UR STRIP-MCNC: 100 MG/DL
RBC # BLD AUTO: 4 M/UL (ref 3.5–5.5)
RBC #/AREA URNS HPF: ABNORMAL /HPF
RSV RNA NPH QL NAA+NON-PROBE: NOT DETECTED
RV+EV RNA NPH QL NAA+NON-PROBE: NOT DETECTED
SARS-COV-2 RNA NPH QL NAA+NON-PROBE: NOT DETECTED
SODIUM SERPL-SCNC: 143 MMOL/L (ref 132–146)
SP GR UR STRIP: 1.02 (ref 1–1.03)
SPECIMEN DESCRIPTION: NORMAL
TROPONIN I SERPL HS-MCNC: 19 NG/L (ref 0–9)
TROPONIN I SERPL HS-MCNC: 21 NG/L (ref 0–9)
UROBILINOGEN UR STRIP-ACNC: 0.2 EU/DL (ref 0–1)
WBC #/AREA URNS HPF: ABNORMAL /HPF
WBC OTHER # BLD: 8.1 K/UL (ref 4.5–11.5)

## 2024-11-26 PROCEDURE — 81001 URINALYSIS AUTO W/SCOPE: CPT

## 2024-11-26 PROCEDURE — 87040 BLOOD CULTURE FOR BACTERIA: CPT

## 2024-11-26 PROCEDURE — 6360000002 HC RX W HCPCS

## 2024-11-26 PROCEDURE — 80197 ASSAY OF TACROLIMUS: CPT

## 2024-11-26 PROCEDURE — 6360000004 HC RX CONTRAST MEDICATION: Performed by: RADIOLOGY

## 2024-11-26 PROCEDURE — 36415 COLL VENOUS BLD VENIPUNCTURE: CPT

## 2024-11-26 PROCEDURE — 73630 X-RAY EXAM OF FOOT: CPT

## 2024-11-26 PROCEDURE — 83735 ASSAY OF MAGNESIUM: CPT

## 2024-11-26 PROCEDURE — 74177 CT ABD & PELVIS W/CONTRAST: CPT

## 2024-11-26 PROCEDURE — 83605 ASSAY OF LACTIC ACID: CPT

## 2024-11-26 PROCEDURE — 96366 THER/PROPH/DIAG IV INF ADDON: CPT

## 2024-11-26 PROCEDURE — 96375 TX/PRO/DX INJ NEW DRUG ADDON: CPT

## 2024-11-26 PROCEDURE — 84145 PROCALCITONIN (PCT): CPT

## 2024-11-26 PROCEDURE — 96365 THER/PROPH/DIAG IV INF INIT: CPT

## 2024-11-26 PROCEDURE — 99285 EMERGENCY DEPT VISIT HI MDM: CPT

## 2024-11-26 PROCEDURE — 87086 URINE CULTURE/COLONY COUNT: CPT

## 2024-11-26 PROCEDURE — 80053 COMPREHEN METABOLIC PANEL: CPT

## 2024-11-26 PROCEDURE — 93010 ELECTROCARDIOGRAM REPORT: CPT | Performed by: INTERNAL MEDICINE

## 2024-11-26 PROCEDURE — 2580000003 HC RX 258

## 2024-11-26 PROCEDURE — 84484 ASSAY OF TROPONIN QUANT: CPT

## 2024-11-26 PROCEDURE — 0202U NFCT DS 22 TRGT SARS-COV-2: CPT

## 2024-11-26 PROCEDURE — 93005 ELECTROCARDIOGRAM TRACING: CPT

## 2024-11-26 PROCEDURE — 6360000002 HC RX W HCPCS: Performed by: EMERGENCY MEDICINE

## 2024-11-26 PROCEDURE — 85025 COMPLETE CBC W/AUTO DIFF WBC: CPT

## 2024-11-26 PROCEDURE — 83880 ASSAY OF NATRIURETIC PEPTIDE: CPT

## 2024-11-26 PROCEDURE — 87088 URINE BACTERIA CULTURE: CPT

## 2024-11-26 RX ORDER — METRONIDAZOLE 500 MG/1
500 TABLET ORAL EVERY 8 HOURS
Qty: 30 TABLET | Refills: 0 | Status: SHIPPED | OUTPATIENT
Start: 2024-11-26 | End: 2024-12-06

## 2024-11-26 RX ORDER — CEFDINIR 300 MG/1
300 CAPSULE ORAL 2 TIMES DAILY
Qty: 14 CAPSULE | Refills: 0 | Status: SHIPPED | OUTPATIENT
Start: 2024-11-26 | End: 2024-12-03

## 2024-11-26 RX ORDER — METRONIDAZOLE 500 MG/100ML
500 INJECTION, SOLUTION INTRAVENOUS ONCE
Status: COMPLETED | OUTPATIENT
Start: 2024-11-26 | End: 2024-11-26

## 2024-11-26 RX ORDER — MINERAL OIL/HYDROPHIL PETROLAT
OINTMENT (GRAM) TOPICAL 2 TIMES DAILY
COMMUNITY

## 2024-11-26 RX ORDER — IOPAMIDOL 755 MG/ML
75 INJECTION, SOLUTION INTRAVASCULAR
Status: COMPLETED | OUTPATIENT
Start: 2024-11-26 | End: 2024-11-26

## 2024-11-26 RX ORDER — IOPAMIDOL 755 MG/ML
18 INJECTION, SOLUTION INTRAVASCULAR
Status: COMPLETED | OUTPATIENT
Start: 2024-11-26 | End: 2024-11-26

## 2024-11-26 RX ADMIN — IOPAMIDOL 18 ML: 755 INJECTION, SOLUTION INTRAVENOUS at 15:15

## 2024-11-26 RX ADMIN — IOPAMIDOL 75 ML: 755 INJECTION, SOLUTION INTRAVENOUS at 15:15

## 2024-11-26 RX ADMIN — WATER 1000 MG: 1 INJECTION INTRAMUSCULAR; INTRAVENOUS; SUBCUTANEOUS at 14:57

## 2024-11-26 RX ADMIN — METRONIDAZOLE 500 MG: 500 INJECTION, SOLUTION INTRAVENOUS at 17:42

## 2024-11-26 ASSESSMENT — PAIN SCALES - GENERAL
PAINLEVEL_OUTOF10: 0
PAINLEVEL_OUTOF10: 7

## 2024-11-26 ASSESSMENT — PAIN - FUNCTIONAL ASSESSMENT: PAIN_FUNCTIONAL_ASSESSMENT: 0-10

## 2024-11-26 NOTE — PROGRESS NOTES
PO contrast delivered at 12:00.  Follow up call placed at about 1330 and contrast had not been given yet.  Please call CT @ [phone] when pt starts drinking.

## 2024-11-26 NOTE — ED PROVIDER NOTES
Xray right foot- no obvious fracture or dislocation. Final radiologist interpretation agrees with my interpretation.     ECG/medicine tests: ordered and independent interpretation performed. Decision-making details documented in ED Course.    Risk  Prescription drug management.                  CONSULTS:   None            PROCEDURES   Unless otherwise noted below, none      CRITICAL CARE TIME (.cct)          Edenilson ARCHIBALD MD, am the primary provider of record      FINAL IMPRESSION      1. Acute diverticulitis    2. Acute cystitis without hematuria          DISPOSITION/PLAN     DISPOSITION Discharge - Pending Orders Complete 11/26/2024 04:36:45 PM      PATIENT REFERRED TO:  Rodger Napier, DO  [address] [address]  [phone]    Schedule an appointment as soon as possible for a visit       [facility] Emergency Department  [address] [address]  [phone]    If symptoms worsen      DISCHARGE MEDICATIONS:  New Prescriptions    CEFDINIR (OMNICEF) 300 MG CAPSULE    Take 1 capsule by mouth 2 times daily for 7 days    METRONIDAZOLE (FLAGYL) 500 MG TABLET    Take 1 tablet by mouth every 8 (eight) hours for 10 days       DISCONTINUED MEDICATIONS:  Discontinued Medications    BLOOD GLUCOSE MONITORING SUPPL (ONETOUCH VERIO FLEX SYSTEM) W/DEVICE KIT        MINERAL OIL-HYDROPHILIC PETROLATUM (AQUAPHOR) OINTMENT    Apply topically as needed for Dry Skin Apply topically as needed.    ONETOUCH VERIO STRIP                  (Please note that portions of this note were completed with a voice recognition program.  Efforts were made to edit the dictations but occasionally words are mis-transcribed.)    Edenilson Dwyer MD (electronically signed)            Edenilson Dwyer MD  11/26/24 [id number]

## 2024-11-26 NOTE — ED NOTES
[facility] EMERGENCY DEPARTMENT  EMERGENCY DEPARTMENT ENCOUNTER        Pt Name: Olena Castrejon  MRN: [id number]  Birthdate 1960  Date of evaluation: 2024  Provider: Rina Jones MD  PCP: Rodger Napier DO  Note Started: 10:43 AM EST 24    CHIEF COMPLAINT       Chief Complaint   Patient presents with    Wound Check     Left foot, not diabetic      Dysuria     With abdominal pain    Leg Swelling     B/l LE, denies CHF hx       HISTORY OF PRESENT ILLNESS: 1 or more Elements   History From: PATIENT     Olena Castrejon is a 63 y.o. female with PMH of dementia, CVA ( R MCA strokes starting 3/13/2019 then again 2019, 4/15/2019 s/p right STA/MCA bypass 2019) with residual left sided weakness and left homonymous hemianopsia , GERD, remote DVT/PE , T2DM, COPD, status post liver transplant due to cirrhosis (10/2016), type 2 diabetes, chronic lymphedema she came in today for lower abdominal pain.     Patient says its been happening for about 3 days now. She is having lower abdominal pain and decrease in urination. She denies any fever, chills, pain with urination, changes in odor or color of urine. She denies any urinary catheterization at the facility. She does have residual left sided weakness from her stroke. She denies chest pain, vision changes, SOB, weakness.       Nursing Notes were all reviewed and agreed with or any disagreements were addressed in the HPI.      SURGICAL HISTORY     Past Surgical History:   Procedure Laterality Date    BRAIN SURGERY      CARDIAC CATHETERIZATION      cerebral angiogrem to check cavernous malformation in head - negative    CARDIAC SURGERY  2014    heart cath     SECTION      x 2     SECTION   and     COLONOSCOPY      ECHO COMPL W DOP COLOR FLOW  2012         ENDOSCOPY, COLON, DIAGNOSTIC      ESOPHAGEAL VARICE LIGATION      banding    LIVER TRANSPLANT      OTHER SURGICAL

## 2024-11-27 ENCOUNTER — TELEPHONE (OUTPATIENT)
Dept: FAMILY MEDICINE CLINIC | Age: 64
End: 2024-11-27

## 2024-11-27 DIAGNOSIS — R53.1 GENERALIZED WEAKNESS: Primary | ICD-10-CM

## 2024-11-27 NOTE — PROGRESS NOTES
Nurse to nurse called to Nidia at [facility] - number is [phone].

## 2024-11-27 NOTE — TELEPHONE ENCOUNTER
Resident returned from ED with DX of UTI & Diverticulitis.. Prescribed Omnicef and Flagyl. Patient has a bruise on top of her right hand and is also VERY weak.  Would you consider PT/OT ?    Orders pended for your review & signature.

## 2024-11-28 LAB
MICROORGANISM SPEC CULT: ABNORMAL
SERVICE CMNT-IMP: ABNORMAL
SPECIMEN DESCRIPTION: ABNORMAL
TACROLIMUS BLD-MCNC: 7.2 NG/ML

## 2024-12-01 LAB
MICROORGANISM SPEC CULT: NORMAL
MICROORGANISM SPEC CULT: NORMAL
SERVICE CMNT-IMP: NORMAL
SERVICE CMNT-IMP: NORMAL
SPECIMEN DESCRIPTION: NORMAL
SPECIMEN DESCRIPTION: NORMAL

## 2024-12-04 NOTE — TELEPHONE ENCOUNTER
[facility] is requesting written orders for PT/OT.  Pended for your review and signature.

## 2024-12-23 ENCOUNTER — APPOINTMENT (OUTPATIENT)
Dept: ULTRASOUND IMAGING | Age: 64
End: 2024-12-23
Payer: MEDICARE

## 2024-12-23 ENCOUNTER — HOSPITAL ENCOUNTER (INPATIENT)
Age: 64
LOS: 4 days | Discharge: NURSING, REHAB OR LONG-TERM CARE | End: 2024-12-27
Attending: EMERGENCY MEDICINE | Admitting: INTERNAL MEDICINE
Payer: MEDICARE

## 2024-12-23 ENCOUNTER — APPOINTMENT (OUTPATIENT)
Dept: GENERAL RADIOLOGY | Age: 64
End: 2024-12-23
Payer: MEDICARE

## 2024-12-23 ENCOUNTER — TELEPHONE (OUTPATIENT)
Dept: FAMILY MEDICINE CLINIC | Age: 64
End: 2024-12-23

## 2024-12-23 ENCOUNTER — APPOINTMENT (OUTPATIENT)
Dept: CT IMAGING | Age: 64
End: 2024-12-23
Payer: MEDICARE

## 2024-12-23 DIAGNOSIS — J96.01 ACUTE RESPIRATORY FAILURE WITH HYPOXIA: Primary | ICD-10-CM

## 2024-12-23 DIAGNOSIS — I26.99 BILATERAL PULMONARY EMBOLISM (HCC): ICD-10-CM

## 2024-12-23 DIAGNOSIS — I82.442 ACUTE DEEP VEIN THROMBOSIS (DVT) OF TIBIAL VEIN OF LEFT LOWER EXTREMITY (HCC): ICD-10-CM

## 2024-12-23 PROBLEM — D64.9 NORMOCYTIC ANEMIA: Status: ACTIVE | Noted: 2024-12-23

## 2024-12-23 PROBLEM — I10 PRIMARY HYPERTENSION: Status: ACTIVE | Noted: 2024-12-23

## 2024-12-23 PROBLEM — F03.90 DEMENTIA (HCC): Status: ACTIVE | Noted: 2024-12-23

## 2024-12-23 PROBLEM — E11.9 CONTROLLED TYPE 2 DIABETES MELLITUS WITHOUT COMPLICATION (HCC): Status: ACTIVE | Noted: 2024-12-23

## 2024-12-23 LAB
ANION GAP SERPL CALCULATED.3IONS-SCNC: 9 MMOL/L (ref 7–16)
B PARAP IS1001 DNA NPH QL NAA+NON-PROBE: NOT DETECTED
B PERT DNA SPEC QL NAA+PROBE: NOT DETECTED
BASOPHILS # BLD: 0.04 K/UL (ref 0–0.2)
BASOPHILS NFR BLD: 1 % (ref 0–2)
BNP SERPL-MCNC: 1599 PG/ML (ref 0–125)
BUN SERPL-MCNC: 26 MG/DL (ref 6–23)
C PNEUM DNA NPH QL NAA+NON-PROBE: NOT DETECTED
CALCIUM SERPL-MCNC: 8.5 MG/DL (ref 8.6–10.2)
CHLORIDE SERPL-SCNC: 110 MMOL/L (ref 98–107)
CO2 SERPL-SCNC: 22 MMOL/L (ref 22–29)
CREAT SERPL-MCNC: 1.6 MG/DL (ref 0.5–1)
EOSINOPHIL # BLD: 0.3 K/UL (ref 0.05–0.5)
EOSINOPHILS RELATIVE PERCENT: 5 % (ref 0–6)
ERYTHROCYTE [DISTWIDTH] IN BLOOD BY AUTOMATED COUNT: 13 % (ref 11.5–15)
FLUAV RNA NPH QL NAA+NON-PROBE: NOT DETECTED
FLUBV RNA NPH QL NAA+NON-PROBE: NOT DETECTED
GFR, ESTIMATED: 35 ML/MIN/1.73M2
GLUCOSE BLD-MCNC: 255 MG/DL (ref 74–99)
GLUCOSE SERPL-MCNC: 187 MG/DL (ref 74–99)
HADV DNA NPH QL NAA+NON-PROBE: NOT DETECTED
HCOV 229E RNA NPH QL NAA+NON-PROBE: NOT DETECTED
HCOV HKU1 RNA NPH QL NAA+NON-PROBE: NOT DETECTED
HCOV NL63 RNA NPH QL NAA+NON-PROBE: NOT DETECTED
HCOV OC43 RNA NPH QL NAA+NON-PROBE: NOT DETECTED
HCT VFR BLD AUTO: 32.5 % (ref 34–48)
HGB BLD-MCNC: 10.4 G/DL (ref 11.5–15.5)
HMPV RNA NPH QL NAA+NON-PROBE: NOT DETECTED
HPIV1 RNA NPH QL NAA+NON-PROBE: NOT DETECTED
HPIV2 RNA NPH QL NAA+NON-PROBE: NOT DETECTED
HPIV3 RNA NPH QL NAA+NON-PROBE: NOT DETECTED
HPIV4 RNA NPH QL NAA+NON-PROBE: NOT DETECTED
IMM GRANULOCYTES # BLD AUTO: 0.03 K/UL (ref 0–0.58)
IMM GRANULOCYTES NFR BLD: 1 % (ref 0–5)
LYMPHOCYTES NFR BLD: 1.23 K/UL (ref 1.5–4)
LYMPHOCYTES RELATIVE PERCENT: 20 % (ref 20–42)
M PNEUMO DNA NPH QL NAA+NON-PROBE: NOT DETECTED
MCH RBC QN AUTO: 30.2 PG (ref 26–35)
MCHC RBC AUTO-ENTMCNC: 32 G/DL (ref 32–34.5)
MCV RBC AUTO: 94.5 FL (ref 80–99.9)
MONOCYTES NFR BLD: 0.54 K/UL (ref 0.1–0.95)
MONOCYTES NFR BLD: 9 % (ref 2–12)
NEUTROPHILS NFR BLD: 66 % (ref 43–80)
NEUTS SEG NFR BLD: 4.17 K/UL (ref 1.8–7.3)
PARTIAL THROMBOPLASTIN TIME: 38.6 SEC (ref 24.5–35.1)
PLATELET # BLD AUTO: 183 K/UL (ref 130–450)
PMV BLD AUTO: 10.6 FL (ref 7–12)
POTASSIUM SERPL-SCNC: 4.6 MMOL/L (ref 3.5–5)
RBC # BLD AUTO: 3.44 M/UL (ref 3.5–5.5)
RSV RNA NPH QL NAA+NON-PROBE: NOT DETECTED
RV+EV RNA NPH QL NAA+NON-PROBE: NOT DETECTED
SARS-COV-2 RNA NPH QL NAA+NON-PROBE: NOT DETECTED
SODIUM SERPL-SCNC: 141 MMOL/L (ref 132–146)
SPECIMEN DESCRIPTION: NORMAL
TROPONIN I SERPL HS-MCNC: 25 NG/L (ref 0–9)
TROPONIN I SERPL HS-MCNC: 26 NG/L (ref 0–9)
WBC OTHER # BLD: 6.3 K/UL (ref 4.5–11.5)

## 2024-12-23 PROCEDURE — 71275 CT ANGIOGRAPHY CHEST: CPT

## 2024-12-23 PROCEDURE — 2500000003 HC RX 250 WO HCPCS: Performed by: INTERNAL MEDICINE

## 2024-12-23 PROCEDURE — 0202U NFCT DS 22 TRGT SARS-COV-2: CPT

## 2024-12-23 PROCEDURE — 2580000003 HC RX 258: Performed by: INTERNAL MEDICINE

## 2024-12-23 PROCEDURE — 82947 ASSAY GLUCOSE BLOOD QUANT: CPT

## 2024-12-23 PROCEDURE — 85025 COMPLETE CBC W/AUTO DIFF WBC: CPT

## 2024-12-23 PROCEDURE — 99223 1ST HOSP IP/OBS HIGH 75: CPT | Performed by: INTERNAL MEDICINE

## 2024-12-23 PROCEDURE — 99285 EMERGENCY DEPT VISIT HI MDM: CPT

## 2024-12-23 PROCEDURE — 6370000000 HC RX 637 (ALT 250 FOR IP): Performed by: INTERNAL MEDICINE

## 2024-12-23 PROCEDURE — 80048 BASIC METABOLIC PNL TOTAL CA: CPT

## 2024-12-23 PROCEDURE — 6360000002 HC RX W HCPCS: Performed by: INTERNAL MEDICINE

## 2024-12-23 PROCEDURE — 93970 EXTREMITY STUDY: CPT

## 2024-12-23 PROCEDURE — 71045 X-RAY EXAM CHEST 1 VIEW: CPT

## 2024-12-23 PROCEDURE — 6360000002 HC RX W HCPCS: Performed by: EMERGENCY MEDICINE

## 2024-12-23 PROCEDURE — 84484 ASSAY OF TROPONIN QUANT: CPT

## 2024-12-23 PROCEDURE — 85730 THROMBOPLASTIN TIME PARTIAL: CPT

## 2024-12-23 PROCEDURE — 2060000000 HC ICU INTERMEDIATE R&B

## 2024-12-23 PROCEDURE — 93005 ELECTROCARDIOGRAM TRACING: CPT | Performed by: EMERGENCY MEDICINE

## 2024-12-23 PROCEDURE — 99222 1ST HOSP IP/OBS MODERATE 55: CPT | Performed by: STUDENT IN AN ORGANIZED HEALTH CARE EDUCATION/TRAINING PROGRAM

## 2024-12-23 PROCEDURE — 83880 ASSAY OF NATRIURETIC PEPTIDE: CPT

## 2024-12-23 PROCEDURE — 6360000004 HC RX CONTRAST MEDICATION: Performed by: RADIOLOGY

## 2024-12-23 RX ORDER — SODIUM CHLORIDE 0.9 % (FLUSH) 0.9 %
5-40 SYRINGE (ML) INJECTION PRN
Status: DISCONTINUED | OUTPATIENT
Start: 2024-12-23 | End: 2024-12-27 | Stop reason: HOSPADM

## 2024-12-23 RX ORDER — SODIUM CHLORIDE 9 MG/ML
INJECTION, SOLUTION INTRAVENOUS PRN
Status: DISCONTINUED | OUTPATIENT
Start: 2024-12-23 | End: 2024-12-27 | Stop reason: HOSPADM

## 2024-12-23 RX ORDER — IOPAMIDOL 755 MG/ML
75 INJECTION, SOLUTION INTRAVASCULAR
Status: COMPLETED | OUTPATIENT
Start: 2024-12-23 | End: 2024-12-23

## 2024-12-23 RX ORDER — ONDANSETRON 2 MG/ML
4 INJECTION INTRAMUSCULAR; INTRAVENOUS EVERY 6 HOURS PRN
Status: DISCONTINUED | OUTPATIENT
Start: 2024-12-23 | End: 2024-12-27 | Stop reason: HOSPADM

## 2024-12-23 RX ORDER — RIVASTIGMINE TARTRATE 3 MG/1
6 CAPSULE ORAL 2 TIMES DAILY
Status: DISCONTINUED | OUTPATIENT
Start: 2024-12-23 | End: 2024-12-27 | Stop reason: HOSPADM

## 2024-12-23 RX ORDER — POLYETHYLENE GLYCOL 3350 17 G/17G
17 POWDER, FOR SOLUTION ORAL DAILY PRN
Status: DISCONTINUED | OUTPATIENT
Start: 2024-12-23 | End: 2024-12-27 | Stop reason: HOSPADM

## 2024-12-23 RX ORDER — TACROLIMUS 0.5 MG/1
0.5 CAPSULE ORAL 2 TIMES DAILY
Status: DISCONTINUED | OUTPATIENT
Start: 2024-12-23 | End: 2024-12-27 | Stop reason: HOSPADM

## 2024-12-23 RX ORDER — ONDANSETRON 4 MG/1
4 TABLET, ORALLY DISINTEGRATING ORAL EVERY 8 HOURS PRN
Status: DISCONTINUED | OUTPATIENT
Start: 2024-12-23 | End: 2024-12-27 | Stop reason: HOSPADM

## 2024-12-23 RX ORDER — ACETAMINOPHEN 650 MG/1
650 SUPPOSITORY RECTAL EVERY 6 HOURS PRN
Status: DISCONTINUED | OUTPATIENT
Start: 2024-12-23 | End: 2024-12-27 | Stop reason: HOSPADM

## 2024-12-23 RX ORDER — TACROLIMUS 1 MG/1
1 CAPSULE ORAL 2 TIMES DAILY
Status: DISCONTINUED | OUTPATIENT
Start: 2024-12-23 | End: 2024-12-27 | Stop reason: HOSPADM

## 2024-12-23 RX ORDER — DEXTROSE MONOHYDRATE AND SODIUM CHLORIDE 5; .45 G/100ML; G/100ML
INJECTION, SOLUTION INTRAVENOUS CONTINUOUS
Status: ACTIVE | OUTPATIENT
Start: 2024-12-23 | End: 2024-12-24

## 2024-12-23 RX ORDER — ACETAMINOPHEN 325 MG/1
650 TABLET ORAL EVERY 6 HOURS PRN
Status: DISCONTINUED | OUTPATIENT
Start: 2024-12-23 | End: 2024-12-27 | Stop reason: HOSPADM

## 2024-12-23 RX ORDER — ARGATROBAN 1 MG/ML
.0625-1 INJECTION INTRAVENOUS CONTINUOUS
Status: DISCONTINUED | OUTPATIENT
Start: 2024-12-23 | End: 2024-12-27 | Stop reason: HOSPADM

## 2024-12-23 RX ORDER — SODIUM CHLORIDE 0.9 % (FLUSH) 0.9 %
5-40 SYRINGE (ML) INJECTION EVERY 12 HOURS SCHEDULED
Status: DISCONTINUED | OUTPATIENT
Start: 2024-12-23 | End: 2024-12-27 | Stop reason: HOSPADM

## 2024-12-23 RX ORDER — MEMANTINE HYDROCHLORIDE 10 MG/1
10 TABLET ORAL 2 TIMES DAILY
Status: DISCONTINUED | OUTPATIENT
Start: 2024-12-23 | End: 2024-12-27 | Stop reason: HOSPADM

## 2024-12-23 RX ADMIN — TACROLIMUS 1 MG: 1 CAPSULE ORAL at 20:12

## 2024-12-23 RX ADMIN — MICONAZOLE NITRATE: 20 POWDER TOPICAL at 20:13

## 2024-12-23 RX ADMIN — SODIUM CHLORIDE, PRESERVATIVE FREE 10 ML: 5 INJECTION INTRAVENOUS at 20:15

## 2024-12-23 RX ADMIN — DEXTROSE AND SODIUM CHLORIDE: 5; 450 INJECTION, SOLUTION INTRAVENOUS at 16:26

## 2024-12-23 RX ADMIN — ARGATROBAN 1 MCG/KG/MIN: 50 INJECTION, SOLUTION INTRAVENOUS at 22:18

## 2024-12-23 RX ADMIN — DEXTROSE AND SODIUM CHLORIDE: 5; 450 INJECTION, SOLUTION INTRAVENOUS at 22:11

## 2024-12-23 RX ADMIN — ARGATROBAN 1 MCG/KG/MIN: 50 INJECTION, SOLUTION INTRAVENOUS at 14:56

## 2024-12-23 RX ADMIN — RIVASTIGMINE TARTRATE 6 MG: 3 CAPSULE ORAL at 20:13

## 2024-12-23 RX ADMIN — TACROLIMUS 0.5 MG: 1 CAPSULE ORAL at 20:14

## 2024-12-23 RX ADMIN — IOPAMIDOL 75 ML: 755 INJECTION, SOLUTION INTRAVENOUS at 14:04

## 2024-12-23 RX ADMIN — MEMANTINE HYDROCHLORIDE 10 MG: 10 TABLET, FILM COATED ORAL at 20:13

## 2024-12-23 ASSESSMENT — ENCOUNTER SYMPTOMS
EYE DISCHARGE: 0
WHEEZING: 0
GASTROINTESTINAL NEGATIVE: 1
EYE REDNESS: 0
SINUS PRESSURE: 0
ABDOMINAL PAIN: 0
ABDOMINAL DISTENTION: 0
NAUSEA: 0
COUGH: 0
EYE PAIN: 0
DIARRHEA: 0
SORE THROAT: 0
RESPIRATORY NEGATIVE: 1
VOMITING: 0
BACK PAIN: 0
SHORTNESS OF BREATH: 1

## 2024-12-23 ASSESSMENT — PAIN - FUNCTIONAL ASSESSMENT: PAIN_FUNCTIONAL_ASSESSMENT: 0-10

## 2024-12-23 ASSESSMENT — PAIN SCALES - GENERAL: PAINLEVEL_OUTOF10: 8

## 2024-12-23 NOTE — TELEPHONE ENCOUNTER
Called D[facility] at [facility] and she stated they sent pt to the ER because they felt she needs more rehab, since she is not even able to stand.

## 2024-12-23 NOTE — PROGRESS NOTES
Database initiated. Patient is Alert with some forgetfulness she comes in from [facility]. She ambulated independently and is RA at baseline. Medications and Full Code status verified using facility paperwork.

## 2024-12-23 NOTE — TELEPHONE ENCOUNTER
Dali from assisted [facility] called and stated pt has 2 plus pitting edema and pain, and does not have lasix. Wondering if something could be sent in. Pt is scheduled to be seen in office 12/30.Dali at [facility] [phone].

## 2024-12-23 NOTE — ED NOTES
ED to Inpatient Handoff Report    Notified floor that electronic handoff available and patient ready for transport to room 428.    Safety Risks: Risk of falls    Patient in Restraints: no    Constant Observer or Patient : no    Telemetry Monitoring Ordered :Yes           Order to transfer to unit without monitor:NO    Last MEWS: 1 Time completed: 1500    Deterioration Index Score:   Predictive Model Details          28 (Normal)  Factor Value    Calculated 12/23/2024 15:02 40% Supplemental oxygen Nasal cannula    Deterioration Index Model 38% Age 64 years old     12% Potassium 4.6 mmol/L     5% Systolic 138     3% BUN abnormal (26 mg/dL)     2% Sodium 141 mmol/L     1% Hematocrit abnormal (32.5 %)     0% Pulse 78     0% Pulse oximetry 96 %     0% Respiratory rate 16     0% WBC count 6.3 k/uL     0% Temperature 98.3 °F (36.8 °C)        Vitals:    12/23/24 1228 12/23/24 1430   BP: (!) 142/70 138/67   Pulse: 84 78   Resp: 18 16   Temp: 98.3 °F (36.8 °C) 98.3 °F (36.8 °C)   SpO2: 93% 96%   Weight: 72.6 kg (160 lb)          Opportunity for questions and clarification was provided.

## 2024-12-23 NOTE — H&P
appropriately and cooperative with exam  HEENT:  Mucous membranes moist. No erythema, rhinorrhea, or post-nasal drip noted.  Neck:  No carotid bruits.  Heart:  Rhythm regular at rate of 72  Lungs:  CTA.  No wheeze, rales, or rhonchi  Abdomen:  Positive bowel sounds positive.  Soft.  Non-tender. No guarding, rebound or rigidity.  Breast/Rectal/Genitourinary: not pertinent.    Extremities:  positive for 1+ b/l lower extremity edema  Skin:  Warm and dry  Vascular: 2/4 Dorsalis Pedis pulses bilaterally.  Neuro:  Cranial nerves 2-12 grossly intact, no focal weakness or change in sensation noted.  Extraocular muscles intact.  Pupils equal, round, reactive to light.        I agree with the assessment and plan of MIKEL Hall - NP    46 min spent discussing with ER, reviewing records, interviewing/examining pt, analyzing data, discussing with nursing, formulating treatment plan as noted in NP's note.     PE  Dvt LLE  Htn  Dm type 2 controlled  dementia      Decision to admit    Electronically signed by Miguel Lynn D.O.  Hospitalist  [facility] at [facility]

## 2024-12-23 NOTE — CONSULTS
Yamilex Mathis MD   ·   MD Lenka Gutierrez APRN  ·  MIKEL Hirsch        Inpatient Medical Oncology/Hematology Consult Note            Patient Name: Olena Castrejon  YOB: 1960    DATE OF ADMISSION: 12/23/2024  DATE OF CONSULTATION: 12/23/2024  CONSULTING PROVIDER: Miguel Lynn DO  REASON FOR CONSULTATION: \"pe;heparin allergy\"  PCP: Rodger Napier    Room: 0428/042A      CHIEF COMPLAINT:  Shortness of breath and fatigue    HISTORY OF PRESENT ILLNESS (12/23/2024):   Patient is a 64 y.o. female comes in from her assisted living center due to report of hypoxia. Patient is a suboptimal history. She has dementia and h/o stroke. The patient was found to have PE and left leg DVT during this hospital course.     She was a liver transplant patient.    She has previous h/o PE when she was admitted at [facility] in 2016, where she was started on heparin and subsequently diagnosed with HIT then. Per chart reviewed, she was transitioned/bridged with Angiomax/bivalirudin and started warfarin, continuing for about 5-6 months. Since then, she denies ever having been on anticoagulation or other blood clots since then.     She denies having been exposed to heparin products since 2016.     Denies currently smoking, long travel, inactivity, HRT use, recent surgeries.     She is unsure if she had recent illness or viral infections. But prior to admit, has been feeling tired.     She admits she has not had colonoscopy or mammogram for some time.             Review of Systems   Constitutional: Negative.    HENT:  Negative.     Respiratory: Negative.     Cardiovascular:  Positive for leg swelling.   Gastrointestinal: Negative.    Genitourinary: Negative.     Musculoskeletal: Negative.    Skin: Negative.    Neurological: Negative.    Psychiatric/Behavioral: Negative.                    Past Medical History:   Diagnosis Date    Arthritis     right knee    Blood circulation, collateral     Chronic

## 2024-12-23 NOTE — ED PROVIDER NOTES
pts or suitable admitted adults)    Specimen: Nasopharyngeal Swab   Result Value Ref Range    Specimen Description .NASOPHARYNGEAL SWAB     Adenovirus PCR Not Detected Not Detected    Coronavirus 229E PCR Not Detected Not Detected    Coronavirus HKU1 PCR Not Detected Not Detected    Coronavirus NL63 PCR Not Detected Not Detected    Coronavirus OC43 PCR Not Detected Not Detected    SARS-CoV-2, PCR Not Detected Not Detected    Human Metapneumovirus PCR Not Detected Not Detected    Rhino/Enterovirus PCR Not Detected Not Detected    Influenza A by PCR Not Detected Not Detected    Influenza B by PCR Not Detected Not Detected    Parainfluenza 1 PCR Not Detected Not Detected    Parainfluenza 2 PCR Not Detected Not Detected    Parainfluenza 3 PCR Not Detected Not Detected    Parainfluenza 4 PCR Not Detected Not Detected    Resp Syncytial Virus PCR Not Detected Not Detected    Bordetella parapertussis by PCR Not Detected Not Detected    B Pertussis by PCR Not Detected Not Detected    Chlamydia pneumoniae By PCR Not Detected Not Detected    Mycoplasma pneumo by PCR Not Detected Not Detected   CBC with Auto Differential   Result Value Ref Range    WBC 6.3 4.5 - 11.5 k/uL    RBC 3.44 (L) 3.50 - 5.50 m/uL    Hemoglobin 10.4 (L) 11.5 - 15.5 g/dL    Hematocrit 32.5 (L) 34.0 - 48.0 %    MCV 94.5 80.0 - 99.9 fL    MCH 30.2 26.0 - 35.0 pg    MCHC 32.0 32.0 - 34.5 g/dL    RDW 13.0 11.5 - 15.0 %    Platelets 183 130 - 450 k/uL    MPV 10.6 7.0 - 12.0 fL    Neutrophils % 66 43.0 - 80.0 %    Lymphocytes % 20 20.0 - 42.0 %    Monocytes % 9 2.0 - 12.0 %    Eosinophils % 5 0 - 6 %    Basophils % 1 0.0 - 2.0 %    Immature Granulocytes % 1 0.0 - 5.0 %    Neutrophils Absolute 4.17 1.80 - 7.30 k/uL    Lymphocytes Absolute 1.23 (L) 1.50 - 4.00 k/uL    Monocytes Absolute 0.54 0.10 - 0.95 k/uL    Eosinophils Absolute 0.30 0.05 - 0.50 k/uL    Basophils Absolute 0.04 0.00 - 0.20 k/uL    Immature Granulocytes Absolute 0.03 0.00 - 0.58 k/uL   Basic  Severe diverticulosis coli.  The mid sigmoid colon shows multiple diverticula with adjacent mild fat stranding compatible with low-grade diverticulitis.  No abscess, pneumoperitoneum, or free fluid is seen. Pelvis: As above, low-grade sigmoid diverticulitis.  Normal urinary bladder. Atrophic uterus.  No free fluid or lymph node enlargement. Bones/soft tissues: No acute osseous abnormality.  Small fat containing umbilical hernia, unchanged     1.  Severe diverticulosis coli and with low-grade, uncomplicated sigmoid diverticulitis. 2.  Left renal 2 mm nonobstructing stone. 3.  No additional significant findings.     XR FOOT RIGHT (MIN 3 VIEWS)    Result Date: 11/26/2024  EXAMINATION: THREE XRAY VIEWS OF THE RIGHT FOOT 11/26/2024 12:36 pm COMPARISON: None. HISTORY: ORDERING SYSTEM PROVIDED HISTORY: r/o cellulitis TECHNOLOGIST PROVIDED HISTORY: Reason for exam:->r/o cellulitis FINDINGS: Degenerative 1st MTP joint and hallux valgus.  Mild degenerative changes in the midfoot and hindfoot.  No radiographic evidence for fracture.  Diffuse nonspecific soft tissue swelling.  No abnormal soft tissue gas.  Mild calcaneal enthesopathy.     Diffuse nonspecific soft tissue swelling.         ------------------------- NURSING NOTES AND VITALS REVIEWED ---------------------------  Date / Time Roomed:  12/23/2024 12:16 PM  ED Bed Assignment:  0428/0428-A    The nursing notes within the ED encounter and vital signs as below have been reviewed.   Patient Vitals for the past 24 hrs:   BP Temp Temp src Pulse Resp SpO2 Weight   12/23/24 1600 (!) 137/50 98.2 °F (36.8 °C) Oral 70 18 94 % --   12/23/24 1430 138/67 98.3 °F (36.8 °C) -- 78 16 96 % --   12/23/24 1228 (!) 142/70 98.3 °F (36.8 °C) -- 84 18 93 % 72.6 kg (160 lb)       Oxygen Saturation Interpretation: Normal      ------------------------------------------ PROGRESS NOTES ------------------------------------------    I have spoken with the patient and discussed today’s results, in

## 2024-12-24 LAB
ALBUMIN SERPL-MCNC: 3 G/DL (ref 3.5–5.2)
ALP SERPL-CCNC: 103 U/L (ref 35–104)
ALT SERPL-CCNC: <5 U/L (ref 0–32)
ANION GAP SERPL CALCULATED.3IONS-SCNC: 8 MMOL/L (ref 7–16)
AST SERPL-CCNC: 8 U/L (ref 0–31)
BASOPHILS # BLD: 0.06 K/UL (ref 0–0.2)
BASOPHILS NFR BLD: 1 % (ref 0–2)
BILIRUB SERPL-MCNC: 0.2 MG/DL (ref 0–1.2)
BUN SERPL-MCNC: 20 MG/DL (ref 6–23)
CALCIUM SERPL-MCNC: 8.1 MG/DL (ref 8.6–10.2)
CHLORIDE SERPL-SCNC: 111 MMOL/L (ref 98–107)
CO2 SERPL-SCNC: 22 MMOL/L (ref 22–29)
CREAT SERPL-MCNC: 1.5 MG/DL (ref 0.5–1)
EKG ATRIAL RATE: 78 BPM
EKG P AXIS: 43 DEGREES
EKG P-R INTERVAL: 148 MS
EKG Q-T INTERVAL: 392 MS
EKG QRS DURATION: 76 MS
EKG QTC CALCULATION (BAZETT): 410 MS
EKG R AXIS: 17 DEGREES
EKG T AXIS: 45 DEGREES
EKG VENTRICULAR RATE: 66 BPM
EOSINOPHIL # BLD: 0.35 K/UL (ref 0.05–0.5)
EOSINOPHILS RELATIVE PERCENT: 6 % (ref 0–6)
ERYTHROCYTE [DISTWIDTH] IN BLOOD BY AUTOMATED COUNT: 12.9 % (ref 11.5–15)
FERRITIN SERPL-MCNC: 50 NG/ML
FOLATE SERPL-MCNC: 8.7 NG/ML (ref 4.8–24.2)
GFR, ESTIMATED: 40 ML/MIN/1.73M2
GLUCOSE SERPL-MCNC: 165 MG/DL (ref 74–99)
HCT VFR BLD AUTO: 31.9 % (ref 34–48)
HGB BLD-MCNC: 9.9 G/DL (ref 11.5–15.5)
IMM GRANULOCYTES # BLD AUTO: 0.03 K/UL (ref 0–0.58)
IMM GRANULOCYTES NFR BLD: 1 % (ref 0–5)
IRON SATN MFR SERPL: 18 % (ref 15–50)
IRON SERPL-MCNC: 37 UG/DL (ref 37–145)
LACTATE BLDV-SCNC: 0.8 MMOL/L (ref 0.5–2.2)
LYMPHOCYTES NFR BLD: 1.21 K/UL (ref 1.5–4)
LYMPHOCYTES RELATIVE PERCENT: 20 % (ref 20–42)
MAGNESIUM SERPL-MCNC: 1.6 MG/DL (ref 1.6–2.6)
MCH RBC QN AUTO: 29.7 PG (ref 26–35)
MCHC RBC AUTO-ENTMCNC: 31 G/DL (ref 32–34.5)
MCV RBC AUTO: 95.8 FL (ref 80–99.9)
MONOCYTES NFR BLD: 0.51 K/UL (ref 0.1–0.95)
MONOCYTES NFR BLD: 8 % (ref 2–12)
NEUTROPHILS NFR BLD: 65 % (ref 43–80)
NEUTS SEG NFR BLD: 3.93 K/UL (ref 1.8–7.3)
PARTIAL THROMBOPLASTIN TIME: 30.3 SEC (ref 24.5–35.1)
PARTIAL THROMBOPLASTIN TIME: 33.9 SEC (ref 24.5–35.1)
PARTIAL THROMBOPLASTIN TIME: 45.7 SEC (ref 24.5–35.1)
PARTIAL THROMBOPLASTIN TIME: 46.4 SEC (ref 24.5–35.1)
PARTIAL THROMBOPLASTIN TIME: 71.3 SEC (ref 24.5–35.1)
PHOSPHATE SERPL-MCNC: 3.2 MG/DL (ref 2.5–4.5)
PLATELET # BLD AUTO: 182 K/UL (ref 130–450)
PMV BLD AUTO: 10.7 FL (ref 7–12)
POTASSIUM SERPL-SCNC: 4.4 MMOL/L (ref 3.5–5)
PROT SERPL-MCNC: 5.5 G/DL (ref 6.4–8.3)
RBC # BLD AUTO: 3.33 M/UL (ref 3.5–5.5)
SODIUM SERPL-SCNC: 141 MMOL/L (ref 132–146)
TIBC SERPL-MCNC: 201 UG/DL (ref 250–450)
VIT B12 SERPL-MCNC: 321 PG/ML (ref 211–946)
WBC OTHER # BLD: 6.1 K/UL (ref 4.5–11.5)

## 2024-12-24 PROCEDURE — 6370000000 HC RX 637 (ALT 250 FOR IP): Performed by: INTERNAL MEDICINE

## 2024-12-24 PROCEDURE — 36415 COLL VENOUS BLD VENIPUNCTURE: CPT

## 2024-12-24 PROCEDURE — 2060000000 HC ICU INTERMEDIATE R&B

## 2024-12-24 PROCEDURE — 99232 SBSQ HOSP IP/OBS MODERATE 35: CPT | Performed by: INTERNAL MEDICINE

## 2024-12-24 PROCEDURE — 83550 IRON BINDING TEST: CPT

## 2024-12-24 PROCEDURE — 85025 COMPLETE CBC W/AUTO DIFF WBC: CPT

## 2024-12-24 PROCEDURE — 6360000002 HC RX W HCPCS: Performed by: INTERNAL MEDICINE

## 2024-12-24 PROCEDURE — 80053 COMPREHEN METABOLIC PANEL: CPT

## 2024-12-24 PROCEDURE — 83605 ASSAY OF LACTIC ACID: CPT

## 2024-12-24 PROCEDURE — 82607 VITAMIN B-12: CPT

## 2024-12-24 PROCEDURE — 82728 ASSAY OF FERRITIN: CPT

## 2024-12-24 PROCEDURE — 2700000000 HC OXYGEN THERAPY PER DAY

## 2024-12-24 PROCEDURE — 93010 ELECTROCARDIOGRAM REPORT: CPT | Performed by: INTERNAL MEDICINE

## 2024-12-24 PROCEDURE — 84100 ASSAY OF PHOSPHORUS: CPT

## 2024-12-24 PROCEDURE — 6370000000 HC RX 637 (ALT 250 FOR IP)

## 2024-12-24 PROCEDURE — 83540 ASSAY OF IRON: CPT

## 2024-12-24 PROCEDURE — 97161 PT EVAL LOW COMPLEX 20 MIN: CPT

## 2024-12-24 PROCEDURE — 85730 THROMBOPLASTIN TIME PARTIAL: CPT

## 2024-12-24 PROCEDURE — 83735 ASSAY OF MAGNESIUM: CPT

## 2024-12-24 PROCEDURE — 82746 ASSAY OF FOLIC ACID SERUM: CPT

## 2024-12-24 PROCEDURE — 99232 SBSQ HOSP IP/OBS MODERATE 35: CPT | Performed by: STUDENT IN AN ORGANIZED HEALTH CARE EDUCATION/TRAINING PROGRAM

## 2024-12-24 PROCEDURE — 97165 OT EVAL LOW COMPLEX 30 MIN: CPT | Performed by: OCCUPATIONAL THERAPIST

## 2024-12-24 PROCEDURE — 2500000003 HC RX 250 WO HCPCS: Performed by: INTERNAL MEDICINE

## 2024-12-24 RX ORDER — TRAMADOL HYDROCHLORIDE 50 MG/1
50 TABLET ORAL ONCE
Status: COMPLETED | OUTPATIENT
Start: 2024-12-24 | End: 2024-12-24

## 2024-12-24 RX ORDER — LANOLIN ALCOHOL/MO/W.PET/CERES
500 CREAM (GRAM) TOPICAL DAILY
Status: DISCONTINUED | OUTPATIENT
Start: 2024-12-24 | End: 2024-12-27 | Stop reason: HOSPADM

## 2024-12-24 RX ADMIN — ACETAMINOPHEN 650 MG: 325 TABLET ORAL at 04:04

## 2024-12-24 RX ADMIN — MICONAZOLE NITRATE: 20 POWDER TOPICAL at 21:17

## 2024-12-24 RX ADMIN — MEMANTINE HYDROCHLORIDE 10 MG: 10 TABLET, FILM COATED ORAL at 08:02

## 2024-12-24 RX ADMIN — ARGATROBAN 1.2 MCG/KG/MIN: 50 INJECTION, SOLUTION INTRAVENOUS at 08:14

## 2024-12-24 RX ADMIN — TACROLIMUS 0.5 MG: 1 CAPSULE ORAL at 21:19

## 2024-12-24 RX ADMIN — RIVASTIGMINE TARTRATE 6 MG: 3 CAPSULE ORAL at 08:02

## 2024-12-24 RX ADMIN — SODIUM CHLORIDE, PRESERVATIVE FREE 10 ML: 5 INJECTION INTRAVENOUS at 21:16

## 2024-12-24 RX ADMIN — TACROLIMUS 1 MG: 1 CAPSULE ORAL at 08:02

## 2024-12-24 RX ADMIN — TRAMADOL HYDROCHLORIDE 50 MG: 50 TABLET, COATED ORAL at 05:21

## 2024-12-24 RX ADMIN — ARGATROBAN 1.4 MCG/KG/MIN: 50 INJECTION, SOLUTION INTRAVENOUS at 17:50

## 2024-12-24 RX ADMIN — TACROLIMUS 1 MG: 1 CAPSULE ORAL at 21:19

## 2024-12-24 RX ADMIN — TACROLIMUS 0.5 MG: 1 CAPSULE ORAL at 08:01

## 2024-12-24 RX ADMIN — MICONAZOLE NITRATE: 20 POWDER TOPICAL at 08:02

## 2024-12-24 RX ADMIN — RIVASTIGMINE TARTRATE 6 MG: 3 CAPSULE ORAL at 21:19

## 2024-12-24 RX ADMIN — MEMANTINE HYDROCHLORIDE 10 MG: 10 TABLET, FILM COATED ORAL at 21:19

## 2024-12-24 RX ADMIN — ACETAMINOPHEN 650 MG: 325 TABLET ORAL at 21:18

## 2024-12-24 ASSESSMENT — PAIN SCALES - GENERAL
PAINLEVEL_OUTOF10: 7
PAINLEVEL_OUTOF10: 2
PAINLEVEL_OUTOF10: 0
PAINLEVEL_OUTOF10: 4

## 2024-12-24 ASSESSMENT — PAIN SCALES - WONG BAKER: WONGBAKER_NUMERICALRESPONSE: NO HURT

## 2024-12-24 ASSESSMENT — PAIN DESCRIPTION - LOCATION
LOCATION: CHEST
LOCATION: LEG
LOCATION: LEG

## 2024-12-24 ASSESSMENT — PAIN DESCRIPTION - PAIN TYPE: TYPE: ACUTE PAIN

## 2024-12-24 ASSESSMENT — PAIN DESCRIPTION - ORIENTATION: ORIENTATION: LEFT

## 2024-12-24 ASSESSMENT — PAIN - FUNCTIONAL ASSESSMENT: PAIN_FUNCTIONAL_ASSESSMENT: ACTIVITIES ARE NOT PREVENTED

## 2024-12-24 ASSESSMENT — PAIN DESCRIPTION - FREQUENCY: FREQUENCY: INTERMITTENT

## 2024-12-24 ASSESSMENT — PAIN DESCRIPTION - DESCRIPTORS: DESCRIPTORS: ACHING;TENDER

## 2024-12-24 NOTE — ACP (ADVANCE CARE PLANNING)
Advance Care Planning   Healthcare Decision Maker:    Primary Decision Maker: KeeganRossi - Child - [phone]    Secondary Decision Maker: OchoaClare - Child - [phone]    Click here to complete Healthcare Decision Makers including selection of the Healthcare Decision Maker Relationship (ie \"Primary\").  Today we documented Decision Maker(s) consistent with Legal Next of Kin hierarchy.

## 2024-12-24 NOTE — PROGRESS NOTES
Physical Therapy  Facility/Department: [address] INTERMEDIATE 1  Physical Therapy Initial Assessment    Name: Olena Castrejon  : 1960  MRN: [id number]  Date of Service: 2024        Patient Diagnosis(es): The primary encounter diagnosis was Acute respiratory failure with hypoxia. Diagnoses of Acute deep vein thrombosis (DVT) of tibial vein of left lower extremity (HCC) and Bilateral pulmonary embolism (HCC) were also pertinent to this visit.  Past Medical History:  has a past medical history of Arthritis, Blood circulation, collateral, Chronic fatigue, Cirrhosis of liver (HCC), COPD exacerbation (HCC), Diabetes mellitus (HCC), Essential hypertension, Gall stones, GERD (gastroesophageal reflux disease), Hep C w/o coma, chronic (HCC), History of blood transfusion, Neuropathy, PFO (patent foramen ovale), Pneumonia, Type 2 diabetes mellitus with stage 3 chronic kidney disease, with long-term current use of insulin (HCC), Unspecified cerebral artery occlusion with cerebral infarction, and Varices.  Past Surgical History:  has a past surgical history that includes  section; Esophageal varice ligation ();  section ( and ); ECHO Compl W Dop Color Flow (2012); Colonoscopy; Endoscopy, colon, diagnostic; Cardiac surgery (); Cardiac catheterization; other surgical history (Right, 16); Liver transplant; and brain surgery.    Evaluating Therapist: Bisi Arora PT    Room #:  0428/0428-A  Diagnosis:  PE (pulmonary thromboembolism) (HCC) [I26.99]  PMHx/PSHx:  DM, Cirrhosis of liver, COPD, CKD, CVA with L side weakness  Precautions:  falls, alarm,       Social:  Pt admitted from retirement. Pt questionable historian, States she does not walk, transfers to wheelchair   Initial Evaluation  Date: 24 Treatment      Short Term/ Long Term   Goals   Was pt agreeable to Eval/treatment? yes     Does pt have pain? Back pain     Bed Mobility  Rolling: min assist  Supine to sit: min

## 2024-12-24 NOTE — PLAN OF CARE
Problem: ABCDS Injury Assessment  Goal: Absence of physical injury  Outcome: Progressing     Problem: Discharge Planning  Goal: Discharge to home or other facility with appropriate resources  Outcome: Progressing     Problem: Chronic Conditions and Co-morbidities  Goal: Patient's chronic conditions and co-morbidity symptoms are monitored and maintained or improved  Outcome: Progressing     Problem: Pain  Goal: Verbalizes/displays adequate comfort level or baseline comfort level  Outcome: Progressing     Problem: Safety - Adult  Goal: Free from fall injury  Outcome: Progressing     Problem: Skin/Tissue Integrity  Goal: Absence of new skin breakdown  Outcome: Progressing

## 2024-12-24 NOTE — PLAN OF CARE
Problem: ABCDS Injury Assessment  Goal: Absence of physical injury  12/24/2024 1655 by Maria Teresa Pulliam RN  Outcome: Progressing     Problem: Discharge Planning  Goal: Discharge to home or other facility with appropriate resources  12/24/2024 1655 by Maria Teresa Pulliam RN  Outcome: Progressing     Problem: Chronic Conditions and Co-morbidities  Goal: Patient's chronic conditions and co-morbidity symptoms are monitored and maintained or improved  12/24/2024 1655 by Maria Teresa Pulliam RN  Outcome: Progressing     Problem: Pain  Goal: Verbalizes/displays adequate comfort level or baseline comfort level  12/24/2024 1655 by Maria Teresa Pulliam RN  Outcome: Progressing     Problem: Safety - Adult  Goal: Free from fall injury  12/24/2024 1655 by Maria Teresa Pulliam RN  Outcome: Progressing     Problem: Skin/Tissue Integrity  Goal: Absence of new skin breakdown  Description: 1.  Monitor for areas of redness and/or skin breakdown  2.  Assess vascular access sites hourly  3.  Every 4-6 hours minimum:  Change oxygen saturation probe site  4.  Every 4-6 hours:  If on nasal continuous positive airway pressure, respiratory therapy assess nares and determine need for appliance change or resting period.  12/24/2024 1655 by Maria Teresa Pulliam RN  Outcome: Progressing

## 2024-12-24 NOTE — PROGRESS NOTES
Occupational Therapy  CCUPATIONAL THERAPY INITIAL EVALUATION      Date:2024  Patient Name: Olena Castrejon  MRN: [id number]  : 1960  Room: [address] [place], [place] [address]A    Referring Provider:      Evaluating OT: Samy GARZON Daily Activity Raw Score:     Recommended Adaptive Equipment:  TBD     Diagnosis: bilateral lower extremity edema    Surgery: none   Pertinent Medical History:    Past Medical History:   Diagnosis Date    Arthritis     right knee    Blood circulation, collateral     Chronic fatigue 2016    Cirrhosis of liver (HCC)     end stage    COPD exacerbation (HCC) 2015    pt denies having    Diabetes mellitus (HCC)     Essential hypertension 2/15/2016    Gall stones     did have stent placed    GERD (gastroesophageal reflux disease)     Hep C w/o coma, chronic (HCC)     Sucessfully treated with Harvoni-no longer has viral load    History of blood transfusion     Neuropathy     PFO (patent foramen ovale)     Pneumonia     Type 2 diabetes mellitus with stage 3 chronic kidney disease, with long-term current use of insulin (HCC)     Unspecified cerebral artery occlusion with cerebral infarction     Varices       Precautions:  Falls, o2 via nc, dementia     Home Living: Pt lives at a nursing facility with assist.  Patient stated she functions at a wheelchair level but does perform stand pivot transfers from surface to surface.  Patient has assist with all aspects of adl's per patient   Bathroom setup: shower stall   Equipment owned: wheelchair per patient    Prior Level of Function: patient assisted with ADLs , patient assisted with IADLs; ambulated for stand pivot transfers and utilized wheelchair for mobility  Driving: no  Occupation: retired    Pain Level: patient stated pain all over.  Unable to rate or describe at time of evaluation  Cognition: A&O: 2/4; Follows 2 step directions   Memory:  impaired   Sequencing:  impaired   Problem solving:  impaired   Judgement/safety:   impaired     Functional Assessment:   Initial Eval Status  Date: 24Dec. 2024 Treatment Status  Date: STGs = LTGs  Time frame: 5-7 days   Feeding Supervision      Grooming Minimal Assist   Supervision    UB Dressing Moderate Assist   Minimal Assist    LB Dressing Maximal Assist   Minimal Assist    Bathing Maximal Assist  Minimal Assist    Toileting Maximal Assist   Minimal Assist    Bed Mobility  Supine to sit: Maximal Assist   Sit to supine: Maximal Assist   Supine to sit: Minimal Assist   Sit to supine: Minimal Assist    Functional Transfers Moderate Assist   Minimal Assist    Functional Mobility Moderate Assist   Minimal Assist    Balance Sitting:     Static:  fair    Dynamic:fair  Standing: fair     Activity Tolerance Fair   good   Visual/  Perceptual Glasses: wears glasses                Hand Dominance right   Strength ROM Additional Info:    RUE  3-/5  Patient active shoulder range of motion 0 - 90 degrees; passive shoulder range of motion wfl good  and wfl FMC/dexterity noted during ADL tasks       LUE 3-/5  Patient left upper extremity active range of motion impaired throughout secondary to stroke.  Patient left upper extremity passive range of motion grossly wfl Impairment throughout secondary to stroke in the past     Hearing: WFL   Sensation:  No c/o numbness or tingling   Tone: impaired left upper extremity throughout  Edema: none noted at time of evaluation    Comments: Upon arrival patient in chair with chair alarm in place.   At end of session, patient in chair with call light and phone within reach, all lines and tubes intact chair alarm activated.  Overall patient demonstrated decreased independence and safety during completion of ADL/functional transfer/mobility tasks.  Pt would benefit from continued skilled OT to increase safety and independence with completion of ADL/IADL tasks for functional independence and quality of life.        Eval Complexity: Low    Assessment of current deficits

## 2024-12-24 NOTE — CARE COORDINATION
Social Work/Discharge Planning:  Met with patient and completed initial assessment.  Explained Social Work role and discussed transition of care/discharge planning.  Patient lives at [facility].  PTA she states she is independent at facility via wheelchair.  Informed patient of message left with facility liaison to confirm if she can return to he assisted living at discharge.  Patient is not interested in rehab and is adamant on returning to the assisted living at discharge.  Await return call from liaison Radha with [facility].  Will continue to follow and assist with discharge planning.  Electronically signed by MICHELLE Barahona on 12/24/2024 at 1:27 PM    Addendum:  Radha with [facility] states patient will need to go to rehab prior to return to facility.  Updated patient and provided her with skilled nursing facility choice list to review.  Will continue to follow.  Electronically signed by MICHELLE Barahona on 12/24/2024 at 2:05 PM

## 2024-12-24 NOTE — PROGRESS NOTES
[facility] Hospitalist Progress Note    Admitting Date and Time: 12/23/2024 12:16 PM  Admit Dx: PE (pulmonary thromboembolism) (HCC) [I26.99]    Subjective:  Patient is being followed for PE (pulmonary thromboembolism) (HCC) [I26.99]   Pt feels terrible as compared to yesterday. She mentions having some pain in her left leg. No SOB,CP,Nausea, Vomiting.    ROS: negative unless stated above.      tacrolimus  1 mg Oral BID    tacrolimus  0.5 mg Oral BID    rivastigmine  6 mg Oral BID    sodium chloride flush  5-40 mL IntraVENous 2 times per day    memantine  10 mg Oral BID    miconazole   Topical BID     sodium chloride flush, 5-40 mL, PRN  sodium chloride, , PRN  ondansetron, 4 mg, Q8H PRN   Or  ondansetron, 4 mg, Q6H PRN  polyethylene glycol, 17 g, Daily PRN  acetaminophen, 650 mg, Q6H PRN   Or  acetaminophen, 650 mg, Q6H PRN         Objective:    BP (!) 130/58   Pulse 69   Temp 98.1 °F (36.7 °C) (Oral)   Resp 16   Ht 1.676 m (5' 5.98\")   Wt 72.6 kg (160 lb)   LMP  (LMP Unknown)   SpO2 94%   BMI 25.84 kg/m²     General Appearance: alert and oriented to person, place and time and in no acute distress  Skin: warm and dry  Head: normocephalic and atraumatic  Eyes: pupils equal, round, and reactive to light, extraocular eye movements intact, conjunctivae normal  Neck: neck supple and non tender without mass   Pulmonary/Chest: clear to auscultation bilaterally- no wheezes, rales or rhonchi, normal air movement, no respiratory distress  Cardiovascular: normal rate, normal S1 and S2 and no carotid bruits  Abdomen: soft, non-tender, non-distended, normal bowel sounds, no masses or organomegaly  Extremities: Left calf swelling present, tender to touch.  Neurologic: no cranial nerve deficit and speech normal        Recent Labs     12/23/24  1258      K 4.6   *   CO2 22   BUN 26*   CREATININE 1.6*   GLUCOSE 187*   CALCIUM 8.5*       Recent Labs     12/23/24  1258   WBC 6.3   RBC 3.44*   HGB 10.4*

## 2024-12-25 LAB
INR PPP: 1.4
PARTIAL THROMBOPLASTIN TIME: 23.1 SEC (ref 24.5–35.1)
PARTIAL THROMBOPLASTIN TIME: 31.2 SEC (ref 24.5–35.1)
PARTIAL THROMBOPLASTIN TIME: 39.3 SEC (ref 24.5–35.1)
PARTIAL THROMBOPLASTIN TIME: 41.1 SEC (ref 24.5–35.1)
PARTIAL THROMBOPLASTIN TIME: 59.5 SEC (ref 24.5–35.1)
PROTHROMBIN TIME: 14.9 SEC (ref 9.3–12.4)

## 2024-12-25 PROCEDURE — 85730 THROMBOPLASTIN TIME PARTIAL: CPT

## 2024-12-25 PROCEDURE — 2500000003 HC RX 250 WO HCPCS: Performed by: INTERNAL MEDICINE

## 2024-12-25 PROCEDURE — 6360000002 HC RX W HCPCS: Performed by: INTERNAL MEDICINE

## 2024-12-25 PROCEDURE — 99232 SBSQ HOSP IP/OBS MODERATE 35: CPT | Performed by: INTERNAL MEDICINE

## 2024-12-25 PROCEDURE — 99232 SBSQ HOSP IP/OBS MODERATE 35: CPT | Performed by: STUDENT IN AN ORGANIZED HEALTH CARE EDUCATION/TRAINING PROGRAM

## 2024-12-25 PROCEDURE — 36415 COLL VENOUS BLD VENIPUNCTURE: CPT

## 2024-12-25 PROCEDURE — 85610 PROTHROMBIN TIME: CPT

## 2024-12-25 PROCEDURE — 6370000000 HC RX 637 (ALT 250 FOR IP): Performed by: INTERNAL MEDICINE

## 2024-12-25 PROCEDURE — 2060000000 HC ICU INTERMEDIATE R&B

## 2024-12-25 RX ADMIN — ARGATROBAN 1.6 MCG/KG/MIN: 50 INJECTION, SOLUTION INTRAVENOUS at 02:11

## 2024-12-25 RX ADMIN — ACETAMINOPHEN 650 MG: 325 TABLET ORAL at 20:12

## 2024-12-25 RX ADMIN — MEMANTINE HYDROCHLORIDE 10 MG: 10 TABLET, FILM COATED ORAL at 20:11

## 2024-12-25 RX ADMIN — RIVASTIGMINE TARTRATE 6 MG: 3 CAPSULE ORAL at 20:11

## 2024-12-25 RX ADMIN — MEMANTINE HYDROCHLORIDE 10 MG: 10 TABLET, FILM COATED ORAL at 07:57

## 2024-12-25 RX ADMIN — TACROLIMUS 1 MG: 1 CAPSULE ORAL at 07:57

## 2024-12-25 RX ADMIN — ARGATROBAN 2.2 MCG/KG/MIN: 50 INJECTION, SOLUTION INTRAVENOUS at 15:25

## 2024-12-25 RX ADMIN — ARGATROBAN 2.2 MCG/KG/MIN: 50 INJECTION, SOLUTION INTRAVENOUS at 23:26

## 2024-12-25 RX ADMIN — RIVASTIGMINE TARTRATE 6 MG: 3 CAPSULE ORAL at 07:57

## 2024-12-25 RX ADMIN — ARGATROBAN 2 MCG/KG/MIN: 50 INJECTION, SOLUTION INTRAVENOUS at 09:39

## 2024-12-25 RX ADMIN — SODIUM CHLORIDE, PRESERVATIVE FREE 10 ML: 5 INJECTION INTRAVENOUS at 07:57

## 2024-12-25 RX ADMIN — MICONAZOLE NITRATE: 20 POWDER TOPICAL at 20:12

## 2024-12-25 RX ADMIN — TACROLIMUS 0.5 MG: 1 CAPSULE ORAL at 07:57

## 2024-12-25 RX ADMIN — TACROLIMUS 0.5 MG: 1 CAPSULE ORAL at 20:11

## 2024-12-25 RX ADMIN — TACROLIMUS 1 MG: 1 CAPSULE ORAL at 20:12

## 2024-12-25 RX ADMIN — MICONAZOLE NITRATE: 20 POWDER TOPICAL at 07:57

## 2024-12-25 RX ADMIN — SODIUM CHLORIDE, PRESERVATIVE FREE 10 ML: 5 INJECTION INTRAVENOUS at 20:11

## 2024-12-25 ASSESSMENT — PAIN - FUNCTIONAL ASSESSMENT: PAIN_FUNCTIONAL_ASSESSMENT: ACTIVITIES ARE NOT PREVENTED

## 2024-12-25 ASSESSMENT — PAIN SCALES - GENERAL: PAINLEVEL_OUTOF10: 3

## 2024-12-25 ASSESSMENT — PAIN DESCRIPTION - FREQUENCY: FREQUENCY: INTERMITTENT

## 2024-12-25 ASSESSMENT — PAIN DESCRIPTION - PAIN TYPE: TYPE: ACUTE PAIN

## 2024-12-25 ASSESSMENT — PAIN SCALES - WONG BAKER: WONGBAKER_NUMERICALRESPONSE: NO HURT

## 2024-12-25 ASSESSMENT — PAIN DESCRIPTION - LOCATION: LOCATION: LEG

## 2024-12-25 ASSESSMENT — PAIN DESCRIPTION - DESCRIPTORS: DESCRIPTORS: ACHING;TENDER

## 2024-12-25 NOTE — PROGRESS NOTES
[facility] Hospitalist Progress Note    Admitting Date and Time: 12/23/2024 12:16 PM  Admit Dx: PE (pulmonary thromboembolism) (HCC) [I26.99]    Subjective:  Patient is being followed for PE (pulmonary thromboembolism) (HCC) [I26.99]     Pt feels better as compared to yesterday.   No SOB,CP,Nausea, Vomiting.    ROS: negative unless stated above.      vitamin B-12  500 mcg Oral Daily    tacrolimus  1 mg Oral BID    tacrolimus  0.5 mg Oral BID    rivastigmine  6 mg Oral BID    sodium chloride flush  5-40 mL IntraVENous 2 times per day    memantine  10 mg Oral BID    miconazole   Topical BID     sodium chloride flush, 5-40 mL, PRN  sodium chloride, , PRN  ondansetron, 4 mg, Q8H PRN   Or  ondansetron, 4 mg, Q6H PRN  polyethylene glycol, 17 g, Daily PRN  acetaminophen, 650 mg, Q6H PRN   Or  acetaminophen, 650 mg, Q6H PRN         Objective:    BP (!) 148/68   Pulse 71   Temp 98.6 °F (37 °C) (Oral)   Resp 19   Ht 1.676 m (5' 5.98\")   Wt 72.5 kg (159 lb 13.3 oz)   LMP  (LMP Unknown)   SpO2 95%   BMI 25.81 kg/m²     General Appearance: alert and oriented to person, place and time and in no acute distress  Skin: warm and dry  Head: normocephalic and atraumatic  Eyes: pupils equal, round, and reactive to light, extraocular eye movements intact, conjunctivae normal  Neck: neck supple and non tender without mass   Pulmonary/Chest: b/l diffuse rales present, no respiratory distress, off oxygen today.   Cardiovascular: normal rate, normal S1 and S2 and no carotid bruits  Abdomen: soft, non-tender, non-distended, normal bowel sounds, no masses or organomegaly  Extremities: Left calf swelling present, tender to touch.  Neurologic: no cranial nerve deficit and speech normal        Recent Labs     12/23/24  1258 12/24/24  0915    141   K 4.6 4.4   * 111*   CO2 22 22   BUN 26* 20   CREATININE 1.6* 1.5*   GLUCOSE 187* 165*   CALCIUM 8.5* 8.1*       Recent Labs     12/23/24  1258 12/24/24  0915   WBC 6.3 6.1

## 2024-12-25 NOTE — PROGRESS NOTES
Yamilex Mathis MD   ·   MD Lenka Gutierrez APRN  ·  MIKEL Hirsch        Inpatient Medical Oncology/Hematology Consult Note            Patient Name: Olena Castrejon  YOB: 1960    DATE OF ADMISSION: 2024  DATE OF CONSULTATION: 2024  CONSULTING PROVIDER: Miguel Lynn DO  REASON FOR CONSULTATION: \"pe;heparin allergy\"  PCP: Rodger Napier    [facility]      CHIEF COMPLAINT:  Shortness of breath and fatigue    Subjective:   The patient was seen and examined, she is feeling better, no shortness of breath, on room air, the leg pain had improved.  No bleeding.      Past Medical History:   Diagnosis Date    Arthritis     right knee    Blood circulation, collateral     Chronic fatigue 2016    Cirrhosis of liver (HCC)     end stage    COPD exacerbation (HCC) 2015    pt denies having    Diabetes mellitus (HCC)     Essential hypertension 2/15/2016    Gall stones     did have stent placed    GERD (gastroesophageal reflux disease)     Hep C w/o coma, chronic (HCC)     Sucessfully treated with Harvoni-no longer has viral load    History of blood transfusion     Neuropathy     PFO (patent foramen ovale)     Pneumonia     Type 2 diabetes mellitus with stage 3 chronic kidney disease, with long-term current use of insulin (HCC)     Unspecified cerebral artery occlusion with cerebral infarction     Varices        Past Surgical History:   Procedure Laterality Date    BRAIN SURGERY      CARDIAC CATHETERIZATION      cerebral angiogrem to check cavernous malformation in head - negative    CARDIAC SURGERY  2014    heart cath     SECTION      x 2     SECTION   and     COLONOSCOPY      ECHO COMPL W DOP COLOR FLOW  2012         ENDOSCOPY, COLON, DIAGNOSTIC      ESOPHAGEAL VARICE LIGATION      banding    LIVER TRANSPLANT      OTHER SURGICAL HISTORY Right 16    Right Knee Arthroscopy with Debridement partial lateral menisectomy,

## 2024-12-26 LAB
ALBUMIN SERPL-MCNC: 3.2 G/DL (ref 3.5–5.2)
ALP SERPL-CCNC: 107 U/L (ref 35–104)
ALT SERPL-CCNC: <5 U/L (ref 0–32)
ANION GAP SERPL CALCULATED.3IONS-SCNC: 10 MMOL/L (ref 7–16)
AST SERPL-CCNC: 8 U/L (ref 0–31)
BASOPHILS # BLD: 0.03 K/UL (ref 0–0.2)
BASOPHILS NFR BLD: 1 % (ref 0–2)
BILIRUB SERPL-MCNC: 0.2 MG/DL (ref 0–1.2)
BUN SERPL-MCNC: 14 MG/DL (ref 6–23)
CALCIUM SERPL-MCNC: 8.6 MG/DL (ref 8.6–10.2)
CHLORIDE SERPL-SCNC: 109 MMOL/L (ref 98–107)
CO2 SERPL-SCNC: 22 MMOL/L (ref 22–29)
CREAT SERPL-MCNC: 1.2 MG/DL (ref 0.5–1)
EOSINOPHIL # BLD: 0.44 K/UL (ref 0.05–0.5)
EOSINOPHILS RELATIVE PERCENT: 7 % (ref 0–6)
ERYTHROCYTE [DISTWIDTH] IN BLOOD BY AUTOMATED COUNT: 13 % (ref 11.5–15)
GFR, ESTIMATED: 51 ML/MIN/1.73M2
GLUCOSE SERPL-MCNC: 146 MG/DL (ref 74–99)
HCT VFR BLD AUTO: 33.9 % (ref 34–48)
HGB BLD-MCNC: 10.8 G/DL (ref 11.5–15.5)
IMM GRANULOCYTES # BLD AUTO: 0.03 K/UL (ref 0–0.58)
IMM GRANULOCYTES NFR BLD: 1 % (ref 0–5)
LYMPHOCYTES NFR BLD: 1 K/UL (ref 1.5–4)
LYMPHOCYTES RELATIVE PERCENT: 17 % (ref 20–42)
MCH RBC QN AUTO: 30 PG (ref 26–35)
MCHC RBC AUTO-ENTMCNC: 31.9 G/DL (ref 32–34.5)
MCV RBC AUTO: 94.2 FL (ref 80–99.9)
MONOCYTES NFR BLD: 0.51 K/UL (ref 0.1–0.95)
MONOCYTES NFR BLD: 9 % (ref 2–12)
NEUTROPHILS NFR BLD: 67 % (ref 43–80)
NEUTS SEG NFR BLD: 4 K/UL (ref 1.8–7.3)
PARTIAL THROMBOPLASTIN TIME: 54.2 SEC (ref 24.5–35.1)
PARTIAL THROMBOPLASTIN TIME: 62.1 SEC (ref 24.5–35.1)
PLATELET # BLD AUTO: 201 K/UL (ref 130–450)
PMV BLD AUTO: 11.3 FL (ref 7–12)
POTASSIUM SERPL-SCNC: 4 MMOL/L (ref 3.5–5)
PROT SERPL-MCNC: 5.6 G/DL (ref 6.4–8.3)
RBC # BLD AUTO: 3.6 M/UL (ref 3.5–5.5)
SODIUM SERPL-SCNC: 141 MMOL/L (ref 132–146)
WBC OTHER # BLD: 6 K/UL (ref 4.5–11.5)

## 2024-12-26 PROCEDURE — 85025 COMPLETE CBC W/AUTO DIFF WBC: CPT

## 2024-12-26 PROCEDURE — 36415 COLL VENOUS BLD VENIPUNCTURE: CPT

## 2024-12-26 PROCEDURE — 6370000000 HC RX 637 (ALT 250 FOR IP)

## 2024-12-26 PROCEDURE — 6360000002 HC RX W HCPCS: Performed by: INTERNAL MEDICINE

## 2024-12-26 PROCEDURE — 97535 SELF CARE MNGMENT TRAINING: CPT

## 2024-12-26 PROCEDURE — 2500000003 HC RX 250 WO HCPCS: Performed by: INTERNAL MEDICINE

## 2024-12-26 PROCEDURE — 85730 THROMBOPLASTIN TIME PARTIAL: CPT

## 2024-12-26 PROCEDURE — 6370000000 HC RX 637 (ALT 250 FOR IP): Performed by: INTERNAL MEDICINE

## 2024-12-26 PROCEDURE — 99232 SBSQ HOSP IP/OBS MODERATE 35: CPT | Performed by: STUDENT IN AN ORGANIZED HEALTH CARE EDUCATION/TRAINING PROGRAM

## 2024-12-26 PROCEDURE — 80053 COMPREHEN METABOLIC PANEL: CPT

## 2024-12-26 PROCEDURE — 97530 THERAPEUTIC ACTIVITIES: CPT

## 2024-12-26 PROCEDURE — 2060000000 HC ICU INTERMEDIATE R&B

## 2024-12-26 RX ORDER — TRAMADOL HYDROCHLORIDE 50 MG/1
50 TABLET ORAL ONCE
Status: COMPLETED | OUTPATIENT
Start: 2024-12-26 | End: 2024-12-26

## 2024-12-26 RX ADMIN — MEMANTINE HYDROCHLORIDE 10 MG: 10 TABLET, FILM COATED ORAL at 19:30

## 2024-12-26 RX ADMIN — TACROLIMUS 0.5 MG: 1 CAPSULE ORAL at 07:50

## 2024-12-26 RX ADMIN — RIVASTIGMINE TARTRATE 6 MG: 3 CAPSULE ORAL at 07:50

## 2024-12-26 RX ADMIN — ARGATROBAN 2.4 MCG/KG/MIN: 50 INJECTION, SOLUTION INTRAVENOUS at 07:48

## 2024-12-26 RX ADMIN — RIVASTIGMINE TARTRATE 6 MG: 3 CAPSULE ORAL at 19:30

## 2024-12-26 RX ADMIN — ACETAMINOPHEN 650 MG: 325 TABLET ORAL at 19:40

## 2024-12-26 RX ADMIN — ARGATROBAN 2.4 MCG/KG/MIN: 50 INJECTION, SOLUTION INTRAVENOUS at 14:35

## 2024-12-26 RX ADMIN — SODIUM CHLORIDE, PRESERVATIVE FREE 10 ML: 5 INJECTION INTRAVENOUS at 19:30

## 2024-12-26 RX ADMIN — TACROLIMUS 0.5 MG: 1 CAPSULE ORAL at 19:29

## 2024-12-26 RX ADMIN — TRAMADOL HYDROCHLORIDE 50 MG: 50 TABLET, COATED ORAL at 21:51

## 2024-12-26 RX ADMIN — TACROLIMUS 1 MG: 1 CAPSULE ORAL at 07:50

## 2024-12-26 RX ADMIN — MICONAZOLE NITRATE: 20 POWDER TOPICAL at 07:43

## 2024-12-26 RX ADMIN — MEMANTINE HYDROCHLORIDE 10 MG: 10 TABLET, FILM COATED ORAL at 07:49

## 2024-12-26 RX ADMIN — MICONAZOLE NITRATE: 20 POWDER TOPICAL at 19:30

## 2024-12-26 RX ADMIN — ARGATROBAN 2.4 MCG/KG/MIN: 50 INJECTION, SOLUTION INTRAVENOUS at 02:39

## 2024-12-26 RX ADMIN — TACROLIMUS 1 MG: 1 CAPSULE ORAL at 19:30

## 2024-12-26 RX ADMIN — ARGATROBAN 2.4 MCG/KG/MIN: 50 INJECTION, SOLUTION INTRAVENOUS at 19:06

## 2024-12-26 ASSESSMENT — PAIN SCALES - GENERAL: PAINLEVEL_OUTOF10: 4

## 2024-12-26 ASSESSMENT — PAIN DESCRIPTION - LOCATION: LOCATION: ABDOMEN

## 2024-12-26 NOTE — DISCHARGE INSTR - COC
Continuity of Care Form    Patient Name: Olena Castrejon   :  1960  MRN:  [id number]    Admit date:  2024  Discharge date:      Code Status Order: Full Code   Advance Directives:   Advance Care Flowsheet Documentation             Admitting Physician:  Miguel Lynn DO  PCP: Rodger Napier DO    Discharging Nurse: samantha oneil RN  Discharging Hospital Unit/Room#: 0428/0428-A  Discharging Unit Phone Number: [phone]    Emergency Contact:   Extended Emergency Contact Information  Primary Emergency Contact: Renetta Allisonnifer  Address: [address] [place] [address]  Home Phone: [phone]  Mobile Phone: [phone]  Relation: Child   needed? No  Secondary Emergency Contact: Clare Packer   [facility]  Home Phone: [phone]  Mobile Phone: [phone]  Relation: Child   needed? No    Past Surgical History:  Past Surgical History:   Procedure Laterality Date    BRAIN SURGERY      CARDIAC CATHETERIZATION      cerebral angiogrem to check cavernous malformation in head - negative    CARDIAC SURGERY  2014    heart cath     SECTION      x 2     SECTION   and     COLONOSCOPY      ECHO COMPL W DOP COLOR FLOW  2012         ENDOSCOPY, COLON, DIAGNOSTIC      ESOPHAGEAL VARICE LIGATION      banding    LIVER TRANSPLANT      OTHER SURGICAL HISTORY Right 16    Right Knee Arthroscopy with Debridement partial lateral menisectomy, chondroplasty, synovectomy       Immunization History:   Immunization History   Administered Date(s) Administered    COVID-19, MODERNA BLUE border, Primary or Immunocompromised, (age 12y+), IM, 100 mcg/0.5mL 2021, 2021, 2021    Hep B, ENGERIX-B, (age 20y+), IM, 1mL 2012    Influenza A (U0Y0-14) Vaccine PF IM 2010    Influenza, AFLURIA, FLUZONE (age 6-35 mo), Quadv MDV, 0.25mL 2020    Influenza, FLUARIX, FLULAVAL, FLUZONE (age  12/26/2024 0800  Gross per 24 hour   Intake 208.39 ml   Output 1800 ml   Net -1591.61 ml     I/O last 3 completed shifts:  In: 1872.1 [I.V.:1872.1]  Out: 1400 [Urine:1400]    Safety Concerns:     At Risk for Falls    Impairments/Disabilities:      None    Nutrition Therapy:  Current Nutrition Therapy:   - Oral Diet:  General    Routes of Feeding: Oral  Liquids: Thin Liquids  Daily Fluid Restriction: no  Last Modified Barium Swallow with Video (Video Swallowing Test): not done    Treatments at the Time of Hospital Discharge:   Respiratory Treatments: none  Oxygen Therapy:  is not on home oxygen therapy.  Ventilator:    - No ventilator support    Rehab Therapies: Physical Therapy and Occupational Therapy  Weight Bearing Status/Restrictions: No weight bearing restrictions  Other Medical Equipment (for information only, NOT a DME order):  bedside commode and hospital bed  Other Treatments: none    Patient's personal belongings (please select all that are sent with patient):  Alcon    RN SIGNATURE:  Electronically signed by Isabell Miller RN on 12/27/24 at 12:34 PM EST    CASE MANAGEMENT/SOCIAL WORK SECTION    Inpatient Status Date: 12/23/2024    Readmission Risk Assessment Score:  [facility] RISK OF UNPLANNED READMISSION 2.0             16.5 Total Score        Discharging to Facility/ Agency   Name: [facility]  Address: [address] [place] [address], [place] [address]   Phone: [phone]  Fax: [phone]    Dialysis Facility (if applicable)   Name:  Address:  Dialysis Schedule:  Phone:  Fax:    / signature: Electronically signed by MICHELLE Barahona on 12/26/24 at 3:33 PM EST    PHYSICIAN SECTION    Prognosis: {Prognosis:5454359228}    Condition at Discharge: { Patient Condition:681253814}    Rehab Potential (if transferring to Rehab): {Prognosis:6379546287}    Recommended Labs or Other Treatments After Discharge: ***    Physician Certification: I certify the above information and transfer

## 2024-12-26 NOTE — CARE COORDINATION
Social Work/Discharge Planning:  Chart reviewed. Notified therapy of need for updated notes.  Met with patient to follow up on facility choices.  Patient states she still need to speak with her daughter.  Informed patient that this worker will call to confirm choices.  Called patient daughter Rossi (ph: [phone]) and left a message in regards to need for facility choices.  Will continue to follow.  Electronically signed by MICHELLE Barahona on 12/26/2024 at 9:43 AM    Addendum:  Patient daughter Rossi returned call and states her facility choices are first [facility], 2nd-[facility] and 3rd-[facility].  Called liaison Bette and confirmed no beds at [facility].  Bette states [facility] can accept patient and will start pre-cert.  Updated patient daughter Rossi.  Electronic N-17 in epic and PAS completed.  Will continue to follow and wait for pre-cert.  Electronically signed by MICHELLE Barahona on 12/26/2024 at 3:45 PM

## 2024-12-26 NOTE — PROGRESS NOTES
[facility] Hospitalist Progress Note    Admitting Date and Time: 12/23/2024 12:16 PM  Admit Dx: PE (pulmonary thromboembolism) (HCC) [I26.99]    Subjective:  Patient is being followed for PE (pulmonary thromboembolism) (HCC) [I26.99]   Pt feels better, was seen and examined at bedside.  Per RN: No major concerns.     ROS: denies fever, chills, cp, sob, n/v, HA unless stated above.      vitamin B-12  500 mcg Oral Daily    tacrolimus  1 mg Oral BID    tacrolimus  0.5 mg Oral BID    rivastigmine  6 mg Oral BID    sodium chloride flush  5-40 mL IntraVENous 2 times per day    memantine  10 mg Oral BID    miconazole   Topical BID     sodium chloride flush, 5-40 mL, PRN  sodium chloride, , PRN  ondansetron, 4 mg, Q8H PRN   Or  ondansetron, 4 mg, Q6H PRN  polyethylene glycol, 17 g, Daily PRN  acetaminophen, 650 mg, Q6H PRN   Or  acetaminophen, 650 mg, Q6H PRN         Objective:    BP (!) 140/85   Pulse 66   Temp 98.2 °F (36.8 °C) (Oral)   Resp 18   Ht 1.676 m (5' 5.98\")   Wt 72.4 kg (159 lb 9.8 oz)   LMP  (LMP Unknown)   SpO2 94%   BMI 25.78 kg/m²     General Appearance: alert and oriented to person, place and time and in no acute distress  Skin: warm and dry  Head: normocephalic and atraumatic  Eyes: pupils equal, round, and reactive to light, extraocular eye movements intact, conjunctivae normal  Neck: neck supple and non tender without mass   Pulmonary/Chest: clear to auscultation bilaterally- no wheezes, rales or rhonchi, normal air movement, no respiratory distress  Cardiovascular: normal rate, normal S1 and S2 and no carotid bruits  Abdomen: soft, non-tender, non-distended, normal bowel sounds, no masses or organomegaly  Extremities: no cyanosis, no clubbing and no edema  Neurologic: no cranial nerve deficit and speech normal    Recent Labs     12/23/24  1258 12/24/24  0915 12/26/24  0340    141 141   K 4.6 4.4 4.0   * 111* 109*   CO2 22 22 22   BUN 26* 20 14   CREATININE 1.6* 1.5* 1.2*

## 2024-12-26 NOTE — PROGRESS NOTES
Occupational Therapy  OT BEDSIDE TREATMENT NOTE      Date:2024  Patient Name: Olena Castrejon  MRN: [id number]  : 1960  Room: [address] [place], [place] [address]A       Evaluating OT: Samy Hdez     AM-PAC Daily Activity Raw Score:      Recommended Adaptive Equipment:  TBD      Diagnosis: bilateral lower extremity edema               Surgery: none   Pertinent Medical History:    Past Medical History        Past Medical History:   Diagnosis Date    Arthritis       right knee    Blood circulation, collateral      Chronic fatigue 2016    Cirrhosis of liver (HCC)       end stage    COPD exacerbation (HCC) 2015     pt denies having    Diabetes mellitus (HCC)      Essential hypertension 2/15/2016    Gall stones       did have stent placed    GERD (gastroesophageal reflux disease)      Hep C w/o coma, chronic (HCC)       Sucessfully treated with Harvoni-no longer has viral load    History of blood transfusion      Neuropathy      PFO (patent foramen ovale)      Pneumonia      Type 2 diabetes mellitus with stage 3 chronic kidney disease, with long-term current use of insulin (HCC)      Unspecified cerebral artery occlusion with cerebral infarction      Varices           Precautions:  Falls, o2, dementia     Home Living: Pt lives at a nursing facility with assist.  Patient stated she functions at a wheelchair level but does perform stand pivot transfers from surface to surface.  Patient has assist with all aspects of adl's per patient   Bathroom setup: shower stall   Equipment owned: wheelchair per patient     Prior Level of Function: patient assisted with ADLs , patient assisted with IADLs; ambulated for stand pivot transfers and utilized wheelchair for mobility  Driving: no  Occupation: retired     Pain Level: pt did not report level of pain.   Cognition: Awake and alert.  Follows directions.  Cues for safety.                Functional Assessment:    Initial Eval Status  Date: 2024 Treatment  Status  Date:12/26/24  STGs = LTGs  Time frame: 5-7 days   Feeding Supervision  Setup       Grooming Minimal Assist   min A to comb hair when seated in the chair.    Supervision    UB Dressing Moderate Assist  Min A    Minimal Assist    LB Dressing Maximal Assist  Max A to don and arrange brief.   Minimal Assist    Bathing Maximal Assist   Minimal Assist    Toileting Maximal Assist  Max A roberto hygiene after sitting on the BSC.    max A clothing management.    Minimal Assist    Bed Mobility  Supine to sit: Maximal Assist   Sit to supine: Maximal Assist  Min A supine to sit   Supine to sit: Minimal Assist   Sit to supine: Minimal Assist    Functional Transfers Moderate Assist  Mod A sit to stand from bed and BSC.     Minimal Assist    Functional Mobility Moderate Assist  Mod A x 2 pivot bed > BSC> chair   Limited stand tolerance with pt requiring increased assist towards end of the pivot.     Minimal Assist    Balance Sitting:     Static:  fair    Dynamic:fair  Standing: fair Static standing for ADL mod A for balance.       Activity Tolerance Fair  Fair   good     Comments:  Pt pleasant and cooperative.  Had been on the bedpan prior to this session however needed to sit on the BSC again.  Difficulty noted to advance LE's and increased assist required during pivot transfers.  ADL completed while seated and pt remained seated in the chair at the end of the session.     Education/treatment:  ADL retraining with facilitation of movement to increase self care skills. Therapeutic activity to address balance and endurance for ADL and transfers.  Pt education of daily orientation and transfer safety.       Pt has made  progress towards set goals.       Time In: 907   Time Out: 933     Min Units   Therapeutic Ex 21812     Therapeutic Activities 43554 10 1   ADL/Self Care 20424 16 1   Orthotic Management 47138     Neuro Re-Ed 13203     Non-Billable Time     TOTAL TIMED TREATMENT 26 2         Yoana VAZQUEZ/L [id number]

## 2024-12-26 NOTE — PROGRESS NOTES
Physical Therapy  Facility/Department: [address] INTERMEDIATE 1  Physical Therapy Treatment Note    Name: Olena Castrejon  : 1960  MRN: [id number]  Date of Service: 2024        Patient Diagnosis(es): The primary encounter diagnosis was Acute respiratory failure with hypoxia. Diagnoses of Acute deep vein thrombosis (DVT) of tibial vein of left lower extremity (HCC) and Bilateral pulmonary embolism (HCC) were also pertinent to this visit.  Past Medical History:  has a past medical history of Arthritis, Blood circulation, collateral, Chronic fatigue, Cirrhosis of liver (HCC), COPD exacerbation (HCC), Diabetes mellitus (HCC), Essential hypertension, Gall stones, GERD (gastroesophageal reflux disease), Hep C w/o coma, chronic (HCC), History of blood transfusion, Neuropathy, PFO (patent foramen ovale), Pneumonia, Type 2 diabetes mellitus with stage 3 chronic kidney disease, with long-term current use of insulin (HCC), Unspecified cerebral artery occlusion with cerebral infarction, and Varices.  Past Surgical History:  has a past surgical history that includes  section; Esophageal varice ligation ();  section ( and ); ECHO Compl W Dop Color Flow (2012); Colonoscopy; Endoscopy, colon, diagnostic; Cardiac surgery (); Cardiac catheterization; other surgical history (Right, 16); Liver transplant; and brain surgery.    Evaluating Therapist: Bisi Arora PT    Room #:  0428/0428-A  Diagnosis:  PE (pulmonary thromboembolism) (HCC) [I26.99]  PMHx/PSHx:  DM, Cirrhosis of liver, COPD, CKD, CVA with L side weakness  Precautions:  falls, alarm, CORY      Social:  Pt admitted from snf. Pt questionable historian, States she does not walk, transfers to wheelchair   Initial Evaluation  Date: 24 Treatment  2024    Short Term/ Long Term   Goals   Was pt agreeable to Eval/treatment? yes yes    Does pt have pain? Back pain Back pain from being in bed    Bed Mobility  Rolling: min

## 2024-12-27 VITALS
SYSTOLIC BLOOD PRESSURE: 118 MMHG | OXYGEN SATURATION: 93 % | HEIGHT: 66 IN | WEIGHT: 214.7 LBS | RESPIRATION RATE: 18 BRPM | HEART RATE: 61 BPM | DIASTOLIC BLOOD PRESSURE: 59 MMHG | TEMPERATURE: 97.9 F | BODY MASS INDEX: 34.51 KG/M2

## 2024-12-27 LAB — PARTIAL THROMBOPLASTIN TIME: 61.4 SEC (ref 24.5–35.1)

## 2024-12-27 PROCEDURE — 6370000000 HC RX 637 (ALT 250 FOR IP): Performed by: INTERNAL MEDICINE

## 2024-12-27 PROCEDURE — 6360000002 HC RX W HCPCS: Performed by: INTERNAL MEDICINE

## 2024-12-27 PROCEDURE — 85730 THROMBOPLASTIN TIME PARTIAL: CPT

## 2024-12-27 PROCEDURE — 99239 HOSP IP/OBS DSCHRG MGMT >30: CPT | Performed by: STUDENT IN AN ORGANIZED HEALTH CARE EDUCATION/TRAINING PROGRAM

## 2024-12-27 RX ADMIN — ARGATROBAN 2.4 MCG/KG/MIN: 50 INJECTION, SOLUTION INTRAVENOUS at 09:43

## 2024-12-27 RX ADMIN — ARGATROBAN 2.4 MCG/KG/MIN: 50 INJECTION, SOLUTION INTRAVENOUS at 04:31

## 2024-12-27 RX ADMIN — ARGATROBAN 2.4 MCG/KG/MIN: 50 INJECTION, SOLUTION INTRAVENOUS at 00:11

## 2024-12-27 RX ADMIN — TACROLIMUS 0.5 MG: 1 CAPSULE ORAL at 08:32

## 2024-12-27 RX ADMIN — MICONAZOLE NITRATE: 20 POWDER TOPICAL at 08:32

## 2024-12-27 RX ADMIN — MEMANTINE HYDROCHLORIDE 10 MG: 10 TABLET, FILM COATED ORAL at 08:31

## 2024-12-27 RX ADMIN — TACROLIMUS 1 MG: 1 CAPSULE ORAL at 08:31

## 2024-12-27 RX ADMIN — RIVASTIGMINE TARTRATE 6 MG: 3 CAPSULE ORAL at 08:31

## 2024-12-27 NOTE — PLAN OF CARE

## 2024-12-27 NOTE — CARE COORDINATION
Social Work/Discharge Planning:  Discharge order noted.  Patient to discharge to [facility].  Unit arranged ambulance transport with [facility] for 12:30pm. Called patient daughter Rossi and left a detailed message informing her of discharge and to call her mother.  Notified liaison Bette with [facility] of discharge time.  Electronically signed by MICHELLE Barahona on 12/27/2024 at 12:21 PM

## 2024-12-27 NOTE — DISCHARGE INSTR - DIET

## 2024-12-27 NOTE — DISCHARGE SUMMARY
[facility] Hospitalist Physician Discharge Summary       [facility]  [address]  [place] [id number]  [phone]          Activity level: As tolerated     Dispo:  JESSICA    Condition on discharge: Stable     Patient ID:  Olena Castrejon  [id number]  64 y.o.  1960    Admit date: 12/23/2024    Discharge date and time:  12/27/2024  12:32 PM    Admission Diagnoses: Principal Problem:    Bilateral pulmonary embolism (HCC)  Active Problems:    Controlled type 2 diabetes mellitus without complication (HCC)    Dementia (HCC)    Primary hypertension    Acute respiratory failure with hypoxia    Acute deep vein thrombosis (DVT) of tibial vein of left lower extremity (HCC)    Normocytic anemia  Resolved Problems:    * No resolved hospital problems. *      Discharge Diagnoses: Principal Problem:    Bilateral pulmonary embolism (HCC)  Active Problems:    Controlled type 2 diabetes mellitus without complication (HCC)    Dementia (HCC)    Primary hypertension    Acute respiratory failure with hypoxia    Acute deep vein thrombosis (DVT) of tibial vein of left lower extremity (HCC)    Normocytic anemia  Resolved Problems:    * No resolved hospital problems. *      Consults:  IP CONSULT TO INTERNAL MEDICINE  IP CONSULT TO HEM/ONC  IP CONSULT TO VASCULAR ACCESS TEAM  IP CONSULT TO VASCULAR ACCESS TEAM  IP CONSULT TO VASCULAR ACCESS TEAM    Hospital Course:   Patient Olena Castrejon is a 64 y.o. presented with PE (pulmonary thromboembolism) (HCC) [I26.99]  Patient was admitted and managed for Bilateral PE with left posterior tibial vein DVT - provoked - s/p HIT - On Argatroban drip. Hematology following 12/23/2024 - a) Once INR is 4, bridge with Warfarin and maintain INR of 2-3. Consider 6 months on AC vs AC indefinitely. Concurrent Asprin and Plavix use - stopped for now, last CVA was 4 yrs ago. Transitioned to DOAC, eliquis on discharge on dc , f/u OP with hematology & PCP.   Was relative hypotensive during  cream  Commonly known as: NIZORAL  Apply topically daily.     loperamide 2 MG capsule  Commonly known as: IMODIUM     memantine 10 MG tablet  Commonly known as: NAMENDA     Miconazole Nitrate 2 % Powd     mineral oil-hydrophilic petrolatum ointment     ondansetron 4 MG tablet  Commonly known as: Zofran  Take 1 tablet by mouth every 8 hours as needed for Nausea or Vomiting     rivastigmine 6 MG capsule  Commonly known as: EXELON     * tacrolimus 1 MG capsule  Commonly known as: PROGRAF     * tacrolimus 0.5 MG capsule  Commonly known as: proGRAF           * This list has 2 medication(s) that are the same as other medications prescribed for you. Read the directions carefully, and ask your doctor or other care provider to review them with you.                STOP taking these medications      Alcohol Prep 70 % Pads     amLODIPine 2.5 MG tablet  Commonly known as: NORVASC     amLODIPine 5 MG tablet  Commonly known as: NORVASC     aspirin 81 MG EC tablet  Commonly known as: Aspirin Low Dose     atorvastatin 40 MG tablet  Commonly known as: LIPITOR     blood glucose monitor kit and supplies     clopidogrel 75 MG tablet  Commonly known as: PLAVIX     hydrALAZINE 25 MG tablet  Commonly known as: APRESOLINE     losartan 100 MG tablet  Commonly known as: COZAAR     metoprolol succinate 25 MG extended release tablet  Commonly known as: TOPROL XL     Misc. Devices Misc     mupirocin 2 % ointment  Commonly known as: BACTROBAN     nicotine 14 MG/24HR  Commonly known as: NICODERM CQ     nitrofurantoin 100 MG capsule  Commonly known as: Macrodantin     OneTouch Verio Soln     Zinc 220 (50 Zn) MG Caps               Where to Get Your Medications        These medications were sent to [facility] - [place], [place] - [address] - P [phone] - F [phone]  [address] [facility] [place] [address]      Phone: [phone]   apixaban 5 MG Tabs tablet           Note that more than 30 minutes was spent in preparing

## 2024-12-27 NOTE — CARE COORDINATION
Social Work/Discharge Planning:  Bette with Josemanuel Wagner at [facility] pre-cert approved and good until Monday 12/30.  Met with patient and updated on above.  Patient expressed frustration she did not know which facility accepted.  Informed patient of conversation this worker had with her daughter yesterday.  Patient states her daughter did not update patient.   apologized for not providing patient with an update.  Called patient daughter Rossi on speaker phone in patient room and left a detailed message to call patient.  Informed patient of possible discharge today pending Physician approval.  Provided patient with free 30 day Eliquis savings card.  Will continue to follow.  Electronically signed by MICHELLE Barahona on 12/27/2024 at 10:04 AM

## 2024-12-27 NOTE — PROGRESS NOTES
Message sent to Dr. Mitchell notifying her that hem/onc put recommendations in their notes for anticoagulation for discharge.

## 2025-01-14 RX ORDER — APIXABAN 5 MG/1
TABLET, FILM COATED ORAL
Qty: 28 TABLET | Refills: 0 | Status: SHIPPED | OUTPATIENT
Start: 2025-01-14

## 2025-01-14 NOTE — TELEPHONE ENCOUNTER
Name of Medication(s) Requested:  Requested Prescriptions     Pending Prescriptions Disp Refills    apixaban (ELIQUIS) 5 MG TABS tablet [Pharmacy Med Name: ELIQUIS 5 MG TABS 5 Tablet] 28 tablet 0     Sig: TAKE 1 TABLET BY MOUTH TWICE DAILY SENT 14 DAY SUPPLY: **RE-ORDER ACCORDINGLY**       Medication is on current medication list Yes    Dosage and directions were verified? Yes    Quantity verified: 30 day supply     Pharmacy Verified?  Yes    Last Appointment:  10/18/2024    Future appts:  Future Appointments   Date Time Provider Department Center   1/29/2025  1:45 PM Rodger Napier DO [place] [facility] DEP        (If no appt send self scheduling link. .REFILLAPPT)  Scheduling request sent?     [] Yes  [x] No    Does patient need updated?  [] Yes  [x] No

## 2025-01-17 NOTE — TELEPHONE ENCOUNTER
Name of Medication(s) Requested:  Requested Prescriptions     Pending Prescriptions Disp Refills    memantine (NAMENDA) 10 MG tablet [Pharmacy Med Name: MEMANTINE HCL 10 MG TABS 10 Tablet] 1 tablet 0     Sig: TAKE 1 TABLET BY MOUTH TWICE DAILY       Medication is on current medication list Yes    Dosage and directions were verified? Yes    Quantity verified: 30 day supply     Pharmacy Verified?  Yes    Last Appointment:  10/18/2024    Future appts:  Future Appointments   Date Time Provider Department Center   1/29/2025  1:45 PM Rodger Napier DO [place] [facility] DEP        (If no appt send self scheduling link. .REFILLAPPT)  Scheduling request sent?     [] Yes  [x] No    Does patient need updated?  [] Yes  [x] No

## 2025-01-20 RX ORDER — MEMANTINE HYDROCHLORIDE 10 MG/1
10 TABLET ORAL 2 TIMES DAILY
Qty: 60 TABLET | Refills: 0 | Status: SHIPPED | OUTPATIENT
Start: 2025-01-20

## 2025-01-22 RX ORDER — LOSARTAN POTASSIUM 100 MG/1
TABLET ORAL
Qty: 30 TABLET | Refills: 0 | Status: SHIPPED | OUTPATIENT
Start: 2025-01-22

## 2025-01-22 RX ORDER — CHOLECALCIFEROL TAB 125 MCG (5000 UNIT) 125 MCG (5000 UT)
TAB
Qty: 30 TABLET | Refills: 0 | Status: SHIPPED | OUTPATIENT
Start: 2025-01-22

## 2025-01-22 RX ORDER — RIVASTIGMINE TARTRATE 6 MG/1
6 CAPSULE ORAL 2 TIMES DAILY
Qty: 60 CAPSULE | Refills: 0 | Status: SHIPPED | OUTPATIENT
Start: 2025-01-22

## 2025-01-22 NOTE — TELEPHONE ENCOUNTER
Name of Medication(s) Requested:  Requested Prescriptions     Pending Prescriptions Disp Refills    vitamin D3 (NATURAL VITAMIN D-3) 125 MCG (5000 UT) TABS tablet [Pharmacy Med Name: VITAMIN D3 5,000U(125MCG) MCG Tablet] 30 tablet 0     Sig: TAKE ONE(1) TABLET BY MOUTH ONCE DAILY    rivastigmine (EXELON) 6 MG capsule [Pharmacy Med Name: RIVASTIGMINE 6 MG CAPSULE 6 Capsule] 60 capsule 0     Sig: TAKE 1 CAPSULE BY MOUTH TWICE DAILY    losartan (COZAAR) 100 MG tablet [Pharmacy Med Name: LOSARTAN 100 MG TABLET 100 Tablet] 30 tablet 0     Sig: TAKE ONE(1) TABLET BY MOUTH ONCE DAILY       Medication is on current medication list Yes    Dosage and directions were verified? Yes    Quantity verified: 30 day supply     Pharmacy Verified?  Yes    Last Appointment:  10/18/2024    Future appts:  Future Appointments   Date Time Provider Department Center   1/29/2025  1:45 PM Rodger Napier DO [place] [facility] DEP        (If no appt send self scheduling link. .REFILLAPPT)  Scheduling request sent?     [] Yes  [x] No    Does patient need updated?  [] Yes  [x] No

## 2025-01-23 ENCOUNTER — TELEPHONE (OUTPATIENT)
Dept: FAMILY MEDICINE CLINIC | Age: 65
End: 2025-01-23

## 2025-01-23 ENCOUNTER — APPOINTMENT (OUTPATIENT)
Dept: GENERAL RADIOLOGY | Age: 65
End: 2025-01-23
Payer: MEDICARE

## 2025-01-23 ENCOUNTER — HOSPITAL ENCOUNTER (EMERGENCY)
Age: 65
Discharge: HOME | End: 2025-01-23
Attending: EMERGENCY MEDICINE
Payer: MEDICARE

## 2025-01-23 VITALS
RESPIRATION RATE: 18 BRPM | HEIGHT: 66 IN | TEMPERATURE: 97.9 F | DIASTOLIC BLOOD PRESSURE: 62 MMHG | OXYGEN SATURATION: 98 % | HEART RATE: 72 BPM | WEIGHT: 168 LBS | SYSTOLIC BLOOD PRESSURE: 137 MMHG | BODY MASS INDEX: 27 KG/M2

## 2025-01-23 DIAGNOSIS — R60.0 LEG EDEMA: Primary | ICD-10-CM

## 2025-01-23 LAB
ALBUMIN SERPL-MCNC: 3.8 G/DL (ref 3.5–5.2)
ALP SERPL-CCNC: 149 U/L (ref 35–104)
ALT SERPL-CCNC: 8 U/L (ref 0–32)
ANION GAP SERPL CALCULATED.3IONS-SCNC: 10 MMOL/L (ref 7–16)
AST SERPL-CCNC: 8 U/L (ref 0–31)
BASOPHILS # BLD: 0.04 K/UL (ref 0–0.2)
BASOPHILS NFR BLD: 1 % (ref 0–2)
BILIRUB SERPL-MCNC: 0.2 MG/DL (ref 0–1.2)
BNP SERPL-MCNC: 451 PG/ML (ref 0–125)
BUN SERPL-MCNC: 34 MG/DL (ref 6–23)
CALCIUM SERPL-MCNC: 9 MG/DL (ref 8.6–10.2)
CHLORIDE SERPL-SCNC: 109 MMOL/L (ref 98–107)
CO2 SERPL-SCNC: 22 MMOL/L (ref 22–29)
CREAT SERPL-MCNC: 1.7 MG/DL (ref 0.5–1)
EOSINOPHIL # BLD: 0.3 K/UL (ref 0.05–0.5)
EOSINOPHILS RELATIVE PERCENT: 5 % (ref 0–6)
ERYTHROCYTE [DISTWIDTH] IN BLOOD BY AUTOMATED COUNT: 13.2 % (ref 11.5–15)
GFR, ESTIMATED: 35 ML/MIN/1.73M2
GLUCOSE SERPL-MCNC: 230 MG/DL (ref 74–99)
HCT VFR BLD AUTO: 36.3 % (ref 34–48)
HGB BLD-MCNC: 11.3 G/DL (ref 11.5–15.5)
IMM GRANULOCYTES # BLD AUTO: <0.03 K/UL (ref 0–0.58)
IMM GRANULOCYTES NFR BLD: 0 % (ref 0–5)
LYMPHOCYTES NFR BLD: 0.93 K/UL (ref 1.5–4)
LYMPHOCYTES RELATIVE PERCENT: 15 % (ref 20–42)
MAGNESIUM SERPL-MCNC: 1.6 MG/DL (ref 1.6–2.6)
MCH RBC QN AUTO: 29.8 PG (ref 26–35)
MCHC RBC AUTO-ENTMCNC: 31.1 G/DL (ref 32–34.5)
MCV RBC AUTO: 95.8 FL (ref 80–99.9)
MONOCYTES NFR BLD: 0.5 K/UL (ref 0.1–0.95)
MONOCYTES NFR BLD: 8 % (ref 2–12)
NEUTROPHILS NFR BLD: 72 % (ref 43–80)
NEUTS SEG NFR BLD: 4.51 K/UL (ref 1.8–7.3)
PLATELET # BLD AUTO: 188 K/UL (ref 130–450)
PMV BLD AUTO: 11 FL (ref 7–12)
POTASSIUM SERPL-SCNC: 5.8 MMOL/L (ref 3.5–5)
PROT SERPL-MCNC: 6.7 G/DL (ref 6.4–8.3)
RBC # BLD AUTO: 3.79 M/UL (ref 3.5–5.5)
SODIUM SERPL-SCNC: 141 MMOL/L (ref 132–146)
WBC OTHER # BLD: 6.3 K/UL (ref 4.5–11.5)

## 2025-01-23 PROCEDURE — 99284 EMERGENCY DEPT VISIT MOD MDM: CPT

## 2025-01-23 PROCEDURE — 83735 ASSAY OF MAGNESIUM: CPT

## 2025-01-23 PROCEDURE — 83880 ASSAY OF NATRIURETIC PEPTIDE: CPT

## 2025-01-23 PROCEDURE — 80053 COMPREHEN METABOLIC PANEL: CPT

## 2025-01-23 PROCEDURE — 85025 COMPLETE CBC W/AUTO DIFF WBC: CPT

## 2025-01-23 PROCEDURE — 71045 X-RAY EXAM CHEST 1 VIEW: CPT

## 2025-01-23 PROCEDURE — 6370000000 HC RX 637 (ALT 250 FOR IP): Performed by: EMERGENCY MEDICINE

## 2025-01-23 RX ORDER — FUROSEMIDE 40 MG/1
40 TABLET ORAL ONCE
Status: COMPLETED | OUTPATIENT
Start: 2025-01-23 | End: 2025-01-23

## 2025-01-23 RX ORDER — FUROSEMIDE 20 MG/1
20 TABLET ORAL DAILY
Qty: 5 TABLET | Refills: 0 | Status: SHIPPED | OUTPATIENT
Start: 2025-01-23

## 2025-01-23 RX ORDER — FUROSEMIDE 10 MG/ML
40 INJECTION INTRAMUSCULAR; INTRAVENOUS ONCE
Status: DISCONTINUED | OUTPATIENT
Start: 2025-01-23 | End: 2025-01-23

## 2025-01-23 RX ADMIN — FUROSEMIDE 40 MG: 40 TABLET ORAL at 14:37

## 2025-01-23 RX ADMIN — SODIUM ZIRCONIUM CYCLOSILICATE 10 G: 10 POWDER, FOR SUSPENSION ORAL at 14:37

## 2025-01-23 NOTE — ED PROVIDER NOTES
HPI:  25,   Time: 12:07 PM JENNIFER Castrejon is a 64 y.o. female presenting to the ED for evaluation of leg edema.  Staff at the facility where she lives has noticed that her legs been swollen over the past couple weeks.  Other than some discomfort in her legs, patient has no complaints.  She denies shortness of breath or chest pain.  She states she is mostly in bed throughout the day.    ROS:   Pertinent positives and negatives are stated within HPI, all other systems reviewed and are negative.  --------------------------------------------- PAST HISTORY ---------------------------------------------  Past Medical History:  has a past medical history of Arthritis, Blood circulation, collateral, Chronic fatigue, Cirrhosis of liver (HCC), COPD exacerbation (HCC), Diabetes mellitus (HCC), Essential hypertension, Gall stones, GERD (gastroesophageal reflux disease), Hep C w/o coma, chronic (HCC), History of blood transfusion, Neuropathy, PFO (patent foramen ovale), Pneumonia, Type 2 diabetes mellitus with stage 3 chronic kidney disease, with long-term current use of insulin (HCC), Unspecified cerebral artery occlusion with cerebral infarction, and Varices.    Past Surgical History:  has a past surgical history that includes  section; Esophageal varice ligation ();  section ( and ); ECHO Compl W Dop Color Flow (2012); Colonoscopy; Endoscopy, colon, diagnostic; Cardiac surgery (); Cardiac catheterization; other surgical history (Right, 17); Liver transplant; and brain surgery.    Social History:  reports that she quit smoking about 5 years ago. Her smoking use included cigarettes. She started smoking about 35 years ago. She has a 15 pack-year smoking history. She has never used smokeless tobacco. She reports that she does not drink alcohol and does not use drugs.    Family History: family history is not on file. She was adopted.     The patient’s home medications have

## 2025-01-23 NOTE — TELEPHONE ENCOUNTER
Nurse from [facility] called and stated pt's L leg and foot are extremely swollen. Nurse stated they are going to send her out to ER provided pt is cooperative.

## 2025-01-27 RX ORDER — APIXABAN 5 MG/1
5 TABLET, FILM COATED ORAL 2 TIMES DAILY
Qty: 28 TABLET | Refills: 11 | OUTPATIENT
Start: 2025-01-27

## 2025-02-03 NOTE — TELEPHONE ENCOUNTER
Name of Medication(s) Requested:  Requested Prescriptions     Pending Prescriptions Disp Refills    apixaban (ELIQUIS) 5 MG TABS tablet 60 tablet 0     Sig: Take 1 tablet by mouth 2 times daily       Medication is on current medication list Yes    Dosage and directions were verified? Yes    Quantity verified: 30 day supply     Pharmacy Verified?  Yes    Last Appointment:  10/18/2024    Future appts:  No future appointments.     (If no appt send self scheduling link. .REFILLAPPT)  Scheduling request sent?     [] Yes  [x] No    Does patient need updated?  [] Yes  [x] No      Spoke with Titus Herbert, will fill 30 day this time patient needs to schedule appointment

## 2025-02-13 ENCOUNTER — TELEPHONE (OUTPATIENT)
Dept: FAMILY MEDICINE CLINIC | Age: 65
End: 2025-02-13

## 2025-02-13 DIAGNOSIS — I89.0 LYMPHEDEMA: Primary | ICD-10-CM

## 2025-02-13 NOTE — TELEPHONE ENCOUNTER
Ellen from patients rehab facility calling to see if referral can be sent to [place] [facility]. She has an appt on the 26th but they thought she should get into lymphedema clinic sooner than later

## 2025-02-13 NOTE — TELEPHONE ENCOUNTER
ASSESSMENT/PLAN:    1. Lymphedema  - External Referral To Physical Therapy        FOLLOW-UP:  jose

## 2025-02-18 RX ORDER — MEMANTINE HYDROCHLORIDE 10 MG/1
TABLET ORAL
Qty: 60 TABLET | Refills: 0 | Status: SHIPPED | OUTPATIENT
Start: 2025-02-18

## 2025-02-18 NOTE — TELEPHONE ENCOUNTER
Name of Medication(s) Requested:  Requested Prescriptions     Pending Prescriptions Disp Refills    memantine (NAMENDA) 10 MG tablet [Pharmacy Med Name: MEMANTINE HCL 10 MG TABS 10 Tablet] 60 tablet 0     Sig: TAKE 1 TABLET BY MOUTH TWICE DAILY       Medication is on current medication list Yes    Dosage and directions were verified? Yes    Quantity verified: 30 day supply     Pharmacy Verified?  Yes    Last Appointment:  10/18/2024    Future appts:  Future Appointments   Date Time Provider Department Center   2/26/2025 11:00 AM Rodger Napier DO [place] [facility] DEP        (If no appt send self scheduling link. .REFILLAPPT)  Scheduling request sent?     [] Yes  [x] No    Does patient need updated?  [] Yes  [x] No

## 2025-02-21 DIAGNOSIS — K20.90 ESOPHAGITIS: ICD-10-CM

## 2025-02-21 RX ORDER — FAMOTIDINE 20 MG/1
20 TABLET, FILM COATED ORAL 2 TIMES DAILY
Qty: 60 TABLET | Refills: 2 | Status: SHIPPED | OUTPATIENT
Start: 2025-02-21

## 2025-02-21 NOTE — TELEPHONE ENCOUNTER
Name of Medication(s) Requested:  Requested Prescriptions     Pending Prescriptions Disp Refills    famotidine (PEPCID) 20 MG tablet [Pharmacy Med Name: FAMOTIDINE 20 MG TABS 20 Tablet] 60 tablet 2     Sig: TAKE 1 TABLET BY MOUTH 2 TIMES DAILY       Medication is on current medication list Yes    Dosage and directions were verified? Yes    Quantity verified: 30 day supply     Pharmacy Verified?  Yes    Last Appointment:  10/18/2024    Future appts:  Future Appointments   Date Time Provider Department Center   2/26/2025 11:00 AM Rodger Napier DO [place] [facility] DEP        (If no appt send self scheduling link. .REFILLAPPT)  Scheduling request sent?     [] Yes  [x] No    Does patient need updated?  [] Yes  [x] No

## 2025-02-24 RX ORDER — LOSARTAN POTASSIUM 100 MG/1
TABLET ORAL
Qty: 30 TABLET | Refills: 0 | Status: SHIPPED
Start: 2025-02-24 | End: 2025-02-28

## 2025-02-24 NOTE — TELEPHONE ENCOUNTER
Name of Medication(s) Requested:  Requested Prescriptions     Pending Prescriptions Disp Refills    losartan (COZAAR) 100 MG tablet [Pharmacy Med Name: LOSARTAN 100 MG TABLET 100 Tablet] 30 tablet 0     Sig: TAKE ONE(1) TABLET BY MOUTH ONCE DAILY **NO REFILLS**       Medication is on current medication list Yes    Dosage and directions were verified? Yes    Quantity verified: 30 day supply     Pharmacy Verified?  Yes    Last Appointment:  10/18/2024    Future appts:  Future Appointments   Date Time Provider Department Center   2/26/2025 11:00 AM Rodger Napier DO [place] [facility] DEP        (If no appt send self scheduling link. .REFILLAPPT)  Scheduling request sent?     [] Yes  [x] No    Does patient need updated?  [] Yes  [x] No

## 2025-02-25 RX ORDER — RIVASTIGMINE TARTRATE 6 MG/1
CAPSULE ORAL
Qty: 60 CAPSULE | Refills: 2 | Status: SHIPPED | OUTPATIENT
Start: 2025-02-25

## 2025-02-25 NOTE — TELEPHONE ENCOUNTER
Name of Medication(s) Requested:  Requested Prescriptions     Pending Prescriptions Disp Refills    rivastigmine (EXELON) 6 MG capsule [Pharmacy Med Name: RIVASTIGMINE 6 MG CAPSULE 6 Capsule] 60 capsule 2     Sig: TAKE 1 CAPSULE BY MOUTH TWICE DAILY **NO REFILLS**       Medication is on current medication list Yes    Dosage and directions were verified? Yes    Quantity verified: 30 day supply     Pharmacy Verified?  Yes    Last Appointment:  10/18/2024    Future appts:  Future Appointments   Date Time Provider Department Center   2/26/2025 11:00 AM Rodger Napier DO [place] [facility] DEP        (If no appt send self scheduling link. .REFILLAPPT)  Scheduling request sent?     [] Yes  [x] No    Does patient need updated?  [] Yes  [x] No

## 2025-02-28 RX ORDER — LOSARTAN POTASSIUM 100 MG/1
TABLET ORAL
Qty: 30 TABLET | Refills: 0 | Status: SHIPPED | OUTPATIENT
Start: 2025-02-28

## 2025-02-28 NOTE — TELEPHONE ENCOUNTER
Name of Medication(s) Requested:  Requested Prescriptions     Pending Prescriptions Disp Refills    losartan (COZAAR) 100 MG tablet [Pharmacy Med Name: LOSARTAN 100 MG TABLET 100 Tablet] 30 tablet 0     Sig: TAKE ONE(1) TABLET BY MOUTH ONCE DAILY **NO REFILLS**       Medication is on current medication list Yes    Dosage and directions were verified? Yes    Quantity verified: 30 day supply     Pharmacy Verified?  Yes    Last Appointment:  10/18/2024    Future appts:  No future appointments.     (If no appt send self scheduling link. .REFILLAPPT)  Scheduling request sent?     [] Yes  [x] No    Does patient need updated?  [] Yes  [x] No

## 2025-03-03 RX ORDER — APIXABAN 5 MG/1
TABLET, FILM COATED ORAL
Qty: 60 TABLET | Refills: 0 | Status: SHIPPED | OUTPATIENT
Start: 2025-03-03

## 2025-03-03 NOTE — TELEPHONE ENCOUNTER
Name of Medication(s) Requested:  Requested Prescriptions     Pending Prescriptions Disp Refills    ELIQUIS 5 MG TABS tablet [Pharmacy Med Name: ELIQUIS 5 MG TABS 5 Tablet] 60 tablet 0     Sig: TAKE 1 TABLET BY MOUTH 2 TIMES DAILY **NO REFILLS**       Medication is on current medication list Yes    Dosage and directions were verified? Yes    Quantity verified: 30 day supply     Pharmacy Verified?  Yes    Last Appointment:  10/18/2024    Future appts:  Future Appointments   Date Time Provider Department Center   4/3/2025 10:15 AM Rodger Napier DO [place] [facility] DEP        (If no appt send self scheduling link. .REFILLAPPT)  Scheduling request sent?     [x] Yes  [] No    Does patient need updated?  [] Yes  [x] No

## 2025-03-11 DIAGNOSIS — Z86.73 HISTORY OF CVA (CEREBROVASCULAR ACCIDENT): ICD-10-CM

## 2025-03-11 RX ORDER — ASPIRIN 81 MG/1
TABLET ORAL
Qty: 31 TABLET | Refills: 10 | OUTPATIENT
Start: 2025-03-11

## 2025-03-11 NOTE — TELEPHONE ENCOUNTER
Requested Prescriptions     Refused Prescriptions Disp Refills    ASPIRIN LOW DOSE 81 MG EC tablet [Pharmacy Med Name: ASPIRIN EC 81 MG TABLET 81 Tablet] 31 tablet 10     Sig: TAKE ONE(1) TABLET BY MOUTH ONCE DAILY **DO NOT CRUSH**       Next appt is 4/3/2025  Last appt was 10/18/2024

## 2025-03-13 RX ORDER — MICONAZOLE NITRATE 2 G/100G
POWDER TOPICAL
Qty: 1 G | Refills: 10 | Status: SHIPPED | OUTPATIENT
Start: 2025-03-13

## 2025-03-13 NOTE — TELEPHONE ENCOUNTER
Last seen 10/18/2024  Next appt 4/3/2025    Requested Prescriptions     Pending Prescriptions Disp Refills    REMEDY PHYTOPLEX ANTIFUNGAL 2 % powder [Pharmacy Med Name: REMEDY PHYTOPLEX AF 2% PDR 2 Powder] 1 g 10     Sig: APPLY TOPICALLY TO AFFECTED AREAS TWICE DAILY **RE-ORDER ACCORDINGLY**      Name of Medication(s) Requested:  Requested Prescriptions     Pending Prescriptions Disp Refills    REMEDY PHYTOPLEX ANTIFUNGAL 2 % powder [Pharmacy Med Name: REMEDY PHYTOPLEX AF 2% PDR 2 Powder] 1 g 10     Sig: APPLY TOPICALLY TO AFFECTED AREAS TWICE DAILY **RE-ORDER ACCORDINGLY**       Medication is on current medication list Yes    Dosage and directions were verified? Yes    Quantity verified: 90 day supply     Pharmacy Verified?  Yes    Last Appointment:  10/18/2024    Future appts:  Future Appointments   Date Time Provider Department Center   4/3/2025 10:15 AM Rodger Napier DO [place] [facility] DEP        (If no appt send self scheduling link. .REFILLAPPT)  Scheduling request sent?     [] Yes  [x] No    Does patient need updated?  [] Yes  [x] No

## 2025-03-18 RX ORDER — MEMANTINE HYDROCHLORIDE 10 MG/1
10 TABLET ORAL 2 TIMES DAILY
Qty: 60 TABLET | Refills: 0 | Status: SHIPPED | OUTPATIENT
Start: 2025-03-18

## 2025-03-18 NOTE — TELEPHONE ENCOUNTER
Name of Medication(s) Requested:  Requested Prescriptions     Pending Prescriptions Disp Refills    memantine (NAMENDA) 10 MG tablet [Pharmacy Med Name: MEMANTINE HCL 10 MG TABS 10 Tablet] 60 tablet 0     Sig: TAKE 1 TABLET BY MOUTH TWICE DAILY       Medication is on current medication list Yes    Dosage and directions were verified? Yes    Quantity verified: 30 day supply     Pharmacy Verified?  Yes    Last Appointment:  10/18/2024    Future appts:  Future Appointments   Date Time Provider Department Center   4/3/2025 10:15 AM Rodger Napier DO [place] [facility] DEP        (If no appt send self scheduling link. .REFILLAPPT)  Scheduling request sent?     [] Yes  [] No    Does patient need updated?  [] Yes  [] No

## 2025-03-27 DIAGNOSIS — R53.1 GENERALIZED WEAKNESS: Primary | ICD-10-CM

## 2025-04-03 ENCOUNTER — OFFICE VISIT (OUTPATIENT)
Dept: FAMILY MEDICINE CLINIC | Age: 65
End: 2025-04-03
Payer: MEDICARE

## 2025-04-03 VITALS
TEMPERATURE: 97.5 F | SYSTOLIC BLOOD PRESSURE: 140 MMHG | OXYGEN SATURATION: 96 % | HEIGHT: 66 IN | HEART RATE: 67 BPM | DIASTOLIC BLOOD PRESSURE: 80 MMHG | BODY MASS INDEX: 27.13 KG/M2 | RESPIRATION RATE: 16 BRPM

## 2025-04-03 DIAGNOSIS — I10 ESSENTIAL HYPERTENSION: Chronic | ICD-10-CM

## 2025-04-03 DIAGNOSIS — E11.49 TYPE 2 DIABETES MELLITUS WITH OTHER NEUROLOGIC COMPLICATION, WITHOUT LONG-TERM CURRENT USE OF INSULIN: Primary | ICD-10-CM

## 2025-04-03 DIAGNOSIS — G30.9 ALZHEIMER'S DISEASE, UNSPECIFIED (CODE): ICD-10-CM

## 2025-04-03 DIAGNOSIS — J96.01 ACUTE RESPIRATORY FAILURE WITH HYPOXIA: ICD-10-CM

## 2025-04-03 DIAGNOSIS — G30.8 MODERATE ALZHEIMER'S DEMENTIA OF OTHER ONSET WITH AGITATION: ICD-10-CM

## 2025-04-03 DIAGNOSIS — Z94.4 STATUS POST LIVER TRANSPLANTATION (HCC): ICD-10-CM

## 2025-04-03 DIAGNOSIS — E11.621 DIABETIC ULCER OF TOE OF RIGHT FOOT ASSOCIATED WITH TYPE 2 DIABETES MELLITUS, LIMITED TO BREAKDOWN OF SKIN: ICD-10-CM

## 2025-04-03 DIAGNOSIS — N18.32 STAGE 3B CHRONIC KIDNEY DISEASE (HCC): ICD-10-CM

## 2025-04-03 DIAGNOSIS — J44.9 CHRONIC OBSTRUCTIVE PULMONARY DISEASE, UNSPECIFIED COPD TYPE (HCC): ICD-10-CM

## 2025-04-03 DIAGNOSIS — Z00.00 MEDICARE ANNUAL WELLNESS VISIT, SUBSEQUENT: ICD-10-CM

## 2025-04-03 DIAGNOSIS — K20.90 ESOPHAGITIS: ICD-10-CM

## 2025-04-03 DIAGNOSIS — I69.354 HEMIPARESIS AFFECTING LEFT SIDE AS LATE EFFECT OF CEREBROVASCULAR ACCIDENT (HCC): ICD-10-CM

## 2025-04-03 DIAGNOSIS — E78.2 MIXED HYPERLIPIDEMIA: ICD-10-CM

## 2025-04-03 DIAGNOSIS — Z86.73 HISTORY OF CARDIOEMBOLIC CEREBROVASCULAR ACCIDENT (CVA): Chronic | ICD-10-CM

## 2025-04-03 DIAGNOSIS — R60.0 LEG EDEMA: ICD-10-CM

## 2025-04-03 DIAGNOSIS — L97.511 DIABETIC ULCER OF TOE OF RIGHT FOOT ASSOCIATED WITH TYPE 2 DIABETES MELLITUS, LIMITED TO BREAKDOWN OF SKIN: ICD-10-CM

## 2025-04-03 DIAGNOSIS — F02.B11 MODERATE ALZHEIMER'S DEMENTIA OF OTHER ONSET WITH AGITATION: ICD-10-CM

## 2025-04-03 DIAGNOSIS — K52.9 CHRONIC DIARRHEA: ICD-10-CM

## 2025-04-03 LAB — HBA1C MFR BLD: 8.7 %

## 2025-04-03 PROCEDURE — 3077F SYST BP >= 140 MM HG: CPT | Performed by: FAMILY MEDICINE

## 2025-04-03 PROCEDURE — 3052F HG A1C>EQUAL 8.0%<EQUAL 9.0%: CPT | Performed by: FAMILY MEDICINE

## 2025-04-03 PROCEDURE — 3079F DIAST BP 80-89 MM HG: CPT | Performed by: FAMILY MEDICINE

## 2025-04-03 PROCEDURE — G0439 PPPS, SUBSEQ VISIT: HCPCS | Performed by: FAMILY MEDICINE

## 2025-04-03 PROCEDURE — 83036 HEMOGLOBIN GLYCOSYLATED A1C: CPT | Performed by: FAMILY MEDICINE

## 2025-04-03 PROCEDURE — 3017F COLORECTAL CA SCREEN DOC REV: CPT | Performed by: FAMILY MEDICINE

## 2025-04-03 RX ORDER — METFORMIN HYDROCHLORIDE 750 MG/1
TABLET, EXTENDED RELEASE ORAL
COMMUNITY
Start: 2025-01-22

## 2025-04-03 RX ORDER — AMLODIPINE BESYLATE 5 MG/1
TABLET ORAL
COMMUNITY
Start: 2025-01-17

## 2025-04-03 RX ORDER — RIVASTIGMINE TARTRATE 6 MG/1
6 CAPSULE ORAL 2 TIMES DAILY
Qty: 60 CAPSULE | Refills: 2 | Status: SHIPPED | OUTPATIENT
Start: 2025-04-03

## 2025-04-03 RX ORDER — MEMANTINE HYDROCHLORIDE 10 MG/1
10 TABLET ORAL 2 TIMES DAILY
Qty: 180 TABLET | Refills: 0 | Status: SHIPPED | OUTPATIENT
Start: 2025-04-03

## 2025-04-03 RX ORDER — LOSARTAN POTASSIUM 100 MG/1
TABLET ORAL
Qty: 30 TABLET | Refills: 10 | OUTPATIENT
Start: 2025-04-03

## 2025-04-03 RX ORDER — APIXABAN 5 MG/1
TABLET, FILM COATED ORAL
Qty: 60 TABLET | Refills: 10 | OUTPATIENT
Start: 2025-04-03

## 2025-04-03 RX ORDER — ASPIRIN 81 MG/1
TABLET, COATED ORAL
COMMUNITY
Start: 2025-02-05

## 2025-04-03 RX ORDER — MEMANTINE HYDROCHLORIDE 10 MG/1
10 TABLET ORAL 2 TIMES DAILY
Qty: 60 TABLET | Refills: 10 | OUTPATIENT
Start: 2025-04-03

## 2025-04-03 RX ORDER — FUROSEMIDE 20 MG/1
20 TABLET ORAL DAILY
Qty: 90 TABLET | Refills: 0 | Status: SHIPPED | OUTPATIENT
Start: 2025-04-03

## 2025-04-03 RX ORDER — LOSARTAN POTASSIUM 100 MG/1
100 TABLET ORAL DAILY
Qty: 90 TABLET | Refills: 0 | Status: SHIPPED | OUTPATIENT
Start: 2025-04-03

## 2025-04-03 RX ORDER — FAMOTIDINE 20 MG/1
20 TABLET, FILM COATED ORAL 2 TIMES DAILY
Qty: 60 TABLET | Refills: 2 | Status: SHIPPED | OUTPATIENT
Start: 2025-04-03

## 2025-04-03 ASSESSMENT — PATIENT HEALTH QUESTIONNAIRE - PHQ9
5. POOR APPETITE OR OVEREATING: NOT AT ALL
4. FEELING TIRED OR HAVING LITTLE ENERGY: NOT AT ALL
1. LITTLE INTEREST OR PLEASURE IN DOING THINGS: MORE THAN HALF THE DAYS
SUM OF ALL RESPONSES TO PHQ QUESTIONS 1-9: 10
7. TROUBLE CONCENTRATING ON THINGS, SUCH AS READING THE NEWSPAPER OR WATCHING TELEVISION: NEARLY EVERY DAY
9. THOUGHTS THAT YOU WOULD BE BETTER OFF DEAD, OR OF HURTING YOURSELF: NOT AT ALL
SUM OF ALL RESPONSES TO PHQ QUESTIONS 1-9: 10
3. TROUBLE FALLING OR STAYING ASLEEP: NEARLY EVERY DAY
6. FEELING BAD ABOUT YOURSELF - OR THAT YOU ARE A FAILURE OR HAVE LET YOURSELF OR YOUR FAMILY DOWN: NOT AT ALL
8. MOVING OR SPEAKING SO SLOWLY THAT OTHER PEOPLE COULD HAVE NOTICED. OR THE OPPOSITE, BEING SO FIGETY OR RESTLESS THAT YOU HAVE BEEN MOVING AROUND A LOT MORE THAN USUAL: NOT AT ALL
10. IF YOU CHECKED OFF ANY PROBLEMS, HOW DIFFICULT HAVE THESE PROBLEMS MADE IT FOR YOU TO DO YOUR WORK, TAKE CARE OF THINGS AT HOME, OR GET ALONG WITH OTHER PEOPLE: NOT DIFFICULT AT ALL
2. FEELING DOWN, DEPRESSED OR HOPELESS: MORE THAN HALF THE DAYS
SUM OF ALL RESPONSES TO PHQ QUESTIONS 1-9: 10
SUM OF ALL RESPONSES TO PHQ QUESTIONS 1-9: 10

## 2025-04-03 NOTE — TELEPHONE ENCOUNTER
Name of Medication(s) Requested:  Requested Prescriptions     Pending Prescriptions Disp Refills    famotidine (PEPCID) 20 MG tablet 60 tablet 2     Sig: Take 1 tablet by mouth 2 times daily    rivastigmine (EXELON) 6 MG capsule 60 capsule 2     Sig: Take 1 capsule by mouth 2 times daily       Medication is on current medication list Yes    Dosage and directions were verified? Yes    Quantity verified: 30 day supply     Pharmacy Verified?  Yes    Last Appointment:  4/3/2025    Future appts:  No future appointments.     (If no appt send self scheduling link. .REFILLAPPT)  Scheduling request sent?     [] Yes  [x] No    Does patient need updated?  [] Yes  [x] No

## 2025-04-03 NOTE — PROGRESS NOTES
Medicare Annual Wellness Visit    Olena Castrejon is here for Medicare AWV (Low appetite, nausea/vomiting x1 week recently had GI illness /Requesting orders to be placed for PT/OT EVAL/), Urinary Tract Infection (Recently had UTI, still experiencing burning sensations and pressure), Leg Swelling (BILATERAL LEG SWELLING), and Health Maintenance (Colonoscopy due patient declined/Mammogram due patient declined )    Assessment & Plan   Type 2 diabetes mellitus with other neurologic complication, without long-term current use of insulin  -     POCT glycosylated hemoglobin (Hb A1C)  Moderate Alzheimer's dementia of other onset with agitation  -     memantine (NAMENDA) 10 MG tablet; Take 1 tablet by mouth 2 times daily, Disp-180 tablet, R-0Normal  Alzheimer's disease, unspecified  -     CBC with Auto Differential; Future  Stage 3b chronic kidney disease (HCC)  -     Comprehensive Metabolic Panel; Future  History of cardioembolic cerebrovascular accident (CVA)  -     apixaban (ELIQUIS) 5 MG TABS tablet; Take 1 tablet by mouth 2 times daily, Disp-60 tablet, R-0Normal  -     External Referral To Physical Therapy  -     Lipid Panel; Future  Essential hypertension  -     losartan (COZAAR) 100 MG tablet; Take 1 tablet by mouth daily, Disp-90 tablet, R-0Normal  Diabetic ulcer of toe of right foot associated with type 2 diabetes mellitus, limited to breakdown of skin  Hemiparesis affecting left side as late effect of cerebrovascular accident (HCC)  Status post liver transplantation (HCC)  Acute respiratory failure with hypoxia  Chronic obstructive pulmonary disease, unspecified COPD type (HCC)  Leg edema  -     furosemide (LASIX) 20 MG tablet; Take 1 tablet by mouth daily As needed for leg swelling, Disp-90 tablet, R-0Normal  Chronic diarrhea  -     External Referral To Gastroenterology  Medicare annual wellness visit, subsequent  Mixed hyperlipidemia  -     Lipid Panel; Future       Return for Medicare Annual Wellness Visit in 1

## 2025-04-07 RX ORDER — RIVASTIGMINE TARTRATE 6 MG/1
6 CAPSULE ORAL 2 TIMES DAILY
Qty: 60 CAPSULE | Refills: 11 | OUTPATIENT
Start: 2025-04-07

## 2025-04-16 RX ORDER — RIVASTIGMINE TARTRATE 6 MG/1
6 CAPSULE ORAL 2 TIMES DAILY
Qty: 60 CAPSULE | Refills: 11 | OUTPATIENT
Start: 2025-04-16

## 2025-04-17 ENCOUNTER — TELEPHONE (OUTPATIENT)
Dept: FAMILY MEDICINE CLINIC | Age: 65
End: 2025-04-17

## 2025-04-17 DIAGNOSIS — L30.9 DERMATITIS: Primary | ICD-10-CM

## 2025-04-17 RX ORDER — AMMONIUM LACTATE 5 %
LOTION (GRAM) TOPICAL
Qty: 226 G | Refills: 0 | Status: CANCELLED | OUTPATIENT
Start: 2025-04-17

## 2025-04-17 RX ORDER — AMMONIUM LACTATE 12 G/100G
CREAM TOPICAL
Qty: 385 G | Refills: 1 | Status: SHIPPED | OUTPATIENT
Start: 2025-04-17 | End: 2025-05-17

## 2025-04-17 NOTE — TELEPHONE ENCOUNTER
Left message with  at [facility]

## 2025-04-17 NOTE — TELEPHONE ENCOUNTER
Reporting Olena's leg edema is causing cracking skin on her ankles and calves.  Is there any cream you could prescribe to help ?    Please advise.

## 2025-04-21 ENCOUNTER — TELEPHONE (OUTPATIENT)
Dept: FAMILY MEDICINE CLINIC | Age: 65
End: 2025-04-21

## 2025-04-21 DIAGNOSIS — M79.672 LEFT FOOT PAIN: Primary | ICD-10-CM

## 2025-04-21 NOTE — TELEPHONE ENCOUNTER
Titus Herbert sent a message on patient,   Upon doing weekly foot checks on patient, her L foot little toe has discomfort, will not let nurse touch. Patient will pull foot away and yells in pain. There is bilateral foot swelling but no c/o pain.in area other then around L foot little toe. Titus Herbert requesting imaging orders of her L foot. They did mention patient will most likely refuse the imaging order.

## 2025-04-22 DIAGNOSIS — Z86.73 HISTORY OF CARDIOEMBOLIC CEREBROVASCULAR ACCIDENT (CVA): Chronic | ICD-10-CM

## 2025-04-22 RX ORDER — APIXABAN 5 MG/1
5 TABLET, FILM COATED ORAL 2 TIMES DAILY
Qty: 60 TABLET | Refills: 2 | Status: SHIPPED | OUTPATIENT
Start: 2025-04-22

## 2025-04-22 NOTE — TELEPHONE ENCOUNTER
Name of Medication(s) Requested:  Requested Prescriptions     Pending Prescriptions Disp Refills    apixaban (ELIQUIS) 5 MG TABS tablet [Pharmacy Med Name: ELIQUIS 5 MG TABS 5 Tablet] 60 tablet 2     Sig: TAKE 1 TABLET BY MOUTH 2 TIMES DAILY       Medication is on current medication list Yes    Dosage and directions were verified? Yes    Quantity verified: 30 day supply     Pharmacy Verified?  Yes    Last Appointment:  4/3/2025    Future appts:  No future appointments.     (If no appt send self scheduling link. .REFILLAPPT)  Scheduling request sent?     [] Yes  [x] No    Does patient need updated?  [] Yes  [x] No

## 2025-04-24 DIAGNOSIS — J30.1 SEASONAL ALLERGIC RHINITIS DUE TO POLLEN: ICD-10-CM

## 2025-04-24 RX ORDER — FLUTICASONE PROPIONATE 50 MCG
SPRAY, SUSPENSION (ML) NASAL
Qty: 1 G | Refills: 1 | Status: SHIPPED | OUTPATIENT
Start: 2025-04-24

## 2025-04-24 NOTE — TELEPHONE ENCOUNTER
Name of Medication(s) Requested:  Requested Prescriptions     Pending Prescriptions Disp Refills    fluticasone (FLONASE) 50 MCG/ACT nasal spray [Pharmacy Med Name: FLUTICASONE 0.05% NASAL SPR 50 MCG SPRY] 1 g 1     Sig: USE 2 SPRAYS IN BOTH NOSTRILS ONCE DAILY *SHAKE GENTLY*       Medication is on current medication list Yes    Dosage and directions were verified? Yes    Quantity verified: 30 day supply     Pharmacy Verified?  Yes    Last Appointment:  4/3/2025    Future appts:  No future appointments.     (If no appt send self scheduling link. .REFILLAPPT)  Scheduling request sent?     [] Yes  [x] No    Does patient need updated?  [] Yes  [x] No

## 2025-04-24 NOTE — TELEPHONE ENCOUNTER
From [facility]  Patient has c/o GI upset regularly and is requesting tums PRN indigestion.   BLE + pedal Edema con't 2-3+ continues to make a grimace when L foot palpates. Patient denies pain.

## 2025-04-28 ENCOUNTER — TELEPHONE (OUTPATIENT)
Dept: FAMILY MEDICINE CLINIC | Age: 65
End: 2025-04-28

## 2025-04-28 NOTE — TELEPHONE ENCOUNTER
[facility] called following up regarding getting an antibiotic for patient.     Dr. Napier stated no antibiotic he wants them to mix Lac-hydrin and cortisone cream 50/50

## 2025-04-29 ENCOUNTER — TELEPHONE (OUTPATIENT)
Dept: FAMILY MEDICINE CLINIC | Age: 65
End: 2025-04-29

## 2025-04-29 DIAGNOSIS — L30.9 DERMATITIS: Primary | ICD-10-CM

## 2025-04-29 DIAGNOSIS — I89.0 LYMPHEDEMA OF EXTREMITY: ICD-10-CM

## 2025-04-29 RX ORDER — TRIAMCINOLONE ACETONIDE 1 MG/G
CREAM TOPICAL 2 TIMES DAILY
Qty: 45 G | Refills: 0 | Status: SHIPPED | OUTPATIENT
Start: 2025-04-29

## 2025-04-29 NOTE — TELEPHONE ENCOUNTER
ASSESSMENT/PLAN:    1. Dermatitis  - triamcinolone (KENALOG) 0.1 % cream; Apply topically 2 times daily Apply topically 2 times daily. Along with Lac Hydrin 12%  Dispense: 45 g; Refill: 0    2. Lymphedema of extremity  - triamcinolone (KENALOG) 0.1 % cream; Apply topically 2 times daily Apply topically 2 times daily. Along with Lac Hydrin 12%  Dispense: 45 g; Refill: 0        FOLLOW-UP:  jose

## 2025-05-06 DIAGNOSIS — F02.B11 MODERATE ALZHEIMER'S DEMENTIA OF OTHER ONSET WITH AGITATION (HCC): ICD-10-CM

## 2025-05-06 DIAGNOSIS — G30.8 MODERATE ALZHEIMER'S DEMENTIA OF OTHER ONSET WITH AGITATION (HCC): ICD-10-CM

## 2025-05-06 DIAGNOSIS — I10 ESSENTIAL HYPERTENSION: Chronic | ICD-10-CM

## 2025-05-06 DIAGNOSIS — K20.90 ESOPHAGITIS: ICD-10-CM

## 2025-05-06 NOTE — TELEPHONE ENCOUNTER
Name of Medication(s) Requested:  Requested Prescriptions     Pending Prescriptions Disp Refills    rivastigmine (EXELON) 6 MG capsule [Pharmacy Med Name: RIVASTIGMINE 6 MG CAPSULE 6 Capsule] 60 capsule 0     Sig: TAKE 1 CAPSULE BY MOUTH 2 TIMES DAILY    famotidine (PEPCID) 20 MG tablet [Pharmacy Med Name: FAMOTIDINE 20 MG TABS 20 Tablet] 60 tablet 0     Sig: TAKE 1 TABLET BY MOUTH 2 TIMES DAILY    memantine (NAMENDA) 10 MG tablet [Pharmacy Med Name: MEMANTINE HCL 10 MG TABS 10 Tablet] 180 tablet 0     Sig: TAKE 1 TABLET BY MOUTH 2 TIMES DAILY    losartan (COZAAR) 100 MG tablet [Pharmacy Med Name: LOSARTAN 100 MG TABLET 100 Tablet] 90 tablet 0     Sig: TAKE 1 TABLET BY MOUTH DAILY    vitamin D3 (NATURAL VITAMIN D-3) 125 MCG (5000 UT) TABS tablet [Pharmacy Med Name: VITAMIN D3 5,000U(125MCG) MCG Tablet] 30 tablet 0     Sig: TAKE 1 TABLET BY MOUTH DAILY       Medication is on current medication list Yes    Dosage and directions were verified? Yes    Quantity verified: 30 day supply     Pharmacy Verified?  Yes    Last Appointment:  4/3/2025    Future appts:  No future appointments.     (If no appt send self scheduling link. .REFILLAPPT)  Scheduling request sent?     [] Yes  [x] No    Does patient need updated?  [] Yes  [x] No

## 2025-05-07 RX ORDER — CHOLECALCIFEROL TAB 125 MCG (5000 UNIT) 125 MCG (5000 UT)
5000 TAB DAILY
Qty: 30 TABLET | Refills: 0 | Status: SHIPPED | OUTPATIENT
Start: 2025-05-07

## 2025-05-07 RX ORDER — LOSARTAN POTASSIUM 100 MG/1
100 TABLET ORAL DAILY
Qty: 90 TABLET | Refills: 0 | Status: SHIPPED | OUTPATIENT
Start: 2025-05-07

## 2025-05-07 RX ORDER — FAMOTIDINE 20 MG/1
20 TABLET, FILM COATED ORAL 2 TIMES DAILY
Qty: 60 TABLET | Refills: 0 | Status: SHIPPED | OUTPATIENT
Start: 2025-05-07

## 2025-05-07 RX ORDER — RIVASTIGMINE TARTRATE 6 MG/1
6 CAPSULE ORAL 2 TIMES DAILY
Qty: 60 CAPSULE | Refills: 0 | Status: SHIPPED | OUTPATIENT
Start: 2025-05-07

## 2025-05-07 RX ORDER — MEMANTINE HYDROCHLORIDE 10 MG/1
10 TABLET ORAL 2 TIMES DAILY
Qty: 180 TABLET | Refills: 0 | Status: SHIPPED | OUTPATIENT
Start: 2025-05-07

## 2025-05-13 ENCOUNTER — TELEPHONE (OUTPATIENT)
Dept: FAMILY MEDICINE CLINIC | Age: 65
End: 2025-05-13

## 2025-05-13 NOTE — TELEPHONE ENCOUNTER
Incontinence, urine frequency and burning sensation when urinating. Will do a urinalysis and fax results to the office.

## 2025-05-16 ENCOUNTER — TELEPHONE (OUTPATIENT)
Dept: FAMILY MEDICINE CLINIC | Age: 65
End: 2025-05-16

## 2025-05-16 DIAGNOSIS — N39.0 URINARY TRACT INFECTION WITH HEMATURIA, SITE UNSPECIFIED: Primary | ICD-10-CM

## 2025-05-16 DIAGNOSIS — R31.9 URINARY TRACT INFECTION WITH HEMATURIA, SITE UNSPECIFIED: Primary | ICD-10-CM

## 2025-05-16 RX ORDER — CEFDINIR 300 MG/1
300 CAPSULE ORAL 2 TIMES DAILY
Qty: 14 CAPSULE | Refills: 0 | Status: SHIPPED | OUTPATIENT
Start: 2025-05-16 | End: 2025-05-23

## 2025-05-16 NOTE — TELEPHONE ENCOUNTER
Daisha with [facility] called stating patient had a urine which showed 3+ bacteria, she states the culture is not back yet, but would like to know if we can send something over to start her on.     Urinalysis scanned into chart.

## 2025-05-19 ENCOUNTER — TELEPHONE (OUTPATIENT)
Dept: FAMILY MEDICINE CLINIC | Age: 65
End: 2025-05-19

## 2025-05-19 NOTE — TELEPHONE ENCOUNTER
[facility]  Alison Arellano MD, [address] [address]  Phone:  [phone]  Fax: [phone]    Chief Complaint   Patient presents with    Establish Care       SUBJECTIVE:    Za Garvin is a 29 y.o. male  Dictation on: 01/06/2020 12:52 PM by: Kate Hernandez [[id number]]     Patient comes in as a new patient to establish care. In his younger years he was diagnosed with ADHD and ADD and was on medication for a period of time. 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 Since then he no longer has the body shop and is self employed as a realtor and his hours are widely fluctuating. His wife wonders if Jocelyne Kim is scatter brained, distracted\", etc.  She would like, and he would like, him to be checked for ADHD/ADD. He has had some minor issues. Patient is seen for evaluation. Past Medical History:   Diagnosis Date    Preventative health care      History reviewed. No pertinent surgical history.   Allergies   Allergen Reactions    Codeine Hives    Codeine Other (comments)     bradycardia    Shellfish Derived Nausea and Vomiting       REVIEW OF SYSTEMS:  General: negative for - chills or fever  ENT: negative for - headaches, nasal congestion or tinnitus  Respiratory: negative for - cough, hemoptysis, shortness of breath or wheezing  Cardiovascular : negative for - chest pain, edema, palpitations or shortness of breath  Gastrointestinal: negative for - abdominal pain, blood in stools, heartburn or nausea/vomiting  Genito-Urinary: no dysuria, trouble voiding, or hematuria  Musculoskeletal: negative for - gait disturbance, joint pain, joint stiffness or joint swelling  Neurological: no TIA or stroke symptoms  Hematologic: no bruises, no bleeding, no swollen glands  Integument: no lumps, mole changes, nail changes or rash  Endocrine:no malaise/lethargy or unexpected weight changes      Social History     Socioeconomic Spoke with Clare regarding her results.  If she's taking the Omnicef and is improving she can finish the antibiotic. If she isn't improving she is to discontinue the Omnicef and make an appointment with Dr. Napier.    Waiting for a return fax.    History    Marital status:      Spouse name: Not on file    Number of children: Not on file    Years of education: Not on file    Highest education level: Not on file   Tobacco Use    Smoking status: Never Smoker    Smokeless tobacco: Never Used   Substance and Sexual Activity    Alcohol use: Yes     Comment: occasional     Drug use: No    Sexual activity: Yes     Partners: Female     Birth control/protection: Condom     Family History   Problem Relation Age of Onset    Diabetes Father    Habits:  Lifetime non cigarette abuser, non drug abuser. Occasional alcohol use. Social History:  Patient is  for the past 5 1/2 years, they have a 5year old daughter and 1year old son. He is gainfully employed as a realtor. Completed one year of college. Restoration preference is none. Family History:  Father  68, multiple problems, COPD, cigarette abuse, MI, diabetes, amputation and CHF. Mother 68 with hypertension. Six brothers, all alive and well. OBJECTIVE:     Visit Vitals  /68   Pulse 71   Temp 98.2 °F (36.8 °C) (Oral)   Resp 18   Ht 6' (1.829 m)   Wt 200 lb 4.8 oz (90.9 kg)   SpO2 97%   BMI 27.17 kg/m²     CONSTITUTIONAL: well , well nourished, appears age appropriate  EYES: perrla, eom intact  ENMT:moist mucous membranes, pharynx clear  NECK: supple. Thyroid normal  RESPIRATORY: Chest: clear bilaterally  CARDIOVASCULAR: Heart: regular rate and rhythm  GASTROINTESTINAL: Abdomen: soft, bowel sounds active  HEMATOLOGIC: no pathological lymph nodes palpated  MUSCULOSKELETAL: Extremities: no edema, pulse 1+   INTEGUMENT: No unusual rashes or suspicious skin lesions noted. Nails appear normal.  NEUROLOGIC: non-focal exam   MENTAL STATUS: alert and oriented, appropriate affect     No visits with results within 3 Month(s) from this visit. Latest known visit with results is:   No results found for any previous visit. ASSESSMENT:   1.  Attention deficit hyperactivity disorder (ADHD), unspecified ADHD type      Patient's medical status is stable. He does over 15 hours of workout a week, we encouraged him to continue workouts. Regarding ADHD, we will refer him for psychological testing. Appropriate lab studies are requested. He will be back to see us in one year, sooner if he has any problems. Discussed the patient's BMI with him. The BMI follow up plan is as follows:     dietary management education, guidance, and counseling  encourage exercise  monitor weight  prescribed dietary intake    I have discussed the diagnosis with the patient and the intended plan as seen in the  orders above. The patient understands and agees with the plan. The patient has   received an after visit summary and questions were answered concerning  future plans  Patient labs and/or xrays were reviewed  Past records were reviewed. PLAN:  .  Orders Placed This Encounter    URINALYSIS W/ RFLX MICROSCOPIC    CBC WITH AUTOMATED DIFF    METABOLIC PANEL, COMPREHENSIVE    REFERRAL TO PSYCHIATRY       Follow-up and Dispositions    · Return in about 1 year (around 1/6/2021). ATTENTION:   This medical record was transcribed using an electronic medical records system. Although proofread, it may and can contain electronic and spelling errors. Other human spelling and other errors may be present. Corrections may be executed at a later time. Please feel free to contact us for any clarifications as needed.

## 2025-05-23 DIAGNOSIS — Z86.73 HISTORY OF CARDIOEMBOLIC CEREBROVASCULAR ACCIDENT (CVA): Chronic | ICD-10-CM

## 2025-05-23 RX ORDER — ASPIRIN 81 MG/1
TABLET, COATED ORAL
Qty: 31 TABLET | Refills: 4 | Status: SHIPPED | OUTPATIENT
Start: 2025-05-23

## 2025-05-23 NOTE — TELEPHONE ENCOUNTER
Last seen 4/3/2025  Next appt 5/28/2025    Requested Prescriptions     Pending Prescriptions Disp Refills    ASPIRIN LOW DOSE 81 MG EC tablet [Pharmacy Med Name: ASPIRIN EC 81 MG TABLET 81 Tablet] 31 tablet 4     Sig: TAKE ONE(1) TABLET BY MOUTH ONCE DAILY **DO NOT CRUSH**      Name of Medication(s) Requested:  Requested Prescriptions     Pending Prescriptions Disp Refills    ASPIRIN LOW DOSE 81 MG EC tablet [Pharmacy Med Name: ASPIRIN EC 81 MG TABLET 81 Tablet] 31 tablet 4     Sig: TAKE ONE(1) TABLET BY MOUTH ONCE DAILY **DO NOT CRUSH**       Medication is on current medication list Yes    Dosage and directions were verified? Yes    Quantity verified: 30 day supply     Pharmacy Verified?  Yes    Last Appointment:  4/3/2025    Future appts:  Future Appointments   Date Time Provider Department Center   5/28/2025 10:45 AM Rodger Napier DO [facility] DE[facility]        (If no appt send self scheduling link. .REFILLAPPT)  Scheduling request sent?     [] Yes  [] No    Does patient need updated?  [] Yes  [] No

## 2025-05-28 ENCOUNTER — OFFICE VISIT (OUTPATIENT)
Dept: FAMILY MEDICINE CLINIC | Age: 65
End: 2025-05-28
Payer: MEDICARE

## 2025-05-28 VITALS
HEART RATE: 74 BPM | RESPIRATION RATE: 16 BRPM | BODY MASS INDEX: 27.13 KG/M2 | HEIGHT: 66 IN | SYSTOLIC BLOOD PRESSURE: 136 MMHG | DIASTOLIC BLOOD PRESSURE: 62 MMHG | TEMPERATURE: 97.3 F | OXYGEN SATURATION: 95 %

## 2025-05-28 DIAGNOSIS — G30.9 ALZHEIMER'S DISEASE, UNSPECIFIED (CODE) (HCC): Primary | ICD-10-CM

## 2025-05-28 DIAGNOSIS — E11.49 TYPE 2 DIABETES MELLITUS WITH OTHER NEUROLOGIC COMPLICATION, WITHOUT LONG-TERM CURRENT USE OF INSULIN (HCC): ICD-10-CM

## 2025-05-28 DIAGNOSIS — N18.32 STAGE 3B CHRONIC KIDNEY DISEASE (HCC): ICD-10-CM

## 2025-05-28 LAB
BILIRUBIN, URINE: NORMAL
BLOOD, URINE: NORMAL
CLARITY, UA: NORMAL
COLOR, UA: NORMAL
GLUCOSE URINE: NORMAL
KETONES, URINE: NORMAL
LEUKOCYTE ESTERASE, URINE: NORMAL
NITRITE, URINE: NORMAL
PH UA: NORMAL
PROTEIN UA: NORMAL
SPECIFIC GRAVITY UA: NORMAL
UROBILINOGEN, URINE: NORMAL

## 2025-05-28 PROCEDURE — 3052F HG A1C>EQUAL 8.0%<EQUAL 9.0%: CPT | Performed by: FAMILY MEDICINE

## 2025-05-28 PROCEDURE — 3078F DIAST BP <80 MM HG: CPT | Performed by: FAMILY MEDICINE

## 2025-05-28 PROCEDURE — 1036F TOBACCO NON-USER: CPT | Performed by: FAMILY MEDICINE

## 2025-05-28 PROCEDURE — 3075F SYST BP GE 130 - 139MM HG: CPT | Performed by: FAMILY MEDICINE

## 2025-05-28 PROCEDURE — 3017F COLORECTAL CA SCREEN DOC REV: CPT | Performed by: FAMILY MEDICINE

## 2025-05-28 PROCEDURE — G8417 CALC BMI ABV UP PARAM F/U: HCPCS | Performed by: FAMILY MEDICINE

## 2025-05-28 PROCEDURE — 2022F DILAT RTA XM EVC RTNOPTHY: CPT | Performed by: FAMILY MEDICINE

## 2025-05-28 PROCEDURE — 99214 OFFICE O/P EST MOD 30 MIN: CPT | Performed by: FAMILY MEDICINE

## 2025-05-28 PROCEDURE — G8427 DOCREV CUR MEDS BY ELIG CLIN: HCPCS | Performed by: FAMILY MEDICINE

## 2025-05-28 RX ORDER — BREXPIPRAZOLE 2 MG/1
TABLET ORAL
Status: ON HOLD | COMMUNITY
Start: 2025-05-19

## 2025-05-28 ASSESSMENT — ENCOUNTER SYMPTOMS
BLOOD IN STOOL: 0
CONSTIPATION: 0
SHORTNESS OF BREATH: 0
NAUSEA: 0
COUGH: 0
VOMITING: 0
WHEEZING: 0
ABDOMINAL PAIN: 0
DIARRHEA: 0

## 2025-05-28 ASSESSMENT — PATIENT HEALTH QUESTIONNAIRE - PHQ9
2. FEELING DOWN, DEPRESSED OR HOPELESS: NOT AT ALL
SUM OF ALL RESPONSES TO PHQ QUESTIONS 1-9: 0
1. LITTLE INTEREST OR PLEASURE IN DOING THINGS: NOT AT ALL

## 2025-05-28 NOTE — PROGRESS NOTES
Olena Castrejon (:  1960) is a 64 y.o. female,Established patient, here for evaluation of the following chief complaint(s):  Urinary Tract Infection (UTI symptoms still unresolved ), Leg Swelling (Bilateral leg swelling ), and Other ([facility] called requesting patient be referred to [facility] for more intense PT/OT therapy. )      Assessment & Plan   ASSESSMENT/PLAN:  Assessment & Plan  Alzheimer's disease, unspecified   Chronic, at goal (stable), continue current treatment plan         Stage 3b chronic kidney disease (HCC)   Chronic, at goal (stable), continue current treatment plan         Type 2 diabetes mellitus with other neurologic complication, without long-term current use of insulin (HCC)   Chronic, at goal (stable), continue current treatment plan              No follow-ups on file.         Subjective   SUBJECTIVE/OBJECTIVE:  Urinary Tract Infection (UTI symptoms still unresolved ), Leg Swelling (Bilateral leg swelling ), and Other ([facility] called requesting patient be referred to [facility] for more intense PT/OT therapy. )      Urinary Tract Infection  Pertinent negatives include no hematuria.       Review of Systems   Constitutional:  Negative for chills, diaphoresis and fever.   HENT:  Negative for ear discharge, ear pain, hearing loss, nosebleeds and tinnitus.    Respiratory:  Negative for cough, shortness of breath and wheezing.    Cardiovascular:  Negative for chest pain.   Gastrointestinal:  Negative for abdominal pain, blood in stool, constipation, diarrhea, nausea and vomiting.   Genitourinary:  Negative for dysuria, flank pain and hematuria.   Musculoskeletal:  Negative for myalgias.   Skin:  Negative for rash.   Neurological:  Negative for headaches.   Hematological:  Does not bruise/bleed easily.   Psychiatric/Behavioral:  Negative for hallucinations and suicidal ideas.           Objective   /62   Pulse 74   Temp 97.3 °F (36.3 °C)   Resp 16

## 2025-05-30 ENCOUNTER — HOSPITAL ENCOUNTER (INPATIENT)
Age: 65
LOS: 3 days | Discharge: NURSING, REHAB OR LONG-TERM CARE | DRG: 690 | End: 2025-06-02
Attending: EMERGENCY MEDICINE | Admitting: STUDENT IN AN ORGANIZED HEALTH CARE EDUCATION/TRAINING PROGRAM
Payer: MEDICARE

## 2025-05-30 ENCOUNTER — APPOINTMENT (OUTPATIENT)
Dept: CT IMAGING | Age: 65
DRG: 690 | End: 2025-05-30
Payer: MEDICARE

## 2025-05-30 ENCOUNTER — APPOINTMENT (OUTPATIENT)
Dept: GENERAL RADIOLOGY | Age: 65
DRG: 690 | End: 2025-05-30
Payer: MEDICARE

## 2025-05-30 DIAGNOSIS — N30.00 ACUTE CYSTITIS WITHOUT HEMATURIA: Primary | ICD-10-CM

## 2025-05-30 DIAGNOSIS — R62.7 FAILURE TO THRIVE IN ADULT: ICD-10-CM

## 2025-05-30 PROBLEM — N39.0 ACUTE UTI: Status: ACTIVE | Noted: 2025-05-30

## 2025-05-30 LAB
ALBUMIN SERPL-MCNC: 3.7 G/DL (ref 3.5–5.2)
ALP SERPL-CCNC: 129 U/L (ref 35–104)
ALT SERPL-CCNC: 12 U/L (ref 0–32)
ANION GAP SERPL CALCULATED.3IONS-SCNC: 15 MMOL/L (ref 7–16)
AST SERPL-CCNC: 24 U/L (ref 0–31)
BACTERIA URNS QL MICRO: ABNORMAL
BASOPHILS # BLD: 0.04 K/UL (ref 0–0.2)
BASOPHILS NFR BLD: 1 % (ref 0–2)
BILIRUB DIRECT SERPL-MCNC: <0.2 MG/DL (ref 0–0.3)
BILIRUB INDIRECT SERPL-MCNC: ABNORMAL MG/DL (ref 0–1)
BILIRUB SERPL-MCNC: 0.3 MG/DL (ref 0–1.2)
BILIRUB UR QL STRIP: NEGATIVE
BUN SERPL-MCNC: 41 MG/DL (ref 6–23)
CALCIUM SERPL-MCNC: 9 MG/DL (ref 8.6–10.2)
CHLORIDE SERPL-SCNC: 110 MMOL/L (ref 98–107)
CLARITY UR: ABNORMAL
CO2 SERPL-SCNC: 16 MMOL/L (ref 22–29)
COLOR UR: YELLOW
CREAT SERPL-MCNC: 1.7 MG/DL (ref 0.5–1)
EOSINOPHIL # BLD: 0.3 K/UL (ref 0.05–0.5)
EOSINOPHILS RELATIVE PERCENT: 5 % (ref 0–6)
EPI CELLS #/AREA URNS HPF: ABNORMAL /HPF
ERYTHROCYTE [DISTWIDTH] IN BLOOD BY AUTOMATED COUNT: 14 % (ref 11.5–15)
GFR, ESTIMATED: 34 ML/MIN/1.73M2
GLUCOSE SERPL-MCNC: 193 MG/DL (ref 74–99)
GLUCOSE UR STRIP-MCNC: NEGATIVE MG/DL
HCT VFR BLD AUTO: 34.8 % (ref 34–48)
HGB BLD-MCNC: 10.7 G/DL (ref 11.5–15.5)
HGB UR QL STRIP.AUTO: ABNORMAL
IMM GRANULOCYTES # BLD AUTO: <0.03 K/UL (ref 0–0.58)
IMM GRANULOCYTES NFR BLD: 0 % (ref 0–5)
INR PPP: 1.5
KETONES UR STRIP-MCNC: NEGATIVE MG/DL
LACTATE BLDV-SCNC: 1 MMOL/L (ref 0.5–2.2)
LEUKOCYTE ESTERASE UR QL STRIP: ABNORMAL
LIPASE SERPL-CCNC: 31 U/L (ref 13–60)
LYMPHOCYTES NFR BLD: 1.32 K/UL (ref 1.5–4)
LYMPHOCYTES RELATIVE PERCENT: 23 % (ref 20–42)
MAGNESIUM SERPL-MCNC: 1.6 MG/DL (ref 1.6–2.6)
MCH RBC QN AUTO: 29.5 PG (ref 26–35)
MCHC RBC AUTO-ENTMCNC: 30.7 G/DL (ref 32–34.5)
MCV RBC AUTO: 95.9 FL (ref 80–99.9)
MONOCYTES NFR BLD: 0.44 K/UL (ref 0.1–0.95)
MONOCYTES NFR BLD: 8 % (ref 2–12)
NEUTROPHILS NFR BLD: 63 % (ref 43–80)
NEUTS SEG NFR BLD: 3.66 K/UL (ref 1.8–7.3)
NITRITE UR QL STRIP: POSITIVE
PH UR STRIP: 6 [PH] (ref 5–8)
PLATELET # BLD AUTO: 196 K/UL (ref 130–450)
PMV BLD AUTO: 11.5 FL (ref 7–12)
POTASSIUM SERPL-SCNC: 4.3 MMOL/L (ref 3.5–5)
POTASSIUM SERPL-SCNC: 5.9 MMOL/L (ref 3.5–5)
PROT SERPL-MCNC: 6.9 G/DL (ref 6.4–8.3)
PROT UR STRIP-MCNC: 100 MG/DL
PROTHROMBIN TIME: 16 SEC (ref 9.3–12.4)
RBC # BLD AUTO: 3.63 M/UL (ref 3.5–5.5)
RBC #/AREA URNS HPF: ABNORMAL /HPF
SODIUM SERPL-SCNC: 141 MMOL/L (ref 132–146)
SP GR UR STRIP: 1.02 (ref 1–1.03)
TROPONIN I SERPL HS-MCNC: 20 NG/L (ref 0–14)
TROPONIN I SERPL HS-MCNC: 22 NG/L (ref 0–14)
UROBILINOGEN UR STRIP-ACNC: 0.2 EU/DL (ref 0–1)
WBC #/AREA URNS HPF: ABNORMAL /HPF
WBC OTHER # BLD: 5.8 K/UL (ref 4.5–11.5)

## 2025-05-30 PROCEDURE — 82248 BILIRUBIN DIRECT: CPT

## 2025-05-30 PROCEDURE — 85025 COMPLETE CBC W/AUTO DIFF WBC: CPT

## 2025-05-30 PROCEDURE — 2500000003 HC RX 250 WO HCPCS

## 2025-05-30 PROCEDURE — 83690 ASSAY OF LIPASE: CPT

## 2025-05-30 PROCEDURE — 84484 ASSAY OF TROPONIN QUANT: CPT

## 2025-05-30 PROCEDURE — 83735 ASSAY OF MAGNESIUM: CPT

## 2025-05-30 PROCEDURE — 80053 COMPREHEN METABOLIC PANEL: CPT

## 2025-05-30 PROCEDURE — 81001 URINALYSIS AUTO W/SCOPE: CPT

## 2025-05-30 PROCEDURE — 87077 CULTURE AEROBIC IDENTIFY: CPT

## 2025-05-30 PROCEDURE — 74176 CT ABD & PELVIS W/O CONTRAST: CPT

## 2025-05-30 PROCEDURE — 85610 PROTHROMBIN TIME: CPT

## 2025-05-30 PROCEDURE — 1200000000 HC SEMI PRIVATE

## 2025-05-30 PROCEDURE — 93005 ELECTROCARDIOGRAM TRACING: CPT

## 2025-05-30 PROCEDURE — 99223 1ST HOSP IP/OBS HIGH 75: CPT | Performed by: STUDENT IN AN ORGANIZED HEALTH CARE EDUCATION/TRAINING PROGRAM

## 2025-05-30 PROCEDURE — 6360000002 HC RX W HCPCS

## 2025-05-30 PROCEDURE — 71045 X-RAY EXAM CHEST 1 VIEW: CPT

## 2025-05-30 PROCEDURE — 84132 ASSAY OF SERUM POTASSIUM: CPT

## 2025-05-30 PROCEDURE — 99285 EMERGENCY DEPT VISIT HI MDM: CPT

## 2025-05-30 PROCEDURE — 83605 ASSAY OF LACTIC ACID: CPT

## 2025-05-30 PROCEDURE — 96374 THER/PROPH/DIAG INJ IV PUSH: CPT

## 2025-05-30 PROCEDURE — 87086 URINE CULTURE/COLONY COUNT: CPT

## 2025-05-30 RX ORDER — SODIUM CHLORIDE 0.9 % (FLUSH) 0.9 %
5-40 SYRINGE (ML) INJECTION PRN
Status: DISCONTINUED | OUTPATIENT
Start: 2025-05-30 | End: 2025-06-02 | Stop reason: HOSPADM

## 2025-05-30 RX ORDER — ONDANSETRON 4 MG/1
4 TABLET, ORALLY DISINTEGRATING ORAL EVERY 8 HOURS PRN
Status: DISCONTINUED | OUTPATIENT
Start: 2025-05-30 | End: 2025-06-02 | Stop reason: HOSPADM

## 2025-05-30 RX ORDER — ACETAMINOPHEN 650 MG/1
650 SUPPOSITORY RECTAL EVERY 6 HOURS PRN
Status: DISCONTINUED | OUTPATIENT
Start: 2025-05-30 | End: 2025-06-02 | Stop reason: HOSPADM

## 2025-05-30 RX ORDER — SODIUM CHLORIDE 9 MG/ML
INJECTION, SOLUTION INTRAVENOUS PRN
Status: DISCONTINUED | OUTPATIENT
Start: 2025-05-30 | End: 2025-06-02 | Stop reason: HOSPADM

## 2025-05-30 RX ORDER — SODIUM CHLORIDE 0.9 % (FLUSH) 0.9 %
5-40 SYRINGE (ML) INJECTION EVERY 12 HOURS SCHEDULED
Status: DISCONTINUED | OUTPATIENT
Start: 2025-05-30 | End: 2025-06-02 | Stop reason: HOSPADM

## 2025-05-30 RX ORDER — ACETAMINOPHEN 325 MG/1
650 TABLET ORAL EVERY 6 HOURS PRN
Status: DISCONTINUED | OUTPATIENT
Start: 2025-05-30 | End: 2025-06-02 | Stop reason: HOSPADM

## 2025-05-30 RX ORDER — POTASSIUM CHLORIDE 1500 MG/1
40 TABLET, EXTENDED RELEASE ORAL PRN
Status: DISCONTINUED | OUTPATIENT
Start: 2025-05-30 | End: 2025-06-02 | Stop reason: HOSPADM

## 2025-05-30 RX ORDER — POTASSIUM CHLORIDE 7.45 MG/ML
10 INJECTION INTRAVENOUS PRN
Status: DISCONTINUED | OUTPATIENT
Start: 2025-05-30 | End: 2025-06-02 | Stop reason: HOSPADM

## 2025-05-30 RX ORDER — INSULIN LISPRO 100 [IU]/ML
0-4 INJECTION, SOLUTION INTRAVENOUS; SUBCUTANEOUS
Status: DISCONTINUED | OUTPATIENT
Start: 2025-05-30 | End: 2025-06-02 | Stop reason: HOSPADM

## 2025-05-30 RX ORDER — ONDANSETRON 2 MG/ML
4 INJECTION INTRAMUSCULAR; INTRAVENOUS EVERY 6 HOURS PRN
Status: DISCONTINUED | OUTPATIENT
Start: 2025-05-30 | End: 2025-06-02 | Stop reason: HOSPADM

## 2025-05-30 RX ORDER — SODIUM CHLORIDE 9 MG/ML
INJECTION, SOLUTION INTRAVENOUS CONTINUOUS
Status: DISCONTINUED | OUTPATIENT
Start: 2025-05-30 | End: 2025-05-31

## 2025-05-30 RX ORDER — MAGNESIUM SULFATE IN WATER 40 MG/ML
2000 INJECTION, SOLUTION INTRAVENOUS PRN
Status: DISCONTINUED | OUTPATIENT
Start: 2025-05-30 | End: 2025-06-02 | Stop reason: HOSPADM

## 2025-05-30 RX ORDER — POLYETHYLENE GLYCOL 3350 17 G/17G
17 POWDER, FOR SOLUTION ORAL DAILY PRN
Status: DISCONTINUED | OUTPATIENT
Start: 2025-05-30 | End: 2025-06-02 | Stop reason: HOSPADM

## 2025-05-30 RX ADMIN — CEFTRIAXONE SODIUM 2000 MG: 2 INJECTION, POWDER, FOR SOLUTION INTRAMUSCULAR; INTRAVENOUS at 21:10

## 2025-05-30 ASSESSMENT — PAIN - FUNCTIONAL ASSESSMENT: PAIN_FUNCTIONAL_ASSESSMENT: NONE - DENIES PAIN

## 2025-05-30 NOTE — CARE COORDINATION
Social Work/Transition of Care:     Patient presents from [facility] living for evaluation.  [facility] received notification from [facility] liaison for the facility stating patient is unsafe to return and needs to be evaluated for rehab.  Patient would like to go to [facility] updated ED PCP.  Once medically cleared pt will need PT/OT Evaluation in order to skilled patient at facility.    Electronically signed by MICHELLE wilhelm on 5/30/2025 at 6:39 PM

## 2025-05-30 NOTE — ED PROVIDER NOTES
[facility] EMERGENCY DEPARTMENT  EMERGENCY DEPARTMENT ENCOUNTER        Pt Name: Olena Castrejon  MRN: [id number]  Birthdate 1960  Date of evaluation: 5/30/2025  Provider: Pj Posey DO  PCP: Rodger Napier DO  Note Started: 5:29 PM EDT 5/30/25    CHIEF COMPLAINT       Chief Complaint   Patient presents with    Frequent/Recurrent UTI     1 month.  Taking abx.  Not improving    Extremity Weakness     Increased confusion, weakness    Leg Swelling     Needs 3 day stay for PT/OT for rehab       HISTORY OF PRESENT ILLNESS: 1 or more Elements   History From: Patient and nurse from facility [facility]    Limitations to history : None    Olena Castrejon is a 64 y.o. female with a past medical history significant for chronic fatigue, hypertension, COPD, cirrhosis, diabetes mellitus, gallstones, GERD, hep C, neuropathy, pneumonia, type 2 diabetes, cerebral artery occlusion with cerebral infarction and varices who presents to the ED complaining of UTI symptoms such as burning when she pees abdominal cramping.  She states that she has had a UTI for months and she feels like it does not get better.  She has been taking antibiotics however the symptoms can remain.  She denies any nausea nausea vomiting chest pain or shortness of breath.  She denies fevers or chills.  She indicates that she also has some swelling of her legs which has been chronic.    I spoke with the nursing facility who states that her PCP would like her admitted so that she can get placement at a facility with more care.  I states that she has had chronic edema and UTIs do not seem to improved with treatment.  She does seem to be weaker now requiring 2 assistance to help her.  Per nurse this  means she is no longer appropriate for medical care.    Patient denies fever, chills, headache, shortness of breath, chest pain, nausea, vomiting, diarrhea, lightheadedness, hematuria, hematochezia, and melena.    Nursing

## 2025-05-31 PROBLEM — E87.20 ACIDOSIS: Status: ACTIVE | Noted: 2025-05-31

## 2025-05-31 PROBLEM — R31.9 URINARY TRACT INFECTION WITH HEMATURIA: Status: ACTIVE | Noted: 2025-05-31

## 2025-05-31 PROBLEM — N39.0 URINARY TRACT INFECTION WITH HEMATURIA: Status: ACTIVE | Noted: 2025-05-31

## 2025-05-31 LAB
ALBUMIN SERPL-MCNC: 3.6 G/DL (ref 3.5–5.2)
ALP SERPL-CCNC: 121 U/L (ref 35–104)
ALT SERPL-CCNC: 7 U/L (ref 0–32)
ANION GAP SERPL CALCULATED.3IONS-SCNC: 10 MMOL/L (ref 7–16)
AST SERPL-CCNC: 10 U/L (ref 0–31)
BASOPHILS # BLD: 0.04 K/UL (ref 0–0.2)
BASOPHILS NFR BLD: 1 % (ref 0–2)
BILIRUB SERPL-MCNC: 0.2 MG/DL (ref 0–1.2)
BUN SERPL-MCNC: 35 MG/DL (ref 6–23)
CALCIUM SERPL-MCNC: 8.6 MG/DL (ref 8.6–10.2)
CHLORIDE SERPL-SCNC: 114 MMOL/L (ref 98–107)
CO2 SERPL-SCNC: 17 MMOL/L (ref 22–29)
CREAT SERPL-MCNC: 1.6 MG/DL (ref 0.5–1)
EOSINOPHIL # BLD: 0.33 K/UL (ref 0.05–0.5)
EOSINOPHILS RELATIVE PERCENT: 6 % (ref 0–6)
ERYTHROCYTE [DISTWIDTH] IN BLOOD BY AUTOMATED COUNT: 13.8 % (ref 11.5–15)
GFR, ESTIMATED: 36 ML/MIN/1.73M2
GLUCOSE BLD-MCNC: 166 MG/DL (ref 74–99)
GLUCOSE BLD-MCNC: 173 MG/DL (ref 74–99)
GLUCOSE SERPL-MCNC: 173 MG/DL (ref 74–99)
HCT VFR BLD AUTO: 32.1 % (ref 34–48)
HGB BLD-MCNC: 10.4 G/DL (ref 11.5–15.5)
IMM GRANULOCYTES # BLD AUTO: <0.03 K/UL (ref 0–0.58)
IMM GRANULOCYTES NFR BLD: 0 % (ref 0–5)
LYMPHOCYTES NFR BLD: 1.51 K/UL (ref 1.5–4)
LYMPHOCYTES RELATIVE PERCENT: 28 % (ref 20–42)
MCH RBC QN AUTO: 29.6 PG (ref 26–35)
MCHC RBC AUTO-ENTMCNC: 32.4 G/DL (ref 32–34.5)
MCV RBC AUTO: 91.5 FL (ref 80–99.9)
MONOCYTES NFR BLD: 0.57 K/UL (ref 0.1–0.95)
MONOCYTES NFR BLD: 11 % (ref 2–12)
NEUTROPHILS NFR BLD: 55 % (ref 43–80)
NEUTS SEG NFR BLD: 2.99 K/UL (ref 1.8–7.3)
PLATELET # BLD AUTO: 184 K/UL (ref 130–450)
PMV BLD AUTO: 11.3 FL (ref 7–12)
POTASSIUM SERPL-SCNC: 4.8 MMOL/L (ref 3.5–5)
PROT SERPL-MCNC: 6.3 G/DL (ref 6.4–8.3)
RBC # BLD AUTO: 3.51 M/UL (ref 3.5–5.5)
SODIUM SERPL-SCNC: 141 MMOL/L (ref 132–146)
WBC OTHER # BLD: 5.5 K/UL (ref 4.5–11.5)

## 2025-05-31 PROCEDURE — 80053 COMPREHEN METABOLIC PANEL: CPT

## 2025-05-31 PROCEDURE — 6370000000 HC RX 637 (ALT 250 FOR IP): Performed by: STUDENT IN AN ORGANIZED HEALTH CARE EDUCATION/TRAINING PROGRAM

## 2025-05-31 PROCEDURE — 2580000003 HC RX 258: Performed by: STUDENT IN AN ORGANIZED HEALTH CARE EDUCATION/TRAINING PROGRAM

## 2025-05-31 PROCEDURE — 2580000003 HC RX 258: Performed by: INTERNAL MEDICINE

## 2025-05-31 PROCEDURE — 1200000000 HC SEMI PRIVATE

## 2025-05-31 PROCEDURE — 2500000003 HC RX 250 WO HCPCS: Performed by: STUDENT IN AN ORGANIZED HEALTH CARE EDUCATION/TRAINING PROGRAM

## 2025-05-31 PROCEDURE — 6360000002 HC RX W HCPCS: Performed by: STUDENT IN AN ORGANIZED HEALTH CARE EDUCATION/TRAINING PROGRAM

## 2025-05-31 PROCEDURE — 82962 GLUCOSE BLOOD TEST: CPT

## 2025-05-31 PROCEDURE — 99232 SBSQ HOSP IP/OBS MODERATE 35: CPT | Performed by: INTERNAL MEDICINE

## 2025-05-31 PROCEDURE — 85025 COMPLETE CBC W/AUTO DIFF WBC: CPT

## 2025-05-31 RX ORDER — AMLODIPINE BESYLATE 5 MG/1
5 TABLET ORAL DAILY
Status: DISCONTINUED | OUTPATIENT
Start: 2025-05-31 | End: 2025-06-02 | Stop reason: HOSPADM

## 2025-05-31 RX ORDER — BISACODYL 10 MG
10 SUPPOSITORY, RECTAL RECTAL DAILY PRN
COMMUNITY

## 2025-05-31 RX ORDER — ASPIRIN 81 MG/1
81 TABLET ORAL DAILY
Status: DISCONTINUED | OUTPATIENT
Start: 2025-05-31 | End: 2025-06-02 | Stop reason: HOSPADM

## 2025-05-31 RX ORDER — RIVASTIGMINE TARTRATE 3 MG/1
6 CAPSULE ORAL 2 TIMES DAILY
Status: DISCONTINUED | OUTPATIENT
Start: 2025-05-31 | End: 2025-06-02 | Stop reason: HOSPADM

## 2025-05-31 RX ORDER — LOSARTAN POTASSIUM 50 MG/1
100 TABLET ORAL DAILY
Status: DISCONTINUED | OUTPATIENT
Start: 2025-06-01 | End: 2025-06-02 | Stop reason: HOSPADM

## 2025-05-31 RX ORDER — AMMONIUM LACTATE 12 G/100G
CREAM TOPICAL PRN
COMMUNITY

## 2025-05-31 RX ORDER — DEXTROSE MONOHYDRATE 50 MG/ML
INJECTION, SOLUTION INTRAVENOUS CONTINUOUS
Status: DISCONTINUED | OUTPATIENT
Start: 2025-05-31 | End: 2025-06-02

## 2025-05-31 RX ORDER — MEMANTINE HYDROCHLORIDE 10 MG/1
10 TABLET ORAL 2 TIMES DAILY
Status: DISCONTINUED | OUTPATIENT
Start: 2025-05-31 | End: 2025-06-02 | Stop reason: HOSPADM

## 2025-05-31 RX ORDER — DOCUSATE SODIUM 100 MG/1
100 CAPSULE, LIQUID FILLED ORAL 2 TIMES DAILY PRN
Status: DISCONTINUED | OUTPATIENT
Start: 2025-05-31 | End: 2025-06-02 | Stop reason: HOSPADM

## 2025-05-31 RX ADMIN — SODIUM CHLORIDE, PRESERVATIVE FREE 10 ML: 5 INJECTION INTRAVENOUS at 21:22

## 2025-05-31 RX ADMIN — DEXTROSE MONOHYDRATE: 50 INJECTION, SOLUTION INTRAVENOUS at 18:39

## 2025-05-31 RX ADMIN — WATER 2000 MG: 1 INJECTION INTRAMUSCULAR; INTRAVENOUS; SUBCUTANEOUS at 21:22

## 2025-05-31 RX ADMIN — ONDANSETRON 4 MG: 2 INJECTION, SOLUTION INTRAMUSCULAR; INTRAVENOUS at 08:54

## 2025-05-31 RX ADMIN — ASPIRIN 81 MG: 81 TABLET, COATED ORAL at 08:51

## 2025-05-31 RX ADMIN — SODIUM CHLORIDE: 0.9 INJECTION, SOLUTION INTRAVENOUS at 12:29

## 2025-05-31 RX ADMIN — AMLODIPINE BESYLATE 5 MG: 5 TABLET ORAL at 08:51

## 2025-05-31 RX ADMIN — SODIUM CHLORIDE: 0.9 INJECTION, SOLUTION INTRAVENOUS at 00:30

## 2025-05-31 RX ADMIN — SODIUM CHLORIDE, PRESERVATIVE FREE 10 ML: 5 INJECTION INTRAVENOUS at 00:24

## 2025-05-31 RX ADMIN — SODIUM CHLORIDE, PRESERVATIVE FREE 10 ML: 5 INJECTION INTRAVENOUS at 08:55

## 2025-05-31 RX ADMIN — RIVASTIGMINE TARTRATE 6 MG: 3 CAPSULE ORAL at 21:22

## 2025-05-31 RX ADMIN — MEMANTINE HYDROCHLORIDE 10 MG: 10 TABLET, FILM COATED ORAL at 21:22

## 2025-05-31 RX ADMIN — APIXABAN 5 MG: 5 TABLET, FILM COATED ORAL at 21:22

## 2025-05-31 NOTE — PROGRESS NOTES
[facility] Hospitalist   Progress Note    Admitting Date and Time: 5/30/2025  4:55 PM  Admit Dx: Acute UTI [N39.0]  Failure to thrive in adult [R62.7]  Acute cystitis without hematuria [N30.00]    Subjective:    Patient was admitted with Acute UTI [N39.0]  Failure to thrive in adult [R62.7]  Acute cystitis without hematuria [N30.00]. Patient denies fever, chills, cp, sob, n/v.     amLODIPine  5 mg Oral Daily    aspirin  81 mg Oral Daily    apixaban  5 mg Oral BID    [START ON 6/1/2025] losartan  100 mg Oral Daily    memantine  10 mg Oral BID    rivastigmine  6 mg Oral BID    sodium chloride flush  5-40 mL IntraVENous 2 times per day    cefTRIAXone (ROCEPHIN) IV  2,000 mg IntraVENous Q24H    insulin lispro  0-4 Units SubCUTAneous 4x Daily AC & HS     docusate sodium, 100 mg, BID PRN  sodium chloride flush, 5-40 mL, PRN  sodium chloride, , PRN  potassium chloride, 40 mEq, PRN   Or  potassium alternative oral replacement, 40 mEq, PRN   Or  potassium chloride, 10 mEq, PRN  magnesium sulfate, 2,000 mg, PRN  ondansetron, 4 mg, Q8H PRN   Or  ondansetron, 4 mg, Q6H PRN  polyethylene glycol, 17 g, Daily PRN  acetaminophen, 650 mg, Q6H PRN   Or  acetaminophen, 650 mg, Q6H PRN         Objective:    BP (!) 140/63   Pulse 58   Temp 97.9 °F (36.6 °C) (Oral)   Resp 16   Ht 1.676 m (5' 6\")   Wt 62.6 kg (138 lb)   LMP  (LMP Unknown)   SpO2 96%   BMI 22.27 kg/m²   Skin: warm and dry, no rash or erythema  Pulmonary/Chest: clear to auscultation bilaterally- no wheezes, rales or rhonchi, normal air movement, no respiratory distress  Cardiovascular: rhythm reg at rate of 60  Abdomen: soft, non-tender, non-distended, normal bowel sounds, no masses or organomegaly  Extremities: no cyanosis, no clubbing, and no edema      Recent Labs     05/30/25  1809 05/30/25  2105 05/31/25  0409     --  141   K 5.9* 4.3 4.8   *  --  114*   CO2 16*  --  17*   BUN 41*  --  35*   CREATININE 1.7*  --  1.6*   GLUCOSE 193*

## 2025-05-31 NOTE — PROGRESS NOTES
Pt refusing sugar checks at this time. Pt states she is not diabetic and does not take diabetic medication.     Electronically signed by Daphne Pelayo RN on 5/31/2025 at 10:50 AM

## 2025-05-31 NOTE — CARE COORDINATION
CARE MANAGEMENT....Patient admitted from [facility] with frequent utis and generalized weakness. Started on iv rocephin/ivf. Per liaison at [facility], patient will need rehab prior to returning. PT/OT on consult for dc planning - requested to see by Monday am 6/2/25 so that precert can be initiated. Met with patient at the bedside. She is alert/oriented x 4. Lele confirmed that she is from [facility] and that she mainly uses a wheelchair. She is agreeable to SNF and is requesting [facility] (has history there). Referral sent to [facility] via [facility]. Await input. Attempted to speak with her daughter, Rossi, to discuss the above. Called her at [phone], no answer and unable to leave . Will follow along.

## 2025-05-31 NOTE — H&P
[facility] Hospitalist Group History and Physical      CHIEF COMPLAINT: Frequent UTIs, generalized weakness    History of Present Illness: 64-year-old lady with past medical history significant for primary hypertension, hyperlipidemia, diabetes mellitus type 2, COPD and hepatitis C presented to ED from nursing home with complaints of recurrent UTI resistant to treatment.  Patient also complained of burning urination and along with lower abdominal cramping for past couple of days.  She also started feeling generalized weakness difficulty ambulation and noticed some swelling in the legs which has been chronic.  As per history her PCP wants her placed in a facility with more care.  Because of generalized weakness she is requiring 2 assistants for help.  Denies any chest pain, no nausea vomiting diarrhea, no cough or shortness of breath, no lightheadedness or dizziness, no fever or chills, no focal deficits.    Informant(s) for H&P:    REVIEW OF SYSTEMS:  A comprehensive review of systems was negative except for: what is in the HPI      PMH:  Past Medical History:   Diagnosis Date    Arthritis     right knee    Blood circulation, collateral     Chronic fatigue 4/11/2016    Cirrhosis of liver (HCC)     end stage    COPD exacerbation (HCC) 4/29/2015    pt denies having    Diabetes mellitus (HCC)     Essential hypertension 2/15/2016    Gall stones     did have stent placed    GERD (gastroesophageal reflux disease)     Hep C w/o coma, chronic (HCC)     Sucessfully treated with Harvoni-no longer has viral load    History of blood transfusion     Neuropathy     PFO (patent foramen ovale)     Pneumonia     Type 2 diabetes mellitus with stage 3 chronic kidney disease, with long-term current use of insulin (HCC)     Unspecified cerebral artery occlusion with cerebral infarction     Varices        Surgical History:  Past Surgical History:   Procedure Laterality Date    BRAIN SURGERY      CARDIAC CATHETERIZATION

## 2025-05-31 NOTE — ED NOTES
ED to Inpatient Handoff Report    Notified [facility] that electronic handoff available and patient ready for transport to room 511.    Safety Risks: Risk of falls    Patient in Restraints: no    Constant Observer or Patient : no    Telemetry Monitoring Ordered: No          Order to transfer to unit without monitor: NA    Last MEWS:  Time completed: 2310    Deterioration Index: 27.74    Vitals:    05/30/25 1703 05/30/25 2113 05/30/25 2213 05/30/25 2310   BP: (!) 145/53 (!) 156/75 (!) 165/72 (!) 166/82   Pulse: 63 66 65 74   Resp: 16 11 12 15   Temp: 97.5 °F (36.4 °C)   97.7 °F (36.5 °C)   TempSrc: Oral      SpO2: 95%   100%   Weight: 62.6 kg (138 lb)      Height: 1.676 m (5' 6\")          Opportunity for questions and clarification was provided.

## 2025-05-31 NOTE — PROGRESS NOTES
4 Eyes Skin Assessment     NAME:  Olena Castrejon  YOB: 1960  MEDICAL RECORD NUMBER:  [id number]    The patient is being assessed for  Admission    I agree that at least one RN has performed a thorough Head to Toe Skin Assessment on the patient. ALL assessment sites listed below have been assessed.      Areas assessed by both nurses:    Head, Face, Ears, Shoulders, Back, Chest, Arms, Elbows, Hands, Sacrum. Buttock, Coccyx, Ischium, Legs. Feet and Heels, and Under Medical Devices         Does the Patient have a Wound? No noted wound(s)       Brett Prevention initiated by RN: Yes  Wound Care Orders initiated by RN: No    Pressure Injury (Stage 3,4, Unstageable, DTI, NWPT, and Complex wounds) if present, place Wound referral order by RN under : No    New Ostomies, if present place, Ostomy referral order under : No     Nurse 1 eSignature: Electronically signed by Ruby Holland RN on 5/31/25 at 2:57 AM EDT    **SHARE this note so that the co-signing nurse can place an eSignature**    Nurse 2 eSignature: Electronically signed by Aaliyah Johnson RN on 5/31/25 at 3:01 AM EDT

## 2025-06-01 LAB
ALBUMIN SERPL-MCNC: 3.5 G/DL (ref 3.5–5.2)
ALP SERPL-CCNC: 121 U/L (ref 35–104)
ALT SERPL-CCNC: 10 U/L (ref 0–32)
ANION GAP SERPL CALCULATED.3IONS-SCNC: 12 MMOL/L (ref 7–16)
AST SERPL-CCNC: 13 U/L (ref 0–31)
BASOPHILS # BLD: 0.03 K/UL (ref 0–0.2)
BASOPHILS NFR BLD: 0 % (ref 0–2)
BILIRUB SERPL-MCNC: 0.2 MG/DL (ref 0–1.2)
BUN SERPL-MCNC: 28 MG/DL (ref 6–23)
CALCIUM SERPL-MCNC: 8.5 MG/DL (ref 8.6–10.2)
CHLORIDE SERPL-SCNC: 110 MMOL/L (ref 98–107)
CO2 SERPL-SCNC: 18 MMOL/L (ref 22–29)
CREAT SERPL-MCNC: 1.6 MG/DL (ref 0.5–1)
EOSINOPHIL # BLD: 0.17 K/UL (ref 0.05–0.5)
EOSINOPHILS RELATIVE PERCENT: 3 % (ref 0–6)
ERYTHROCYTE [DISTWIDTH] IN BLOOD BY AUTOMATED COUNT: 13.7 % (ref 11.5–15)
GFR, ESTIMATED: 37 ML/MIN/1.73M2
GLUCOSE SERPL-MCNC: 197 MG/DL (ref 74–99)
HCT VFR BLD AUTO: 33.5 % (ref 34–48)
HGB BLD-MCNC: 10.8 G/DL (ref 11.5–15.5)
IMM GRANULOCYTES # BLD AUTO: 0.04 K/UL (ref 0–0.58)
IMM GRANULOCYTES NFR BLD: 1 % (ref 0–5)
LYMPHOCYTES NFR BLD: 1.2 K/UL (ref 1.5–4)
LYMPHOCYTES RELATIVE PERCENT: 18 % (ref 20–42)
MCH RBC QN AUTO: 29.4 PG (ref 26–35)
MCHC RBC AUTO-ENTMCNC: 32.2 G/DL (ref 32–34.5)
MCV RBC AUTO: 91.3 FL (ref 80–99.9)
MONOCYTES NFR BLD: 0.58 K/UL (ref 0.1–0.95)
MONOCYTES NFR BLD: 9 % (ref 2–12)
NEUTROPHILS NFR BLD: 70 % (ref 43–80)
NEUTS SEG NFR BLD: 4.75 K/UL (ref 1.8–7.3)
PLATELET # BLD AUTO: 173 K/UL (ref 130–450)
PMV BLD AUTO: 11.5 FL (ref 7–12)
POTASSIUM SERPL-SCNC: 4.6 MMOL/L (ref 3.5–5)
PROT SERPL-MCNC: 6.3 G/DL (ref 6.4–8.3)
RBC # BLD AUTO: 3.67 M/UL (ref 3.5–5.5)
SODIUM SERPL-SCNC: 140 MMOL/L (ref 132–146)
WBC OTHER # BLD: 6.8 K/UL (ref 4.5–11.5)

## 2025-06-01 PROCEDURE — 2500000003 HC RX 250 WO HCPCS: Performed by: STUDENT IN AN ORGANIZED HEALTH CARE EDUCATION/TRAINING PROGRAM

## 2025-06-01 PROCEDURE — 2580000003 HC RX 258: Performed by: INTERNAL MEDICINE

## 2025-06-01 PROCEDURE — 85025 COMPLETE CBC W/AUTO DIFF WBC: CPT

## 2025-06-01 PROCEDURE — 99232 SBSQ HOSP IP/OBS MODERATE 35: CPT | Performed by: INTERNAL MEDICINE

## 2025-06-01 PROCEDURE — 97530 THERAPEUTIC ACTIVITIES: CPT

## 2025-06-01 PROCEDURE — 80053 COMPREHEN METABOLIC PANEL: CPT

## 2025-06-01 PROCEDURE — 6360000002 HC RX W HCPCS: Performed by: STUDENT IN AN ORGANIZED HEALTH CARE EDUCATION/TRAINING PROGRAM

## 2025-06-01 PROCEDURE — 97161 PT EVAL LOW COMPLEX 20 MIN: CPT

## 2025-06-01 PROCEDURE — 6370000000 HC RX 637 (ALT 250 FOR IP)

## 2025-06-01 PROCEDURE — 6370000000 HC RX 637 (ALT 250 FOR IP): Performed by: STUDENT IN AN ORGANIZED HEALTH CARE EDUCATION/TRAINING PROGRAM

## 2025-06-01 PROCEDURE — 1200000000 HC SEMI PRIVATE

## 2025-06-01 PROCEDURE — 36415 COLL VENOUS BLD VENIPUNCTURE: CPT

## 2025-06-01 RX ORDER — HYDROXYZINE PAMOATE 25 MG/1
25 CAPSULE ORAL 3 TIMES DAILY PRN
Status: DISCONTINUED | OUTPATIENT
Start: 2025-06-01 | End: 2025-06-02 | Stop reason: HOSPADM

## 2025-06-01 RX ADMIN — MEMANTINE HYDROCHLORIDE 10 MG: 10 TABLET, FILM COATED ORAL at 20:53

## 2025-06-01 RX ADMIN — DEXTROSE MONOHYDRATE: 50 INJECTION, SOLUTION INTRAVENOUS at 13:36

## 2025-06-01 RX ADMIN — MEMANTINE HYDROCHLORIDE 10 MG: 10 TABLET, FILM COATED ORAL at 08:43

## 2025-06-01 RX ADMIN — WATER 2000 MG: 1 INJECTION INTRAMUSCULAR; INTRAVENOUS; SUBCUTANEOUS at 20:53

## 2025-06-01 RX ADMIN — ACETAMINOPHEN 650 MG: 325 TABLET ORAL at 08:45

## 2025-06-01 RX ADMIN — ASPIRIN 81 MG: 81 TABLET, COATED ORAL at 08:43

## 2025-06-01 RX ADMIN — Medication 3 MG: at 20:53

## 2025-06-01 RX ADMIN — HYDROXYZINE PAMOATE 25 MG: 25 CAPSULE ORAL at 02:46

## 2025-06-01 RX ADMIN — AMLODIPINE BESYLATE 5 MG: 5 TABLET ORAL at 08:43

## 2025-06-01 RX ADMIN — APIXABAN 5 MG: 5 TABLET, FILM COATED ORAL at 08:43

## 2025-06-01 RX ADMIN — SODIUM CHLORIDE, PRESERVATIVE FREE 10 ML: 5 INJECTION INTRAVENOUS at 20:54

## 2025-06-01 RX ADMIN — LOSARTAN POTASSIUM 100 MG: 50 TABLET, FILM COATED ORAL at 08:43

## 2025-06-01 RX ADMIN — RIVASTIGMINE TARTRATE 6 MG: 3 CAPSULE ORAL at 08:43

## 2025-06-01 RX ADMIN — RIVASTIGMINE TARTRATE 6 MG: 3 CAPSULE ORAL at 20:55

## 2025-06-01 RX ADMIN — APIXABAN 5 MG: 5 TABLET, FILM COATED ORAL at 20:53

## 2025-06-01 ASSESSMENT — PAIN SCALES - GENERAL: PAINLEVEL_OUTOF10: 6

## 2025-06-01 ASSESSMENT — PAIN DESCRIPTION - DESCRIPTORS: DESCRIPTORS: ACHING;DISCOMFORT

## 2025-06-01 ASSESSMENT — PAIN DESCRIPTION - LOCATION: LOCATION: GENERALIZED

## 2025-06-01 ASSESSMENT — PAIN - FUNCTIONAL ASSESSMENT: PAIN_FUNCTIONAL_ASSESSMENT: PREVENTS OR INTERFERES SOME ACTIVE ACTIVITIES AND ADLS

## 2025-06-01 NOTE — PROGRESS NOTES
Pt refused all glucose checks. Pt educated it is to monitor her blood sugar especially because she has IV fluids with dextrose running. Pt adament she is not diabetic even though her A1C from April 2025 was elevated and has also been in previous checks.        Electronically signed by Daphne Pelayo RN on 6/1/2025 at 7:26 PM

## 2025-06-01 NOTE — PROGRESS NOTES
Patient is refusing morning POC glucose check. States she does not know why we keep trying to check if she is not diabetic. Educated patient that she is receiving dextrose and we need to monitor her glucose and she still refused.

## 2025-06-01 NOTE — PROGRESS NOTES
[facility] Hospitalist   Progress Note    Admitting Date and Time: 5/30/2025  4:55 PM  Admit Dx: Acute UTI [N39.0]  Failure to thrive in adult [R62.7]  Acute cystitis without hematuria [N30.00]    Subjective:    Patient was admitted with Acute UTI [N39.0]  Failure to thrive in adult [R62.7]  Acute cystitis without hematuria [N30.00]. Patient denies fever, chills, cp, sob, n/v.     amLODIPine  5 mg Oral Daily    aspirin  81 mg Oral Daily    apixaban  5 mg Oral BID    losartan  100 mg Oral Daily    memantine  10 mg Oral BID    rivastigmine  6 mg Oral BID    sodium chloride flush  5-40 mL IntraVENous 2 times per day    cefTRIAXone (ROCEPHIN) IV  2,000 mg IntraVENous Q24H    insulin lispro  0-4 Units SubCUTAneous 4x Daily AC & HS     hydrOXYzine pamoate, 25 mg, TID PRN  melatonin, 3 mg, Nightly PRN  docusate sodium, 100 mg, BID PRN  sodium chloride flush, 5-40 mL, PRN  sodium chloride, , PRN  potassium chloride, 40 mEq, PRN   Or  potassium alternative oral replacement, 40 mEq, PRN   Or  potassium chloride, 10 mEq, PRN  magnesium sulfate, 2,000 mg, PRN  ondansetron, 4 mg, Q8H PRN   Or  ondansetron, 4 mg, Q6H PRN  polyethylene glycol, 17 g, Daily PRN  acetaminophen, 650 mg, Q6H PRN   Or  acetaminophen, 650 mg, Q6H PRN         Objective:    BP (!) 143/67   Pulse 70   Temp 98.4 °F (36.9 °C) (Oral)   Resp 18   Ht 1.676 m (5' 6\")   Wt 94.8 kg (209 lb)   LMP  (LMP Unknown)   SpO2 90%   BMI 33.73 kg/m²   Skin: warm and dry, no rash or erythema  Pulmonary/Chest: clear to auscultation bilaterally- no wheezes, rales or rhonchi, normal air movement, no respiratory distress  Cardiovascular: rhythm reg at rate of 72  Abdomen: soft, non-tender, non-distended, normal bowel sounds, no masses or organomegaly  Extremities: no cyanosis, no clubbing, and no edema      Recent Labs     05/30/25 1809 05/30/25 2105 05/31/25  0409 06/01/25  0508     --  141 140   K 5.9* 4.3 4.8 4.6   *  --  114* 110*   CO2

## 2025-06-01 NOTE — PROGRESS NOTES
Physical Therapy IE      Attending Provider:  Miguel Lynn DO    Evaluating PT:  Hussein Shahflorinda PT    Room #:  0511/0511-A  Diagnosis:  Acute cystitis without hematuria, Fail to thrive  Pertinent PMHx/PSHx:  See PMH, Residual LT hand/UE use 2/2 post CVA  Procedure/Surgery:  NA  Precautions:  Falls, Limited use/ LT hand/post CVA,   Equipment Needs:  WW     SUBJECTIVE:    Pt lives in [facility] x 2.5 Yrs reported.  Pt reports was walking in [facility] w/o any AD to nurse station but now has been not walking and in a WC.  OBJECTIVE:   Initial Evaluation  Date: 6/1/25 Treatment Short Term/ Long Term   Goals   Was pt agreeable to Eval/treatment? Yes, Very pleasant and cooperative.      Does pt have pain? None expressed     Bed Mobility  Rolling: Indep  Supine to sit: SBA/CGA  Sit to supine: SBA/CGA  Scooting: Indep  Roll: Indep  Sup-sit: S/SBA  Sit-supine: S/SBA  Scoot: Indep   Transfers Sit to stand: CGA/SBA  Stand to sit: CGA/SBA  Stand pivot: CGA/SBA  S/SBA Sit<->stand    Ambulation    Agree to take step FWD and back but did not want to walk today. Use of WW and SBA/CGA during that standing time.   25 feet with WW S/SBA   Stair negotiation: ascended and descended  NA      ROM WFL LE's     MMT WFL LE's     AM-PAC 6 Clicks 15/24, 8 Pts automatically not assigned as refuses to walk and steps could not be assessed.        Pt is A & O x 3   Sensation:  Pt denies numbness and tingling to extremities  Edema:  NA  Balance: sitting:Indep and standing: WW use with SBA/CGA  Endurance: ++ SOB with post bed mobility and transfer to stand.    ASSESSMENT:    Comments:  In bed on arrival in no distress.  Very pleasant and cooperative with exception did not want to walk but willing to stand.  Has residual (post CVA) LT hand weakness and minimal /is RT hand dependant.  Mobility as above grid.  No post positional change in bed and from sit-stand reported.  Stood with WW good 3-4 min w/o buckle or dizziness. Wanted to return back to

## 2025-06-02 VITALS
DIASTOLIC BLOOD PRESSURE: 71 MMHG | HEART RATE: 63 BPM | OXYGEN SATURATION: 94 % | TEMPERATURE: 97.7 F | HEIGHT: 66 IN | RESPIRATION RATE: 16 BRPM | BODY MASS INDEX: 32.3 KG/M2 | WEIGHT: 201 LBS | SYSTOLIC BLOOD PRESSURE: 137 MMHG

## 2025-06-02 LAB
ALBUMIN SERPL-MCNC: 3.3 G/DL (ref 3.5–5.2)
ALP SERPL-CCNC: 126 U/L (ref 35–104)
ALT SERPL-CCNC: 14 U/L (ref 0–32)
ANION GAP SERPL CALCULATED.3IONS-SCNC: 11 MMOL/L (ref 7–16)
AST SERPL-CCNC: 18 U/L (ref 0–31)
BASOPHILS # BLD: 0.05 K/UL (ref 0–0.2)
BASOPHILS NFR BLD: 1 % (ref 0–2)
BILIRUB SERPL-MCNC: 0.2 MG/DL (ref 0–1.2)
BUN SERPL-MCNC: 19 MG/DL (ref 6–23)
CALCIUM SERPL-MCNC: 8.5 MG/DL (ref 8.6–10.2)
CHLORIDE SERPL-SCNC: 107 MMOL/L (ref 98–107)
CO2 SERPL-SCNC: 18 MMOL/L (ref 22–29)
CREAT SERPL-MCNC: 1.4 MG/DL (ref 0.5–1)
CRP SERPL HS-MCNC: 38.1 MG/L (ref 0–5)
EKG ATRIAL RATE: 63 BPM
EKG P AXIS: 19 DEGREES
EKG P-R INTERVAL: 150 MS
EKG Q-T INTERVAL: 402 MS
EKG QRS DURATION: 78 MS
EKG QTC CALCULATION (BAZETT): 411 MS
EKG R AXIS: -5 DEGREES
EKG T AXIS: 43 DEGREES
EKG VENTRICULAR RATE: 63 BPM
EOSINOPHIL # BLD: 0.34 K/UL (ref 0.05–0.5)
EOSINOPHILS RELATIVE PERCENT: 5 % (ref 0–6)
ERYTHROCYTE [DISTWIDTH] IN BLOOD BY AUTOMATED COUNT: 13.3 % (ref 11.5–15)
ERYTHROCYTE [SEDIMENTATION RATE] IN BLOOD BY WESTERGREN METHOD: 15 MM/HR (ref 0–20)
GFR, ESTIMATED: 42 ML/MIN/1.73M2
GLUCOSE BLD-MCNC: 172 MG/DL (ref 74–99)
GLUCOSE SERPL-MCNC: 192 MG/DL (ref 74–99)
HCT VFR BLD AUTO: 34.7 % (ref 34–48)
HGB BLD-MCNC: 11 G/DL (ref 11.5–15.5)
IMM GRANULOCYTES # BLD AUTO: <0.03 K/UL (ref 0–0.58)
IMM GRANULOCYTES NFR BLD: 0 % (ref 0–5)
LYMPHOCYTES NFR BLD: 1.08 K/UL (ref 1.5–4)
LYMPHOCYTES RELATIVE PERCENT: 16 % (ref 20–42)
MCH RBC QN AUTO: 29.5 PG (ref 26–35)
MCHC RBC AUTO-ENTMCNC: 31.7 G/DL (ref 32–34.5)
MCV RBC AUTO: 93 FL (ref 80–99.9)
MICROORGANISM SPEC CULT: ABNORMAL
MONOCYTES NFR BLD: 0.65 K/UL (ref 0.1–0.95)
MONOCYTES NFR BLD: 10 % (ref 2–12)
NEUTROPHILS NFR BLD: 68 % (ref 43–80)
NEUTS SEG NFR BLD: 4.54 K/UL (ref 1.8–7.3)
PLATELET # BLD AUTO: 182 K/UL (ref 130–450)
PMV BLD AUTO: 10.8 FL (ref 7–12)
POTASSIUM SERPL-SCNC: 4.1 MMOL/L (ref 3.5–5)
PROT SERPL-MCNC: 6.3 G/DL (ref 6.4–8.3)
RBC # BLD AUTO: 3.73 M/UL (ref 3.5–5.5)
SERVICE CMNT-IMP: ABNORMAL
SODIUM SERPL-SCNC: 136 MMOL/L (ref 132–146)
SPECIMEN DESCRIPTION: ABNORMAL
WBC OTHER # BLD: 6.7 K/UL (ref 4.5–11.5)

## 2025-06-02 PROCEDURE — 86140 C-REACTIVE PROTEIN: CPT

## 2025-06-02 PROCEDURE — 82962 GLUCOSE BLOOD TEST: CPT

## 2025-06-02 PROCEDURE — 6370000000 HC RX 637 (ALT 250 FOR IP): Performed by: REGISTERED NURSE

## 2025-06-02 PROCEDURE — 93010 ELECTROCARDIOGRAM REPORT: CPT | Performed by: INTERNAL MEDICINE

## 2025-06-02 PROCEDURE — 80053 COMPREHEN METABOLIC PANEL: CPT

## 2025-06-02 PROCEDURE — 85652 RBC SED RATE AUTOMATED: CPT

## 2025-06-02 PROCEDURE — 99239 HOSP IP/OBS DSCHRG MGMT >30: CPT | Performed by: INTERNAL MEDICINE

## 2025-06-02 PROCEDURE — 97165 OT EVAL LOW COMPLEX 30 MIN: CPT

## 2025-06-02 PROCEDURE — 6370000000 HC RX 637 (ALT 250 FOR IP): Performed by: STUDENT IN AN ORGANIZED HEALTH CARE EDUCATION/TRAINING PROGRAM

## 2025-06-02 PROCEDURE — 85025 COMPLETE CBC W/AUTO DIFF WBC: CPT

## 2025-06-02 RX ORDER — TACROLIMUS 1 MG/1
1 CAPSULE ORAL 2 TIMES DAILY
Status: DISCONTINUED | OUTPATIENT
Start: 2025-06-02 | End: 2025-06-02 | Stop reason: HOSPADM

## 2025-06-02 RX ORDER — TACROLIMUS 0.5 MG/1
0.5 CAPSULE ORAL 2 TIMES DAILY
Status: DISCONTINUED | OUTPATIENT
Start: 2025-06-02 | End: 2025-06-02 | Stop reason: HOSPADM

## 2025-06-02 RX ORDER — GRANULES FOR ORAL 3 G/1
3 POWDER ORAL ONCE
Status: COMPLETED | OUTPATIENT
Start: 2025-06-02 | End: 2025-06-02

## 2025-06-02 RX ADMIN — MEMANTINE HYDROCHLORIDE 10 MG: 10 TABLET, FILM COATED ORAL at 08:18

## 2025-06-02 RX ADMIN — ASPIRIN 81 MG: 81 TABLET, COATED ORAL at 08:18

## 2025-06-02 RX ADMIN — GRANULES FOR ORAL SOLUTION 1 PACKET: 3 POWDER ORAL at 11:13

## 2025-06-02 RX ADMIN — APIXABAN 5 MG: 5 TABLET, FILM COATED ORAL at 08:18

## 2025-06-02 RX ADMIN — AMLODIPINE BESYLATE 5 MG: 5 TABLET ORAL at 08:18

## 2025-06-02 RX ADMIN — LOSARTAN POTASSIUM 100 MG: 50 TABLET, FILM COATED ORAL at 08:18

## 2025-06-02 RX ADMIN — RIVASTIGMINE TARTRATE 6 MG: 3 CAPSULE ORAL at 08:18

## 2025-06-02 ASSESSMENT — PAIN SCALES - GENERAL: PAINLEVEL_OUTOF10: 0

## 2025-06-02 NOTE — CARE COORDINATION
Social Work discharge planning  Per [facility], the will submit for precert once OT note available in chart.  Electronically signed by CULLEN Martinez on 6/2/2025 at 11:47 AM     Addendum   [facility] has precert approval. Notified charge Rn.  Electronically signed by CULLEN Martinez on 6/2/2025 at 1:20 PM     Addendum  Pt to [facility] at 4p today  via snf's van. Charge RnBette with [facility], daughter Rossi [phone] all notified.   Electronically signed by CULLEN Martinez on 6/2/2025 at 1:34 PM

## 2025-06-02 NOTE — CONSULTS
[facility]  [facility]  Consultation Note     Admit Date: 5/30/2025  4:55 PM    Reason for Consult:   Possible failure of outpatient treatment; patient with ESBL    Attending Physician:  Miguel Lynn DO    HISTORY OF PRESENT ILLNESS:             The history is obtained from extensive review of available past medical records. The patient is a 64 y.o. female who is previously known to the ID service.    She presented to the ED at [facility] on 5/30/2025 with complaints of UTI with abdominal pain and dysuria.  She says that she is on multiple antibiotics but the symptoms never really truly went away.  She never had fevers or chills.  She does live in an assisted living and is getting weaker so her PCP wanted her admitted to be placed in rehab.  On exam she says she is feeling significantly better than when she came in.  She has been afebrile during her admission.    Labs in the ED showed a WBC of 5.8, CRP 38.1.  UA showed pyuria with 2+ bacteria.Urine in the pure wick is slightly turbid.  She denies any dysuria today.  CT of the abdomen pelvis showed a nonobstructing calculus.  No obstructive uropathy.  No stranding or evidence of pyelonephritis.  She has been on Rocephin.  ID was asked to see today in consultation as urine culture came back with ESBL + E. coli.    Past Medical History:      January 2017.  Admitted to [facility] with shortness of breath and viral-like symptoms.  She does have a history of a liver transplant in October 2016, with rejection in December 2016 and was treated with Thymoglobulin.  She was continued on her prophylactic acyclovir and Bactrim.  No infections were found.      Diagnosis Date    Arthritis     right knee    Blood circulation, collateral     Chronic fatigue 4/11/2016    Cirrhosis of liver (HCC)     end stage    COPD exacerbation (HCC) 4/29/2015    pt denies having    Diabetes mellitus (HCC)     Essential hypertension 2/15/2016    Gall

## 2025-06-02 NOTE — ACP (ADVANCE CARE PLANNING)
Advance Care Planning   Healthcare Decision Maker:    Primary Decision Maker: Rossi Allison - Child - [phone]    Secondary Decision Maker: Clare Packer - Child - [phone]    Click here to complete Healthcare Decision Makers including selection of the Healthcare Decision Maker Relationship (ie \"Primary\").

## 2025-06-02 NOTE — PLAN OF CARE
Problem: Chronic Conditions and Co-morbidities  Goal: Patient's chronic conditions and co-morbidity symptoms are monitored and maintained or improved  6/1/2025 0052 by Linda Nicole RN  Outcome: Progressing  6/1/2025 0052 by Linda Nicole RN  Outcome: Progressing     Problem: Skin/Tissue Integrity  Goal: Skin integrity remains intact  Description: 1.  Monitor for areas of redness and/or skin breakdown2.  Assess vascular access sites hourly3.  Every 4-6 hours minimum:  Change oxygen saturation probe site4.  Every 4-6 hours:  If on nasal continuous positive airway pressure, respiratory therapy assess nares and determine need for appliance change or resting period  6/1/2025 0052 by Linda Nicole RN  Outcome: Progressing  6/1/2025 0052 by Linda Nicole RN  Outcome: Progressing     Problem: Safety - Adult  Goal: Free from fall injury  6/1/2025 0052 by Linda Nicole RN  Outcome: Progressing  6/1/2025 0052 by Linda Nicole RN  Outcome: Progressing     Problem: Anxiety  Goal: Will report anxiety at manageable levels  Description: INTERVENTIONS:1. Administer medication as ordered2. Teach and rehearse alternative coping skills3. Provide emotional support with 1:1 interaction with staff  6/1/2025 0052 by Linda Nicole RN  Outcome: Progressing  6/1/2025 0052 by Linda Nicole RN  Outcome: Progressing     Problem: Coping  Goal: Pt/Family able to verbalize concerns and demonstrate effective coping strategies  Description: INTERVENTIONS:1. Assist patient/family to identify coping skills, available support systems and cultural and spiritual values2. Provide emotional support, including active listening and acknowledgement of concerns of patient and caregivers3. Reduce environmental stimuli, as able4. Instruct patient/family in relaxation techniques, as appropriate5. Assess for spiritual pain/suffering and initiate Spiritual Care, Psychosocial Clinical Specialist consults as needed  6/1/2025 0052 by 
  Problem: Chronic Conditions and Co-morbidities  Goal: Patient's chronic conditions and co-morbidity symptoms are monitored and maintained or improved  Outcome: Progressing     Problem: Skin/Tissue Integrity  Goal: Skin integrity remains intact  Description: 1.  Monitor for areas of redness and/or skin breakdown2.  Assess vascular access sites hourly3.  Every 4-6 hours minimum:  Change oxygen saturation probe site4.  Every 4-6 hours:  If on nasal continuous positive airway pressure, respiratory therapy assess nares and determine need for appliance change or resting period  Outcome: Progressing     Problem: Safety - Adult  Goal: Free from fall injury  Outcome: Progressing     Problem: Anxiety  Goal: Will report anxiety at manageable levels  Description: INTERVENTIONS:1. Administer medication as ordered2. Teach and rehearse alternative coping skills3. Provide emotional support with 1:1 interaction with staff  Outcome: Progressing     Problem: Coping  Goal: Pt/Family able to verbalize concerns and demonstrate effective coping strategies  Description: INTERVENTIONS:1. Assist patient/family to identify coping skills, available support systems and cultural and spiritual values2. Provide emotional support, including active listening and acknowledgement of concerns of patient and caregivers3. Reduce environmental stimuli, as able4. Instruct patient/family in relaxation techniques, as appropriate5. Assess for spiritual pain/suffering and initiate Spiritual Care, Psychosocial Clinical Specialist consults as needed  Outcome: Progressing     Problem: Decision Making  Goal: Pt/Family able to effectively weigh alternatives and participate in decision making related to treatment and care  Description: INTERVENTIONS:1. Determine when there are differences between patient's view, family's view, and healthcare provider's view of condition2. Facilitate patient and family articulation of goals for care3. Help patient and family 
safety call refer to organization policy.7. Initiate consult with , Psychosocial CNS, Spiritual Care as appropriate  Outcome: Progressing     Problem: Neurosensory - Adult  Goal: Achieves stable or improved neurological status  Outcome: Progressing  Goal: Achieves maximal functionality and self care  Outcome: Progressing     Problem: Respiratory - Adult  Goal: Achieves optimal ventilation and oxygenation  Outcome: Progressing     Problem: Skin/Tissue Integrity - Adult  Goal: Skin integrity remains intact  Description: 1.  Monitor for areas of redness and/or skin breakdown2.  Assess vascular access sites hourly3.  Every 4-6 hours minimum:  Change oxygen saturation probe site4.  Every 4-6 hours:  If on nasal continuous positive airway pressure, respiratory therapy assess nares and determine need for appliance change or resting period  Outcome: Progressing  Goal: Oral mucous membranes remain intact  Outcome: Progressing     Problem: Musculoskeletal - Adult  Goal: Return mobility to safest level of function  Outcome: Progressing  Goal: Return ADL status to a safe level of function  Outcome: Progressing     Problem: Genitourinary - Adult  Goal: Absence of urinary retention  Outcome: Progressing

## 2025-06-02 NOTE — DISCHARGE SUMMARY
tablet  Generic drug: brexpiprazole           * This list has 1 medication(s) that are the same as other medications prescribed for you. Read the directions carefully, and ask your doctor or other care provider to review them with you.                    Total time for discharge is 37 min    Signed:  Electronically signed by Miguel Lynn DO on 6/2/2025 at 3:39 PM

## 2025-06-02 NOTE — DISCHARGE INSTR - COC
(1500kcals/day)    Routes of Feeding: Oral  Liquids: Thin Liquids  Daily Fluid Restriction: no  Last Modified Barium Swallow with Video (Video Swallowing Test): not done    Treatments at the Time of Hospital Discharge:   Respiratory Treatments: none room air  Oxygen Therapy:   None   Ventilator:    - No ventilator support    Rehab Therapies: Physical Therapy and Occupational Therapy  Weight Bearing Status/Restrictions: No weight bearing restrictions  Other Medical Equipment (for information only, NOT a DME order):  wheelchair  Other Treatments: none    Patient's personal belongings (please select all that are sent with patient):  Glasses, clothing and ring monica ferreira    RN SIGNATURE:  {Esignature:[id number]}    CASE MANAGEMENT/SOCIAL WORK SECTION    Inpatient Status Date: ***    Readmission Risk Assessment Score:  [facility] RISK OF UNPLANNED READMISSION 2.0             15.7 Total Score        Discharging to Facility/ Agency   Name: [facility]  Address: [address] [place] [address]   Phone: [phone]  Fax: [phone]    / signature: Electronically signed by CULLEN Martinez on 6/2/25 at 11:31 AM EDT    PHYSICIAN SECTION    Prognosis: {Prognosis:6688489598}    Condition at Discharge: Stable    Rehab Potential (if transferring to Rehab): {Prognosis:0397811262}    Recommended Labs or Other Treatments After Discharge: recommend starting sliding scale insulin    Physician Certification: I certify the above information and transfer of Olena Castrejon  is necessary for the continuing treatment of the diagnosis listed and that she requires Skilled Nursing Facility for less 30 days.     Update Admission H&P: {CHP DME Changes in HandP:386801967}    PHYSICIAN SIGNATURE:  Electronically signed by Miguel Lynn DO on 6/2/25 at 3:18 PM EDT

## 2025-06-02 NOTE — PROGRESS NOTES
Occupational Therapy  OCCUPATIONAL THERAPY INITIAL EVALUATION    [facility]   [address], [place]         Date:2025                                                  Patient Name: Olena Castrejon    MRN: [id number]    : 1960    Room: [address] [place] [address]      Evaluating OT: Elif Medina OTR/L [id number]       Referring Provider:Ignacio Mitchell MD     Specific Provider Orders/Date: OT eval and treat 25       Diagnosis: Acute UTI     Surgery: none      Pertinent Medical History:  COPD, Arthritis, Cirrhosis of Liver, Neuropathy, CVA ,DM           Precautions:  Fall Risk, LUE andrea,alarms ,contact isolation     Assessment of current deficits:    [x] Functional mobility  [x]ADLs  [x] Strength               [x]Cognition    [x] Functional transfers   [x] IADLs         [x] Safety Awareness   [x]Endurance    [] Fine Coordination              [x] Balance      [] Vision/perception   []Sensation     []Gross Motor Coordination  [x] ROM  [] Delirium                   [] Motor Control     OT PLAN OF CARE   OT POC based on physician orders, patient diagnosis and results of clinical assessment    Frequency/Duration 2-5 days/wk for 2 -4weeks PRN   Specific OT Treatment Interventions to include:   * Instruction/training on adapted ADL techniques and AE recommendations to increase functional independence within precautions       * Training on energy conservation strategies, correct breathing pattern and techniques to improve independence/tolerance for self-care routine  * Functional transfer/mobility training/DME recommendations for increased independence, safety, and fall prevention  * Patient/Family education to increase follow through with safety techniques and functional independence  * Recommendation of environmental modifications for increased safety with functional transfers/mobility and ADLs  * Therapeutic exercise to improve motor endurance, ROM, and

## 2025-06-02 NOTE — PROGRESS NOTES
Patient refusing blood sugar check, explained the importance of checking blood sugar with dextrose running. She stated she wasn't a diabetic.

## 2025-06-18 DIAGNOSIS — J30.1 SEASONAL ALLERGIC RHINITIS DUE TO POLLEN: ICD-10-CM

## 2025-06-18 DIAGNOSIS — R11.2 NAUSEA AND VOMITING, UNSPECIFIED VOMITING TYPE: ICD-10-CM

## 2025-06-18 DIAGNOSIS — R60.0 LEG EDEMA: ICD-10-CM

## 2025-06-18 RX ORDER — ONDANSETRON 4 MG/1
TABLET, FILM COATED ORAL
Qty: 1 TABLET | Refills: 0 | OUTPATIENT
Start: 2025-06-18

## 2025-06-18 RX ORDER — FUROSEMIDE 20 MG/1
TABLET ORAL
Qty: 90 TABLET | Refills: 0 | Status: SHIPPED | OUTPATIENT
Start: 2025-06-18

## 2025-06-18 RX ORDER — FLUTICASONE PROPIONATE 50 MCG
SPRAY, SUSPENSION (ML) NASAL
Qty: 16 G | Refills: 1 | Status: SHIPPED | OUTPATIENT
Start: 2025-06-18

## 2025-06-18 NOTE — TELEPHONE ENCOUNTER
Name of Medication(s) Requested:  Requested Prescriptions     Pending Prescriptions Disp Refills    fluticasone (FLONASE) 50 MCG/ACT nasal spray [Pharmacy Med Name: FLUTICASONE 0.05% NASAL SPR 50 MCG SPRY] 16 g 1     Sig: USE 2 SPRAYS IN BOTH NOSTRILS ONCE DAILY *SHAKE GENTLY*    furosemide (LASIX) 20 MG tablet [Pharmacy Med Name: FUROSEMIDE 20 MG TABS 20 Tablet] 90 tablet 0     Sig: TAKE 1 TABLET BY MOUTH DAILY AS NEEDED FOR LEG SWELLING **RE-ORDER ACCORDINGLY**       Medication is on current medication list Yes    Dosage and directions were verified? Yes    Quantity verified: 30 day supply     Pharmacy Verified?  Yes    Last Appointment:  5/28/2025    Future appts:  No future appointments.     (If no appt send self scheduling link. .[email or web address])  Scheduling request sent?     [] Yes  [x] No    Does patient need updated?  [] Yes  [x] No